# Patient Record
Sex: MALE | Race: WHITE | Employment: OTHER | URBAN - METROPOLITAN AREA
[De-identification: names, ages, dates, MRNs, and addresses within clinical notes are randomized per-mention and may not be internally consistent; named-entity substitution may affect disease eponyms.]

---

## 2017-07-19 PROBLEM — Z86.79 H/O ENDOCARDITIS: Status: ACTIVE | Noted: 2017-07-19

## 2018-05-10 ENCOUNTER — OFFICE VISIT (OUTPATIENT)
Dept: ENT CLINIC | Age: 73
End: 2018-05-10
Payer: MEDICARE

## 2018-05-10 VITALS
SYSTOLIC BLOOD PRESSURE: 120 MMHG | DIASTOLIC BLOOD PRESSURE: 70 MMHG | OXYGEN SATURATION: 97 % | HEIGHT: 72 IN | HEART RATE: 70 BPM | BODY MASS INDEX: 25.73 KG/M2 | WEIGHT: 190 LBS

## 2018-05-10 DIAGNOSIS — H61.23 BILATERAL IMPACTED CERUMEN: Primary | ICD-10-CM

## 2018-05-10 DIAGNOSIS — J30.2 SEASONAL ALLERGIC RHINITIS, UNSPECIFIED CHRONICITY, UNSPECIFIED TRIGGER: ICD-10-CM

## 2018-05-10 PROCEDURE — 69210 REMOVE IMPACTED EAR WAX UNI: CPT | Performed by: OTOLARYNGOLOGY

## 2018-05-10 PROCEDURE — 99213 OFFICE O/P EST LOW 20 MIN: CPT | Performed by: OTOLARYNGOLOGY

## 2018-05-10 RX ORDER — AZELASTINE 1 MG/ML
1 SPRAY, METERED NASAL 2 TIMES DAILY
Qty: 30 ML | Refills: 3 | Status: SHIPPED | OUTPATIENT
Start: 2018-05-10 | End: 2018-11-16 | Stop reason: SDUPTHER

## 2018-05-10 ASSESSMENT — ENCOUNTER SYMPTOMS
COUGH: 0
DIARRHEA: 0
SHORTNESS OF BREATH: 0
RHINORRHEA: 0
CONSTIPATION: 0
NAUSEA: 0
VOMITING: 0
SORE THROAT: 0

## 2018-05-23 ENCOUNTER — OFFICE VISIT (OUTPATIENT)
Dept: NEUROLOGY | Age: 73
End: 2018-05-23
Payer: MEDICARE

## 2018-05-23 VITALS
DIASTOLIC BLOOD PRESSURE: 77 MMHG | TEMPERATURE: 98.3 F | SYSTOLIC BLOOD PRESSURE: 125 MMHG | WEIGHT: 190 LBS | BODY MASS INDEX: 25.73 KG/M2 | RESPIRATION RATE: 18 BRPM | HEIGHT: 72 IN | OXYGEN SATURATION: 96 % | HEART RATE: 69 BPM

## 2018-05-23 DIAGNOSIS — G35 MULTIPLE SCLEROSIS (HCC): Primary | ICD-10-CM

## 2018-05-23 PROCEDURE — 99213 OFFICE O/P EST LOW 20 MIN: CPT | Performed by: NURSE PRACTITIONER

## 2018-05-23 RX ORDER — PROPYLENE GLYCOL/PEG 400 0.3 %-0.4%
DROPS OPHTHALMIC (EYE)
COMMUNITY
End: 2020-06-18

## 2018-07-18 RX ORDER — FLUTICASONE PROPIONATE 50 MCG
SPRAY, SUSPENSION (ML) NASAL
Qty: 48 G | Refills: 3 | Status: SHIPPED | OUTPATIENT
Start: 2018-07-18 | End: 2018-11-12 | Stop reason: SDUPTHER

## 2018-08-01 ENCOUNTER — OFFICE VISIT (OUTPATIENT)
Dept: CARDIOLOGY CLINIC | Age: 73
End: 2018-08-01
Payer: MEDICARE

## 2018-08-01 VITALS
HEIGHT: 72 IN | RESPIRATION RATE: 12 BRPM | HEART RATE: 71 BPM | DIASTOLIC BLOOD PRESSURE: 68 MMHG | WEIGHT: 186.8 LBS | BODY MASS INDEX: 25.3 KG/M2 | SYSTOLIC BLOOD PRESSURE: 114 MMHG

## 2018-08-01 DIAGNOSIS — I34.0 SEVERE MITRAL REGURGITATION: ICD-10-CM

## 2018-08-01 DIAGNOSIS — K21.00 GASTROESOPHAGEAL REFLUX DISEASE WITH ESOPHAGITIS: ICD-10-CM

## 2018-08-01 DIAGNOSIS — Z87.442 HISTORY OF KIDNEY STONES: ICD-10-CM

## 2018-08-01 DIAGNOSIS — K44.9 HIATAL HERNIA: ICD-10-CM

## 2018-08-01 DIAGNOSIS — E11.9 TYPE 2 DIABETES MELLITUS WITHOUT COMPLICATION, WITHOUT LONG-TERM CURRENT USE OF INSULIN (HCC): ICD-10-CM

## 2018-08-01 DIAGNOSIS — I34.1 MITRAL VALVE PROLAPSE: Primary | ICD-10-CM

## 2018-08-01 DIAGNOSIS — G35 MULTIPLE SCLEROSIS (HCC): ICD-10-CM

## 2018-08-01 DIAGNOSIS — I35.1 NONRHEUMATIC AORTIC VALVE INSUFFICIENCY: ICD-10-CM

## 2018-08-01 DIAGNOSIS — I10 ESSENTIAL HYPERTENSION: ICD-10-CM

## 2018-08-01 DIAGNOSIS — N40.1 BENIGN PROSTATIC HYPERPLASIA WITH LOWER URINARY TRACT SYMPTOMS, SYMPTOM DETAILS UNSPECIFIED: ICD-10-CM

## 2018-08-01 DIAGNOSIS — Z87.19 S/P DILATATION OF ESOPHAGEAL STRICTURE: ICD-10-CM

## 2018-08-01 DIAGNOSIS — Z90.79 S/P TURP: ICD-10-CM

## 2018-08-01 DIAGNOSIS — I27.20 PULMONARY HTN (HCC): ICD-10-CM

## 2018-08-01 DIAGNOSIS — Z86.79 HISTORY OF PSVT (PAROXYSMAL SUPRAVENTRICULAR TACHYCARDIA): ICD-10-CM

## 2018-08-01 DIAGNOSIS — I37.1 NONRHEUMATIC PULMONARY VALVE INSUFFICIENCY: ICD-10-CM

## 2018-08-01 DIAGNOSIS — I36.1 NON-RHEUMATIC TRICUSPID VALVE INSUFFICIENCY: ICD-10-CM

## 2018-08-01 DIAGNOSIS — C68.9 UROTHELIAL CARCINOMA (HCC): ICD-10-CM

## 2018-08-01 DIAGNOSIS — J61 ASBESTOSIS (HCC): ICD-10-CM

## 2018-08-01 DIAGNOSIS — Z86.79 H/O ENDOCARDITIS: ICD-10-CM

## 2018-08-01 DIAGNOSIS — F41.0 PANIC ATTACKS: ICD-10-CM

## 2018-08-01 DIAGNOSIS — F41.9 ANXIETY: ICD-10-CM

## 2018-08-01 DIAGNOSIS — K22.70 BARRETT'S ESOPHAGUS WITHOUT DYSPLASIA: ICD-10-CM

## 2018-08-01 DIAGNOSIS — Z87.19 HISTORY OF ESOPHAGEAL STRICTURE: ICD-10-CM

## 2018-08-01 DIAGNOSIS — F40.240 CLAUSTROPHOBIA: ICD-10-CM

## 2018-08-01 DIAGNOSIS — N52.9 ERECTILE DYSFUNCTION, UNSPECIFIED ERECTILE DYSFUNCTION TYPE: ICD-10-CM

## 2018-08-01 DIAGNOSIS — Z98.890 S/P DILATATION OF ESOPHAGEAL STRICTURE: ICD-10-CM

## 2018-08-01 PROCEDURE — 93000 ELECTROCARDIOGRAM COMPLETE: CPT | Performed by: INTERNAL MEDICINE

## 2018-08-01 PROCEDURE — 99213 OFFICE O/P EST LOW 20 MIN: CPT | Performed by: INTERNAL MEDICINE

## 2018-08-01 RX ORDER — TAMSULOSIN HYDROCHLORIDE 0.4 MG/1
0.4 CAPSULE ORAL EVERY MORNING
COMMUNITY
Start: 2018-06-11 | End: 2022-04-07 | Stop reason: ALTCHOICE
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
## 2018-08-13 ENCOUNTER — HOSPITAL ENCOUNTER (EMERGENCY)
Age: 73
Discharge: HOME | End: 2018-08-13
Payer: MEDICARE

## 2018-08-13 ENCOUNTER — APPOINTMENT (OUTPATIENT)
Dept: GENERAL RADIOLOGY | Age: 73
End: 2018-08-13
Payer: MEDICARE

## 2018-08-13 VITALS
RESPIRATION RATE: 14 BRPM | OXYGEN SATURATION: 96 % | WEIGHT: 190 LBS | TEMPERATURE: 97.8 F | BODY MASS INDEX: 25.73 KG/M2 | SYSTOLIC BLOOD PRESSURE: 122 MMHG | HEART RATE: 72 BPM | HEIGHT: 72 IN | DIASTOLIC BLOOD PRESSURE: 68 MMHG
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
## 2018-08-13 DIAGNOSIS — S93.402A SPRAIN OF LEFT ANKLE, UNSPECIFIED LIGAMENT, INITIAL ENCOUNTER: Primary | ICD-10-CM

## 2018-08-13 PROCEDURE — 73610 X-RAY EXAM OF ANKLE: CPT

## 2018-08-13 PROCEDURE — 99283 EMERGENCY DEPT VISIT LOW MDM: CPT

## 2018-08-13 PROCEDURE — 73564 X-RAY EXAM KNEE 4 OR MORE: CPT

## 2018-08-13 PROCEDURE — 73502 X-RAY EXAM HIP UNI 2-3 VIEWS: CPT

## 2018-08-13 PROCEDURE — 73630 X-RAY EXAM OF FOOT: CPT

## 2018-08-13 RX ORDER — HYDROCODONE BITARTRATE AND ACETAMINOPHEN 5; 325 MG/1; MG/1
1 TABLET ORAL EVERY 6 HOURS PRN
Qty: 12 TABLET | Refills: 0 | Status: SHIPPED | OUTPATIENT
Start: 2018-08-13 | End: 2018-08-16

## 2018-08-13 NOTE — ED PROVIDER NOTES
Independent MLP      HPI:  8/13/18,   Time: 12:54 PM         August Alba Kaur is a 74 y.o. male presenting to the ED for fall, beginning just prior to arrival .  The complaint has been persistent, moderate in severity, and worsened by walking. Pt reports he was working in yard and fell. States his left foot was planted and he twisted his left knee. Comes in complaining of pain in left ankle and knee. He states he fall to ground and was unable to get up due to pain. Reports he landed on his left hip. Has pain in his left ankle and foot as well. Very painful to ambulate. No swelling or bruising reported. Denies hitting his head. No LOC. States he is not on any blood thinners. ROS:     Constitutional: Negative for fever and chills  HENT: Negative for ear pain, sore throat and sinus pressure  Eyes: Negative for pain, discharge and redness  Respiratory:  Negative for shortness of breath, cough and wheezing  Cardiovascular: Negative for CP, edema or palpitations  Gastrointestinal: Negative for nausea, vomiting, diarrhea and abdominal distention  Genitourinary: Negative for dysuria and frequency  Musculoskeletal:  See HPI  Skin: Negative for rash and wound  Neurological: Negative for weakness and headaches  Hematological: Negative for adenopathy    All other systems reviewed and are negative      --------------------------------------------- PAST HISTORY ---------------------------------------------  Past Medical History:  has a past medical history of Abnormal PSA; Anxiety; Bronchitis; Claustrophobia; Diabetes mellitus ([address] Endocarditis; Enlarged LA (left atrium); Erectile dysfunction; GERD (gastroesophageal reflux disease); H/O complete arterial study of extremity; Hiatal hernia; History of EMG; Hypertension; Lung nodule; Mitral valve prolapse; Moderate tricuspid regurgitation; Multiple sclerosis ([address] Prostatitis; Renal calculi; Sinus problem; Supraventricular arrhythmia;  Tachycardia; and Tilt table

## 2018-08-17 DIAGNOSIS — M54.17 L-S RADICULOPATHY: ICD-10-CM

## 2018-08-17 RX ORDER — GABAPENTIN 100 MG/1
200 CAPSULE ORAL 3 TIMES DAILY
Qty: 180 CAPSULE | Refills: 2 | Status: SHIPPED | OUTPATIENT
Start: 2018-08-17 | End: 2018-08-21 | Stop reason: SDUPTHER

## 2018-08-21 DIAGNOSIS — M54.17 L-S RADICULOPATHY: ICD-10-CM

## 2018-08-21 RX ORDER — GABAPENTIN 100 MG/1
200 CAPSULE ORAL 3 TIMES DAILY
Qty: 180 CAPSULE | Refills: 2 | Status: SHIPPED | OUTPATIENT
Start: 2018-08-21 | End: 2019-01-28 | Stop reason: SDUPTHER

## 2018-11-05 ENCOUNTER — OFFICE VISIT (OUTPATIENT)
Dept: NEUROLOGY | Age: 73
End: 2018-11-05
Payer: MEDICARE

## 2018-11-05 VITALS
HEART RATE: 69 BPM | TEMPERATURE: 96.6 F | WEIGHT: 190 LBS | BODY MASS INDEX: 25.73 KG/M2 | OXYGEN SATURATION: 95 % | HEIGHT: 72 IN | SYSTOLIC BLOOD PRESSURE: 117 MMHG | DIASTOLIC BLOOD PRESSURE: 79 MMHG | RESPIRATION RATE: 18 BRPM

## 2018-11-05 DIAGNOSIS — G35 MULTIPLE SCLEROSIS (HCC): ICD-10-CM

## 2018-11-05 DIAGNOSIS — M54.17 L-S RADICULOPATHY: Primary | ICD-10-CM

## 2018-11-05 PROCEDURE — 99215 OFFICE O/P EST HI 40 MIN: CPT | Performed by: PSYCHIATRY & NEUROLOGY

## 2018-11-05 RX ORDER — HYDROCODONE BITARTRATE AND ACETAMINOPHEN 5; 325 MG/1; MG/1
1 TABLET ORAL EVERY 8 HOURS PRN
COMMUNITY
End: 2020-12-14

## 2018-11-05 RX ORDER — DUTASTERIDE 0.5 MG/1
0.5 CAPSULE, LIQUID FILLED ORAL EVERY MORNING
COMMUNITY
End: 2022-04-07 | Stop reason: ALTCHOICE

## 2018-11-08 ENCOUNTER — OFFICE VISIT (OUTPATIENT)
Dept: ENT CLINIC | Age: 73
End: 2018-11-08
Payer: MEDICARE

## 2018-11-08 VITALS
SYSTOLIC BLOOD PRESSURE: 111 MMHG | HEIGHT: 72 IN | OXYGEN SATURATION: 96 % | WEIGHT: 190 LBS | BODY MASS INDEX: 25.73 KG/M2 | HEART RATE: 73 BPM | DIASTOLIC BLOOD PRESSURE: 63 MMHG

## 2018-11-08 DIAGNOSIS — R42 VERTIGO: ICD-10-CM

## 2018-11-08 DIAGNOSIS — H61.23 BILATERAL IMPACTED CERUMEN: Primary | ICD-10-CM

## 2018-11-08 PROCEDURE — 69210 REMOVE IMPACTED EAR WAX UNI: CPT | Performed by: OTOLARYNGOLOGY

## 2018-11-08 PROCEDURE — 99213 OFFICE O/P EST LOW 20 MIN: CPT | Performed by: OTOLARYNGOLOGY

## 2018-11-08 ASSESSMENT — ENCOUNTER SYMPTOMS
DIARRHEA: 0
NAUSEA: 0
COUGH: 0
VOMITING: 0
SORE THROAT: 0
CONSTIPATION: 0
RHINORRHEA: 0
SHORTNESS OF BREATH: 0

## 2018-11-08 NOTE — PROGRESS NOTES
Subjective:      Patient ID:  Sonu Joe is a 74 y.o. male. HPI:    Pt presents with a history of cerumen impaction removal.   The patients ear was last cleaned 6 month(s) ago. The patient was not using ear drops to loosen wax immediately prior to thisvisit. Pt has had some vertigo issues 1 week ago. Pt turned head to the left and experienced some dizziness. Pt had seen neurology for his MS but felt it was inner ear. Hearing aids: no      Patient's medications, allergies, past medical,surgical, social and family histories were reviewed and updated as appropriate. Review of Systems   Constitutional: Negative for chills and fever. HENT: Negative for congestion, ear discharge, ear pain, postnasal drip, rhinorrhea and sore throat. Respiratory: Negative for cough and shortness of breath. Cardiovascular: Negative for chest pain. Gastrointestinal: Negative for constipation, diarrhea, nausea and vomiting. Allergic/Immunologic: Positive for environmental allergies. Neurological: Positive for dizziness. Negative for light-headedness. All other systems reviewed and are negative. Objective:   Physical Exam   Constitutional: He appears well-developed and well-nourished. HENT:   Head: Normocephalic and atraumatic. Right Ear: Hearing normal.   Left Ear: Hearing normal.   Nose: Nose normal. No mucosal edema or rhinorrhea. Mouth/Throat: Normal dentition. No dental abscesses or dental caries. No oropharyngeal exudate, posterior oropharyngeal edema, posterior oropharyngeal erythema or tonsillar abscesses. Bilateral cerumen impaction. Eyes: Pupils are equal, round, and reactive to light. Conjunctivae are normal.   Neck: Normal range of motion. Cardiovascular: Normal rate. Abdominal: Soft. Neurological: He is alert. Skin: Skin is warm. Cerumen removal     Auditory canal(s) both ears completely obstructed with cerumen.   A microscope was not used due

## 2018-11-14 RX ORDER — FLUTICASONE PROPIONATE 50 MCG
1 SPRAY, SUSPENSION (ML) NASAL DAILY
Qty: 3 BOTTLE | Refills: 1 | Status: SHIPPED
Start: 2018-11-14 | End: 2020-02-07

## 2018-11-16 RX ORDER — AZELASTINE 1 MG/ML
1 SPRAY, METERED NASAL 2 TIMES DAILY
Qty: 3 BOTTLE | Refills: 1 | Status: SHIPPED | OUTPATIENT
Start: 2018-11-16 | End: 2019-07-22

## 2019-01-28 DIAGNOSIS — M54.17 L-S RADICULOPATHY: ICD-10-CM

## 2019-01-28 RX ORDER — GABAPENTIN 100 MG/1
200 CAPSULE ORAL 3 TIMES DAILY
Qty: 270 CAPSULE | Refills: 2 | Status: SHIPPED | OUTPATIENT
Start: 2019-01-28 | End: 2019-07-05 | Stop reason: SDUPTHER

## 2019-02-25 ENCOUNTER — OFFICE VISIT (OUTPATIENT)
Dept: NEUROLOGY | Age: 74
End: 2019-02-25
Payer: MEDICARE

## 2019-02-25 VITALS
SYSTOLIC BLOOD PRESSURE: 133 MMHG | TEMPERATURE: 97.4 F | OXYGEN SATURATION: 97 % | WEIGHT: 196 LBS | BODY MASS INDEX: 26.58 KG/M2 | DIASTOLIC BLOOD PRESSURE: 84 MMHG | RESPIRATION RATE: 18 BRPM

## 2019-02-25 DIAGNOSIS — G35 MULTIPLE SCLEROSIS (HCC): ICD-10-CM

## 2019-02-25 PROCEDURE — 99215 OFFICE O/P EST HI 40 MIN: CPT | Performed by: PSYCHIATRY & NEUROLOGY

## 2019-03-07 ENCOUNTER — TELEPHONE (OUTPATIENT)
Dept: ENT CLINIC | Age: 74
End: 2019-03-07

## 2019-03-20 ENCOUNTER — OFFICE VISIT (OUTPATIENT)
Dept: CARDIOLOGY CLINIC | Age: 74
End: 2019-03-20
Payer: MEDICARE

## 2019-03-20 VITALS
HEART RATE: 71 BPM | BODY MASS INDEX: 26.63 KG/M2 | HEIGHT: 72 IN | SYSTOLIC BLOOD PRESSURE: 112 MMHG | DIASTOLIC BLOOD PRESSURE: 70 MMHG | RESPIRATION RATE: 18 BRPM | WEIGHT: 196.6 LBS

## 2019-03-20 DIAGNOSIS — I34.0 SEVERE MITRAL REGURGITATION: ICD-10-CM

## 2019-03-20 DIAGNOSIS — K22.70 BARRETT'S ESOPHAGUS WITHOUT DYSPLASIA: ICD-10-CM

## 2019-03-20 DIAGNOSIS — I47.1 PAROXYSMAL SUPRAVENTRICULAR TACHYCARDIA (HCC): ICD-10-CM

## 2019-03-20 DIAGNOSIS — M21.372 FOOT DROP, LEFT: ICD-10-CM

## 2019-03-20 DIAGNOSIS — Z86.79 H/O ENDOCARDITIS: ICD-10-CM

## 2019-03-20 DIAGNOSIS — Z90.79 S/P TURP: ICD-10-CM

## 2019-03-20 DIAGNOSIS — J61 ASBESTOSIS (HCC): ICD-10-CM

## 2019-03-20 DIAGNOSIS — I38 VHD (VALVULAR HEART DISEASE): Primary | ICD-10-CM

## 2019-03-20 DIAGNOSIS — F41.9 ANXIETY: ICD-10-CM

## 2019-03-20 DIAGNOSIS — N52.8 OTHER MALE ERECTILE DYSFUNCTION: ICD-10-CM

## 2019-03-20 DIAGNOSIS — F40.240 CLAUSTROPHOBIA: ICD-10-CM

## 2019-03-20 DIAGNOSIS — Z85.51 HISTORY OF BLADDER CANCER: ICD-10-CM

## 2019-03-20 DIAGNOSIS — I36.1 NON-RHEUMATIC TRICUSPID VALVE INSUFFICIENCY: ICD-10-CM

## 2019-03-20 DIAGNOSIS — I27.20 PULMONARY HTN (HCC): ICD-10-CM

## 2019-03-20 DIAGNOSIS — K44.9 HIATAL HERNIA: ICD-10-CM

## 2019-03-20 DIAGNOSIS — G35 MULTIPLE SCLEROSIS (HCC): ICD-10-CM

## 2019-03-20 DIAGNOSIS — I10 ESSENTIAL HYPERTENSION: ICD-10-CM

## 2019-03-20 DIAGNOSIS — I34.1 MITRAL VALVE PROLAPSE: ICD-10-CM

## 2019-03-20 DIAGNOSIS — Z98.890 HISTORY OF ESOPHAGEAL DILATATION: ICD-10-CM

## 2019-03-20 DIAGNOSIS — K21.9 GASTROESOPHAGEAL REFLUX DISEASE, ESOPHAGITIS PRESENCE NOT SPECIFIED: ICD-10-CM

## 2019-03-20 DIAGNOSIS — I35.1 NONRHEUMATIC AORTIC VALVE INSUFFICIENCY: ICD-10-CM

## 2019-03-20 DIAGNOSIS — E11.9 DIET-CONTROLLED DIABETES MELLITUS (HCC): ICD-10-CM

## 2019-03-20 DIAGNOSIS — Z86.59 HISTORY OF PANIC ATTACKS: ICD-10-CM

## 2019-03-20 PROCEDURE — 93000 ELECTROCARDIOGRAM COMPLETE: CPT | Performed by: INTERNAL MEDICINE

## 2019-03-20 PROCEDURE — 99214 OFFICE O/P EST MOD 30 MIN: CPT | Performed by: INTERNAL MEDICINE

## 2019-04-29 ENCOUNTER — HOSPITAL ENCOUNTER (OUTPATIENT)
Dept: CARDIOLOGY | Age: 74
Discharge: HOME | End: 2019-04-29
Payer: MEDICARE

## 2019-04-29 DIAGNOSIS — I34.1 MITRAL VALVE PROLAPSE: ICD-10-CM

## 2019-04-29 DIAGNOSIS — I36.1 NON-RHEUMATIC TRICUSPID VALVE INSUFFICIENCY: ICD-10-CM

## 2019-04-29 DIAGNOSIS — I38 VHD (VALVULAR HEART DISEASE): ICD-10-CM

## 2019-04-29 DIAGNOSIS — I27.20 PULMONARY HTN (HCC): ICD-10-CM

## 2019-04-29 DIAGNOSIS — I34.0 SEVERE MITRAL REGURGITATION: ICD-10-CM

## 2019-04-29 DIAGNOSIS — I35.1 NONRHEUMATIC AORTIC VALVE INSUFFICIENCY: ICD-10-CM

## 2019-04-29 LAB
LV EF: 63 %
LVEF MODALITY: NORMAL

## 2019-04-29 PROCEDURE — 93306 TTE W/DOPPLER COMPLETE: CPT

## 2019-05-09 ENCOUNTER — TELEPHONE (OUTPATIENT)
Dept: ENT CLINIC | Age: 74
End: 2019-05-09

## 2019-05-09 NOTE — TELEPHONE ENCOUNTER
Patient was scheduled this afternoon for an appt at 1:15pm. Unfortunately, Dr. Luis Miguel Stinson was delayed in surgery. We announced the delay to the waiting room for all the patients including each patient that came in afterwards. When the patient came to the window tired of waiting he said he was never told of the delay, however he would wait. Within 5 minutes the patient was called by the clinical staff to be roomed. Patient was no where to be found. We gave him 5 minutes in case he was in the hallway restroom and then I went to look for him to make sure he did not have an fall or accident. I then left a message on the patient's cell phone asking if he was okay since we could not find him to see the doctor. Patient's wife called back 30 minutes later yelling that it was ridiculous that we let him wait for 90 minutes before telling him the doctor was delayed. I apologized for the delay and miscommunication several times throughout the conversation, as the patient himself was yelling in the background as well. He insists no one was told of the delay. I again apologized and asked if the patient would like to be rescheduled. He accepted an appt for next Tuesday, May 14, and said he was definitely coming because he was going to let Dr. Luis Miguel Stinson know how upset he was.

## 2019-05-13 ENCOUNTER — TELEPHONE (OUTPATIENT)
Dept: CARDIOLOGY CLINIC | Age: 74
End: 2019-05-13

## 2019-05-14 ENCOUNTER — OFFICE VISIT (OUTPATIENT)
Dept: ENT CLINIC | Age: 74
End: 2019-05-14
Payer: MEDICARE

## 2019-05-14 VITALS
SYSTOLIC BLOOD PRESSURE: 137 MMHG | WEIGHT: 190 LBS | HEIGHT: 72 IN | BODY MASS INDEX: 25.73 KG/M2 | HEART RATE: 75 BPM | DIASTOLIC BLOOD PRESSURE: 80 MMHG

## 2019-05-14 DIAGNOSIS — R42 VERTIGO: ICD-10-CM

## 2019-05-14 DIAGNOSIS — H61.23 BILATERAL IMPACTED CERUMEN: Primary | ICD-10-CM

## 2019-05-14 PROCEDURE — 69210 REMOVE IMPACTED EAR WAX UNI: CPT | Performed by: OTOLARYNGOLOGY

## 2019-05-14 PROCEDURE — 99213 OFFICE O/P EST LOW 20 MIN: CPT | Performed by: OTOLARYNGOLOGY

## 2019-05-14 ASSESSMENT — ENCOUNTER SYMPTOMS
RHINORRHEA: 0
DIARRHEA: 0
NAUSEA: 0
SHORTNESS OF BREATH: 0
COUGH: 0
CONSTIPATION: 0
VOMITING: 0
SORE THROAT: 0

## 2019-06-10 ENCOUNTER — HOSPITAL ENCOUNTER (OUTPATIENT)
Dept: GENERAL RADIOLOGY | Age: 74
Discharge: HOME | End: 2019-06-12
Payer: MEDICARE

## 2019-06-10 ENCOUNTER — HOSPITAL ENCOUNTER (OUTPATIENT)
Age: 74
Discharge: HOME | End: 2019-06-12
Payer: MEDICARE

## 2019-06-10 DIAGNOSIS — N20.0 KIDNEY STONES: ICD-10-CM

## 2019-06-10 PROCEDURE — 74018 RADEX ABDOMEN 1 VIEW: CPT

## 2019-07-22 ENCOUNTER — OFFICE VISIT (OUTPATIENT)
Dept: NEUROLOGY | Age: 74
End: 2019-07-22
Payer: MEDICARE

## 2019-07-22 VITALS
BODY MASS INDEX: 25.73 KG/M2 | OXYGEN SATURATION: 97 % | RESPIRATION RATE: 18 BRPM | SYSTOLIC BLOOD PRESSURE: 133 MMHG | WEIGHT: 190 LBS | DIASTOLIC BLOOD PRESSURE: 85 MMHG | HEART RATE: 68 BPM | HEIGHT: 72 IN

## 2019-07-22 DIAGNOSIS — H81.10 BENIGN PAROXYSMAL POSITIONAL VERTIGO, UNSPECIFIED LATERALITY: ICD-10-CM

## 2019-07-22 DIAGNOSIS — G35 MULTIPLE SCLEROSIS (HCC): Primary | ICD-10-CM

## 2019-07-22 DIAGNOSIS — M54.17 L-S RADICULOPATHY: ICD-10-CM

## 2019-07-22 PROCEDURE — 99215 OFFICE O/P EST HI 40 MIN: CPT | Performed by: PSYCHIATRY & NEUROLOGY

## 2019-07-22 RX ORDER — LORATADINE 10 MG/1
TABLET ORAL
Refills: 5 | COMMUNITY
Start: 2019-07-02 | End: 2020-12-14

## 2019-07-22 RX ORDER — MECLIZINE HCL 12.5 MG/1
12.5 TABLET ORAL 3 TIMES DAILY PRN
Qty: 60 TABLET | Refills: 3 | Status: SHIPPED | OUTPATIENT
Start: 2019-07-22 | End: 2020-01-18

## 2019-07-22 RX ORDER — AZELASTINE 1 MG/ML
1 SPRAY, METERED NASAL DAILY
COMMUNITY
Start: 2019-06-28 | End: 2019-11-18 | Stop reason: SDUPTHER

## 2019-10-18 ENCOUNTER — OFFICE VISIT (OUTPATIENT)
Dept: CARDIOLOGY CLINIC | Age: 74
End: 2019-10-18
Payer: MEDICARE

## 2019-10-18 VITALS
DIASTOLIC BLOOD PRESSURE: 72 MMHG | SYSTOLIC BLOOD PRESSURE: 120 MMHG | BODY MASS INDEX: 26.17 KG/M2 | HEIGHT: 72 IN | HEART RATE: 68 BPM | RESPIRATION RATE: 18 BRPM | WEIGHT: 193.2 LBS

## 2019-10-18 DIAGNOSIS — I38 VHD (VALVULAR HEART DISEASE): Primary | ICD-10-CM

## 2019-10-18 DIAGNOSIS — Z98.890 HISTORY OF ESOPHAGEAL DILATATION: ICD-10-CM

## 2019-10-18 DIAGNOSIS — Z98.890 HISTORY OF TRANSURETHRAL RESECTION OF PROSTATE: ICD-10-CM

## 2019-10-18 DIAGNOSIS — R07.89 ATYPICAL CHEST PAIN: ICD-10-CM

## 2019-10-18 DIAGNOSIS — K22.70 BARRETT'S ESOPHAGUS WITHOUT DYSPLASIA: ICD-10-CM

## 2019-10-18 DIAGNOSIS — Z87.19 H/O GASTRITIS: ICD-10-CM

## 2019-10-18 DIAGNOSIS — Z86.79 H/O ENDOCARDITIS: ICD-10-CM

## 2019-10-18 DIAGNOSIS — F41.9 ANXIETY: ICD-10-CM

## 2019-10-18 DIAGNOSIS — I35.1 NONRHEUMATIC AORTIC VALVE INSUFFICIENCY: ICD-10-CM

## 2019-10-18 DIAGNOSIS — K44.9 HIATAL HERNIA WITH GASTROESOPHAGEAL REFLUX: ICD-10-CM

## 2019-10-18 DIAGNOSIS — N52.9 ERECTILE DYSFUNCTION, UNSPECIFIED ERECTILE DYSFUNCTION TYPE: ICD-10-CM

## 2019-10-18 DIAGNOSIS — I36.1 NONRHEUMATIC TRICUSPID VALVE REGURGITATION: ICD-10-CM

## 2019-10-18 DIAGNOSIS — K21.9 HIATAL HERNIA WITH GASTROESOPHAGEAL REFLUX: ICD-10-CM

## 2019-10-18 DIAGNOSIS — I34.1 MITRAL VALVE PROLAPSE: ICD-10-CM

## 2019-10-18 DIAGNOSIS — K22.2 PEPTIC STRICTURE OF ESOPHAGUS: ICD-10-CM

## 2019-10-18 DIAGNOSIS — I34.0 MODERATE TO SEVERE MITRAL REGURGITATION: ICD-10-CM

## 2019-10-18 DIAGNOSIS — E11.9 DIET-CONTROLLED DIABETES MELLITUS (HCC): ICD-10-CM

## 2019-10-18 DIAGNOSIS — F40.240 CLAUSTROPHOBIA: ICD-10-CM

## 2019-10-18 DIAGNOSIS — M21.379 FOOT-DROP, UNSPECIFIED LATERALITY: ICD-10-CM

## 2019-10-18 DIAGNOSIS — I47.1 PAROXYSMAL SUPRAVENTRICULAR TACHYCARDIA (HCC): ICD-10-CM

## 2019-10-18 DIAGNOSIS — J61 ASBESTOSIS (HCC): ICD-10-CM

## 2019-10-18 DIAGNOSIS — Z86.59 HISTORY OF PANIC ATTACKS: ICD-10-CM

## 2019-10-18 DIAGNOSIS — G35 MULTIPLE SCLEROSIS (HCC): ICD-10-CM

## 2019-10-18 DIAGNOSIS — Z85.51 HISTORY OF BLADDER CANCER: ICD-10-CM

## 2019-10-18 DIAGNOSIS — Z90.79 HISTORY OF TRANSURETHRAL RESECTION OF PROSTATE: ICD-10-CM

## 2019-10-18 DIAGNOSIS — I10 ESSENTIAL HYPERTENSION: ICD-10-CM

## 2019-10-18 DIAGNOSIS — I27.20 PULMONARY HTN (HCC): ICD-10-CM

## 2019-10-18 PROCEDURE — 93000 ELECTROCARDIOGRAM COMPLETE: CPT | Performed by: INTERNAL MEDICINE

## 2019-10-18 PROCEDURE — 99214 OFFICE O/P EST MOD 30 MIN: CPT | Performed by: INTERNAL MEDICINE

## 2019-11-18 RX ORDER — AZELASTINE 1 MG/ML
SPRAY, METERED NASAL
Qty: 90 ML | Refills: 2 | Status: SHIPPED
Start: 2019-11-18 | End: 2021-02-11

## 2019-12-16 ENCOUNTER — OFFICE VISIT (OUTPATIENT)
Dept: NEUROLOGY | Age: 74
End: 2019-12-16
Payer: MEDICARE

## 2019-12-16 VITALS
HEART RATE: 65 BPM | HEIGHT: 72 IN | BODY MASS INDEX: 25.73 KG/M2 | WEIGHT: 190 LBS | DIASTOLIC BLOOD PRESSURE: 74 MMHG | SYSTOLIC BLOOD PRESSURE: 122 MMHG

## 2019-12-16 DIAGNOSIS — M54.17 L-S RADICULOPATHY: ICD-10-CM

## 2019-12-16 PROCEDURE — 99215 OFFICE O/P EST HI 40 MIN: CPT | Performed by: PSYCHIATRY & NEUROLOGY

## 2019-12-16 RX ORDER — GABAPENTIN 300 MG/1
300 CAPSULE ORAL 4 TIMES DAILY
Qty: 360 CAPSULE | Refills: 3 | Status: SHIPPED
Start: 2019-12-16 | End: 2021-02-03

## 2020-01-13 ENCOUNTER — TELEPHONE (OUTPATIENT)
Dept: ADMINISTRATIVE | Age: 75
End: 2020-01-13

## 2020-02-12 RX ORDER — FLUTICASONE PROPIONATE 50 MCG
SPRAY, SUSPENSION (ML) NASAL
Qty: 3 BOTTLE | Refills: 1 | Status: SHIPPED
Start: 2020-02-12 | End: 2021-02-09

## 2020-05-11 ENCOUNTER — OFFICE VISIT (OUTPATIENT)
Dept: CARDIOLOGY CLINIC | Age: 75
End: 2020-05-11
Payer: MEDICARE

## 2020-05-11 VITALS
WEIGHT: 195.2 LBS | SYSTOLIC BLOOD PRESSURE: 132 MMHG | BODY MASS INDEX: 26.44 KG/M2 | DIASTOLIC BLOOD PRESSURE: 72 MMHG | HEIGHT: 72 IN | RESPIRATION RATE: 18 BRPM | HEART RATE: 76 BPM

## 2020-05-11 PROCEDURE — 93000 ELECTROCARDIOGRAM COMPLETE: CPT | Performed by: INTERNAL MEDICINE

## 2020-05-11 PROCEDURE — 99213 OFFICE O/P EST LOW 20 MIN: CPT | Performed by: INTERNAL MEDICINE
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
## 2020-06-17 ENCOUNTER — OFFICE VISIT (OUTPATIENT)
Dept: ENT CLINIC | Age: 75
End: 2020-06-17
Payer: MEDICARE

## 2020-06-17 VITALS — WEIGHT: 195 LBS | BODY MASS INDEX: 26.41 KG/M2 | HEIGHT: 72 IN

## 2020-06-17 PROCEDURE — 69210 REMOVE IMPACTED EAR WAX UNI: CPT | Performed by: OTOLARYNGOLOGY

## 2020-06-17 ASSESSMENT — ENCOUNTER SYMPTOMS
SHORTNESS OF BREATH: 0
CONSTIPATION: 0
VOMITING: 0
DIARRHEA: 0
COUGH: 0
NAUSEA: 0
RHINORRHEA: 0
SORE THROAT: 0

## 2020-06-17 NOTE — PROGRESS NOTES
Subjective:      Patient ID:  Sonu Juárez is a 66 y.o. male. HPI:    Pt presents with a history of cerumen impaction removal.   The patients ear was last cleaned 6 month(s) ago. The patient was not using ear drops to loosen wax immediately prior to this visit. Hearing aids: no      Past Medical History:   Diagnosis Date    Abnormal PSA     Anxiety     With panic attacks.  Bronchitis     Claustrophobia     Diabetes mellitus ([address]     Endocarditis 4/2005    Transesophageal echocardiogram showed prolapse of the posterior mitral leaflet but with no definite vegetation and with moderate mitral regurgitation.  Enlarged LA (left atrium) 3/24/15    severely dilated    Erectile dysfunction     GERD (gastroesophageal reflux disease)     H/O complete arterial study of extremity 2/25/2010    Normal.    Hiatal hernia     History of EMG     testing on legs    Hypertension     Lung nodule     Mitral valve prolapse     Mitral regurgitation.  Moderate tricuspid regurgitation 3/24/15    Multiple sclerosis (HCC)     Mild drop foot.  Prostatitis     Renal calculi 11/2004 S/P lithotripsy. 1/2005 S/P cystoscopy (with further removal of calculi).  Sinus problem     Supraventricular arrhythmia     ? history in 7/2007.  Tachycardia     Tilt table evaluation 2/17/2010    Upright titlt-table test was unremarkable. There was NTG-induced hypotension but without clinical reproduction of the patient's symptoms.      Past Surgical History:   Procedure Laterality Date    BLADDER SURGERY      CARDIOVASCULAR STRESS TEST  10/19/2011  [place] nuclear stress test (4 minute walking protocol) showed a minimally reversible minor, nontransmural defect involving the apical anterolateral wall, more consistent with soft tissue attenuation and motion artifact    with the gated views showing no regional wall motion abnormality with normal left ventricular systolic function and a coputer-calculated EF of  Transportation needs     Medical: None     Non-medical: None   Tobacco Use    Smoking status: Former Smoker     Packs/day: 2.00     Years: 25.00     Pack years: 50.00     Types: Cigarettes     Last attempt to quit: 1982     Years since quittin.5    Smokeless tobacco: Never Used    Tobacco comment: Smoked 2-3 ppd. Substance and Sexual Activity    Alcohol use: Yes     Comment: drinks hard lemonade during the week, scotch 3-4x/week . 3 cups of coffee a day     Drug use: No    Sexual activity: None   Lifestyle    Physical activity     Days per week: None     Minutes per session: None    Stress: None   Relationships    Social connections     Talks on phone: None     Gets together: None     Attends Church service: None     Active member of club or organization: None     Attends meetings of clubs or organizations: None     Relationship status: None    Intimate partner violence     Fear of current or ex partner: None     Emotionally abused: None     Physically abused: None     Forced sexual activity: None   Other Topics Concern    None   Social History Narrative    8 cups coffee daily     Allergies   Allergen Reactions    Baclofen Hives and Swelling    Aleve [Naproxen]      Nausea    Dye [Iodides]      Rash    Shellfish Allergy      Unclear if allergic     Paxil [Paroxetine] Anxiety       Review of Systems   Constitutional: Negative for chills and fever. HENT: Negative for congestion, ear discharge, ear pain, postnasal drip, rhinorrhea and sore throat. Respiratory: Negative for cough and shortness of breath. Cardiovascular: Negative for chest pain. Gastrointestinal: Negative for constipation, diarrhea, nausea and vomiting. Allergic/Immunologic: Positive for environmental allergies. Neurological: Positive for dizziness. Negative for light-headedness. All other systems reviewed and are negative. Objective:    There were no vitals filed for this visit.  Physical Exam  Constitutional:       Appearance: He is well-developed. HENT:      Head: Normocephalic and atraumatic. Right Ear: Hearing normal.      Left Ear: Hearing normal.      Nose: Nose normal. No mucosal edema or rhinorrhea. Mouth/Throat:      Dentition: Normal dentition. No dental caries or dental abscesses. Pharynx: No oropharyngeal exudate or posterior oropharyngeal erythema. Tonsils: No tonsillar abscesses. Eyes:      Conjunctiva/sclera: Conjunctivae normal.      Pupils: Pupils are equal, round, and reactive to light. Neck:      Musculoskeletal: Normal range of motion. Cardiovascular:      Rate and Rhythm: Normal rate. Abdominal:      Palpations: Abdomen is soft. Skin:     General: Skin is warm. Neurological:      Mental Status: He is alert. Cerumen removal     Auditory canal(s) both ears completely obstructed with cerumen. A microscope was used due to deep impaction of the cerumen. Cerumen was gently removed using soft plastic curette, suction. Tympanic membranes are intact following the procedure. Auditory canals appear normal.            Assessment:       Diagnosis Orders   1. Bilateral impacted cerumen     2. Vertigo                Plan:      Cerumen impaction   Discussed H2O2 and irrigation bi-weekly for maintenance.     Follow up in 6 month(s)  [facility]

## 2020-06-18 ENCOUNTER — OFFICE VISIT (OUTPATIENT)
Dept: NEUROLOGY | Age: 75
End: 2020-06-18
Payer: MEDICARE

## 2020-06-18 VITALS
HEIGHT: 72 IN | BODY MASS INDEX: 26.41 KG/M2 | WEIGHT: 195 LBS | HEART RATE: 58 BPM | RESPIRATION RATE: 12 BRPM | TEMPERATURE: 96.3 F | OXYGEN SATURATION: 94 % | SYSTOLIC BLOOD PRESSURE: 115 MMHG | DIASTOLIC BLOOD PRESSURE: 70 MMHG

## 2020-06-18 PROCEDURE — 99215 OFFICE O/P EST HI 40 MIN: CPT | Performed by: PSYCHIATRY & NEUROLOGY

## 2020-06-18 RX ORDER — MECLIZINE HCL 12.5 MG/1
12.5 TABLET ORAL 3 TIMES DAILY PRN
COMMUNITY
End: 2020-12-14

## 2020-06-18 SDOH — HEALTH STABILITY: MENTAL HEALTH: HOW OFTEN DO YOU HAVE A DRINK CONTAINING ALCOHOL?: 2-4 TIMES A MONTH

## 2020-06-18 NOTE — PROGRESS NOTES
Sonu Colon is a 78-year-old right-handed man who presented for follow up of longstanding multiple sclerosis. He was an excellent historian. His medications were meclizine as needed, Flonase, gabapentin, metoprolol, Avodart, hydra oxycodone as needed, Flomax, Singulair, sialoliths, Xanax, Allegra and albuterol inhalers.     He was off Tecfidera for almost 3 years-- his multiple sclerosis remained stable. He again denied additional weakness, clumsiness, speech difficulties, double vision or headaches.      He was not tripping or falling at home. However, he was using a cane more often, citing increasing numbness in both feet. Unfortunately, he also suffered from lumbosacral radiculopathy. These discomforts continued well controlled on gabapentin 600 mg---now only twice daily. He still occasionally used oxycodone. His vertigo had not returned. He had responded remarkably well to low-dose meclizine. He was now taking that drug only as needed. He still reported occasional cramping sensations in both legs.     He  continued to drive and was active socially. He was eating well. He was sleeping well on most occasions. He now noted increasing urinary problems. He would undergo prostate biopsy and imaging studies of his pelvis and kidneys. He was previously afflicted with kidney stones, noting 3 episodes this year alone. Medically, he was otherwise stable. He denied additional episodes of tachycardia, chest pain or palpitations, shortness of breath, loss of consciousness, incontinence, other pains or abdominal issues. He continued in good spirits.     Review of systems was otherwise unremarkable.     Objective:      /70 (Site: Left Arm, Position: Sitting)  Pulse 58  Resp 12  Ht 6' (1.829 m)  Wt 190 lb (86.2 kg)  BMI 25.77 kg/m2  Afebrile      General Appearance: alert, cooperative, in no distress              Neck: supple,, trachea midline, no adenopathy; no carotid bruit or JVD; prostate biopsy.     Assessment:      This individual has suffered from multiple sclerosis for many years with only minimal disease. At present, he again displays only a minimal left foot drop----- likely from his lumbosacral radiculopathies. He has continued well off Tecfidera.     His lumbosacral radiculopathies have stabilized. He will continue with his current dose of gabapentin. If necessary, nerve blocks will be considered. However, I recommended shoe inserts---- which will, hopefully, relieve many of his foot pains and back pains. His benign positional vertigo has not return. .    Medically, he is stable despite his valvular heart disease, paroxysmal supraventricular tachycardia and anxiety disorder. Unfortunately, he may have return of his prostatic cancer.     Plan:      He will be maintained on his same medications. He will stay off disease modifying therapies. He will report back after his above urological studies were completed. He will obtain shoe inserts from [employer]. He will call if any problems arise in the interim. He will return in 6 months. I spent 40 minutes with the patient with over 50 % spent in counseling and disease management.   All patient issues were addressed and all questions were answered.                                                                                   Visit the only 1 that these are the foot still got help was running

## 2020-06-19 ENCOUNTER — TELEPHONE (OUTPATIENT)
Dept: CARDIOLOGY CLINIC | Age: 75
End: 2020-06-19

## 2020-06-25 ENCOUNTER — HOSPITAL ENCOUNTER (OUTPATIENT)
Age: 75
Discharge: HOME | End: 2020-06-27

## 2020-06-25 PROCEDURE — 88305 TISSUE EXAM BY PATHOLOGIST: CPT

## 2020-09-16 RX ORDER — METOPROLOL SUCCINATE 100 MG/1
TABLET, EXTENDED RELEASE ORAL
Qty: 180 TABLET | Refills: 3 | Status: SHIPPED
Start: 2020-09-16 | End: 2021-11-17

## 2020-12-11 ENCOUNTER — OFFICE VISIT (OUTPATIENT)
Dept: CARDIOLOGY CLINIC | Age: 75
End: 2020-12-11
Payer: MEDICARE

## 2020-12-11 VITALS
SYSTOLIC BLOOD PRESSURE: 116 MMHG | RESPIRATION RATE: 18 BRPM | DIASTOLIC BLOOD PRESSURE: 68 MMHG | HEIGHT: 72 IN | BODY MASS INDEX: 26.36 KG/M2 | WEIGHT: 194.6 LBS | HEART RATE: 69 BPM

## 2020-12-11 PROCEDURE — 93000 ELECTROCARDIOGRAM COMPLETE: CPT | Performed by: INTERNAL MEDICINE

## 2020-12-11 PROCEDURE — 99214 OFFICE O/P EST MOD 30 MIN: CPT | Performed by: INTERNAL MEDICINE

## 2020-12-11 RX ORDER — BACLOFEN 10 MG/1
10 TABLET ORAL 3 TIMES DAILY
COMMUNITY
End: 2020-12-14

## 2020-12-14 ENCOUNTER — OFFICE VISIT (OUTPATIENT)
Dept: NEUROLOGY | Age: 75
End: 2020-12-14
Payer: MEDICARE

## 2020-12-14 VITALS
RESPIRATION RATE: 18 BRPM | TEMPERATURE: 97 F | OXYGEN SATURATION: 95 % | HEART RATE: 76 BPM | HEIGHT: 66 IN | WEIGHT: 195 LBS | DIASTOLIC BLOOD PRESSURE: 76 MMHG | SYSTOLIC BLOOD PRESSURE: 130 MMHG | BODY MASS INDEX: 31.34 KG/M2

## 2020-12-14 PROCEDURE — 99215 OFFICE O/P EST HI 40 MIN: CPT | Performed by: PSYCHIATRY & NEUROLOGY

## 2020-12-14 SDOH — HEALTH STABILITY: MENTAL HEALTH: HOW MANY STANDARD DRINKS CONTAINING ALCOHOL DO YOU HAVE ON A TYPICAL DAY?: NOT ASKED
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
## 2020-12-14 NOTE — PROGRESS NOTES
Sonu Vasquez  was a 64-year-old right-handed man who presented for follow up of longstanding multiple sclerosis. He was an excellent historian. His medications were now Flonase, gabapentin, metoprolol, Avodart, hydra oxycodone as needed, Flomax, Singulair, sialoliths, Xanax, Allegra and albuterol inhalers. He was no longer on Norco or meclizine.     He was off Tecfidera for over 3 years his multiple sclerosis remained stable. He reported no additional weakness, clumsiness, speech difficulties, double vision or headaches.      He was not tripping or falling. However, he was using a cane more often, citing increasing numbness in both feet. Unfortunately, he also suffered from lumbosacral radiculopathy. These discomforts were moderately controlled on gabapentin 300 mg 4 times daily. Fortunately, he was no longer using oxycodone. His vertigo had not returned. He had responded well to low-dose meclizine. He was now taking that drug. He still reported occasional cramping sensations in both legs, requesting baclofen. That drug was reported as producing an allergy; he denied such.     He continued to drive and was active socially. He was eating well. He was sleeping well on most occasions. He was practicing proper precautions during the pandemic. There were no additional urinary problems. Medically, he was otherwise stable. He denied tachycardia, chest pain or palpitations, shortness of breath, loss of consciousness, incontinence, other pains or abdominal issues. He continued in good spirits.     Review of systems was otherwise unremarkable.     Objective:      /76 (Site: Left Arm, Position: Sitting)  Pulse 76  Resp 12  Ht 6' (1.829 m)  Wt 195 lb (86.2 kg)  BMI 31.4  Afebrile      General Appearance: alert, cooperative, in no distress              Neck: supple,, trachea midline, no adenopathy; no carotid bruit or JVD; moderately limited ROM with persistent spasms in the cervical paraspinals and trapezius muscles              Lungs: clear to auscultation, respirations unlabored               Heart: regular rate and rhythm, S1 and S2 normal, no rub or gallop; +2/6 SAMI              Extremities:  marked bilateral pes planus deformities, no cyanosis or edema              Pulses: 1+ throughout     Mental Status:  alert, oriented and very pleasant  Speech: no aphasic errors; no dysarthria  Language: intact     Cranial Nerves:  I: smell NA   II: visual acuity  NA   II: visual fields Full to confrontation   II: pupils ALENA   III,VII: ptosis None   III,IV,VI: extraocular muscles  Full ROM   V: mastication Normal   V: facial light touch sensation  Normal   V,VII: corneal reflex  Present   VII: facial muscle function - upper  Normal   VII: facial muscle function - lower Normal   VIII: hearing Normal   IX: soft palate elevation  Normal   IX,X: gag reflex Present   XI: trapezius strength  5/5   XI: sternocleidomastoid strength 5/5   XI: neck extension strength  5/5   XII: tongue strength  Normal      No red desaturation  No Leydi pupil     Motor:  5/5 strength throughout  Normal tone and bulk  No spasticity; no abnormal movements     Sensory:  Light touch \"more sensitive\" in his left leg -- otherwise unremarkable  Pinprick decreased to both mid shins  Vibration minimally decreased at both ankles     Coordination:   Intact throughout       Gait:  Slightly cautious gait with more left foot drop  He continued to walk well on his heels and toes    DTR:   Right Brachioradialis reflex 1+  Left Brachioradialis reflex 3+  Right Biceps reflex 1+  Left Biceps reflex 3+  Right Triceps reflex 1+  Left Triceps reflex 3+  Right Quadriceps reflex 2+  Left Quadriceps reflex 3+  Right Achilles reflex 1+  Left Achilles reflex 1+   No pathological reflexes    His neurological examination displayed increasing left foot drop     Laboratory/Radiology:      Number pending     Assessment:      This individual suffered from multiple sclerosis for years with only minimal disease. At present, he again presents with left foot drop--worsening from his lumbosacral radiculopathies. He remains off Tecfidera, without issues.     I now suspect increasing radiculopathy, aggravating his foot drop. He will continue with his current dose of gabapentin. I first recommended hightop boots and tennis shoes. His benign positional vertigo has not returned. Medically, he is stable despite his valvular heart disease, paroxysmal supraventricular tachycardia and anxiety disorder. Unfortunately, he may have return of his prostatic cancer.     Plan:      He will be maintained on his same medications. I will start a low-dose baclofen at night. He will stay off disease modifying therapies. He will call if any problems arise in the interim. He will return in 6 months. I spent 40 minutes with the patient with over 50 % spent in counseling and disease management.   All patient issues were addressed and all questions were answered.                                                                                   Visit the only 1 that these are the foot still got help was running

## 2021-01-07 ENCOUNTER — OFFICE VISIT (OUTPATIENT)
Dept: ENT CLINIC | Age: 76
End: 2021-01-07
Payer: MEDICARE

## 2021-01-07 VITALS — BODY MASS INDEX: 31.34 KG/M2 | HEIGHT: 66 IN | TEMPERATURE: 97.4 F | WEIGHT: 195 LBS

## 2021-01-07 DIAGNOSIS — H61.23 BILATERAL IMPACTED CERUMEN: Primary | ICD-10-CM

## 2021-01-07 DIAGNOSIS — R42 VERTIGO: ICD-10-CM

## 2021-01-07 PROCEDURE — 69210 REMOVE IMPACTED EAR WAX UNI: CPT | Performed by: OTOLARYNGOLOGY

## 2021-01-07 ASSESSMENT — ENCOUNTER SYMPTOMS
VOMITING: 0
COUGH: 0
SORE THROAT: 0
DIARRHEA: 0
SHORTNESS OF BREATH: 0
NAUSEA: 0
RHINORRHEA: 0
CONSTIPATION: 0
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
## 2021-01-19 ENCOUNTER — TELEPHONE (OUTPATIENT)
Dept: CARDIOLOGY | Age: 76
End: 2021-01-19

## 2021-01-20 ENCOUNTER — HOSPITAL ENCOUNTER (OUTPATIENT)
Dept: CARDIOLOGY | Age: 76
Discharge: HOME | End: 2021-01-20
Payer: MEDICARE

## 2021-01-20 DIAGNOSIS — I34.0 NONRHEUMATIC MITRAL VALVE REGURGITATION: Primary | ICD-10-CM

## 2021-01-20 LAB
LV EF: 63 %
LVEF MODALITY: NORMAL

## 2021-01-20 PROCEDURE — 93306 TTE W/DOPPLER COMPLETE: CPT | Performed by: PSYCHIATRY & NEUROLOGY

## 2021-02-02 DIAGNOSIS — M54.17 L-S RADICULOPATHY: ICD-10-CM

## 2021-02-03 RX ORDER — GABAPENTIN 300 MG/1
300 CAPSULE ORAL 4 TIMES DAILY
Qty: 360 CAPSULE | Refills: 1 | Status: SHIPPED
Start: 2021-02-03 | End: 2022-01-18

## 2021-02-06 DIAGNOSIS — J30.2 SEASONAL ALLERGIC RHINITIS, UNSPECIFIED TRIGGER: Primary | ICD-10-CM

## 2021-02-09 RX ORDER — FLUTICASONE PROPIONATE 50 MCG
SPRAY, SUSPENSION (ML) NASAL
Qty: 48 G | Refills: 1 | Status: SHIPPED
Start: 2021-02-09 | End: 2022-01-18

## 2021-02-09 NOTE — TELEPHONE ENCOUNTER
Patient was last seen in office 1/7/2021 and would like a prescription refill for Flonase nasal spray. Patient next appointment is 7/14/2021.

## 2021-02-10 DIAGNOSIS — J30.2 SEASONAL ALLERGIC RHINITIS, UNSPECIFIED TRIGGER: Primary | ICD-10-CM

## 2021-02-11 RX ORDER — AZELASTINE 1 MG/ML
SPRAY, METERED NASAL
Qty: 90 ML | Refills: 1 | Status: SHIPPED
Start: 2021-02-11 | End: 2021-12-07

## 2021-02-11 NOTE — TELEPHONE ENCOUNTER
Patient was last seen in office 1/7/2021 and is scheduled to come back in to the office 7/14/2021. Patient would like a prescription refill for Astelin Nasal [place].

## 2021-08-24 ENCOUNTER — OFFICE VISIT (OUTPATIENT)
Dept: NEUROLOGY | Age: 76
End: 2021-08-24
Payer: MEDICARE

## 2021-08-24 VITALS
WEIGHT: 195 LBS | HEIGHT: 66 IN | TEMPERATURE: 98.1 F | OXYGEN SATURATION: 93 % | DIASTOLIC BLOOD PRESSURE: 75 MMHG | SYSTOLIC BLOOD PRESSURE: 118 MMHG | HEART RATE: 68 BPM | BODY MASS INDEX: 31.34 KG/M2

## 2021-08-24 DIAGNOSIS — M54.17 L-S RADICULOPATHY: ICD-10-CM

## 2021-08-24 DIAGNOSIS — G35 MULTIPLE SCLEROSIS (HCC): Primary | ICD-10-CM

## 2021-08-24 PROCEDURE — 99215 OFFICE O/P EST HI 40 MIN: CPT | Performed by: PSYCHIATRY & NEUROLOGY

## 2021-08-24 RX ORDER — BACLOFEN 10 MG/1
10 TABLET ORAL 3 TIMES DAILY
Status: ON HOLD | COMMUNITY
End: 2022-04-22 | Stop reason: HOSPADM

## 2021-08-24 RX ORDER — CETIRIZINE HYDROCHLORIDE 10 MG/1
10 TABLET ORAL EVERY MORNING
COMMUNITY
End: 2022-04-07 | Stop reason: ALTCHOICE

## 2021-08-24 NOTE — PROGRESS NOTES
respirations unlabored   Heart: regular rate and rhythm, S1 and S2 normal, no rub or gallop; +2/6 SAMI  Extremities:  marked bilateral pes planus deformities, no cyanosis or edema  Pulses: 1+ throughout     Mental Status:  alert, oriented and pleasant; his speech was unremarkable     Cranial Nerves:  I: smell NA   II: visual acuity  NA   II: visual fields Full to confrontation   II: pupils ALENA   III,VII: ptosis None   III,IV,VI: extraocular muscles  Full ROM   V: mastication Normal   V: facial light touch sensation  Normal   V,VII: corneal reflex  Present   VII: facial muscle function - upper  Normal   VII: facial muscle function - lower Normal   VIII: hearing Normal   IX: soft palate elevation  Normal   IX,X: gag reflex Present   XI: trapezius strength  5/5   XI: sternocleidomastoid strength 5/5   XI: neck extension strength  5/5   XII: tongue strength  Normal      No red desaturation  No Leydi pupil     Motor:  5/5 strength throughout remained  I found normal tone and bulk  There was spasticity or abnormal movements     Sensory:  Light touch continued \"more sensitive\" in his left leg, otherwise unremarkable  Pinprick was decreased to both mid shins  Vibration was minimally decreased at both ankles     Coordination:   Finger-nose and heel shin testings were unremarkable.     Gait:  He remained cautious while walking with minimal left foot drop    DTR:   Right Brachioradialis reflex 1+  Left Brachioradialis reflex 3+  Right Biceps reflex 1+  Left Biceps reflex 3+  Right Triceps reflex 1+  Left Triceps reflex 3+  Right Quadriceps reflex 2+  Left Quadriceps reflex 3+  Right Achilles reflex 1+  Left Achilles reflex 1+   No pathological reflexes    His neurological examination displayed persistent left foot drop     Laboratory/Radiology:      None were pending     Assessment:      This individual suffered from longstanding multiple sclerosis with only minimal disease.   At present, he again displays left foot drop, from his lumbosacral radiculopathies. He remains off dimethyl fumarate, without issues.     I again suspect increasing radiculopathy, aggravating his foot drop. He will continue with his current dose of gabapentin. I again recommended hightop boots and tennis shoes. His benign positional vertigo has not returned. Medically, he is stable despite his valvular heart disease, paroxysmal supraventricular tachycardia and anxiety disorder. Unfortunately, his anxiety is increasing with his girlfriend's cognitive decline.     Plan:      He will be maintained on his same medications. I recommended decreasing his coffee consumption and drinking more fluids. He will stay off disease modifying therapies. He will call if any problems arise in the interim. He will return in 4 months. I spent 40 minutes with the patient with over 50 % spent in counseling and disease management.   All patient issues were addressed and all questions were answered.

## 2021-11-17 RX ORDER — METOPROLOL SUCCINATE 100 MG/1
TABLET, EXTENDED RELEASE ORAL
Qty: 180 TABLET | Refills: 3 | Status: SHIPPED
Start: 2021-11-17 | End: 2022-01-18

## 2021-12-07 DIAGNOSIS — J30.2 SEASONAL ALLERGIC RHINITIS, UNSPECIFIED TRIGGER: Primary | ICD-10-CM

## 2021-12-07 RX ORDER — AZELASTINE 1 MG/ML
SPRAY, METERED NASAL
Qty: 90 ML | Refills: 1 | Status: SHIPPED
Start: 2021-12-07 | End: 2022-01-18

## 2021-12-07 NOTE — TELEPHONE ENCOUNTER
Patient was last seen in office 1/7/2021 patient would like a prescription refill for Astelin nasal spray.

## 2021-12-30 ENCOUNTER — APPOINTMENT (OUTPATIENT)
Dept: CT IMAGING | Age: 76
DRG: 853 | End: 2021-12-30
Payer: MEDICARE

## 2021-12-30 ENCOUNTER — APPOINTMENT (OUTPATIENT)
Dept: ULTRASOUND IMAGING | Age: 76
DRG: 853 | End: 2021-12-30
Payer: MEDICARE

## 2021-12-30 ENCOUNTER — HOSPITAL ENCOUNTER (INPATIENT)
Age: 76
LOS: 8 days | Discharge: HOME HEALTH | DRG: 853 | End: 2022-01-07
Attending: EMERGENCY MEDICINE | Admitting: INTERNAL MEDICINE
Payer: MEDICARE

## 2021-12-30 ENCOUNTER — APPOINTMENT (OUTPATIENT)
Dept: GENERAL RADIOLOGY | Age: 76
DRG: 853 | End: 2021-12-30
Payer: MEDICARE

## 2021-12-30 DIAGNOSIS — J86.9 EMPYEMA (HCC): ICD-10-CM

## 2021-12-30 DIAGNOSIS — D72.829 LEUKOCYTOSIS, UNSPECIFIED TYPE: ICD-10-CM

## 2021-12-30 DIAGNOSIS — J90 PLEURAL EFFUSION: Primary | ICD-10-CM

## 2021-12-30 DIAGNOSIS — R10.11 ABDOMINAL PAIN, RIGHT UPPER QUADRANT: ICD-10-CM

## 2021-12-30 LAB
ALBUMIN SERPL-MCNC: 2.6 G/DL (ref 3.5–5.2)
ALP BLD-CCNC: 158 U/L (ref 40–129)
ALT SERPL-CCNC: 15 U/L (ref 0–40)
ANION GAP SERPL CALCULATED.3IONS-SCNC: 13 MMOL/L (ref 7–16)
AST SERPL-CCNC: 10 U/L (ref 0–39)
BACTERIA: ABNORMAL /HPF
BASOPHILS ABSOLUTE: 0 E9/L (ref 0–0.2)
BASOPHILS RELATIVE PERCENT: 0 % (ref 0–2)
BILIRUB SERPL-MCNC: 0.7 MG/DL (ref 0–1.2)
BILIRUBIN URINE: NEGATIVE
BLOOD, URINE: ABNORMAL
BUN BLDV-MCNC: 25 MG/DL (ref 6–23)
BURR CELLS: ABNORMAL
CALCIUM SERPL-MCNC: 8.4 MG/DL (ref 8.6–10.2)
CHLORIDE BLD-SCNC: 101 MMOL/L (ref 98–107)
CLARITY: CLEAR
CO2: 20 MMOL/L (ref 22–29)
COLOR: YELLOW
CREAT SERPL-MCNC: 0.8 MG/DL (ref 0.7–1.2)
EOSINOPHILS ABSOLUTE: 0 E9/L (ref 0.05–0.5)
EOSINOPHILS RELATIVE PERCENT: 0 % (ref 0–6)
GFR AFRICAN AMERICAN: >60
GFR NON-AFRICAN AMERICAN: >60 ML/MIN/1.73
GLUCOSE BLD-MCNC: 137 MG/DL (ref 74–99)
GLUCOSE URINE: NEGATIVE MG/DL
HCT VFR BLD CALC: 36.6 % (ref 37–54)
HEMOGLOBIN: 12 G/DL (ref 12.5–16.5)
HYALINE CASTS: ABNORMAL /LPF (ref 0–2)
KETONES, URINE: NEGATIVE MG/DL
LACTIC ACID: 1.6 MMOL/L (ref 0.5–2.2)
LEUKOCYTE ESTERASE, URINE: NEGATIVE
LIPASE: 13 U/L (ref 13–60)
LYMPHOCYTES ABSOLUTE: 1.07 E9/L (ref 1.5–4)
LYMPHOCYTES RELATIVE PERCENT: 2.6 % (ref 20–42)
MCH RBC QN AUTO: 30.8 PG (ref 26–35)
MCHC RBC AUTO-ENTMCNC: 32.8 % (ref 32–34.5)
MCV RBC AUTO: 93.8 FL (ref 80–99.9)
MONOCYTES ABSOLUTE: 1.42 E9/L (ref 0.1–0.95)
MONOCYTES RELATIVE PERCENT: 4.4 % (ref 2–12)
NEUTROPHILS ABSOLUTE: 33.02 E9/L (ref 1.8–7.3)
NEUTROPHILS RELATIVE PERCENT: 93 % (ref 43–80)
NITRITE, URINE: NEGATIVE
NUCLEATED RED BLOOD CELLS: 0 /100 WBC
OVALOCYTES: ABNORMAL
PDW BLD-RTO: 15.3 FL (ref 11.5–15)
PH UA: 5.5 (ref 5–9)
PLATELET # BLD: 435 E9/L (ref 130–450)
PMV BLD AUTO: 9.1 FL (ref 7–12)
POIKILOCYTES: ABNORMAL
POLYCHROMASIA: ABNORMAL
POTASSIUM SERPL-SCNC: 3.9 MMOL/L (ref 3.5–5)
PROCALCITONIN: 1.93 NG/ML (ref 0–0.08)
PROTEIN UA: NEGATIVE MG/DL
RBC # BLD: 3.9 E12/L (ref 3.8–5.8)
RBC UA: ABNORMAL /HPF (ref 0–2)
SARS-COV-2, NAAT: NOT DETECTED
SEDIMENTATION RATE, ERYTHROCYTE: 68 MM/HR (ref 0–15)
SODIUM BLD-SCNC: 134 MMOL/L (ref 132–146)
SPECIFIC GRAVITY UA: 1.02 (ref 1–1.03)
TOTAL PROTEIN: 6.8 G/DL (ref 6.4–8.3)
UROBILINOGEN, URINE: 0.2 E.U./DL
WBC # BLD: 35.5 E9/L (ref 4.5–11.5)
WBC UA: ABNORMAL /HPF (ref 0–5)

## 2021-12-30 PROCEDURE — 83690 ASSAY OF LIPASE: CPT

## 2021-12-30 PROCEDURE — 71275 CT ANGIOGRAPHY CHEST: CPT

## 2021-12-30 PROCEDURE — 2580000003 HC RX 258: Performed by: EMERGENCY MEDICINE

## 2021-12-30 PROCEDURE — 85025 COMPLETE CBC W/AUTO DIFF WBC: CPT

## 2021-12-30 PROCEDURE — 71046 X-RAY EXAM CHEST 2 VIEWS: CPT

## 2021-12-30 PROCEDURE — 96376 TX/PRO/DX INJ SAME DRUG ADON: CPT

## 2021-12-30 PROCEDURE — 83605 ASSAY OF LACTIC ACID: CPT

## 2021-12-30 PROCEDURE — 87635 SARS-COV-2 COVID-19 AMP PRB: CPT

## 2021-12-30 PROCEDURE — 81001 URINALYSIS AUTO W/SCOPE: CPT

## 2021-12-30 PROCEDURE — 6370000000 HC RX 637 (ALT 250 FOR IP): Performed by: EMERGENCY MEDICINE

## 2021-12-30 PROCEDURE — 76705 ECHO EXAM OF ABDOMEN: CPT

## 2021-12-30 PROCEDURE — 36415 COLL VENOUS BLD VENIPUNCTURE: CPT

## 2021-12-30 PROCEDURE — 99284 EMERGENCY DEPT VISIT MOD MDM: CPT

## 2021-12-30 PROCEDURE — 87088 URINE BACTERIA CULTURE: CPT

## 2021-12-30 PROCEDURE — 74176 CT ABD & PELVIS W/O CONTRAST: CPT

## 2021-12-30 PROCEDURE — 6360000002 HC RX W HCPCS: Performed by: EMERGENCY MEDICINE

## 2021-12-30 PROCEDURE — 1200000000 HC SEMI PRIVATE

## 2021-12-30 PROCEDURE — 85651 RBC SED RATE NONAUTOMATED: CPT

## 2021-12-30 PROCEDURE — 96375 TX/PRO/DX INJ NEW DRUG ADDON: CPT

## 2021-12-30 PROCEDURE — 96366 THER/PROPH/DIAG IV INF ADDON: CPT

## 2021-12-30 PROCEDURE — 96374 THER/PROPH/DIAG INJ IV PUSH: CPT

## 2021-12-30 PROCEDURE — 96365 THER/PROPH/DIAG IV INF INIT: CPT

## 2021-12-30 PROCEDURE — 87040 BLOOD CULTURE FOR BACTERIA: CPT

## 2021-12-30 PROCEDURE — 87449 NOS EACH ORGANISM AG IA: CPT

## 2021-12-30 PROCEDURE — 2700000000 HC OXYGEN THERAPY PER DAY

## 2021-12-30 PROCEDURE — 80053 COMPREHEN METABOLIC PANEL: CPT

## 2021-12-30 PROCEDURE — 96361 HYDRATE IV INFUSION ADD-ON: CPT

## 2021-12-30 PROCEDURE — 84145 PROCALCITONIN (PCT): CPT

## 2021-12-30 PROCEDURE — 6360000004 HC RX CONTRAST MEDICATION: Performed by: RADIOLOGY

## 2021-12-30 PROCEDURE — 74177 CT ABD & PELVIS W/CONTRAST: CPT

## 2021-12-30 PROCEDURE — 2500000003 HC RX 250 WO HCPCS: Performed by: EMERGENCY MEDICINE

## 2021-12-30 RX ORDER — 0.9 % SODIUM CHLORIDE 0.9 %
1000 INTRAVENOUS SOLUTION INTRAVENOUS ONCE
Status: COMPLETED | OUTPATIENT
Start: 2021-12-30 | End: 2021-12-30

## 2021-12-30 RX ORDER — CEFTRIAXONE 1 G/1
INJECTION, POWDER, FOR SOLUTION INTRAMUSCULAR; INTRAVENOUS
Status: DISCONTINUED
Start: 2021-12-30 | End: 2021-12-31

## 2021-12-30 RX ORDER — METHYLPREDNISOLONE SODIUM SUCCINATE 125 MG/2ML
125 INJECTION, POWDER, LYOPHILIZED, FOR SOLUTION INTRAMUSCULAR; INTRAVENOUS ONCE
Status: COMPLETED | OUTPATIENT
Start: 2021-12-30 | End: 2021-12-30

## 2021-12-30 RX ORDER — DOXYCYCLINE HYCLATE 100 MG/1
100 CAPSULE ORAL ONCE
Status: COMPLETED | OUTPATIENT
Start: 2021-12-30 | End: 2021-12-30

## 2021-12-30 RX ORDER — DIPHENHYDRAMINE HYDROCHLORIDE 50 MG/ML
25 INJECTION INTRAMUSCULAR; INTRAVENOUS ONCE
Status: COMPLETED | OUTPATIENT
Start: 2021-12-30 | End: 2021-12-30

## 2021-12-30 RX ORDER — ONDANSETRON 2 MG/ML
4 INJECTION INTRAMUSCULAR; INTRAVENOUS ONCE
Status: COMPLETED | OUTPATIENT
Start: 2021-12-30 | End: 2021-12-30

## 2021-12-30 RX ORDER — FENTANYL CITRATE 50 UG/ML
50 INJECTION, SOLUTION INTRAMUSCULAR; INTRAVENOUS ONCE
Status: COMPLETED | OUTPATIENT
Start: 2021-12-30 | End: 2021-12-30

## 2021-12-30 RX ADMIN — WATER 2000 MG: 1 INJECTION INTRAMUSCULAR; INTRAVENOUS; SUBCUTANEOUS at 21:33

## 2021-12-30 RX ADMIN — METRONIDAZOLE 500 MG: 500 INJECTION, SOLUTION INTRAVENOUS at 22:27

## 2021-12-30 RX ADMIN — METHYLPREDNISOLONE SODIUM SUCCINATE 125 MG: 125 INJECTION, POWDER, FOR SOLUTION INTRAMUSCULAR; INTRAVENOUS at 15:51

## 2021-12-30 RX ADMIN — SODIUM CHLORIDE 1000 ML: 9 INJECTION, SOLUTION INTRAVENOUS at 14:25

## 2021-12-30 RX ADMIN — IOPAMIDOL 85 ML: 755 INJECTION, SOLUTION INTRAVENOUS at 16:33

## 2021-12-30 RX ADMIN — DOXYCYCLINE HYCLATE 100 MG: 100 CAPSULE ORAL at 20:58

## 2021-12-30 RX ADMIN — FENTANYL CITRATE 50 MCG: 50 INJECTION, SOLUTION INTRAMUSCULAR; INTRAVENOUS at 14:26

## 2021-12-30 RX ADMIN — DIPHENHYDRAMINE HYDROCHLORIDE 25 MG: 50 INJECTION, SOLUTION INTRAMUSCULAR; INTRAVENOUS at 15:50

## 2021-12-30 RX ADMIN — ONDANSETRON 4 MG: 2 INJECTION INTRAMUSCULAR; INTRAVENOUS at 13:30

## 2021-12-30 ASSESSMENT — PAIN SCALES - GENERAL
PAINLEVEL_OUTOF10: 8
PAINLEVEL_OUTOF10: 8

## 2021-12-30 ASSESSMENT — ENCOUNTER SYMPTOMS
ABDOMINAL PAIN: 1
SORE THROAT: 0
EYE PAIN: 0
WHEEZING: 0
BACK PAIN: 0
DIARRHEA: 0
COUGH: 0
NAUSEA: 1
SHORTNESS OF BREATH: 0
VOMITING: 0
SINUS PAIN: 0
EYE REDNESS: 0

## 2021-12-30 ASSESSMENT — PAIN DESCRIPTION - PAIN TYPE: TYPE: ACUTE PAIN

## 2021-12-30 ASSESSMENT — PAIN DESCRIPTION - LOCATION: LOCATION: ABDOMEN

## 2021-12-30 NOTE — Clinical Note
Patient Class: Inpatient [101]   REQUIRED: Diagnosis: Empyema ([facility] [address] [[id number]]   Estimated Length of Stay: Estimated stay of less than 2 midnights   Admitting Provider: Neema HORTA [de-identified]   Telemetry/Cardiac Monitoring Required?: Yes

## 2021-12-30 NOTE — ED PROVIDER NOTES
Chief Complaint   Patient presents with    Abdominal Pain     intermittent x 2 days    Diarrhea       75-year-old male with past medical history of diabetes, acid reflux, hypertension, multiple sclerosis presenting today with sharp stabbing abdominal pain. He had a colonoscopy 10 to 15 days ago and that he has been feeling worse since then. He has been getting intermittent bouts of abdominal pain that is very short lasting but feels as if someone is stabbing him, it is worsening over the past week, nothing makes it better, associated with nausea. He is not complaining of fevers, chills, chest pain, shortness of breath. He is not experiencing any weakness numbness or tingling. He does believe his belly is more distended than normal, he is not experiencing any abnormalities with urination. No other acute complaints. Review of Systems   Constitutional: Negative for chills and fever. HENT: Negative for ear pain, sinus pain and sore throat. Eyes: Negative for pain and redness. Respiratory: Negative for cough, shortness of breath and wheezing. Cardiovascular: Negative for chest pain. Gastrointestinal: Positive for abdominal pain and nausea. Negative for diarrhea and vomiting. Genitourinary: Negative for dysuria and flank pain. Musculoskeletal: Negative for back pain and neck pain. Skin: Negative for rash. Neurological: Negative for seizures and headaches. Hematological: Negative for adenopathy. All other systems reviewed and are negative. Physical Exam  Vitals and nursing note reviewed. Constitutional:       Appearance: Normal appearance. He is not ill-appearing. HENT:      Head: Normocephalic and atraumatic. Right Ear: External ear normal.      Left Ear: External ear normal.      Nose: Nose normal.      Mouth/Throat:      Mouth: Mucous membranes are moist.      Pharynx: Oropharynx is clear.    Eyes:      Conjunctiva/sclera: Conjunctivae normal.      Pupils: Pupils are equal, round, and reactive to light. Cardiovascular:      Rate and Rhythm: Regular rhythm. Tachycardia present. Pulmonary:      Breath sounds: Normal breath sounds. Abdominal:      General: Abdomen is flat. Palpations: Abdomen is soft. Tenderness: There is generalized abdominal tenderness. Musculoskeletal:         General: No deformity or signs of injury. Cervical back: Neck supple. Skin:     General: Skin is warm and dry. Neurological:      General: No focal deficit present. Mental Status: He is alert. Mental status is at baseline. Procedures       MDM  Number of Diagnoses or Management Options  Abdominal pain, right upper quadrant  Leukocytosis, unspecified type  Pleural effusion  Diagnosis management comments: 66-year male past medical history of diabetes, acid reflux, hypertension, multiple sclerosis presenting with sharp stabbing abdominal pain. He was tachycardic on arrival to emergency department otherwise hemodynamically stable. He did get a fluid bolus, fentanyl and Zofran for symptomatic management. CMP did not demonstrate acute abnormalities, lactic acid, lipase were within normal limits. CBC demonstrated leukocytosis of 35.5. Chest x-ray demonstrated right lung pneumonia and large right-sided pleural effusion, pulmonary CT showed no evidence for pulmonary emboli but there was a moderate to large right pleural effusion with adjacent atelectasis versus infiltrate that cannot exclude loculation. Ultrasound of abdomen showed a slight distention of common bile duct, CT abdomen pelvis showed irregular masslike areas around superior aspect of right kidney and cholelithiasis with no signs of acute cholecystitis. I spoke with Dr. Wayne Mei, she will plan to have the patient set up for a tap tomorrow. Discussed results of labs and imaging with patient and he verbalized understanding of plan for admission.   With leukocytosis, likely empyema on CT there were no acute infectious pathologies on abdomen CT. getting ceftriaxone, Flagyl, doxycycline to cover all the bases. I spoke with [facility] and she will admit the patient with LON. Patient verbalized understanding. ED Course as of 12/30/21 2003   Thu Dec 30, 2021   1908 Spoke with Dr. Stacy Cagle, she will plan to see the patient and set him up for a tap tomorrow. [MM]   [facility] with [facility] admitting for LON and they will accept the patient. [MM]      ED Course User Index  [MM] Lynnette Baires DO      ED Course as of 12/30/21 2003   Thu Dec 30, 2021   1908 Spoke with Dr. Stacy Cagle, she will plan to see the patient and set him up for a tap tomorrow. [MM]   36 [facility] with [facility] admitting for LON and they will accept the patient. [MM]      ED Course User Index  [MM] Lynnette Baires DO       --------------------------------------------- PAST HISTORY ---------------------------------------------  Past Medical History:  has a past medical history of Abnormal PSA, Anxiety, Bronchitis, Claustrophobia, Diabetes mellitus ([address] Endocarditis, Enlarged LA (left atrium), Erectile dysfunction, GERD (gastroesophageal reflux disease), H/O complete arterial study of extremity, Hiatal hernia, History of EMG, Hypertension, Lung nodule, Mitral valve prolapse, Moderate tricuspid regurgitation, Multiple sclerosis ([address] Prostatitis, Renal calculi, Sinus problem, Supraventricular arrhythmia, Tachycardia, and Tilt table evaluation.     Past Surgical History:  has a past surgical history that includes transthoracic echocardiogram (9/28/2011  Echo showed normal left ventricular size with borderline concentric left ventricular hypertrophy with normal left ventricular systolic function with stage I  LV diastolic dysfunction, normal, normal right ventricular size and function, mildly dilated left atrium, borderline dilated right atrium mildly thickened anterior mitral leaflet with bowing of the mitral leaflets without felipe prolapse with mild mitral annular calcification, mild-to-moderate eccentrically-directed jet of mitral regurgitation.); Diagnostic Cardiac Cath Lab Procedure; cardiovascular stress test (10/19/2011  Adenosine nuclear stress test (4 minute walking protocol) showed a minimally reversible minor, nontransmural defect involving the apical anterolateral wall, more consistent with soft tissue attenuation and motion artifact); Skin graft; Inguinal hernia repair; Nose surgery; Colonoscopy; Prostate Biopsy (july 2013); Prostate surgery (Aug,13); eye surgery (10/13); eye surgery (11/13); Bladder surgery; Dental surgery; and Lithotripsy (12/6/17 @ [facility] [address] [address]). Social History:  reports that he quit smoking about 39 years ago. His smoking use included cigarettes. He has a 50.00 pack-year smoking history. He has never used smokeless tobacco. He reports current alcohol use. He reports that he does not use drugs. Family History: family history includes Diabetes in his mother; Other (age of onset: 48) in his father. The patients home medications have been reviewed.     Allergies: Baclofen, Aleve [naproxen], Dye [iodides], Shellfish allergy, and Paxil [paroxetine]    -------------------------------------------------- RESULTS -------------------------------------------------    LABS:  Results for orders placed or performed during the hospital encounter of 12/30/21   COVID-19, Rapid    Specimen: Nasopharyngeal Swab; Nasal   Result Value Ref Range    SARS-CoV-2, NAAT Not Detected Not Detected   CBC Auto Differential   Result Value Ref Range    WBC 35.5 (H) 4.5 - 11.5 E9/L    RBC 3.90 3.80 - 5.80 E12/L    Hemoglobin 12.0 (L) 12.5 - 16.5 g/dL    Hematocrit 36.6 (L) 37.0 - 54.0 %    MCV 93.8 80.0 - 99.9 fL    MCH 30.8 26.0 - 35.0 pg    MCHC 32.8 32.0 - 34.5 %    RDW 15.3 (H) 11.5 - 15.0 fL    Platelets 244 962 - 138 E9/L    MPV 9.1 7.0 - 12.0 fL    Neutrophils % 93.0 (H) 43.0 - 80.0 %    Lymphocytes % 2.6 (L) 20.0 - 42.0 %    Monocytes % 4.4 2.0 - 12.0 % Eosinophils % 0.0 0.0 - 6.0 %    Basophils % 0.0 0.0 - 2.0 %    Neutrophils Absolute 33.02 (H) 1.80 - 7.30 E9/L    Lymphocytes Absolute 1.07 (L) 1.50 - 4.00 E9/L    Monocytes Absolute 1.42 (H) 0.10 - 0.95 E9/L    Eosinophils Absolute 0.00 (L) 0.05 - 0.50 E9/L    Basophils Absolute 0.00 0.00 - 0.20 E9/L    nRBC 0.0 /100 WBC    Polychromasia 1+     Poikilocytes 1+     [place] Cells 1+     Ovalocytes 1+    Comprehensive Metabolic Panel   Result Value Ref Range    Sodium 134 132 - 146 mmol/L    Potassium 3.9 3.5 - 5.0 mmol/L    Chloride 101 98 - 107 mmol/L    CO2 20 (L) 22 - 29 mmol/L    Anion Gap 13 7 - 16 mmol/L    Glucose 137 (H) 74 - 99 mg/dL    BUN 25 (H) 6 - 23 mg/dL    CREATININE 0.8 0.7 - 1.2 mg/dL    GFR Non-African American >60 >=60 mL/min/1.73    GFR African American >60     Calcium 8.4 (L) 8.6 - 10.2 mg/dL    Total Protein 6.8 6.4 - 8.3 g/dL    Albumin 2.6 (L) 3.5 - 5.2 g/dL    Total Bilirubin 0.7 0.0 - 1.2 mg/dL    Alkaline Phosphatase 158 (H) 40 - 129 U/L    ALT 15 0 - 40 U/L    AST 10 0 - 39 U/L   Lipase   Result Value Ref Range    Lipase 13 13 - 60 U/L   Lactic Acid, Plasma   Result Value Ref Range    Lactic Acid 1.6 0.5 - 2.2 mmol/L       RADIOLOGY:  CTA PULMONARY W CONTRAST   Final Result   No evidence for pulmonary emboli. Moderate to large right pleural effusion with adjacent atelectasis versus   infiltrate. Loculation cannot be excluded. Cardiomegaly. Hepatic steatosis. Irregular soft tissue density superior and posterior to the right kidney of   unknown clinical significance and may related to infectious or neoplastic   process. Subcentimeter right renal calculi. Emphysematous changes. RECOMMENDATIONS:   Unavailable         CT ABDOMEN PELVIS W IV CONTRAST Additional Contrast? None   Final Result   Right pleural effusion with right middle lobe and bilateral lower lobe   infiltrates consistent with atelectasis and/or infection.       Mixed density infiltration right posterosuperior perinephric fat extending   into and possibly communicating with the right pleural and pleural fluid. This may represent an area of contiguous infection although neoplastic   disease cannot be excluded. Overall size and volume of this is not   significantly changed when compared to the previous study. If there has been   recent procedure in this area, this appearance could be secondary to   hemorrhage although that is probably less likely. Cardiomegaly and small pericardial effusion. Nonobstructing bilateral nephrolithiasis. Hepatic steatosis. Diverticulosis but no evidence of diverticulitis. Prostate enlargement. RECOMMENDATIONS:   Unavailable         US ABDOMEN LIMITED   Final Result   Non-specific slight distention of common bile duct with 7.9 mm diameter      Echogenic foci in the right kidney most compatible with nonobstructing calculi         XR CHEST (2 VW)   Final Result   Right lung pneumonia and large right pleural effusion. CT ABDOMEN PELVIS WO CONTRAST Additional Contrast? None   Final Result   1. Irregular masslike area of attenuation along superior aspect of right   kidney may be related to scarring at site of prior surgery or trauma versus   developing retroperitoneal hemorrhage. 2. Large right pleural effusion with adjacent atelectasis and pneumonia. 3. Foci of irregular attenuation fill the gallbladder related to multiple   gallstones or hyperattenuating sludge. 4. Nonobstructing bilateral renal calculi. 5. Multiple diverticula involving right colon. 6. Hepatic steatosis. 7. Prostatomegaly.    8. Results were called by Dr. Bianka Bergeron to Dr. Marvina Oppenheim on 12/30/2021 at   15:14.               ------------------------- NURSING NOTES AND VITALS REVIEWED ---------------------------  Date / Time Roomed:  12/30/2021 12:22 PM  ED Bed Assignment:  [id number]    The nursing notes within the ED encounter and vital signs as below have been reviewed. Patient Vitals for the past 24 hrs:   BP Temp Temp src Pulse Resp SpO2 Height Weight   12/30/21 1929 110/67 99 °F (37.2 °C) Oral 116 14 91 % -- --   12/30/21 1234 -- -- -- -- -- 92 % -- --   12/30/21 1231 114/62 -- -- 121 20 90 % 5' 10\" (1.778 m) 190 lb (86.2 kg)       Oxygen Saturation Interpretation: Normal    ------------------------------------------ PROGRESS NOTES ------------------------------------------  Re-evaluation(s):  Time: 1900  Patients symptoms are improving  Repeat physical examination is not changed    Counseling:  I have spoken with the patient and discussed todays results, in addition to providing specific details for the plan of care and counseling regarding the diagnosis and prognosis. Their questions are answered at this time and they are agreeable with the plan of admission.    --------------------------------- ADDITIONAL PROVIDER NOTES ---------------------------------  Consultations:  Time: 1955. Spoke with [facility] admitting for LON. Discussed case. They will admit the patient. This patient's ED course included: a personal history and physicial examination, re-evaluation prior to disposition, multiple bedside re-evaluations, IV medications and complex medical decision making and emergency management    This patient has remained hemodynamically stable during their ED course. Diagnosis:  1. Pleural effusion    2. Abdominal pain, right upper quadrant    3. Leukocytosis, unspecified type        Disposition:  Patient's disposition: Admit to telemetry  Patient's condition is stable.            Mirta Sullivan DO  Resident  12/30/21 2003

## 2021-12-30 NOTE — ED NOTES
Patient's daughter, Geraldo Contreras, can be reached at [phone].      Vinayak Early RN  12/30/21 3745

## 2021-12-31 ENCOUNTER — APPOINTMENT (OUTPATIENT)
Dept: GENERAL RADIOLOGY | Age: 76
DRG: 853 | End: 2021-12-31
Payer: MEDICARE

## 2021-12-31 ENCOUNTER — APPOINTMENT (OUTPATIENT)
Dept: ULTRASOUND IMAGING | Age: 76
DRG: 853 | End: 2021-12-31
Payer: MEDICARE

## 2021-12-31 LAB
ALBUMIN FLUID: 1.5 G/DL
AMYLASE FLUID: 64 U/L
ANION GAP SERPL CALCULATED.3IONS-SCNC: 9 MMOL/L (ref 7–16)
ANTISTREPTOLYSIN-O: 116 IU/ML (ref 0–200)
BASOPHILS ABSOLUTE: 0.06 E9/L (ref 0–0.2)
BASOPHILS RELATIVE PERCENT: 0.2 % (ref 0–2)
BILIRUBIN FLUID: 0.41 MG/DL
BUN BLDV-MCNC: 27 MG/DL (ref 6–23)
BURR CELLS: ABNORMAL
C-REACTIVE PROTEIN: 13.7 MG/DL (ref 0–0.4)
CALCIUM SERPL-MCNC: 8.3 MG/DL (ref 8.6–10.2)
CHLORIDE BLD-SCNC: 107 MMOL/L (ref 98–107)
CHOLESTEROL FLUID: 47 MG/DL
CO2: 22 MMOL/L (ref 22–29)
CREAT SERPL-MCNC: 0.8 MG/DL (ref 0.7–1.2)
CREATININE FLUID: 0.7 MG/DL
EOSINOPHILS ABSOLUTE: 0 E9/L (ref 0.05–0.5)
EOSINOPHILS RELATIVE PERCENT: 0 % (ref 0–6)
FLUID TYPE: NORMAL
GFR AFRICAN AMERICAN: >60
GFR NON-AFRICAN AMERICAN: >60 ML/MIN/1.73
GLUCOSE BLD-MCNC: 122 MG/DL (ref 74–99)
GLUCOSE, FLUID: 13 MG/DL
HCT VFR BLD CALC: 32.7 % (ref 37–54)
HEMATOCRIT FLUID: <2 %
HEMOGLOBIN: 10.6 G/DL (ref 12.5–16.5)
IMMATURE GRANULOCYTES #: 0.55 E9/L
IMMATURE GRANULOCYTES %: 1.8 % (ref 0–5)
INR BLD: 1.8
L. PNEUMOPHILA SEROGP 1 UR AG: NORMAL
LD, FLUID: 2149 U/L
LIPASE BODY FLUID: 7 U/L
LYMPHOCYTES ABSOLUTE: 0.73 E9/L (ref 1.5–4)
LYMPHOCYTES RELATIVE PERCENT: 2.4 % (ref 20–42)
MCH RBC QN AUTO: 30.8 PG (ref 26–35)
MCHC RBC AUTO-ENTMCNC: 32.4 % (ref 32–34.5)
MCV RBC AUTO: 95.1 FL (ref 80–99.9)
MONOCYTES ABSOLUTE: 1.57 E9/L (ref 0.1–0.95)
MONOCYTES RELATIVE PERCENT: 5.1 % (ref 2–12)
NEUTROPHILS ABSOLUTE: 27.89 E9/L (ref 1.8–7.3)
NEUTROPHILS RELATIVE PERCENT: 90.5 % (ref 43–80)
OVALOCYTES: ABNORMAL
PDW BLD-RTO: 15.6 FL (ref 11.5–15)
PLATELET # BLD: 409 E9/L (ref 130–450)
PMV BLD AUTO: 9.3 FL (ref 7–12)
POIKILOCYTES: ABNORMAL
POLYCHROMASIA: ABNORMAL
POTASSIUM SERPL-SCNC: 4.2 MMOL/L (ref 3.5–5)
PROCALCITONIN: 1.95 NG/ML (ref 0–0.08)
PROTEIN FLUID: 3.1 G/DL
PROTHROMBIN TIME: 20 SEC (ref 9.3–12.4)
RBC # BLD: 3.44 E12/L (ref 3.8–5.8)
SODIUM BLD-SCNC: 138 MMOL/L (ref 132–146)
STREP PNEUMONIAE ANTIGEN, URINE: NORMAL
TRIGLYCERIDES FLUID: 36 MG/DL
WBC # BLD: 30.8 E9/L (ref 4.5–11.5)

## 2021-12-31 PROCEDURE — 87116 MYCOBACTERIA CULTURE: CPT

## 2021-12-31 PROCEDURE — 87015 SPECIMEN INFECT AGNT CONCNTJ: CPT

## 2021-12-31 PROCEDURE — 86060 ANTISTREPTOLYSIN O TITER: CPT

## 2021-12-31 PROCEDURE — 93005 ELECTROCARDIOGRAM TRACING: CPT | Performed by: NURSE PRACTITIONER

## 2021-12-31 PROCEDURE — 84999 UNLISTED CHEMISTRY PROCEDURE: CPT

## 2021-12-31 PROCEDURE — 87206 SMEAR FLUORESCENT/ACID STAI: CPT

## 2021-12-31 PROCEDURE — 86140 C-REACTIVE PROTEIN: CPT

## 2021-12-31 PROCEDURE — 6360000002 HC RX W HCPCS: Performed by: SPECIALIST

## 2021-12-31 PROCEDURE — 84145 PROCALCITONIN (PCT): CPT

## 2021-12-31 PROCEDURE — 87102 FUNGUS ISOLATION CULTURE: CPT

## 2021-12-31 PROCEDURE — 82150 ASSAY OF AMYLASE: CPT

## 2021-12-31 PROCEDURE — 6360000002 HC RX W HCPCS: Performed by: INTERNAL MEDICINE

## 2021-12-31 PROCEDURE — 0W993ZZ DRAINAGE OF RIGHT PLEURAL CAVITY, PERCUTANEOUS APPROACH: ICD-10-PCS | Performed by: INTERNAL MEDICINE

## 2021-12-31 PROCEDURE — 84157 ASSAY OF PROTEIN OTHER: CPT

## 2021-12-31 PROCEDURE — 87205 SMEAR GRAM STAIN: CPT

## 2021-12-31 PROCEDURE — 85013 SPUN MICROHEMATOCRIT: CPT

## 2021-12-31 PROCEDURE — 6370000000 HC RX 637 (ALT 250 FOR IP): Performed by: NURSE PRACTITIONER

## 2021-12-31 PROCEDURE — 71045 X-RAY EXAM CHEST 1 VIEW: CPT

## 2021-12-31 PROCEDURE — 2700000000 HC OXYGEN THERAPY PER DAY

## 2021-12-31 PROCEDURE — 85025 COMPLETE CBC W/AUTO DIFF WBC: CPT

## 2021-12-31 PROCEDURE — 84478 ASSAY OF TRIGLYCERIDES: CPT

## 2021-12-31 PROCEDURE — 82947 ASSAY GLUCOSE BLOOD QUANT: CPT

## 2021-12-31 PROCEDURE — 83615 LACTATE (LD) (LDH) ENZYME: CPT

## 2021-12-31 PROCEDURE — 88112 CYTOPATH CELL ENHANCE TECH: CPT

## 2021-12-31 PROCEDURE — 87186 SC STD MICRODIL/AGAR DIL: CPT

## 2021-12-31 PROCEDURE — 32555 ASPIRATE PLEURA W/ IMAGING: CPT

## 2021-12-31 PROCEDURE — 83986 ASSAY PH BODY FLUID NOS: CPT

## 2021-12-31 PROCEDURE — 82247 BILIRUBIN TOTAL: CPT

## 2021-12-31 PROCEDURE — 87077 CULTURE AEROBIC IDENTIFY: CPT

## 2021-12-31 PROCEDURE — 36415 COLL VENOUS BLD VENIPUNCTURE: CPT

## 2021-12-31 PROCEDURE — 83690 ASSAY OF LIPASE: CPT

## 2021-12-31 PROCEDURE — 92610 EVALUATE SWALLOWING FUNCTION: CPT

## 2021-12-31 PROCEDURE — 87070 CULTURE OTHR SPECIMN AEROBIC: CPT

## 2021-12-31 PROCEDURE — 2060000000 HC ICU INTERMEDIATE R&B

## 2021-12-31 PROCEDURE — 80048 BASIC METABOLIC PNL TOTAL CA: CPT

## 2021-12-31 PROCEDURE — 89051 BODY FLUID CELL COUNT: CPT

## 2021-12-31 PROCEDURE — 82570 ASSAY OF URINE CREATININE: CPT

## 2021-12-31 PROCEDURE — 2580000003 HC RX 258: Performed by: INTERNAL MEDICINE

## 2021-12-31 PROCEDURE — 6370000000 HC RX 637 (ALT 250 FOR IP): Performed by: INTERNAL MEDICINE

## 2021-12-31 PROCEDURE — 85610 PROTHROMBIN TIME: CPT

## 2021-12-31 PROCEDURE — 82042 OTHER SOURCE ALBUMIN QUAN EA: CPT

## 2021-12-31 PROCEDURE — 2580000003 HC RX 258: Performed by: SPECIALIST

## 2021-12-31 PROCEDURE — 88305 TISSUE EXAM BY PATHOLOGIST: CPT

## 2021-12-31 RX ORDER — ALBUTEROL SULFATE 2.5 MG/3ML
2.5 SOLUTION RESPIRATORY (INHALATION) EVERY 6 HOURS PRN
Status: DISCONTINUED | OUTPATIENT
Start: 2021-12-31 | End: 2022-01-07

## 2021-12-31 RX ORDER — METOPROLOL SUCCINATE 25 MG/1
50 TABLET, EXTENDED RELEASE ORAL NIGHTLY
Status: DISCONTINUED | OUTPATIENT
Start: 2021-12-31 | End: 2022-01-07 | Stop reason: HOSPADM

## 2021-12-31 RX ORDER — ALBUTEROL SULFATE 90 UG/1
2 AEROSOL, METERED RESPIRATORY (INHALATION) EVERY 6 HOURS PRN
Status: DISCONTINUED | OUTPATIENT
Start: 2021-12-31 | End: 2021-12-31 | Stop reason: CLARIF

## 2021-12-31 RX ORDER — ACETAMINOPHEN 325 MG/1
650 TABLET ORAL EVERY 6 HOURS PRN
Status: DISPENSED | OUTPATIENT
Start: 2021-12-31 | End: 2022-01-03

## 2021-12-31 RX ORDER — TAMSULOSIN HYDROCHLORIDE 0.4 MG/1
0.4 CAPSULE ORAL DAILY
Status: DISCONTINUED | OUTPATIENT
Start: 2021-12-31 | End: 2022-01-07 | Stop reason: HOSPADM

## 2021-12-31 RX ORDER — CETIRIZINE HYDROCHLORIDE 10 MG/1
5 TABLET ORAL DAILY
Status: DISCONTINUED | OUTPATIENT
Start: 2021-12-31 | End: 2022-01-07 | Stop reason: HOSPADM

## 2021-12-31 RX ORDER — DOXYCYCLINE HYCLATE 100 MG/1
100 CAPSULE ORAL EVERY 12 HOURS SCHEDULED
Status: DISCONTINUED | OUTPATIENT
Start: 2021-12-31 | End: 2021-12-31

## 2021-12-31 RX ORDER — GABAPENTIN 300 MG/1
300 CAPSULE ORAL 2 TIMES DAILY
Status: DISCONTINUED | OUTPATIENT
Start: 2021-12-31 | End: 2022-01-07 | Stop reason: HOSPADM

## 2021-12-31 RX ORDER — FINASTERIDE 5 MG/1
5 TABLET, FILM COATED ORAL DAILY
Status: DISCONTINUED | OUTPATIENT
Start: 2021-12-31 | End: 2022-01-07 | Stop reason: HOSPADM

## 2021-12-31 RX ORDER — DUTASTERIDE 0.5 MG/1
0.5 CAPSULE, LIQUID FILLED ORAL DAILY
Status: DISCONTINUED | OUTPATIENT
Start: 2021-12-31 | End: 2021-12-31 | Stop reason: CLARIF

## 2021-12-31 RX ORDER — METOPROLOL SUCCINATE 25 MG/1
100 TABLET, EXTENDED RELEASE ORAL DAILY
Status: DISCONTINUED | OUTPATIENT
Start: 2021-12-31 | End: 2022-01-07 | Stop reason: HOSPADM

## 2021-12-31 RX ORDER — ALPRAZOLAM 0.25 MG/1
0.25 TABLET ORAL NIGHTLY PRN
Status: DISCONTINUED | OUTPATIENT
Start: 2021-12-31 | End: 2022-01-04

## 2021-12-31 RX ADMIN — FINASTERIDE 5 MG: 5 TABLET, FILM COATED ORAL at 10:04

## 2021-12-31 RX ADMIN — TAMSULOSIN HYDROCHLORIDE 0.4 MG: 0.4 CAPSULE ORAL at 10:07

## 2021-12-31 RX ADMIN — GABAPENTIN 300 MG: 300 CAPSULE ORAL at 18:36

## 2021-12-31 RX ADMIN — CETIRIZINE HYDROCHLORIDE 5 MG: 10 TABLET, FILM COATED ORAL at 10:04

## 2021-12-31 RX ADMIN — AZITHROMYCIN DIHYDRATE 500 MG: 500 INJECTION, POWDER, LYOPHILIZED, FOR SOLUTION INTRAVENOUS at 10:04

## 2021-12-31 RX ADMIN — ALPRAZOLAM 0.25 MG: 0.25 TABLET ORAL at 02:21

## 2021-12-31 RX ADMIN — METOPROLOL SUCCINATE 50 MG: 25 TABLET, FILM COATED, EXTENDED RELEASE ORAL at 21:04

## 2021-12-31 RX ADMIN — ACETAMINOPHEN 650 MG: 325 TABLET ORAL at 02:21

## 2021-12-31 RX ADMIN — AMPICILLIN SODIUM AND SULBACTAM SODIUM 3000 MG: 2; 1 INJECTION, POWDER, FOR SOLUTION INTRAMUSCULAR; INTRAVENOUS at 21:03

## 2021-12-31 RX ADMIN — GABAPENTIN 300 MG: 300 CAPSULE ORAL at 10:04

## 2021-12-31 RX ADMIN — METOPROLOL SUCCINATE 100 MG: 25 TABLET, EXTENDED RELEASE ORAL at 10:04

## 2021-12-31 ASSESSMENT — PAIN SCALES - GENERAL
PAINLEVEL_OUTOF10: 0

## 2021-12-31 NOTE — PROGRESS NOTES
I will be down later to place chest tube in right pleural space of patient to potentially drain the loculated pleural effusion that is predominantly right-sided.     Aroldo Ramachandran MD  12/31/2021  10:39 AM
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
## 2021-12-31 NOTE — ED NOTES
Patient readjusted into bed and placed in hospital gown. IV tubing changed along with lead stickers. Patient was getting swallow eval upon nurse exiting room. No distress noted at this time. Patient also was able to take all PO meds without difficulty or coughing. Isidra Burton.  Desmond Clark RN  12/31/21 1016

## 2021-12-31 NOTE — PROCEDURES
[address]    PULMONARY/CRITICAL CARE PROCEDURE NOTE    RIGHT CHEST TUBE PROCEDURE NOTE    Patient: Sonu Vasquez  MRN: [id number]  : 1945  Date: 2021  Time: 4:57 PM    Laboratory Work:  Lab Results   Component Value Date    INR 1.8 2021    INR 1.0 2016    INR 1.0 2015    PROTIME 20.0 (H) 2021    PROTIME 11.0 2016    PROTIME 10.9 2015     Lab Results   Component Value Date    WBC 30.8 (H) 2021    HGB 10.6 (L) 2021    HCT 32.7 (L) 2021    MCV 95.1 2021     2021    LYMPHOPCT 2.4 (L) 2021    RBC 3.44 (L) 2021    MCH 30.8 2021    MCHC 32.4 2021    RDW 15.6 (H) 2021    NEUTOPHILPCT 90.5 (H) 2021    MONOPCT 5.1 2021    BASOPCT 0.2 2021    NEUTROABS 27.89 (H) 2021    LYMPHSABS 0.73 (L) 2021    MONOSABS 1.57 (H) 2021    EOSABS 0.00 (L) 2021    BASOSABS 0.06 2021     Lab Results   Component Value Date     2021    K 4.2 2021     2021    CO2 22 2021    BUN 27 2021    CREATININE 0.8 2021    GLUCOSE 122 2021    CALCIUM 8.3 2021      Attending Physician: Dr. Jonathan Lozano    Assistant(s): Ultrasound, Nursing Staff    Pre-Operative Diagnosis: Pleural Effusion, Right    Operation/Procedure: Right 14 Fr 35 cm Pigtail Catheter Placement     Post-Operative Diagnosis: Pleural Effusion, Right    Anesthesia: Local with 1% Lidocaine     Indications: Pleural Effusion, Right    Purpose: Therapeutic    Consent: Consent was obtained prior to procedure. Indications, risks, benefits were explained at length. We explained the major steps of the procedure, the necessity for repeat chest radiographs and possible complications but not limited to bleeding, infection, re-expansion pulmonary edema, injury to internal organs during the procedure, anxiety, shortness of breath, pain, cough.   Further, the patient was made aware that if the chest tube becomes non-functional for whatever reason, it may be removed and another tube may need to be placed. Procedure Summary:  A time out was performed to confirm patient and procedure. A hat, mask as well as sterile gown and gloves were worn during the procedure. The patient was placed in the seating at bedside. After donning cap, mask, sterile gown and gloves, the patients Right back was cleansed with chlorhexidine scrub. Sterile drapes were applied covering the patients upper back. Landmarks were identified between the 6th and 7th intercostals space. US used to confirm placement  5 cc of 1% lidocaine was widely infiltrated subcutaneously for local analgesia. A finder needle was used to locate pleural fluid which was straw colored and cloudy and free flowing. An 14 Fr 35 cm length catheter was then threaded along the guidewire into the right pleural space. Guidewire was removed. The tube was then placed to suction. The skin was approximated first, via 2-0 sutures in a horizontal mattress above and below the chest tube, then the suture ends were tied around the indwelling tube. The site was then taped with pressure tape and secured and then pleuravac was checked. Estimated Blood Loss: NONE    Complications: NONE    Tolerance: Excellent     Department of Internal Medicine  Division of Pulmonary, Critical Care and Sleep Medicine  [address] [facility]  Procedural Note Addendum Statement    I personally supervised/performed the performance of the documented procedure. I was present for the critical elements of this procedure and was immediately available for the entire procedure.     Surinder Wing MD on 12/31/2021 at 4:57 PM

## 2021-12-31 NOTE — PROGRESS NOTES
SPEECH/LANGUAGE PATHOLOGY  CLINICAL ASSESSMENT OF SWALLOWING FUNCTION   and PLAN OF CARE    PATIENT NAME:  Sonu Rincon  (male)     MRN:  [id number]    :  1945  (64 y.o.)  STATUS:  Inpatient: Room     TODAY'S DATE:  2021  REFERRING PROVIDER:   MARIA DEL ROSARIO Sltaer  SPECIFIC PROVIDER ORDER: Speech Language Pathology clinical swallowing screening Date of order:  21  REASON FOR REFERRAL: assess swallow fx   EVALUATING THERAPIST: ROBINSON Gómez                 RESULTS:    DYSPHAGIA DIAGNOSIS:   Clinical indicators of functional swallow for age/premorbid functioning      DIET RECOMMENDATIONS:  Regular consistency solids (IDDSI level 7) with  thin liquids (IDDSI level 0) and Per patient choice/nursing judgement     FEEDING RECOMMENDATIONS:     Assistance level:  No assistance needed      Compensatory strategies recommended: Not applicable      Discussed recommendations with nursing and/or faxed report to referring provider: Yes    SPEECH THERAPY  PLAN OF CARE   The dysphagia POC is established based on physician order, dysphagia diagnosis and results of clinical assessment     Dysphagia therapy is not recommended     Conditions Requiring Skilled Therapeutic Intervention for dysphagia:    Not applicable    Specific dysphagia interventions to include:     Not applicable    Specific instructions for next treatment:  not applicable   Patient Treatment Goals:    Short Term Goals:  Not applicable no therapy warranted     Long Term Goals:   Not applicable no therapy warranted      Patient/family Goal:    not applicable    Plan of care discussed with Patient   The Patient understand(s) the diagnosis, prognosis and plan of care     Rehabilitation Potential/Prognosis: good                    ADMITTING DIAGNOSIS: Empyema ([facility] [address] [J86.9]  Pleural effusion [J90]    VISIT DIAGNOSIS:   Visit Diagnoses       Codes    Abdominal pain, right upper quadrant     R10.11    Leukocytosis, unspecified type D72.829           PATIENT REPORT/COMPLAINT: none reported    RN cleared patient for participation in assessment     yes     PRIOR LEVEL OF SWALLOW FUNCTION:    PAST HISTORY OF DYSPHAGIA?: none reported    Home diet: Regular consistency solids (IDDSI level 7) with  thin liquids (IDDSI level 0)    PROCEDURE:  Consistencies Administered During the Evaluation   Liquids: thin liquid   Solids:  pureed foods and solid foods      Method of Intake:   cup, straw, spoon  Self fed      Position:   Seated, upright    CLINICAL ASSESSMENT:  Oral Stage: The oral stage of swallowing was within functional limits      Pharyngeal Stage:    No signs of aspiration were noted during this evaluation however, silent aspiration cannot be ruled out at bedside. If silent aspiration is suspected, a Videofluoroscopic Study of Swallowing (MBS) is recommended and requires a physician order. Cognition:   Within functional limits for this exam    Oral Peripheral Examination   Adequate lingual/labial strength     Current Respiratory Status    Nasal cannula     Parameters of Speech Production  Respiration:  Adequate for speech production  Quality:   Within functional limits  Intensity: Within functional limits    Volitional Swallow: present     Volitional Cough:   present     Pain: No pain reported. EDUCATION:   The Speech Language Pathologist (SLP) completed education regarding results of evaluation and that intervention is not warranted at this time. Learner: Patient  Education: Reviewed results and recommendations of this evaluation  Evaluation of Education:  Roya Cameron understanding    This plan may be re-evaluated and revised as warranted.       Evaluation Time includes thorough review of current medical information, gathering information on past medical history/social history and prior level of function, completion of standardized testing/informal observation of tasks, assessment of data and education on plan of care and goals. [x]The admitting diagnosis and active problem list, have been reviewed prior to initiation of this evaluation. ACTIVE PROBLEM LIST:   Patient Active Problem List   Diagnosis    Abnormal PSA    Mitral valve prolapse    MR (mitral regurgitation)    Tricuspid valve regurgitation    Pulmonary HTN (HCC)    HTN (hypertension)    DM (diabetes mellitus) ([facility] [address]    Multiple sclerosis (HCC)    Hiatal hernia    GERD (gastroesophageal reflux disease)    History of kidney stones    Benign prostatic hyperplasia    S/P TURP    Urothelial carcinoma (HCC)    Asbestosis ([facility] [address]    Erectile dysfunction    Panic attacks    History of esophageal stricture    History of esophageal dilatation    Washburn's esophagus determined by biopsy    Anxiety    Claustrophobia    History of PSVT (paroxysmal supraventricular tachycardia)    H/O endocarditis    Nonrheumatic aortic valve insufficiency    Nonrheumatic pulmonary valve insufficiency    Empyema (HCC)    Pleural effusion         CPT code:  92067  bedside swallow eval Arlean Frater D'Amico M. A. Wynell Mako [id number]  Speech Language Pathologist
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
## 2021-12-31 NOTE — ED NOTES
Patients oxygen tank was empty. 81% on room air. Placed on 4 L nasal cannula. Saturation increased to 91%. Will continue to monitor. Pt resting and easily awakens.      Bernadine Ziegler RN  12/30/21 2006

## 2021-12-31 NOTE — CONSULTS
[address] [address]                                  CONSULTATION    PATIENT NAME: Kota Ocampo                    :        1945  MED REC NO:   [id number]                            ROOM:       01  ACCOUNT NO:   [de-identified]                           ADMIT DATE: 2021  PROVIDER:     Mark Barrow MD    CONSULT DATE:  2021    PULMONARY AND CRITICAL CARE CONSULTATION    REQUESTING PROVIDER:  Dr. Danica Fitzgerald. REASON FOR CONSULTATION:  Parapneumonic effusion. IMPRESSION:  1. Sepsis based on leukocytosis and tachycardia. 2.  Loculated right pleural effusion with appearance strongly suggestive  of an empyema. PLAN:  At this point is to place a pigtail catheter and to switch the  doxycycline orally to azithromycin 500 IV daily and increase the  ceftriaxone to 2 gm daily pending the pleural fluid analysis. We will  likely need a thoracic surgery consult the beginning of next week when  Dr. Aditya Fuller returns to [facility]. HISTORY OF PRESENT ILLNESS:  A 58-year-old white male with multiple  sclerosis and mitral regurgitation with mitral valve prolapse and prior  endocarditis, presented to the emergency department last night reportedly with  abdominal pain after 10 to 15 days post a colonoscopy apparently for  constipation. He reports to me that he was having some shortness of  breath and was somewhat delirious. Shortness of breath was mild and  associated with a dry cough. He felt sweaty and had a stabbing  sensation of chest pain in his chest, on the right  side. He had a CT angiogram, which showed a loculated right pleural  effusion and was subsequently referred to Pulmonary. PAST MEDICAL HISTORY:  Includes diabetes, which he denied; multiple  sclerosis, which he admitted; mitral valve prolapse. He did not report  the mitral valve endocarditis, nor the regurgitation.   This was not in  any old notes that I have reviewed. PAST SURGICAL HISTORY:  Includes bilateral hernias and a penile implant. FAMILY HISTORY:  Mother had diabetes. SOCIAL HISTORY:  Remote former smoker. Drinks a glass of wine a night. Worked at [employer]. ALLERGIES:  He has multiple listed drug allergies including dye , which  he no longer has a problem with; PAXIL. REVIEW OF SYSTEMS:  He is still sweaty, but had no fevers or chills. He  has some blurred vision. No hearing difficulty. Positive chest pain. Positive cough and shortness of breath. Positive abdominal pain since  the colonoscopy. No urinary complaints. No history of blood clots. No  skin problems. Review of systems is otherwise negative. PHYSICAL EXAMINATION:  GENERAL:  He is in no acute distress. A little bit confused. VITAL SIGNS:  Temperature 97.5, pulse 100, respiratory rate was 17,  blood pressure 108/67, six-liter pulse ox was 99%. SKIN:  Had no rashes. HEENT:  Eyes had clear sclerae. Teeth and palate were in fair repair. NECK:  Without masses or adenopathy. CHEST:  Symmetric. There was no accessory muscle use. HEART:  Tachycardic with a 1/6 systolic murmur. LUNGS:  Had decreased breath sounds and a couple of crackles at the  right base. ABDOMEN:  Soft and nontender without masses or organomegaly. EXTREMITIES:  Showed some leg swelling, nothing major. NEUROLOGICAL:  He was confused, but answered all questions relatively  appropriately. IMAGING DATA:  CT images of the chest were reviewed and showed extensive  right pleural effusion with loculation superomedially and  superolaterally. There was more free flowing fluid at the right base. LABORATORY DATA:  White count was 30.8, hemoglobin is 10.6, platelets  are 416. INR was 1.8. Glucose was 122, bicarb was 22, anion gap was 9.         Franck Jaramillo MD    D: 12/31/2021 9:36:50       T: 12/31/2021 9:40:22     [email or web address]  Job#: [id number]     Doc#: [id number]    CC:

## 2022-01-01 LAB
ANION GAP SERPL CALCULATED.3IONS-SCNC: 10 MMOL/L (ref 7–16)
BASOPHILS ABSOLUTE: 0.03 E9/L (ref 0–0.2)
BASOPHILS RELATIVE PERCENT: 0.1 % (ref 0–2)
BUN BLDV-MCNC: 31 MG/DL (ref 6–23)
CALCIUM SERPL-MCNC: 8.4 MG/DL (ref 8.6–10.2)
CEA: 0.9 NG/ML (ref 0–5.2)
CHLORIDE BLD-SCNC: 105 MMOL/L (ref 98–107)
CO2: 25 MMOL/L (ref 22–29)
CREAT SERPL-MCNC: 0.8 MG/DL (ref 0.7–1.2)
EOSINOPHILS ABSOLUTE: 0.02 E9/L (ref 0.05–0.5)
EOSINOPHILS RELATIVE PERCENT: 0.1 % (ref 0–6)
GFR AFRICAN AMERICAN: >60
GFR NON-AFRICAN AMERICAN: >60 ML/MIN/1.73
GLUCOSE BLD-MCNC: 101 MG/DL (ref 74–99)
GRAM STAIN ORDERABLE: NORMAL
HCT VFR BLD CALC: 34.5 % (ref 37–54)
HEMOGLOBIN: 11.1 G/DL (ref 12.5–16.5)
IMMATURE GRANULOCYTES #: 0.33 E9/L
IMMATURE GRANULOCYTES %: 1.3 % (ref 0–5)
LYMPHOCYTES ABSOLUTE: 0.8 E9/L (ref 1.5–4)
LYMPHOCYTES RELATIVE PERCENT: 3.2 % (ref 20–42)
MCH RBC QN AUTO: 30.3 PG (ref 26–35)
MCHC RBC AUTO-ENTMCNC: 32.2 % (ref 32–34.5)
MCV RBC AUTO: 94.3 FL (ref 80–99.9)
MONOCYTES ABSOLUTE: 1.63 E9/L (ref 0.1–0.95)
MONOCYTES RELATIVE PERCENT: 6.4 % (ref 2–12)
NEUTROPHILS ABSOLUTE: 22.53 E9/L (ref 1.8–7.3)
NEUTROPHILS RELATIVE PERCENT: 88.9 % (ref 43–80)
OVALOCYTES: ABNORMAL
PDW BLD-RTO: 15.5 FL (ref 11.5–15)
PLATELET # BLD: 477 E9/L (ref 130–450)
PMV BLD AUTO: 9.5 FL (ref 7–12)
POIKILOCYTES: ABNORMAL
POLYCHROMASIA: ABNORMAL
POTASSIUM SERPL-SCNC: 4.6 MMOL/L (ref 3.5–5)
RBC # BLD: 3.66 E12/L (ref 3.8–5.8)
SODIUM BLD-SCNC: 140 MMOL/L (ref 132–146)
WBC # BLD: 25.3 E9/L (ref 4.5–11.5)

## 2022-01-01 PROCEDURE — 6360000002 HC RX W HCPCS: Performed by: SPECIALIST

## 2022-01-01 PROCEDURE — 82378 CARCINOEMBRYONIC ANTIGEN: CPT

## 2022-01-01 PROCEDURE — 86301 IMMUNOASSAY TUMOR CA 19-9: CPT

## 2022-01-01 PROCEDURE — 2700000000 HC OXYGEN THERAPY PER DAY

## 2022-01-01 PROCEDURE — 6370000000 HC RX 637 (ALT 250 FOR IP): Performed by: NURSE PRACTITIONER

## 2022-01-01 PROCEDURE — 2060000000 HC ICU INTERMEDIATE R&B

## 2022-01-01 PROCEDURE — 2580000003 HC RX 258: Performed by: SPECIALIST

## 2022-01-01 PROCEDURE — 36415 COLL VENOUS BLD VENIPUNCTURE: CPT

## 2022-01-01 PROCEDURE — 6360000002 HC RX W HCPCS: Performed by: NURSE PRACTITIONER

## 2022-01-01 PROCEDURE — 85025 COMPLETE CBC W/AUTO DIFF WBC: CPT

## 2022-01-01 PROCEDURE — 6360000002 HC RX W HCPCS: Performed by: INTERNAL MEDICINE

## 2022-01-01 PROCEDURE — 80048 BASIC METABOLIC PNL TOTAL CA: CPT

## 2022-01-01 RX ORDER — MORPHINE SULFATE 2 MG/ML
2 INJECTION, SOLUTION INTRAMUSCULAR; INTRAVENOUS
Status: DISCONTINUED | OUTPATIENT
Start: 2022-01-01 | End: 2022-01-04 | Stop reason: SDUPTHER

## 2022-01-01 RX ADMIN — AMPICILLIN SODIUM AND SULBACTAM SODIUM 3000 MG: 2; 1 INJECTION, POWDER, FOR SOLUTION INTRAMUSCULAR; INTRAVENOUS at 21:17

## 2022-01-01 RX ADMIN — FINASTERIDE 5 MG: 5 TABLET, FILM COATED ORAL at 08:57

## 2022-01-01 RX ADMIN — AMPICILLIN SODIUM AND SULBACTAM SODIUM 3000 MG: 2; 1 INJECTION, POWDER, FOR SOLUTION INTRAMUSCULAR; INTRAVENOUS at 09:05

## 2022-01-01 RX ADMIN — METOPROLOL SUCCINATE 100 MG: 25 TABLET, EXTENDED RELEASE ORAL at 08:56

## 2022-01-01 RX ADMIN — ENOXAPARIN SODIUM 40 MG: 100 INJECTION SUBCUTANEOUS at 17:39

## 2022-01-01 RX ADMIN — TAMSULOSIN HYDROCHLORIDE 0.4 MG: 0.4 CAPSULE ORAL at 08:57

## 2022-01-01 RX ADMIN — METOPROLOL SUCCINATE 50 MG: 25 TABLET, FILM COATED, EXTENDED RELEASE ORAL at 21:17

## 2022-01-01 RX ADMIN — MORPHINE SULFATE 2 MG: 2 INJECTION, SOLUTION INTRAMUSCULAR; INTRAVENOUS at 15:09

## 2022-01-01 RX ADMIN — AMPICILLIN SODIUM AND SULBACTAM SODIUM 3000 MG: 2; 1 INJECTION, POWDER, FOR SOLUTION INTRAMUSCULAR; INTRAVENOUS at 15:05

## 2022-01-01 RX ADMIN — GABAPENTIN 300 MG: 300 CAPSULE ORAL at 17:39

## 2022-01-01 RX ADMIN — GABAPENTIN 300 MG: 300 CAPSULE ORAL at 08:57

## 2022-01-01 RX ADMIN — MORPHINE SULFATE 2 MG: 2 INJECTION, SOLUTION INTRAMUSCULAR; INTRAVENOUS at 09:17

## 2022-01-01 RX ADMIN — AMPICILLIN SODIUM AND SULBACTAM SODIUM 3000 MG: 2; 1 INJECTION, POWDER, FOR SOLUTION INTRAMUSCULAR; INTRAVENOUS at 03:12

## 2022-01-01 RX ADMIN — CETIRIZINE HYDROCHLORIDE 5 MG: 10 TABLET, FILM COATED ORAL at 08:58

## 2022-01-01 ASSESSMENT — PAIN SCALES - GENERAL
PAINLEVEL_OUTOF10: 8
PAINLEVEL_OUTOF10: 0
PAINLEVEL_OUTOF10: 4

## 2022-01-01 NOTE — PROGRESS NOTES
Pulmonary  Progress Note    Admit Date: 2021                            PCP: Mike Villa MD  Patient Active Problem List   Diagnosis    Abnormal PSA    Mitral valve prolapse    MR (mitral regurgitation)    Tricuspid valve regurgitation    [facility]    HTN (hypertension)    DM [facility]diabetes mellitus) [facility]    [facility]    Hiatal hernia    GERD (gastroesophageal reflux disease)    History of kidney stones    Benign prostatic hyperplasia    S/P TURP    Urothelial carcinoma (HCC)    [facility]osis [facility]    Erectile dysfunction    Panic attacks    History of esophageal stricture    History of esophageal dilatation    Washburn's esophagus determined by biopsy    Anxiety    Claustrophobia    History of PSVT (paroxysmal supraventricular tachycardia)    H/O endocarditis    Nonrheumatic aortic valve insufficiency    Nonrheumatic pulmonary valve insufficiency    Empyema (HCC)    Pleural effusion       Subjective:  Resting in bed eating lunch  On 5L nc - on room air at home  R CT intact draining serous fluid  Breathing is good  + Occ cough    Medications:       cetirizine  5 mg Oral Daily    gabapentin  300 mg Oral BID    metoprolol succinate  100 mg Oral Daily    metoprolol succinate  50 mg Oral Nightly    tamsulosin  0.4 mg Oral Daily    finasteride  5 mg Oral Daily    ampicillin-sulbactam  3,000 mg IntraVENous Q6H    [Held by provider] enoxaparin  40 mg SubCUTAneous Daily       Vitals:  Tmax:  VITALS:  BP (!) 113/94   Pulse 104   Temp 98.1 °F (36.7 °C) (Oral)   Resp 18   Ht 5' 10\" (1.778 m)   Wt 190 lb (86.2 kg)   SpO2 95%   BMI 27.26 kg/m²   24HR INTAKE/OUTPUT:  No intake or output data in the 24 hours ending 22 1231  CURRENT PULSE OXIMETRY:  SpO2: 95 %  24HR PULSE OXIMETRY RANGE:  SpO2  Av.7 %  Min: 93 %  Max: 98 %  CVP:    VENT SETTINGS:   Vent Information  SpO2: 95 %  Additional Respiratory  Assessments  Pulse: 104  Resp: 18  SpO2: 95 %      EXAM:  General: No distress. Alert. Eyes: PERRL. No sclera icterus. No conjunctival injection. ENT: No discharge. Pharynx clear. Neck: Trachea midline. Normal thyroid. Resp: No accessory muscle use. Diminished >R, CT to suction draining serous fluid with no air leak or subq emphy. No crackles. No wheezing. No rhonchi. CV: Regular rate. Regular rhythm. No mumur or rub. 2-3+ BLE edema. ABD: Non-tender. Non-distended. No masses. No organmegaly. Normal bowel sounds. Skin: Warm and dry. No nodule on exposed extremities. No rash on exposed extremities. Lymph: No cervical LAD. No supraclavicular LAD. M/S: No cyanosis. No joint deformity. No clubbing. Neuro: Awake. Follows commands. I/O: I/O last 3 completed shifts:  In: -   Out: 1000 [Urine:1000]  No intake/output data recorded. Results:  CBC:   Recent Labs     12/30/21  1309 12/31/21  0547 01/01/22  0441   WBC 35.5* 30.8* 25.3*   HGB 12.0* 10.6* 11.1*   HCT 36.6* 32.7* 34.5*   MCV 93.8 95.1 94.3    409 477*     BMP:   Recent Labs     12/30/21  1309 12/31/21  0547 01/01/22  0441    138 140   K 3.9 4.2 4.6    107 105   CO2 20* 22 25   BUN 25* 27* 31*   CREATININE 0.8 0.8 0.8     PT/INR:   Recent Labs     12/31/21  0547   PROTIME 20.0*   INR 1.8     Cultures:  12/31 Gram stain performed on unspun fluid   Abundant Polymorphonuclear leukocytes   Epithelial cells not seen   No organisms seen     ABG: noted if resulted    Films:  CXR 12/31/21: Interval pleural tube placement right lung base with evaluation of some of the previous seen pleural fluid. There is probable loculated pneumothorax at the right costophrenic sulcus. Right lower lobe consolidation consistent with atelectasis and/or pneumonia. This is improved overall. Minimal left lower lobe atelectasis and/or pneumonitis. Stable enlargement of the cardiac silhouette. 12/30/21 CTA: No evidence for pulmonary emboli.    Moderate to large right pleural effusion with

## 2022-01-01 NOTE — PROGRESS NOTES
Subjective: The patient is awake and alert. No acute events overnight. Denies chest pain, angina, SOB   Complaining pain r/t chest tube, IV  Morphine ordered. Feels much better today    Objective:    BP (!) 113/94   Pulse 104   Temp 98.1 °F (36.7 °C) (Oral)   Resp 18   Ht 5' 10\" (1.778 m)   Wt 190 lb (86.2 kg)   SpO2 95%   BMI 27.26 kg/m²     No intake/output data recorded. No intake/output data recorded. General appearance: NAD, conversant  HEENT: AT/NC, MMM  Neck: FROM, supple  Lungs: right side diminshed, R chest tube to suction   CV: RRR, no MRGs  Vasc: Radial pulses 2+  Abdomen: Soft, non-tender; no masses or HSM  Extremities: No peripheral edema or digital cyanosis  Skin: no rash, lesions or ulcers  Psych: Alert and oriented to person, place and time  Neuro: Alert and interactive     Recent Labs     12/30/21  1309 12/31/21  0547 01/01/22  0441   WBC 35.5* 30.8* 25.3*   HGB 12.0* 10.6* 11.1*   HCT 36.6* 32.7* 34.5*    409 477*       Recent Labs     12/30/21  1309 12/31/21  0547 01/01/22  0441    138 140   K 3.9 4.2 4.6    107 105   CO2 20* 22 25   BUN 25* 27* 31*   CREATININE 0.8 0.8 0.8   CALCIUM 8.4* 8.3* 8.4*       Assessment:    Active Problems:    Empyema (HCC)    Pleural effusion  Resolved Problems:    * No resolved hospital problems. *      Plan:    Patient is a 76-year-old male admitted to [facility] for  Loculated pleural effusion- concerning for possible malignancy given his history   -Monitor labs  -Pro-Isac 1.93 > 1.95, check tumor markers-known history of \"spot on lung\", has not pursued surveillance  -Sed rate 68  -WBC 35.5> 30.8 > 25.3 today   -Pulmonology consulted  -CTA pulmonary moderate to large right pleural effusion with adjacent atelectasis versus infiltrate, with possible loculation  -R pigtail chest tube placed per pulm, 1200cc out at time of assessment.    -ID consulted -input appreciated  -IV Unasyn pending pan culture results   -BC negative so far  -CTS to be consulted per Pulm on Monday 1/3     Hypertension  -continue home medications      DVT Prophylaxis - lovenox  PT/OT  Discharge planning       SHANIQUE Watkins CNP  1:20 PM     Above note edited to reflect my thoughts     I personally saw, examined and provided care for the patient. Radiographs, labs and medication list were reviewed by me independently. The case was discussed in detail and plans for care were established. Review of RICKEY Watkins   , documentation was conducted and revisions were made as appropriate directly by me. I agree with the above documented exam, problem list, and plan of care.      Lexis Nevarez MD  3:18 PM  1/1/2022 1/1/2022

## 2022-01-01 NOTE — PROGRESS NOTES
Patient came to floor on 12/31/21 with a chest tube. Not added to LDA's, added today to document drainage.

## 2022-01-01 NOTE — PROGRESS NOTES
[address] [place] [address] [facility]  Progress Note    SUBJECTIVE:  Chief Complaint   Patient presents with    Abdominal Pain     intermittent x 2 days    Diarrhea     The patient had a chest tube placed. Denies any pain. Minimal cough. Tolerating antibiotics. Review of systems:  As stated above in the chief complaint, otherwise negative. Medications:  Scheduled Meds:   cetirizine  5 mg Oral Daily    gabapentin  300 mg Oral BID    metoprolol succinate  100 mg Oral Daily    metoprolol succinate  50 mg Oral Nightly    tamsulosin  0.4 mg Oral Daily    finasteride  5 mg Oral Daily    ampicillin-sulbactam  3,000 mg IntraVENous Q6H    [Held by provider] enoxaparin  40 mg SubCUTAneous Daily     Continuous Infusions:  PRN Meds:morphine, ALPRAZolam, albuterol, acetaminophen    OBJECTIVE:  BP (!) 113/94   Pulse 104   Temp 98.1 °F (36.7 °C) (Oral)   Resp 18   Ht 5' 10\" (1.778 m)   Wt 190 lb (86.2 kg)   SpO2 95%   BMI 27.26 kg/m²   Temp  Av.4 °F (36.9 °C)  Min: 98.1 °F (36.7 °C)  Max: 98.7 °F (37.1 °C)  Constitutional: The patient is sitting up in bed and eating lunch. No distress. Skin: Warm and dry. No rashes were noted. HEENT: Round and reactive pupils. Moist mucous membranes. No ulcerations or thrush. Neck: Supple to movements. Chest: No use of accessory muscles to breathe. Symmetrical expansion. No wheezing, crackles or rhonchi. Right chest tube with serous fluid. Cardiovascular: S1 and S2 are rhythmic and regular. No murmurs appreciated. Abdomen: Positive bowel sounds to auscultation. Benign to palpation. No masses felt. No hepatosplenomegaly. Extremities: No edema.   Lines: peripheral    Laboratory and Tests Review:  Lab Results   Component Value Date    WBC 25.3 (H) 2022    WBC 30.8 (H) 2021    WBC 35.5 (H) 2021    HGB 11.1 (L) 2022    HCT 34.5 (L) 2022    MCV 94.3 2022     (H) 2022     Lab Results   Component Value Date    NEUTROABS 22.53 (H) 01/01/2022    NEUTROABS 27.89 (H) 12/31/2021    NEUTROABS 33.02 (H) 12/30/2021     No results found for: CRPHS  Lab Results   Component Value Date    ALT 15 12/30/2021    AST 10 12/30/2021    ALKPHOS 158 (H) 12/30/2021    BILITOT 0.7 12/30/2021     Lab Results   Component Value Date     01/01/2022    K 4.6 01/01/2022     01/01/2022    CO2 25 01/01/2022    BUN 31 01/01/2022    CREATININE 0.8 01/01/2022    CREATININE 0.8 12/31/2021    CREATININE 0.8 12/30/2021    GFRAA >60 01/01/2022    LABGLOM >60 01/01/2022    GLUCOSE 101 01/01/2022    PROT 6.8 12/30/2021    LABALBU 2.6 12/30/2021    CALCIUM 8.4 01/01/2022    BILITOT 0.7 12/30/2021    ALKPHOS 158 12/30/2021    AST 10 12/30/2021    ALT 15 12/30/2021     Lab Results   Component Value Date    CRP 13.7 (H) 12/31/2021     Lab Results   Component Value Date    SEDRATE 68 (H) 12/30/2021     Radiology:      Microbiology:   Blood cultures 12/30/2021: Negative so far  Pleural fluid 12/31/2021: Negative so far  Urine culture 12/30/2021: Negative so far  Streptococcus pneumoniae/Legionella urine Ag: negative  Rapid SARS-CoV-2: Negative  ASO titer: 116    Recent Labs     12/30/21  1919 12/31/21  0547   PROCAL 1.93* 1.95*       ASSESSMENT:  · Right side empyema. Status post thoracotomy tube 12/31/2021. Exudative fluid compatible with empyema  · Leukocytosis associated to empyema, improving    PLAN:  · Continue Unasyn  · Check final cultures.   I asked microbiology to hold fluid for 7 days if it is negative at day 2  · Monitor labs    Spoke with nurse    Joy Guardado MD  11:30 AM  1/1/2022

## 2022-01-01 NOTE — PROGRESS NOTES
Patient admitted to unit with new chest tube, intermittent low suction -20. Stage one pressure ulcer noted on bilateral buttocks, mepilex dressing placed.

## 2022-01-02 LAB
ANION GAP SERPL CALCULATED.3IONS-SCNC: 9 MMOL/L (ref 7–16)
BASOPHILS ABSOLUTE: 0 E9/L (ref 0–0.2)
BASOPHILS RELATIVE PERCENT: 0 % (ref 0–2)
BUN BLDV-MCNC: 26 MG/DL (ref 6–23)
CALCIUM SERPL-MCNC: 8.1 MG/DL (ref 8.6–10.2)
CHLORIDE BLD-SCNC: 105 MMOL/L (ref 98–107)
CO2: 24 MMOL/L (ref 22–29)
CREAT SERPL-MCNC: 0.8 MG/DL (ref 0.7–1.2)
EOSINOPHILS ABSOLUTE: 0.17 E9/L (ref 0.05–0.5)
EOSINOPHILS RELATIVE PERCENT: 0.9 % (ref 0–6)
GFR AFRICAN AMERICAN: >60
GFR NON-AFRICAN AMERICAN: >60 ML/MIN/1.73
GLUCOSE BLD-MCNC: 87 MG/DL (ref 74–99)
HCT VFR BLD CALC: 35 % (ref 37–54)
HEMOGLOBIN: 11.1 G/DL (ref 12.5–16.5)
LYMPHOCYTES ABSOLUTE: 0.37 E9/L (ref 1.5–4)
LYMPHOCYTES RELATIVE PERCENT: 1.7 % (ref 20–42)
MCH RBC QN AUTO: 29.8 PG (ref 26–35)
MCHC RBC AUTO-ENTMCNC: 31.7 % (ref 32–34.5)
MCV RBC AUTO: 93.8 FL (ref 80–99.9)
MONOCYTES ABSOLUTE: 1.12 E9/L (ref 0.1–0.95)
MONOCYTES RELATIVE PERCENT: 6.1 % (ref 2–12)
MYELOCYTE PERCENT: 0.9 % (ref 0–0)
NEUTROPHILS ABSOLUTE: 17.02 E9/L (ref 1.8–7.3)
NEUTROPHILS RELATIVE PERCENT: 90.4 % (ref 43–80)
NUCLEATED RED BLOOD CELLS: 0 /100 WBC
OVALOCYTES: ABNORMAL
PDW BLD-RTO: 15.4 FL (ref 11.5–15)
PLATELET # BLD: 440 E9/L (ref 130–450)
PMV BLD AUTO: 9.5 FL (ref 7–12)
POIKILOCYTES: ABNORMAL
POLYCHROMASIA: ABNORMAL
POTASSIUM SERPL-SCNC: 4.6 MMOL/L (ref 3.5–5)
RBC # BLD: 3.73 E12/L (ref 3.8–5.8)
SODIUM BLD-SCNC: 138 MMOL/L (ref 132–146)
URINE CULTURE, ROUTINE: NORMAL
WBC # BLD: 18.7 E9/L (ref 4.5–11.5)

## 2022-01-02 PROCEDURE — 6360000002 HC RX W HCPCS: Performed by: SPECIALIST

## 2022-01-02 PROCEDURE — 6360000002 HC RX W HCPCS: Performed by: INTERNAL MEDICINE

## 2022-01-02 PROCEDURE — 2700000000 HC OXYGEN THERAPY PER DAY

## 2022-01-02 PROCEDURE — 36415 COLL VENOUS BLD VENIPUNCTURE: CPT

## 2022-01-02 PROCEDURE — 80048 BASIC METABOLIC PNL TOTAL CA: CPT

## 2022-01-02 PROCEDURE — 2060000000 HC ICU INTERMEDIATE R&B

## 2022-01-02 PROCEDURE — 2580000003 HC RX 258: Performed by: SPECIALIST

## 2022-01-02 PROCEDURE — 85025 COMPLETE CBC W/AUTO DIFF WBC: CPT

## 2022-01-02 PROCEDURE — 6370000000 HC RX 637 (ALT 250 FOR IP): Performed by: NURSE PRACTITIONER

## 2022-01-02 PROCEDURE — 6360000002 HC RX W HCPCS: Performed by: NURSE PRACTITIONER

## 2022-01-02 RX ADMIN — CETIRIZINE HYDROCHLORIDE 5 MG: 10 TABLET, FILM COATED ORAL at 07:56

## 2022-01-02 RX ADMIN — ENOXAPARIN SODIUM 40 MG: 100 INJECTION SUBCUTANEOUS at 15:08

## 2022-01-02 RX ADMIN — GABAPENTIN 300 MG: 300 CAPSULE ORAL at 16:30

## 2022-01-02 RX ADMIN — FINASTERIDE 5 MG: 5 TABLET, FILM COATED ORAL at 07:56

## 2022-01-02 RX ADMIN — METOPROLOL SUCCINATE 100 MG: 25 TABLET, EXTENDED RELEASE ORAL at 07:55

## 2022-01-02 RX ADMIN — GABAPENTIN 300 MG: 300 CAPSULE ORAL at 07:56

## 2022-01-02 RX ADMIN — MORPHINE SULFATE 2 MG: 2 INJECTION, SOLUTION INTRAMUSCULAR; INTRAVENOUS at 02:41

## 2022-01-02 RX ADMIN — AMPICILLIN SODIUM AND SULBACTAM SODIUM 3000 MG: 2; 1 INJECTION, POWDER, FOR SOLUTION INTRAMUSCULAR; INTRAVENOUS at 02:37

## 2022-01-02 RX ADMIN — MORPHINE SULFATE 2 MG: 2 INJECTION, SOLUTION INTRAMUSCULAR; INTRAVENOUS at 16:02

## 2022-01-02 RX ADMIN — AMPICILLIN SODIUM AND SULBACTAM SODIUM 3000 MG: 2; 1 INJECTION, POWDER, FOR SOLUTION INTRAMUSCULAR; INTRAVENOUS at 19:41

## 2022-01-02 RX ADMIN — METOPROLOL SUCCINATE 50 MG: 25 TABLET, FILM COATED, EXTENDED RELEASE ORAL at 19:40

## 2022-01-02 RX ADMIN — AMPICILLIN SODIUM AND SULBACTAM SODIUM 3000 MG: 2; 1 INJECTION, POWDER, FOR SOLUTION INTRAMUSCULAR; INTRAVENOUS at 15:08

## 2022-01-02 RX ADMIN — AMPICILLIN SODIUM AND SULBACTAM SODIUM 3000 MG: 2; 1 INJECTION, POWDER, FOR SOLUTION INTRAMUSCULAR; INTRAVENOUS at 07:54

## 2022-01-02 RX ADMIN — TAMSULOSIN HYDROCHLORIDE 0.4 MG: 0.4 CAPSULE ORAL at 07:56

## 2022-01-02 ASSESSMENT — PAIN SCALES - GENERAL
PAINLEVEL_OUTOF10: 1
PAINLEVEL_OUTOF10: 5
PAINLEVEL_OUTOF10: 3
PAINLEVEL_OUTOF10: 9

## 2022-01-02 ASSESSMENT — PAIN DESCRIPTION - LOCATION: LOCATION: CHEST

## 2022-01-02 ASSESSMENT — PAIN DESCRIPTION - PAIN TYPE: TYPE: SURGICAL PAIN

## 2022-01-02 NOTE — PROGRESS NOTES
Pulmonary  Progress Note    Admit Date: 2021                            PCP: Obdulio Hickey MD  Patient Active Problem List   Diagnosis    Abnormal PSA    Mitral valve prolapse    MR (mitral regurgitation)    Tricuspid valve regurgitation    Pulmonary HTN (HCC)    HTN (hypertension)    DM [facility]    Multiple sclerosis (HCC)    Hiatal hernia    GERD (gastroesophageal reflux disease)    History of kidney stones    Benign prostatic hyperplasia    S/P TURP    Urothelial carcinoma (HCC)    Asbestosis ([facility]    Erectile dysfunction    Panic attacks    History of esophageal stricture    History of esophageal dilatation    Washburn's esophagus determined by biopsy    Anxiety    Claustrophobia    History of PSVT (paroxysmal supraventricular tachycardia)    H/O endocarditis    Nonrheumatic aortic valve insufficiency    Nonrheumatic pulmonary valve insufficiency    Empyema (HCC)    Pleural effusion       Subjective:  Ambulating to bed on 5L   CT to suction with 0 output last shift  Feels ok, breathing is the same, moist cough    Medications:       enoxaparin  40 mg SubCUTAneous Daily    cetirizine  5 mg Oral Daily    gabapentin  300 mg Oral BID    metoprolol succinate  100 mg Oral Daily    metoprolol succinate  50 mg Oral Nightly    tamsulosin  0.4 mg Oral Daily    finasteride  5 mg Oral Daily    ampicillin-sulbactam  3,000 mg IntraVENous Q6H       Vitals:  Tmax:  VITALS:  /79   Pulse 100   Temp 98.6 °F (37 °C)   Resp 18   Ht 5' 10\" (1.778 m)   Wt 188 lb 12.8 oz (85.6 kg)   SpO2 96%   BMI 27.09 kg/m²   24HR INTAKE/OUTPUT:      Intake/Output Summary (Last 24 hours) at 2022 0930  Last data filed at 2022 0841  Gross per 24 hour   Intake --   Output 4700 ml   Net -4700 ml     CURRENT PULSE OXIMETRY:  SpO2: 96 %  24HR PULSE OXIMETRY RANGE:  SpO2  Av.7 %  Min: 96 %  Max: 98 %  CVP:    VENT SETTINGS:   Vent Information  SpO2: 96 %  Additional Respiratory  Assessments  Pulse: 100  Resp: 18  SpO2: 96 %  Oral Care: Teeth brushed,Mouthwash      EXAM:  General: No distress. Alert. Eyes: PERRL. No sclera icterus. No conjunctival injection. ENT: No discharge. Pharynx clear. Neck: Trachea midline. Normal thyroid. Resp: No accessory muscle use. Diminished >R, CT to suction draining serous fluid with no air leak or subq emphy. No crackles. No wheezing. No rhonchi. CV: Regular rate. Regular rhythm. No mumur or rub. 2-3+ BLE edema. ABD: Non-tender. Non-distended. No masses. No organmegaly. Normal bowel sounds. Skin: Warm and dry. No nodule on exposed extremities. No rash on exposed extremities. Lymph: No cervical LAD. No supraclavicular LAD. M/S: No cyanosis. No joint deformity. No clubbing. Neuro: Awake. Follows commands. I/O: I/O last 3 completed shifts:  In: -   Out: 3800 [Urine:2600; Chest Tube:1200]  I/O this shift:  In: -   Out: 900 [Urine:900]     Results:  CBC:   Recent Labs     12/31/21  0547 01/01/22  0441 01/02/22  0410   WBC 30.8* 25.3* 18.7*   HGB 10.6* 11.1* 11.1*   HCT 32.7* 34.5* 35.0*   MCV 95.1 94.3 93.8    477* 440     BMP:   Recent Labs     12/31/21  0547 01/01/22  0441 01/02/22  0410    140 138   K 4.2 4.6 4.6    105 105   CO2 22 25 24   BUN 27* 31* 26*   CREATININE 0.8 0.8 0.8     PT/INR:   Recent Labs     12/31/21  0547   PROTIME 20.0*   INR 1.8     Cultures:  12/31 Gram stain performed on unspun fluid   Abundant Polymorphonuclear leukocytes   Epithelial cells not seen   No organisms seen     ABG: noted if resulted    Films:  CXR 12/31/21: Interval pleural tube placement right lung base with evaluation of some of the previous seen pleural fluid. There is probable loculated pneumothorax at the right costophrenic sulcus. Right lower lobe consolidation consistent with atelectasis and/or pneumonia. This is improved overall. Minimal left lower lobe atelectasis and/or pneumonitis.    Stable enlargement of the cardiac silhouette. 12/30/21 CTA: No evidence for pulmonary emboli. Moderate to large right pleural effusion with adjacent atelectasis versus infiltrate. Loculation cannot be excluded. Cardiomegaly. Hepatic steatosis. Irregular soft tissue density superior and posterior to the right kidney of unknown clinical significance and may related to infectious or neoplastic process. Subcentimeter right renal calculi. Emphysematous changes         Assessment:  · Loculated R pleural effusion  · Emphyema  · MVP  · DM  · MS      Plan:  · On 5L, wean O2 to keep pox >90%. On room air at home  · R Pigtail placed 12/31 - intact to suction draining serous fluid - exudative. Await cytology and cultures. GS with abundant polymorphonuclear leukocytes, no organisms. CT had 1200 out last 24 hrs  · CTS consult Monday for empyema  · Continue Unisyn per ID  · Procal 1.95 - afebrile and improving leukocytosis  · Strep pneumoniae and legionella negative  · IS        SHANIQUE Davis - CNP        Seen and examined, agree with above except pleural fluid with GNR so will consult Dr. Aditya Fuller for possible decortication in am. Continue Unasyn for empyema per ID.

## 2022-01-02 NOTE — PROGRESS NOTES
Subjective: The patient is awake and alert. No acute events overnight. Denies chest pain, angina, SOB   Resting peacefully in bed, no acute issues overnight    Objective:    /79   Pulse 100   Temp 98.6 °F (37 °C)   Resp 18   Ht 5' 10\" (1.778 m)   Wt 188 lb 12.8 oz (85.6 kg)   SpO2 96%   BMI 27.09 kg/m²     In: -   Out: 4700   In: -   Out: 4700 [Urine:3500]    General appearance: NAD, conversant  HEENT: AT/NC, MMM  Neck: FROM, supple  Lungs: right side diminshed, R chest tube to suction   CV: RRR, no MRGs  Vasc: Radial pulses 2+  Abdomen: Soft, non-tender; no masses or HSM  Extremities: No peripheral edema or digital cyanosis  Skin: no rash, lesions or ulcers  Psych: Alert and oriented to person, place and time  Neuro: Alert and interactive     Recent Labs     12/31/21  0547 01/01/22  0441 01/02/22  0410   WBC 30.8* 25.3* 18.7*   HGB 10.6* 11.1* 11.1*   HCT 32.7* 34.5* 35.0*    477* 440       Recent Labs     12/31/21  0547 01/01/22  0441 01/02/22  0410    140 138   K 4.2 4.6 4.6    105 105   CO2 22 25 24   BUN 27* 31* 26*   CREATININE 0.8 0.8 0.8   CALCIUM 8.3* 8.4* 8.1*       Assessment:    Active Problems:    Empyema (HCC)    Pleural effusion  Resolved Problems:    * No resolved hospital problems.  *      Plan:    Patient is a 73-year-old male admitted to [facility] for  Loculated pleural effusion- concerning for possible malignancy given his history   -Monitor labs  -Pro-Isac 1.93 > 1.95, check tumor markers-known history of \"spot on lung\", has not pursued surveillance  -Sed rate 68  -WBC 35.5> 30.8 > 25.3  > 18,000  -Pulmonology consulted  -CTA pulmonary moderate to large right pleural effusion with adjacent atelectasis versus infiltrate, with possible loculation  -R pigtail chest tube placed per pulm, 1200cc out  -ID consulted -input appreciated  -IV Unasyn pending pan culture results-negative so far  -BC negative so far  -CTS to be consulted per Pulm on Monday 1/3     Hypertension  -continue home medications      DVT Prophylaxis - lovenox  PT/OT  Discharge planning       Chace Munoz MD  1:45 PM

## 2022-01-02 NOTE — PROGRESS NOTES
[address] [place] [address] [facility]  Progress Note    SUBJECTIVE:  Chief Complaint   Patient presents with    Abdominal Pain     intermittent x 2 days    Diarrhea     No new complaints. No pain. Tolerating antibiotics. No fever. Review of systems:  As stated above in the chief complaint, otherwise negative. Medications:  Scheduled Meds:   enoxaparin  40 mg SubCUTAneous Daily    cetirizine  5 mg Oral Daily    gabapentin  300 mg Oral BID    metoprolol succinate  100 mg Oral Daily    metoprolol succinate  50 mg Oral Nightly    tamsulosin  0.4 mg Oral Daily    finasteride  5 mg Oral Daily    ampicillin-sulbactam  3,000 mg IntraVENous Q6H     Continuous Infusions:  PRN Meds:morphine, ALPRAZolam, albuterol, acetaminophen    OBJECTIVE:  /79   Pulse 100   Temp 98.6 °F (37 °C)   Resp 18   Ht 5' 10\" (1.778 m)   Wt 188 lb 12.8 oz (85.6 kg)   SpO2 96%   BMI 27.09 kg/m²   Temp  Av °F (36.7 °C)  Min: 97.5 °F (36.4 °C)  Max: 98.6 °F (37 °C)  Constitutional: The patient is sitting up in bed and eating lunch. No distress. Skin: Warm and dry. No rashes were noted. HEENT: Round and reactive pupils. Moist mucous membranes. No ulcerations or thrush. Neck: Supple to movements. Chest: No respiratory distress. Diminished breath sounds right lower lobe. Right chest tube with serous fluid. Cardiovascular: Heart sounds rhythmic and regular. Abdomen: Positive bowel sounds to auscultation. Benign to palpation. No masses felt. Extremities: No edema.   Lines: peripheral    Laboratory and Tests Review:  Lab Results   Component Value Date    WBC 18.7 (H) 2022    WBC 25.3 (H) 2022    WBC 30.8 (H) 2021    HGB 11.1 (L) 2022    HCT 35.0 (L) 2022    MCV 93.8 2022     2022     Lab Results   Component Value Date    NEUTROABS 17.02 (H) 2022    NEUTROABS 22.53 (H) 2022    NEUTROABS 27.89 (H) 2021     No results found for: CRP  Lab Results   Component Value Date    ALT 15 12/30/2021    AST 10 12/30/2021    ALKPHOS 158 (H) 12/30/2021    BILITOT 0.7 12/30/2021     Lab Results   Component Value Date     01/02/2022    K 4.6 01/02/2022     01/02/2022    CO2 24 01/02/2022    BUN 26 01/02/2022    CREATININE 0.8 01/02/2022    CREATININE 0.8 01/01/2022    CREATININE 0.8 12/31/2021    GFRAA >60 01/02/2022    LABGLOM >60 01/02/2022    GLUCOSE 87 01/02/2022    PROT 6.8 12/30/2021    LABALBU 2.6 12/30/2021    CALCIUM 8.1 01/02/2022    BILITOT 0.7 12/30/2021    ALKPHOS 158 12/30/2021    AST 10 12/30/2021    ALT 15 12/30/2021     Lab Results   Component Value Date    CRP 13.7 (H) 12/31/2021     Lab Results   Component Value Date    SEDRATE 68 (H) 12/30/2021     Radiology:      Microbiology:   Blood cultures 12/30/2021: Negative so far  Pleural fluid 12/31/2021: GNR  Urine culture 12/30/2021: Negative so far  Streptococcus pneumoniae/Legionella urine Ag: negative  Rapid SARS-CoV-2: Negative  ASO titer: 116    Recent Labs     12/30/21  1919 12/31/21  0547   PROCAL 1.93* 1.95*       ASSESSMENT:  · Right side empyema. Status post thoracotomy tube 12/31/2021. Exudative fluid compatible with empyema  · Leukocytosis associated to empyema, improving    PLAN:  · Continue Unasyn  · Check final cultures.   I asked microbiology to hold fluid for 7 days if it is negative at day 2  · Monitor labs    Lucien Allen MD  11:43 AM  1/2/2022

## 2022-01-02 NOTE — PROGRESS NOTES
Dr. Henry Min called back. He is currently out of town and can see patient Tuesday 1/4/2022. If patient needs to be seen sooner, will need transferred downtown.  Audrey Willis RN

## 2022-01-03 ENCOUNTER — ANESTHESIA EVENT (OUTPATIENT)
Dept: OPERATING ROOM | Age: 77
DRG: 853 | End: 2022-01-03
Payer: MEDICARE

## 2022-01-03 LAB
ANION GAP SERPL CALCULATED.3IONS-SCNC: 10 MMOL/L (ref 7–16)
ANISOCYTOSIS: ABNORMAL
APPEARANCE FLUID: NORMAL
BASOPHILS ABSOLUTE: 0.03 E9/L (ref 0–0.2)
BASOPHILS RELATIVE PERCENT: 0.2 % (ref 0–2)
BODY FLUID CULTURE, STERILE: ABNORMAL
BUN BLDV-MCNC: 23 MG/DL (ref 6–23)
CALCIUM SERPL-MCNC: 8 MG/DL (ref 8.6–10.2)
CELL COUNT FLUID TYPE: NORMAL
CHLORIDE BLD-SCNC: 102 MMOL/L (ref 98–107)
CO2: 24 MMOL/L (ref 22–29)
COLOR FLUID: YELLOW
CREAT SERPL-MCNC: 0.7 MG/DL (ref 0.7–1.2)
EKG ATRIAL RATE: 118 BPM
EKG P AXIS: 64 DEGREES
EKG P-R INTERVAL: 130 MS
EKG Q-T INTERVAL: 334 MS
EKG QRS DURATION: 84 MS
EKG QTC CALCULATION (BAZETT): 468 MS
EKG R AXIS: 32 DEGREES
EKG T AXIS: 51 DEGREES
EKG VENTRICULAR RATE: 118 BPM
EOSINOPHILS ABSOLUTE: 0.08 E9/L (ref 0.05–0.5)
EOSINOPHILS RELATIVE PERCENT: 0.4 % (ref 0–6)
GFR AFRICAN AMERICAN: >60
GFR NON-AFRICAN AMERICAN: >60 ML/MIN/1.73
GLUCOSE BLD-MCNC: 98 MG/DL (ref 74–99)
GRAM STAIN RESULT: ABNORMAL
HCT VFR BLD CALC: 33.7 % (ref 37–54)
HEMOGLOBIN: 10.9 G/DL (ref 12.5–16.5)
IMMATURE GRANULOCYTES #: 0.35 E9/L
IMMATURE GRANULOCYTES %: 1.9 % (ref 0–5)
LYMPHOCYTES ABSOLUTE: 0.81 E9/L (ref 1.5–4)
LYMPHOCYTES RELATIVE PERCENT: 4.4 % (ref 20–42)
MCH RBC QN AUTO: 30.2 PG (ref 26–35)
MCHC RBC AUTO-ENTMCNC: 32.3 % (ref 32–34.5)
MCV RBC AUTO: 93.4 FL (ref 80–99.9)
MONOCYTE, FLUID: 24 %
MONOCYTES ABSOLUTE: 1.73 E9/L (ref 0.1–0.95)
MONOCYTES RELATIVE PERCENT: 9.4 % (ref 2–12)
NEUTROPHIL, FLUID: 76 %
NEUTROPHILS ABSOLUTE: 15.45 E9/L (ref 1.8–7.3)
NEUTROPHILS RELATIVE PERCENT: 83.7 % (ref 43–80)
NUCLEATED CELLS FLUID: NORMAL /UL
ORGANISM: ABNORMAL
PDW BLD-RTO: 15.1 FL (ref 11.5–15)
PLATELET # BLD: 392 E9/L (ref 130–450)
PMV BLD AUTO: 9.5 FL (ref 7–12)
POLYCHROMASIA: ABNORMAL
POTASSIUM SERPL-SCNC: 4.4 MMOL/L (ref 3.5–5)
RBC # BLD: 3.61 E12/L (ref 3.8–5.8)
RBC FLUID: NORMAL /UL
SODIUM BLD-SCNC: 136 MMOL/L (ref 132–146)
WBC # BLD: 18.5 E9/L (ref 4.5–11.5)

## 2022-01-03 PROCEDURE — 85025 COMPLETE CBC W/AUTO DIFF WBC: CPT

## 2022-01-03 PROCEDURE — 2060000000 HC ICU INTERMEDIATE R&B

## 2022-01-03 PROCEDURE — 2700000000 HC OXYGEN THERAPY PER DAY

## 2022-01-03 PROCEDURE — 36415 COLL VENOUS BLD VENIPUNCTURE: CPT

## 2022-01-03 PROCEDURE — 6370000000 HC RX 637 (ALT 250 FOR IP): Performed by: NURSE PRACTITIONER

## 2022-01-03 PROCEDURE — 2580000003 HC RX 258: Performed by: SPECIALIST

## 2022-01-03 PROCEDURE — 6360000002 HC RX W HCPCS: Performed by: SPECIALIST

## 2022-01-03 PROCEDURE — 80048 BASIC METABOLIC PNL TOTAL CA: CPT

## 2022-01-03 PROCEDURE — 93010 ELECTROCARDIOGRAM REPORT: CPT | Performed by: INTERNAL MEDICINE

## 2022-01-03 RX ORDER — SODIUM CHLORIDE 9 MG/ML
25 INJECTION, SOLUTION INTRAVENOUS PRN
Status: DISCONTINUED | OUTPATIENT
Start: 2022-01-03 | End: 2022-01-04 | Stop reason: SDUPTHER

## 2022-01-03 RX ORDER — SODIUM CHLORIDE 0.9 % (FLUSH) 0.9 %
5-40 SYRINGE (ML) INJECTION EVERY 12 HOURS SCHEDULED
Status: DISCONTINUED | OUTPATIENT
Start: 2022-01-03 | End: 2022-01-04 | Stop reason: SDUPTHER

## 2022-01-03 RX ORDER — SODIUM CHLORIDE 0.9 % (FLUSH) 0.9 %
5-40 SYRINGE (ML) INJECTION PRN
Status: DISCONTINUED | OUTPATIENT
Start: 2022-01-03 | End: 2022-01-04 | Stop reason: SDUPTHER

## 2022-01-03 RX ORDER — HEPARIN SODIUM (PORCINE) LOCK FLUSH IV SOLN 100 UNIT/ML 100 UNIT/ML
3 SOLUTION INTRAVENOUS PRN
Status: DISCONTINUED | OUTPATIENT
Start: 2022-01-03 | End: 2022-01-07 | Stop reason: HOSPADM

## 2022-01-03 RX ORDER — SODIUM CHLORIDE 0.9 % (FLUSH) 0.9 %
10 SYRINGE (ML) INJECTION PRN
Status: DISCONTINUED | OUTPATIENT
Start: 2022-01-03 | End: 2022-01-04 | Stop reason: HOSPADM

## 2022-01-03 RX ORDER — HEPARIN SODIUM (PORCINE) LOCK FLUSH IV SOLN 100 UNIT/ML 100 UNIT/ML
3 SOLUTION INTRAVENOUS EVERY 12 HOURS SCHEDULED
Status: DISCONTINUED | OUTPATIENT
Start: 2022-01-03 | End: 2022-01-07 | Stop reason: HOSPADM

## 2022-01-03 RX ORDER — SODIUM CHLORIDE 9 MG/ML
25 INJECTION, SOLUTION INTRAVENOUS PRN
Status: DISCONTINUED | OUTPATIENT
Start: 2022-01-03 | End: 2022-01-04 | Stop reason: HOSPADM

## 2022-01-03 RX ORDER — LIDOCAINE HYDROCHLORIDE 10 MG/ML
5 INJECTION, SOLUTION EPIDURAL; INFILTRATION; INTRACAUDAL; PERINEURAL ONCE
Status: COMPLETED | OUTPATIENT
Start: 2022-01-03 | End: 2022-01-06

## 2022-01-03 RX ORDER — SODIUM CHLORIDE 0.9 % (FLUSH) 0.9 %
10 SYRINGE (ML) INJECTION EVERY 12 HOURS SCHEDULED
Status: DISCONTINUED | OUTPATIENT
Start: 2022-01-03 | End: 2022-01-04 | Stop reason: HOSPADM

## 2022-01-03 RX ADMIN — GABAPENTIN 300 MG: 300 CAPSULE ORAL at 09:53

## 2022-01-03 RX ADMIN — GABAPENTIN 300 MG: 300 CAPSULE ORAL at 17:12

## 2022-01-03 RX ADMIN — SODIUM CHLORIDE, PRESERVATIVE FREE 10 ML: 5 INJECTION INTRAVENOUS at 17:17

## 2022-01-03 RX ADMIN — Medication 10 ML: at 19:37

## 2022-01-03 RX ADMIN — AMPICILLIN SODIUM AND SULBACTAM SODIUM 3000 MG: 2; 1 INJECTION, POWDER, FOR SOLUTION INTRAMUSCULAR; INTRAVENOUS at 09:54

## 2022-01-03 RX ADMIN — METOPROLOL SUCCINATE 100 MG: 25 TABLET, EXTENDED RELEASE ORAL at 09:53

## 2022-01-03 RX ADMIN — AMPICILLIN SODIUM AND SULBACTAM SODIUM 3000 MG: 2; 1 INJECTION, POWDER, FOR SOLUTION INTRAMUSCULAR; INTRAVENOUS at 19:35

## 2022-01-03 RX ADMIN — TAMSULOSIN HYDROCHLORIDE 0.4 MG: 0.4 CAPSULE ORAL at 09:53

## 2022-01-03 RX ADMIN — AMPICILLIN SODIUM AND SULBACTAM SODIUM 3000 MG: 2; 1 INJECTION, POWDER, FOR SOLUTION INTRAMUSCULAR; INTRAVENOUS at 17:17

## 2022-01-03 RX ADMIN — AMPICILLIN SODIUM AND SULBACTAM SODIUM 3000 MG: 2; 1 INJECTION, POWDER, FOR SOLUTION INTRAMUSCULAR; INTRAVENOUS at 02:25

## 2022-01-03 RX ADMIN — METOPROLOL SUCCINATE 50 MG: 25 TABLET, FILM COATED, EXTENDED RELEASE ORAL at 19:34

## 2022-01-03 RX ADMIN — FINASTERIDE 5 MG: 5 TABLET, FILM COATED ORAL at 09:53

## 2022-01-03 RX ADMIN — CETIRIZINE HYDROCHLORIDE 5 MG: 10 TABLET, FILM COATED ORAL at 09:53

## 2022-01-03 RX ADMIN — Medication 10 ML: at 20:25

## 2022-01-03 ASSESSMENT — PAIN SCALES - GENERAL
PAINLEVEL_OUTOF10: 1
PAINLEVEL_OUTOF10: 0

## 2022-01-03 ASSESSMENT — PAIN DESCRIPTION - ORIENTATION: ORIENTATION: RIGHT

## 2022-01-03 ASSESSMENT — PAIN DESCRIPTION - ONSET: ONSET: ON-GOING

## 2022-01-03 ASSESSMENT — PAIN DESCRIPTION - DESCRIPTORS: DESCRIPTORS: ACHING

## 2022-01-03 ASSESSMENT — PAIN DESCRIPTION - PROGRESSION: CLINICAL_PROGRESSION: NOT CHANGED

## 2022-01-03 ASSESSMENT — PAIN DESCRIPTION - FREQUENCY: FREQUENCY: INTERMITTENT

## 2022-01-03 ASSESSMENT — PAIN - FUNCTIONAL ASSESSMENT: PAIN_FUNCTIONAL_ASSESSMENT: ACTIVITIES ARE NOT PREVENTED

## 2022-01-03 ASSESSMENT — PAIN DESCRIPTION - LOCATION: LOCATION: CHEST

## 2022-01-03 ASSESSMENT — ENCOUNTER SYMPTOMS: SHORTNESS OF BREATH: 1

## 2022-01-03 ASSESSMENT — PAIN DESCRIPTION - PAIN TYPE: TYPE: ACUTE PAIN

## 2022-01-03 NOTE — CARE COORDINATION
COVID - 12/30. Met w/ patient. Explained role of  and plan of care. Lives with his domestic partner Annelise Kirkland in a 1 story condo- 1 step to entrance. Has walker and cane. Independent PTA. No Hx HHC or EPIFANIO. Currently requiring O2 5lNC. R pigtail catheter inserted 12/31. Cardiothoracic to eval later today- plan for OR R thoracotomy with decortication tomorrow. On iv abx. Discharge plan unknown at this time pending progress.  Will follow Jolene Kinney RN case manager

## 2022-01-03 NOTE — CONSULTS
Full consult to follow:  Complicated R parapneumonic effusion  Advise R thoracotomy with decortication.   Will see pt this evening and schedule for surgery Tues 1-4  Medical clearance preop requested

## 2022-01-03 NOTE — PROGRESS NOTES
Messaged Ede Ba NP regarding medical clearance for thoracotomy tomorrow; patient is cleared for tomorrow.

## 2022-01-03 NOTE — PROGRESS NOTES
Subjective: The patient is awake and alert. Anxious for procedure tomorrow. No acute events overnight. Denies chest pain, angina, SOB       Objective:    /74   Pulse 93   Temp 97.3 °F (36.3 °C)   Resp 21   Ht 5' 10\" (1.778 m)   Wt 188 lb 12.8 oz (85.6 kg)   SpO2 95%   BMI 27.09 kg/m²     In: -   Out: 1663   In: -   Out: 4096 [Urine:1650]    General appearance: NAD, conversant  HEENT: AT/NC, MMM  Neck: FROM, supple  Lungs: right side diminshed, R chest tube to suction   CV: RRR, no MRGs  Vasc: Radial pulses 2+  Abdomen: Soft, non-tender; no masses or HSM  Extremities: No peripheral edema or digital cyanosis  Skin: no rash, lesions or ulcers  Psych: Alert and oriented to person, place and time  Neuro: Alert and interactive     Recent Labs     01/01/22 0441 01/02/22  0410 01/03/22  0230   WBC 25.3* 18.7* 18.5*   HGB 11.1* 11.1* 10.9*   HCT 34.5* 35.0* 33.7*   * 440 392       Recent Labs     01/01/22 0441 01/02/22  0410 01/03/22  0230    138 136   K 4.6 4.6 4.4    105 102   CO2 25 24 24   BUN 31* 26* 23   CREATININE 0.8 0.8 0.7   CALCIUM 8.4* 8.1* 8.0*       Assessment:    Active Problems:    Empyema (HCC)    Pleural effusion  Resolved Problems:    * No resolved hospital problems.  *      Plan:    Patient is a 75-year-old male admitted to [facility] for  Loculated pleural effusion- concerning for possible malignancy given his history   -Monitor labs  -Pro-Isac 1.93 > 1.95, check tumor markers-known history of \"spot on lung\", has not pursued surveillance  -Sed rate 68  -WBC 35.5> 30.8 > 25.3  > 18,000  -Pulmonology following , ID following   -CTA pulmonary moderate to large right pleural effusion with adjacent atelectasis versus infiltrate, with possible loculation  -R pigtail chest tube placed per pulm, 1200cc out  -ID consulted -input appreciated  -IV Unasyn pending pan culture results-negative so far  -BC negative so far  -CTS on board -  right thoracotomy with decortication on 1/4/2022.  - await cardiac clearance        Hypertension  -continue home medications    DVT Prophylaxis - pcd's  PT/OT  Discharge planning       SHANIQUE Carpio CNP  1:36 PM   1/3/2022    Above note edited to reflect my thoughts     Cleared from medicine standpoint to proceed with surgical intervention   Will obtain cardiac roberta ance given age and need for thoracic intervention    I personally saw, examined and provided care for the patient. Radiographs, labs and medication list were reviewed by me independently. The case was discussed in detail and plans for care were established. Review of Iris GUTIERREZ CNP, documentation was conducted and revisions were made as appropriate directly by me. I agree with the above documented exam, problem list, and plan of care.      Chace Munoz MD  5:28 PM  1/3/2022

## 2022-01-03 NOTE — PROGRESS NOTES
[address] [place] [address] [facility]  Progress Note    SUBJECTIVE:  Chief Complaint   Patient presents with    Abdominal Pain     intermittent x 2 days    Diarrhea     The patient is tolerating antibiotics. No fever. No nausea, vomiting or diarrhea. Review of systems:  As stated above in the chief complaint, otherwise negative. Medications:  Scheduled Meds:   enoxaparin  40 mg SubCUTAneous Daily    cetirizine  5 mg Oral Daily    gabapentin  300 mg Oral BID    metoprolol succinate  100 mg Oral Daily    metoprolol succinate  50 mg Oral Nightly    tamsulosin  0.4 mg Oral Daily    finasteride  5 mg Oral Daily    ampicillin-sulbactam  3,000 mg IntraVENous Q6H     Continuous Infusions:  PRN Meds:morphine, ALPRAZolam, albuterol    OBJECTIVE:  /74   Pulse 93   Temp 97.3 °F (36.3 °C)   Resp 21   Ht 5' 10\" (1.778 m)   Wt 188 lb 12.8 oz (85.6 kg)   SpO2 95%   BMI 27.09 kg/m²   Temp  Av.1 °F (36.7 °C)  Min: 97.3 °F (36.3 °C)  Max: 98.9 °F (37.2 °C)  Constitutional: The patient is lying in bed. Asleep but arousable. No distress. Skin: Warm and dry. No rashes were noted. HEENT: Round and reactive pupils. Moist mucous membranes. No ulcerations or thrush. Neck: Supple to movements. Chest: No respiratory distress. Diminished breath sounds right lower lobe. Right chest tube with serous fluid. Cardiovascular: Heart sounds rhythmic and regular. Abdomen: Positive bowel sounds to auscultation. Benign to palpation. No masses felt. Extremities: No edema.   Lines: peripheral    Laboratory and Tests Review:  Lab Results   Component Value Date    WBC 18.5 (H) 2022    WBC 18.7 (H) 2022    WBC 25.3 (H) 2022    HGB 10.9 (L) 2022    HCT 33.7 (L) 2022    MCV 93.4 2022     2022     Lab Results   Component Value Date    NEUTROABS 15.45 (H) 2022    NEUTROABS 17.02 (H) 2022    NEUTROABS 22.53 (H) 2022     No results Xray order faxed as requested.    found for: [facility]  Lab Results   Component Value Date    ALT 15 12/30/2021    AST 10 12/30/2021    ALKPHOS 158 (H) 12/30/2021    BILITOT 0.7 12/30/2021     Lab Results   Component Value Date     01/03/2022    K 4.4 01/03/2022     01/03/2022    CO2 24 01/03/2022    BUN 23 01/03/2022    CREATININE 0.7 01/03/2022    CREATININE 0.8 01/02/2022    CREATININE 0.8 01/01/2022    GFRAA >60 01/03/2022    LABGLOM >60 01/03/2022    GLUCOSE 98 01/03/2022    PROT 6.8 12/30/2021    LABALBU 2.6 12/30/2021    CALCIUM 8.0 01/03/2022    BILITOT 0.7 12/30/2021    ALKPHOS 158 12/30/2021    AST 10 12/30/2021    ALT 15 12/30/2021     Lab Results   Component Value Date    CRP 13.7 (H) 12/31/2021     Lab Results   Component Value Date    SEDRATE 76 (H) 12/30/2021     Radiology:      Microbiology:   Blood cultures 12/30/2021: Negative so far  Pleural fluid 12/31/2021: Pansensitive E. coli  Urine culture 12/30/2021: Negative so far  Streptococcus pneumoniae/Legionella urine Ag: negative  Rapid SARS-CoV-2: Negative  ASO titer: 116    No results for input(s): PROCAL in the last 72 hours. ASSESSMENT:  · Right side empyema. Status post thoracotomy tube 12/31/2021. Exudative fluid compatible with empyema  · Leukocytosis associated to empyema, improving    PLAN:  · Continue Unasyn  · PICC for IV antibiotics  · Patient going for thoracotomy    Spoke with nursing    Informed Consent for PICC:  I explained the risks, benefits, alternative options, and potential complications associated with insertion of Peripherally Inserted Central Catheter (PICC) with the patient prior to the procedure.     Electronically signed by Tu Fuller MD on 1/3/2022 at 12:29 PM     Tu Fuller MD  11:24 AM  1/3/2022
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
## 2022-01-03 NOTE — PROGRESS NOTES
Feels okay, sl. Chest pain from chest tube and some dry cough. On 5 liters.   Vent settings:Vent Information  SpO2: 100 %  Additional Respiratory  Assessments  Pulse: 88  Resp: 20  SpO2: 100 %  Oral Care: Teeth brushed,Mouthwash      Current Facility-Administered Medications:     lidocaine PF 1 % injection 5 mL, 5 mL, IntraDERmal, Once, Ayaka Scott MD    sodium chloride flush 0.9 % injection 5-40 mL, 5-40 mL, IntraVENous, 2 times per day, Ayaka Scott MD    sodium chloride flush 0.9 % injection 5-40 mL, 5-40 mL, IntraVENous, PRN, Ayaka Scott MD    0.9 % sodium chloride infusion, 25 mL, IntraVENous, PRN, Ayaka Scott MD    heparin flush 100 UNIT/ML injection 300 Units, 3 mL, IntraVENous, 2 times per day, Ayaka Scott MD    heparin flush 100 UNIT/ML injection 300 Units, 3 mL, Intercatheter, PRN, Ayaka Scott MD    morphine (PF) injection 2 mg, 2 mg, IntraVENous, Q3H PRN, Duran Raisin, APRN - CNP, 2 mg at 01/02/22 1602    [Held by provider] enoxaparin (LOVENOX) injection 40 mg, 40 mg, SubCUTAneous, Daily, Brandie Pires MD, 40 mg at 01/02/22 1508    ALPRAZolam (XANAX) tablet 0.25 mg, 0.25 mg, Oral, Nightly PRN, [place] Raisin, APRN - CNP, 0.25 mg at 12/31/21 0221    cetirizine (ZYRTEC) tablet 5 mg, 5 mg, Oral, Daily, Duran Raisin, APRN - CNP, 5 mg at 01/03/22 0830    gabapentin (NEURONTIN) capsule 300 mg, 300 mg, Oral, BID, [place] Raisin, APRN - CNP, 300 mg at 01/03/22 0953    metoprolol succinate (TOPROL XL) extended release tablet 100 mg, 100 mg, Oral, Daily, Duran Raisin, APRN - CNP, 100 mg at 01/03/22 0953    metoprolol succinate (TOPROL XL) extended release tablet 50 mg, 50 mg, Oral, Nightly, [place] Raisin, APRN - CNP, 50 mg at 01/02/22 1940    tamsulosin (FLOMAX) capsule 0.4 mg, 0.4 mg, Oral, Daily, Duran Raluisin, APRN - CNP, 0.4 mg at 01/03/22 0953    albuterol (PROVENTIL) nebulizer solution 2.5 mg, 2.5 mg, Nebulization, Q6H PRN, Duran Aguero, APRN - CNP    finasteride (PROSCAR) tablet 5 mg, 5 mg, Oral, Daily, Drema Tial, APRN - CNP, 5 mg at 01/03/22 0953    ampicillin-sulbactam (UNASYN) 3000 mg ivpb minibag, 3,000 mg, IntraVENous, Q6H, Tu Fuller MD, Stopped at 01/03/22 1149  /72   Pulse 88   Temp 97.5 °F (36.4 °C)   Resp 20   Ht 5' 10\" (1.778 m)   Wt 188 lb 12.8 oz (85.6 kg)   SpO2 100%   BMI 27.09 kg/m²     General: No distress  Chest: Symmetric, no accessory use  Heart: RRR  Lungs: Diminished right base  Abdomen: Soft, nt  Extremities: No edema    Intake/Output Summary (Last 24 hours) at 1/3/2022 1437  Last data filed at 1/3/2022 1433  Gross per 24 hour   Intake --   Output 2188 ml   Net -2188 ml     CBC with Differential:    Lab Results   Component Value Date    WBC 18.5 01/03/2022    RBC 3.61 01/03/2022    HGB 10.9 01/03/2022    HCT 33.7 01/03/2022     01/03/2022    MCV 93.4 01/03/2022    MCH 30.2 01/03/2022    MCHC 32.3 01/03/2022    RDW 15.1 01/03/2022    NRBC 0.0 01/02/2022    SEGSPCT 70 06/08/2011    LYMPHOPCT 4.4 01/03/2022    MONOPCT 9.4 01/03/2022    MYELOPCT 0.9 01/02/2022    BASOPCT 0.2 01/03/2022    MONOSABS 1.73 01/03/2022    LYMPHSABS 0.81 01/03/2022    EOSABS 0.08 01/03/2022    BASOSABS 0.03 01/03/2022     BMP:    Lab Results   Component Value Date     01/03/2022    K 4.4 01/03/2022     01/03/2022    CO2 24 01/03/2022    BUN 23 01/03/2022    LABALBU 2.6 12/30/2021    CREATININE 0.7 01/03/2022    CALCIUM 8.0 01/03/2022    GFRAA >60 01/03/2022    LABGLOM >60 01/03/2022    GLUCOSE 98 01/03/2022   Pleural fluid E.  Coli     IMPRESSION:   Patient Active Problem List   Diagnosis    Abnormal PSA    Mitral valve prolapse    MR (mitral regurgitation)    Tricuspid valve regurgitation    Pulmonary HTN ([address]    HTN (hypertension)    DM (diabetes mellitus) ([address]    Multiple sclerosis (HCC)    Hiatal hernia    GERD (gastroesophageal reflux disease)    History of kidney stones    Benign prostatic hyperplasia    S/P TURP    Urothelial carcinoma (HCC)    Asbestosis ([address]    Erectile dysfunction    Panic attacks    History of esophageal stricture    History of esophageal dilatation    Washburn's esophagus determined by biopsy    Anxiety    Claustrophobia    History of PSVT (paroxysmal supraventricular tachycardia)    H/O endocarditis    Nonrheumatic aortic valve insufficiency    Nonrheumatic pulmonary valve insufficiency    Empyema (HCC)    Pleural effusion   Right chest E. Coli empyema for decortication tomorrow.   On amp/ sulbactam.    Feng Mcintyre MD  1/3/2022  2:37 PM

## 2022-01-04 ENCOUNTER — ANESTHESIA (OUTPATIENT)
Dept: OPERATING ROOM | Age: 77
DRG: 853 | End: 2022-01-04
Payer: MEDICARE

## 2022-01-04 ENCOUNTER — APPOINTMENT (OUTPATIENT)
Dept: GENERAL RADIOLOGY | Age: 77
DRG: 853 | End: 2022-01-04
Payer: MEDICARE

## 2022-01-04 VITALS — SYSTOLIC BLOOD PRESSURE: 123 MMHG | TEMPERATURE: 68.2 F | DIASTOLIC BLOOD PRESSURE: 70 MMHG | OXYGEN SATURATION: 95 %

## 2022-01-04 LAB
ANION GAP SERPL CALCULATED.3IONS-SCNC: 8 MMOL/L (ref 7–16)
ANION GAP SERPL CALCULATED.3IONS-SCNC: 9 MMOL/L (ref 7–16)
ANISOCYTOSIS: ABNORMAL
BASOPHILS ABSOLUTE: 0.07 E9/L (ref 0–0.2)
BASOPHILS RELATIVE PERCENT: 0.4 % (ref 0–2)
BLOOD CULTURE, ROUTINE: NORMAL
BUN BLDV-MCNC: 16 MG/DL (ref 6–23)
BUN BLDV-MCNC: 19 MG/DL (ref 6–23)
CA 19-9: 18 U/ML (ref 0–37)
CALCIUM SERPL-MCNC: 7.6 MG/DL (ref 8.6–10.2)
CALCIUM SERPL-MCNC: 7.9 MG/DL (ref 8.6–10.2)
CHLORIDE BLD-SCNC: 100 MMOL/L (ref 98–107)
CHLORIDE BLD-SCNC: 105 MMOL/L (ref 98–107)
CO2: 22 MMOL/L (ref 22–29)
CO2: 24 MMOL/L (ref 22–29)
CREAT SERPL-MCNC: 0.6 MG/DL (ref 0.7–1.2)
CREAT SERPL-MCNC: 0.7 MG/DL (ref 0.7–1.2)
CULTURE, BLOOD 2: NORMAL
EOSINOPHILS ABSOLUTE: 0.23 E9/L (ref 0.05–0.5)
EOSINOPHILS RELATIVE PERCENT: 1.3 % (ref 0–6)
GFR AFRICAN AMERICAN: >60
GFR AFRICAN AMERICAN: >60
GFR NON-AFRICAN AMERICAN: >60 ML/MIN/1.73
GFR NON-AFRICAN AMERICAN: >60 ML/MIN/1.73
GLUCOSE BLD-MCNC: 90 MG/DL (ref 74–99)
GLUCOSE BLD-MCNC: 99 MG/DL (ref 74–99)
HCT VFR BLD CALC: 31.5 % (ref 37–54)
HEMOGLOBIN: 10.1 G/DL (ref 12.5–16.5)
IMMATURE GRANULOCYTES #: 0.51 E9/L
IMMATURE GRANULOCYTES %: 2.8 % (ref 0–5)
LYMPHOCYTES ABSOLUTE: 0.94 E9/L (ref 1.5–4)
LYMPHOCYTES RELATIVE PERCENT: 5.1 % (ref 20–42)
MCH RBC QN AUTO: 30.1 PG (ref 26–35)
MCHC RBC AUTO-ENTMCNC: 32.1 % (ref 32–34.5)
MCV RBC AUTO: 93.8 FL (ref 80–99.9)
MONOCYTES ABSOLUTE: 1.71 E9/L (ref 0.1–0.95)
MONOCYTES RELATIVE PERCENT: 9.3 % (ref 2–12)
NEUTROPHILS ABSOLUTE: 14.91 E9/L (ref 1.8–7.3)
NEUTROPHILS RELATIVE PERCENT: 81.1 % (ref 43–80)
PDW BLD-RTO: 15.1 FL (ref 11.5–15)
PLATELET # BLD: 385 E9/L (ref 130–450)
PMV BLD AUTO: 9.8 FL (ref 7–12)
POLYCHROMASIA: ABNORMAL
POTASSIUM SERPL-SCNC: 4.3 MMOL/L (ref 3.5–5)
POTASSIUM SERPL-SCNC: 4.7 MMOL/L (ref 3.5–5)
RBC # BLD: 3.36 E12/L (ref 3.8–5.8)
SODIUM BLD-SCNC: 133 MMOL/L (ref 132–146)
SODIUM BLD-SCNC: 135 MMOL/L (ref 132–146)
WBC # BLD: 18.4 E9/L (ref 4.5–11.5)

## 2022-01-04 PROCEDURE — 3600000014 HC SURGERY LEVEL 4 ADDTL 15MIN: Performed by: SPECIALIST

## 2022-01-04 PROCEDURE — 6360000002 HC RX W HCPCS: Performed by: SPECIALIST

## 2022-01-04 PROCEDURE — 2580000003 HC RX 258: Performed by: SPECIALIST

## 2022-01-04 PROCEDURE — APPSS60 APP SPLIT SHARED TIME 46-60 MINUTES: Performed by: NURSE PRACTITIONER

## 2022-01-04 PROCEDURE — 3700000000 HC ANESTHESIA ATTENDED CARE: Performed by: SPECIALIST

## 2022-01-04 PROCEDURE — C1729 CATH, DRAINAGE: HCPCS | Performed by: SPECIALIST

## 2022-01-04 PROCEDURE — 36620 INSERTION CATHETER ARTERY: CPT | Performed by: ANESTHESIOLOGY

## 2022-01-04 PROCEDURE — 80048 BASIC METABOLIC PNL TOTAL CA: CPT

## 2022-01-04 PROCEDURE — 71045 X-RAY EXAM CHEST 1 VIEW: CPT

## 2022-01-04 PROCEDURE — 3700000001 HC ADD 15 MINUTES (ANESTHESIA): Performed by: SPECIALIST

## 2022-01-04 PROCEDURE — C2626 INFUSION PUMP, NON-PROG,TEMP: HCPCS | Performed by: SPECIALIST

## 2022-01-04 PROCEDURE — 2709999900 HC NON-CHARGEABLE SUPPLY: Performed by: SPECIALIST

## 2022-01-04 PROCEDURE — 7100000001 HC PACU RECOVERY - ADDTL 15 MIN: Performed by: SPECIALIST

## 2022-01-04 PROCEDURE — 2060000000 HC ICU INTERMEDIATE R&B

## 2022-01-04 PROCEDURE — 88305 TISSUE EXAM BY PATHOLOGIST: CPT

## 2022-01-04 PROCEDURE — 2500000003 HC RX 250 WO HCPCS: Performed by: SPECIALIST

## 2022-01-04 PROCEDURE — 2580000003 HC RX 258: Performed by: NURSE ANESTHETIST, CERTIFIED REGISTERED

## 2022-01-04 PROCEDURE — 7100000000 HC PACU RECOVERY - FIRST 15 MIN: Performed by: SPECIALIST

## 2022-01-04 PROCEDURE — 3600000004 HC SURGERY LEVEL 4 BASE: Performed by: SPECIALIST

## 2022-01-04 PROCEDURE — 87075 CULTR BACTERIA EXCEPT BLOOD: CPT

## 2022-01-04 PROCEDURE — 87206 SMEAR FLUORESCENT/ACID STAI: CPT

## 2022-01-04 PROCEDURE — 87205 SMEAR GRAM STAIN: CPT

## 2022-01-04 PROCEDURE — 2700000000 HC OXYGEN THERAPY PER DAY

## 2022-01-04 PROCEDURE — 6360000002 HC RX W HCPCS: Performed by: NURSE ANESTHETIST, CERTIFIED REGISTERED

## 2022-01-04 PROCEDURE — 87116 MYCOBACTERIA CULTURE: CPT

## 2022-01-04 PROCEDURE — 85025 COMPLETE CBC W/AUTO DIFF WBC: CPT

## 2022-01-04 PROCEDURE — 0BNK0ZZ RELEASE RIGHT LUNG, OPEN APPROACH: ICD-10-PCS | Performed by: SPECIALIST

## 2022-01-04 PROCEDURE — 36415 COLL VENOUS BLD VENIPUNCTURE: CPT

## 2022-01-04 PROCEDURE — 87015 SPECIMEN INFECT AGNT CONCNTJ: CPT

## 2022-01-04 PROCEDURE — 6370000000 HC RX 637 (ALT 250 FOR IP): Performed by: NURSE PRACTITIONER

## 2022-01-04 PROCEDURE — 87070 CULTURE OTHR SPECIMN AEROBIC: CPT

## 2022-01-04 PROCEDURE — 99222 1ST HOSP IP/OBS MODERATE 55: CPT | Performed by: INTERNAL MEDICINE

## 2022-01-04 PROCEDURE — 87176 TISSUE HOMOGENIZATION CULTR: CPT

## 2022-01-04 PROCEDURE — 87102 FUNGUS ISOLATION CULTURE: CPT

## 2022-01-04 PROCEDURE — 2500000003 HC RX 250 WO HCPCS: Performed by: NURSE ANESTHETIST, CERTIFIED REGISTERED

## 2022-01-04 RX ORDER — ONDANSETRON 2 MG/ML
4 INJECTION INTRAMUSCULAR; INTRAVENOUS
Status: DISCONTINUED | OUTPATIENT
Start: 2022-01-04 | End: 2022-01-04 | Stop reason: HOSPADM

## 2022-01-04 RX ORDER — MEPERIDINE HYDROCHLORIDE 25 MG/ML
12.5 INJECTION INTRAMUSCULAR; INTRAVENOUS; SUBCUTANEOUS EVERY 5 MIN PRN
Status: DISCONTINUED | OUTPATIENT
Start: 2022-01-04 | End: 2022-01-04 | Stop reason: HOSPADM

## 2022-01-04 RX ORDER — DEXAMETHASONE SODIUM PHOSPHATE 4 MG/ML
INJECTION, SOLUTION INTRA-ARTICULAR; INTRALESIONAL; INTRAMUSCULAR; INTRAVENOUS; SOFT TISSUE PRN
Status: DISCONTINUED | OUTPATIENT
Start: 2022-01-04 | End: 2022-01-04 | Stop reason: SDUPTHER

## 2022-01-04 RX ORDER — BUPIVACAINE HYDROCHLORIDE 2.5 MG/ML
INJECTION, SOLUTION EPIDURAL; INFILTRATION; INTRACAUDAL PRN
Status: DISCONTINUED | OUTPATIENT
Start: 2022-01-04 | End: 2022-01-04 | Stop reason: ALTCHOICE

## 2022-01-04 RX ORDER — SODIUM CHLORIDE 9 MG/ML
INJECTION, SOLUTION INTRAVENOUS CONTINUOUS PRN
Status: DISCONTINUED | OUTPATIENT
Start: 2022-01-04 | End: 2022-01-04 | Stop reason: SDUPTHER

## 2022-01-04 RX ORDER — FENTANYL CITRATE 50 UG/ML
INJECTION, SOLUTION INTRAMUSCULAR; INTRAVENOUS PRN
Status: DISCONTINUED | OUTPATIENT
Start: 2022-01-04 | End: 2022-01-04 | Stop reason: SDUPTHER

## 2022-01-04 RX ORDER — PHENYLEPHRINE HCL IN 0.9% NACL 1 MG/10 ML
SYRINGE (ML) INTRAVENOUS PRN
Status: DISCONTINUED | OUTPATIENT
Start: 2022-01-04 | End: 2022-01-04 | Stop reason: SDUPTHER

## 2022-01-04 RX ORDER — SODIUM CHLORIDE, SODIUM LACTATE, POTASSIUM CHLORIDE, CALCIUM CHLORIDE 600; 310; 30; 20 MG/100ML; MG/100ML; MG/100ML; MG/100ML
INJECTION, SOLUTION INTRAVENOUS CONTINUOUS
Status: DISCONTINUED | OUTPATIENT
Start: 2022-01-04 | End: 2022-01-05

## 2022-01-04 RX ORDER — FENTANYL CITRATE 50 UG/ML
50 INJECTION, SOLUTION INTRAMUSCULAR; INTRAVENOUS EVERY 5 MIN PRN
Status: DISCONTINUED | OUTPATIENT
Start: 2022-01-04 | End: 2022-01-04 | Stop reason: HOSPADM

## 2022-01-04 RX ORDER — ONDANSETRON 2 MG/ML
INJECTION INTRAMUSCULAR; INTRAVENOUS PRN
Status: DISCONTINUED | OUTPATIENT
Start: 2022-01-04 | End: 2022-01-04 | Stop reason: SDUPTHER

## 2022-01-04 RX ORDER — FENTANYL CITRATE 50 UG/ML
25 INJECTION, SOLUTION INTRAMUSCULAR; INTRAVENOUS EVERY 5 MIN PRN
Status: DISCONTINUED | OUTPATIENT
Start: 2022-01-04 | End: 2022-01-04 | Stop reason: HOSPADM

## 2022-01-04 RX ORDER — SODIUM CHLORIDE 0.9 % (FLUSH) 0.9 %
5-40 SYRINGE (ML) INJECTION PRN
Status: DISCONTINUED | OUTPATIENT
Start: 2022-01-04 | End: 2022-01-07 | Stop reason: HOSPADM

## 2022-01-04 RX ORDER — SODIUM CHLORIDE 0.9 % (FLUSH) 0.9 %
5-40 SYRINGE (ML) INJECTION EVERY 12 HOURS SCHEDULED
Status: DISCONTINUED | OUTPATIENT
Start: 2022-01-04 | End: 2022-01-07 | Stop reason: HOSPADM

## 2022-01-04 RX ORDER — GLYCOPYRROLATE 1 MG/5 ML
SYRINGE (ML) INTRAVENOUS PRN
Status: DISCONTINUED | OUTPATIENT
Start: 2022-01-04 | End: 2022-01-04 | Stop reason: SDUPTHER

## 2022-01-04 RX ORDER — MORPHINE SULFATE 2 MG/ML
2 INJECTION, SOLUTION INTRAMUSCULAR; INTRAVENOUS
Status: DISCONTINUED | OUTPATIENT
Start: 2022-01-04 | End: 2022-01-07 | Stop reason: HOSPADM

## 2022-01-04 RX ORDER — SODIUM CHLORIDE 9 MG/ML
25 INJECTION, SOLUTION INTRAVENOUS PRN
Status: DISCONTINUED | OUTPATIENT
Start: 2022-01-04 | End: 2022-01-07 | Stop reason: HOSPADM

## 2022-01-04 RX ORDER — MORPHINE SULFATE 4 MG/ML
4 INJECTION, SOLUTION INTRAMUSCULAR; INTRAVENOUS
Status: DISCONTINUED | OUTPATIENT
Start: 2022-01-04 | End: 2022-01-07 | Stop reason: HOSPADM

## 2022-01-04 RX ORDER — ACETAMINOPHEN 325 MG/1
650 TABLET ORAL EVERY 4 HOURS PRN
Status: DISCONTINUED | OUTPATIENT
Start: 2022-01-04 | End: 2022-01-07 | Stop reason: HOSPADM

## 2022-01-04 RX ORDER — PROPOFOL 10 MG/ML
INJECTION, EMULSION INTRAVENOUS PRN
Status: DISCONTINUED | OUTPATIENT
Start: 2022-01-04 | End: 2022-01-04 | Stop reason: SDUPTHER

## 2022-01-04 RX ORDER — ROCURONIUM BROMIDE 10 MG/ML
INJECTION, SOLUTION INTRAVENOUS PRN
Status: DISCONTINUED | OUTPATIENT
Start: 2022-01-04 | End: 2022-01-04 | Stop reason: SDUPTHER

## 2022-01-04 RX ORDER — LIDOCAINE HYDROCHLORIDE 20 MG/ML
INJECTION, SOLUTION EPIDURAL; INFILTRATION; INTRACAUDAL; PERINEURAL PRN
Status: DISCONTINUED | OUTPATIENT
Start: 2022-01-04 | End: 2022-01-04 | Stop reason: SDUPTHER

## 2022-01-04 RX ORDER — NEOSTIGMINE METHYLSULFATE 1 MG/ML
INJECTION, SOLUTION INTRAVENOUS PRN
Status: DISCONTINUED | OUTPATIENT
Start: 2022-01-04 | End: 2022-01-04 | Stop reason: SDUPTHER

## 2022-01-04 RX ORDER — GENTAMICIN SULFATE 40 MG/ML
INJECTION, SOLUTION INTRAMUSCULAR; INTRAVENOUS PRN
Status: DISCONTINUED | OUTPATIENT
Start: 2022-01-04 | End: 2022-01-04

## 2022-01-04 RX ADMIN — SODIUM CHLORIDE: 9 INJECTION, SOLUTION INTRAVENOUS at 16:04

## 2022-01-04 RX ADMIN — Medication 0.6 MG: at 18:37

## 2022-01-04 RX ADMIN — BUPIVACAINE HYDROCHLORIDE: 5 INJECTION, SOLUTION EPIDURAL; INTRACAUDAL at 19:31

## 2022-01-04 RX ADMIN — SODIUM CHLORIDE, POTASSIUM CHLORIDE, SODIUM LACTATE AND CALCIUM CHLORIDE: 600; 310; 30; 20 INJECTION, SOLUTION INTRAVENOUS at 21:02

## 2022-01-04 RX ADMIN — METOPROLOL SUCCINATE 100 MG: 25 TABLET, EXTENDED RELEASE ORAL at 09:33

## 2022-01-04 RX ADMIN — Medication 3 MG: at 18:37

## 2022-01-04 RX ADMIN — Medication 100 MCG: at 17:27

## 2022-01-04 RX ADMIN — Medication 100 MCG: at 17:30

## 2022-01-04 RX ADMIN — AMPICILLIN SODIUM AND SULBACTAM SODIUM 3000 MG: 2; 1 INJECTION, POWDER, FOR SOLUTION INTRAMUSCULAR; INTRAVENOUS at 03:08

## 2022-01-04 RX ADMIN — ONDANSETRON 4 MG: 2 INJECTION INTRAMUSCULAR; INTRAVENOUS at 18:17

## 2022-01-04 RX ADMIN — Medication 10 ML: at 09:34

## 2022-01-04 RX ADMIN — FENTANYL CITRATE 50 MCG: 50 INJECTION, SOLUTION INTRAMUSCULAR; INTRAVENOUS at 16:41

## 2022-01-04 RX ADMIN — FENTANYL CITRATE 50 MCG: 50 INJECTION, SOLUTION INTRAMUSCULAR; INTRAVENOUS at 16:52

## 2022-01-04 RX ADMIN — AMPICILLIN SODIUM AND SULBACTAM SODIUM 3000 MG: 2; 1 INJECTION, POWDER, FOR SOLUTION INTRAMUSCULAR; INTRAVENOUS at 09:30

## 2022-01-04 RX ADMIN — LIDOCAINE HYDROCHLORIDE 25 MG: 20 INJECTION, SOLUTION EPIDURAL; INFILTRATION; INTRACAUDAL; PERINEURAL at 16:53

## 2022-01-04 RX ADMIN — SODIUM CHLORIDE: 9 INJECTION, SOLUTION INTRAVENOUS at 16:48

## 2022-01-04 RX ADMIN — Medication 200 MCG: at 17:43

## 2022-01-04 RX ADMIN — PROPOFOL 60 MG: 10 INJECTION, EMULSION INTRAVENOUS at 16:55

## 2022-01-04 RX ADMIN — Medication 100 MCG: at 17:47

## 2022-01-04 RX ADMIN — FINASTERIDE 5 MG: 5 TABLET, FILM COATED ORAL at 09:34

## 2022-01-04 RX ADMIN — AMPICILLIN SODIUM AND SULBACTAM SODIUM 3000 MG: 2; 1 INJECTION, POWDER, FOR SOLUTION INTRAMUSCULAR; INTRAVENOUS at 15:11

## 2022-01-04 RX ADMIN — ROCURONIUM BROMIDE 10 MG: 50 INJECTION, SOLUTION INTRAVENOUS at 17:22

## 2022-01-04 RX ADMIN — Medication 100 MCG: at 17:45

## 2022-01-04 RX ADMIN — Medication 150 MCG: at 19:05

## 2022-01-04 RX ADMIN — SODIUM CHLORIDE: 9 INJECTION, SOLUTION INTRAVENOUS at 18:27

## 2022-01-04 RX ADMIN — ROCURONIUM BROMIDE 10 MG: 50 INJECTION, SOLUTION INTRAVENOUS at 16:57

## 2022-01-04 RX ADMIN — AMPICILLIN SODIUM AND SULBACTAM SODIUM 3000 MG: 2; 1 INJECTION, POWDER, FOR SOLUTION INTRAMUSCULAR; INTRAVENOUS at 21:02

## 2022-01-04 RX ADMIN — ENOXAPARIN SODIUM 40 MG: 100 INJECTION SUBCUTANEOUS at 21:01

## 2022-01-04 RX ADMIN — TAMSULOSIN HYDROCHLORIDE 0.4 MG: 0.4 CAPSULE ORAL at 09:33

## 2022-01-04 RX ADMIN — GABAPENTIN 300 MG: 300 CAPSULE ORAL at 09:33

## 2022-01-04 RX ADMIN — CETIRIZINE HYDROCHLORIDE 5 MG: 10 TABLET, FILM COATED ORAL at 09:34

## 2022-01-04 RX ADMIN — Medication 100 MCG: at 16:58

## 2022-01-04 RX ADMIN — ROCURONIUM BROMIDE 40 MG: 50 INJECTION, SOLUTION INTRAVENOUS at 16:55

## 2022-01-04 RX ADMIN — PROPOFOL 60 MG: 10 INJECTION, EMULSION INTRAVENOUS at 16:53

## 2022-01-04 RX ADMIN — Medication 200 MCG: at 16:56

## 2022-01-04 RX ADMIN — Medication 10 ML: at 21:08

## 2022-01-04 RX ADMIN — Medication 200 MCG: at 17:25

## 2022-01-04 RX ADMIN — PHENYLEPHRINE HYDROCHLORIDE 40 MCG/MIN: 10 INJECTION INTRAVENOUS at 17:49

## 2022-01-04 RX ADMIN — Medication 100 MCG: at 16:54

## 2022-01-04 RX ADMIN — Medication 0.2 MG: at 17:11

## 2022-01-04 RX ADMIN — DEXAMETHASONE SODIUM PHOSPHATE 10 MG: 4 INJECTION, SOLUTION INTRA-ARTICULAR; INTRALESIONAL; INTRAMUSCULAR; INTRAVENOUS; SOFT TISSUE at 17:23

## 2022-01-04 RX ADMIN — Medication 200 MCG: at 18:56

## 2022-01-04 ASSESSMENT — PAIN DESCRIPTION - DESCRIPTORS: DESCRIPTORS: ACHING;DISCOMFORT

## 2022-01-04 ASSESSMENT — PULMONARY FUNCTION TESTS
PIF_VALUE: 18
PIF_VALUE: 5
PIF_VALUE: 15
PIF_VALUE: 24
PIF_VALUE: 28
PIF_VALUE: 0
PIF_VALUE: 19
PIF_VALUE: 15
PIF_VALUE: 25
PIF_VALUE: 24
PIF_VALUE: 29
PIF_VALUE: 18
PIF_VALUE: 32
PIF_VALUE: 15
PIF_VALUE: 15
PIF_VALUE: 27
PIF_VALUE: 18
PIF_VALUE: 21
PIF_VALUE: 30
PIF_VALUE: 20
PIF_VALUE: 0
PIF_VALUE: 27
PIF_VALUE: 26
PIF_VALUE: 13
PIF_VALUE: 19
PIF_VALUE: 1
PIF_VALUE: 25
PIF_VALUE: 18
PIF_VALUE: 28
PIF_VALUE: 0
PIF_VALUE: 19
PIF_VALUE: 2
PIF_VALUE: 26
PIF_VALUE: 6
PIF_VALUE: 18
PIF_VALUE: 24
PIF_VALUE: 17
PIF_VALUE: 0
PIF_VALUE: 22
PIF_VALUE: 29
PIF_VALUE: 26
PIF_VALUE: 0
PIF_VALUE: 30
PIF_VALUE: 5
PIF_VALUE: 28
PIF_VALUE: 18
PIF_VALUE: 24
PIF_VALUE: 19
PIF_VALUE: 29
PIF_VALUE: 2
PIF_VALUE: 27
PIF_VALUE: 18
PIF_VALUE: 24
PIF_VALUE: 2
PIF_VALUE: 0
PIF_VALUE: 7
PIF_VALUE: 15
PIF_VALUE: 25
PIF_VALUE: 24
PIF_VALUE: 18
PIF_VALUE: 23
PIF_VALUE: 18
PIF_VALUE: 2
PIF_VALUE: 19
PIF_VALUE: 26
PIF_VALUE: 0
PIF_VALUE: 28
PIF_VALUE: 28
PIF_VALUE: 29
PIF_VALUE: 2
PIF_VALUE: 27
PIF_VALUE: 27
PIF_VALUE: 26
PIF_VALUE: 15
PIF_VALUE: 29
PIF_VALUE: 19
PIF_VALUE: 29
PIF_VALUE: 14
PIF_VALUE: 28
PIF_VALUE: 30
PIF_VALUE: 27
PIF_VALUE: 35
PIF_VALUE: 28
PIF_VALUE: 0
PIF_VALUE: 18
PIF_VALUE: 34
PIF_VALUE: 27
PIF_VALUE: 22
PIF_VALUE: 20
PIF_VALUE: 32
PIF_VALUE: 3
PIF_VALUE: 34
PIF_VALUE: 30
PIF_VALUE: 2
PIF_VALUE: 29
PIF_VALUE: 3
PIF_VALUE: 35
PIF_VALUE: 26
PIF_VALUE: 19
PIF_VALUE: 30
PIF_VALUE: 27
PIF_VALUE: 18
PIF_VALUE: 1
PIF_VALUE: 23
PIF_VALUE: 20
PIF_VALUE: 25
PIF_VALUE: 5
PIF_VALUE: 26
PIF_VALUE: 19
PIF_VALUE: 13
PIF_VALUE: 30
PIF_VALUE: 24
PIF_VALUE: 28
PIF_VALUE: 36
PIF_VALUE: 28
PIF_VALUE: 0
PIF_VALUE: 27
PIF_VALUE: 19
PIF_VALUE: 18
PIF_VALUE: 11
PIF_VALUE: 22
PIF_VALUE: 21
PIF_VALUE: 29
PIF_VALUE: 27
PIF_VALUE: 27
PIF_VALUE: 2
PIF_VALUE: 24
PIF_VALUE: 20
PIF_VALUE: 32
PIF_VALUE: 18
PIF_VALUE: 27
PIF_VALUE: 28

## 2022-01-04 ASSESSMENT — PAIN DESCRIPTION - PAIN TYPE
TYPE: SURGICAL PAIN
TYPE: SURGICAL PAIN

## 2022-01-04 ASSESSMENT — PAIN DESCRIPTION - ORIENTATION
ORIENTATION: LEFT
ORIENTATION: RIGHT

## 2022-01-04 ASSESSMENT — PAIN DESCRIPTION - LOCATION
LOCATION: CHEST
LOCATION: BACK;CHEST
LOCATION: BACK;CHEST

## 2022-01-04 ASSESSMENT — PAIN SCALES - GENERAL
PAINLEVEL_OUTOF10: 0
PAINLEVEL_OUTOF10: 6
PAINLEVEL_OUTOF10: 7
PAINLEVEL_OUTOF10: 6

## 2022-01-04 ASSESSMENT — PAIN - FUNCTIONAL ASSESSMENT: PAIN_FUNCTIONAL_ASSESSMENT: 0-10

## 2022-01-04 NOTE — PROGRESS NOTES
Subjective: The patient is awake and alert. Anxious for procedure this evening. No acute events overnight. Denies chest pain, angina, SOB       Objective:    /65   Pulse 92   Temp 98.1 °F (36.7 °C) (Oral)   Resp 18   Ht 5' 10\" (1.778 m)   Wt 190 lb 11.2 oz (86.5 kg)   SpO2 99%   BMI 27.36 kg/m²     In: 10 [I.V.:10]  Out: 600   In: 10   Out: 600 [Urine:600]    General appearance: NAD, conversant,   HEENT: AT/NC, MMM  Neck: FROM, supple  Lungs: right side diminshed, R chest tube to suction   CV: RRR, no MRGs  Vasc: Radial pulses 2+  Abdomen: Soft, non-tender; no masses or HSM  Extremities: No peripheral edema or digital cyanosis  Skin: no rash, lesions or ulcers  Psych: Alert and oriented to person, place and time  Neuro: Alert and interactive     Recent Labs     01/02/22  0410 01/03/22  0230 01/04/22  0345   WBC 18.7* 18.5* 18.4*   HGB 11.1* 10.9* 10.1*   HCT 35.0* 33.7* 31.5*    392 385       Recent Labs     01/02/22  0410 01/03/22  0230 01/04/22  0345    136 133   K 4.6 4.4 4.3    102 100   CO2 24 24 24   BUN 26* 23 19   CREATININE 0.8 0.7 0.7   CALCIUM 8.1* 8.0* 7.9*       Assessment:    Active Problems:    Empyema (HCC)    Pleural effusion  Resolved Problems:    * No resolved hospital problems.  *      Plan:    Patient is a 23-year-old male admitted to [facility] for  Loculated pleural effusion- concerning for possible malignancy given his history   -Monitor labs  -Pro-Isac 1.93 > 1.95, check tumor markers-known history of \"spot on lung\", has not pursued surveillance  -Sed rate 68  -WBC 35.5> 30.8 > 25.3  > 18,000>18.4  -Pulmonology following , ID following   -CTA pulmonary moderate to large right pleural effusion with adjacent atelectasis versus infiltrate, with possible loculation  -R pigtail chest tube placed per pulm, 1200cc out  -ID consulted -input appreciated  -IV Unasyn pending pan culture results-negative so far  -BC negative so far  -CTS on board -  right thoracotomy with decortication on 1/4/2022.  - await cardiac clearance -Dr. Luiz Mcdaniel cardiac clearance much appreciated-patient okay to proceed with surgery    Hypertension  -continue home medications    DVT Prophylaxis - pcd's  PT/OT  Discharge planning       SHANIQUE Martinez CNP  10:34 AM   1/3/2022      Above note edited to reflect my thoughts     I personally saw, examined and provided care for the patient. Radiographs, labs and medication list were reviewed by me independently. The case was discussed in detail and plans for care were established. Review of Iris GUTIERREZ CNP, documentation was conducted and revisions were made as appropriate directly by me. I agree with the above documented exam, problem list, and plan of care. Senia Coyle MD  Above note edited to reflect my thoughts     I personally saw, examined and provided care for the patient. Radiographs, labs and medication list were reviewed by me independently. The case was discussed in detail and plans for care were established. Review of Iris GUTIERREZ CNP, documentation was conducted and revisions were made as appropriate directly by me. I agree with the above documented exam, problem list, and plan of care.      Senia Coyle MD  4:00 PM  1/5/2022
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
## 2022-01-04 NOTE — PROGRESS NOTES
[address] [place] [address] [facility]  Progress Note    SUBJECTIVE:  Chief Complaint   Patient presents with    Abdominal Pain     intermittent x 2 days    Diarrhea     The patient is feeling good. No pain. No cough. Tolerating antibiotics. Review of systems:  As stated above in the chief complaint, otherwise negative. Medications:  Scheduled Meds:   sodium chloride flush  10 mL IntraVENous 2 times per day    lidocaine PF  5 mL IntraDERmal Once    sodium chloride flush  5-40 mL IntraVENous 2 times per day    heparin flush  3 mL IntraVENous 2 times per day    [Held by provider] enoxaparin  40 mg SubCUTAneous Daily    cetirizine  5 mg Oral Daily    gabapentin  300 mg Oral BID    metoprolol succinate  100 mg Oral Daily    metoprolol succinate  50 mg Oral Nightly    tamsulosin  0.4 mg Oral Daily    finasteride  5 mg Oral Daily    ampicillin-sulbactam  3,000 mg IntraVENous Q6H     Continuous Infusions:   sodium chloride      sodium chloride       PRN Meds:sodium chloride flush, sodium chloride, sodium chloride flush, sodium chloride, heparin flush, morphine, ALPRAZolam, albuterol    OBJECTIVE:  /65   Pulse 92   Temp 98.1 °F (36.7 °C) (Oral)   Resp 18   Ht 5' 10\" (1.778 m)   Wt 190 lb 11.2 oz (86.5 kg)   SpO2 99%   BMI 27.36 kg/m²   Temp  Av.9 °F (36.6 °C)  Min: 97.5 °F (36.4 °C)  Max: 98.1 °F (36.7 °C)  Constitutional: The patient is sitting up in bed. Awake and alert. No distress. Visitor in room. Skin: Warm and dry. No rashes were noted. HEENT: Round and reactive pupils. Moist mucous membranes. No ulcerations or thrush. Neck: Supple to movements. Chest: No respiratory distress. Diminished breath sounds right lower lobe. Right chest tube with serous fluid. Cardiovascular: Heart sounds rhythmic and regular. Abdomen: Positive bowel sounds to auscultation. Benign to palpation. No masses felt. Extremities: No edema.   Lines: peripheral    Laboratory and Tests Review:  Lab Results   Component Value Date    WBC 18.4 (H) 01/04/2022    WBC 18.5 (H) 01/03/2022    WBC 18.7 (H) 01/02/2022    HGB 10.1 (L) 01/04/2022    HCT 31.5 (L) 01/04/2022    MCV 93.8 01/04/2022     01/04/2022     Lab Results   Component Value Date    NEUTROABS 14.91 (H) 01/04/2022    NEUTROABS 15.45 (H) 01/03/2022    NEUTROABS 17.02 (H) 01/02/2022     No results found for: CRPHS  Lab Results   Component Value Date    ALT 15 12/30/2021    AST 10 12/30/2021    ALKPHOS 158 (H) 12/30/2021    BILITOT 0.7 12/30/2021     Lab Results   Component Value Date     01/04/2022    K 4.3 01/04/2022     01/04/2022    CO2 24 01/04/2022    BUN 19 01/04/2022    CREATININE 0.7 01/04/2022    CREATININE 0.7 01/03/2022    CREATININE 0.8 01/02/2022    GFRAA >60 01/04/2022    LABGLOM >60 01/04/2022    GLUCOSE 90 01/04/2022    PROT 6.8 12/30/2021    LABALBU 2.6 12/30/2021    CALCIUM 7.9 01/04/2022    BILITOT 0.7 12/30/2021    ALKPHOS 158 12/30/2021    AST 10 12/30/2021    ALT 15 12/30/2021     Lab Results   Component Value Date    CRP 13.7 (H) 12/31/2021     Lab Results   Component Value Date    SEDRATE 68 (H) 12/30/2021     Radiology:      Microbiology:   Blood cultures 12/30/2021: Negative so far  Pleural fluid 12/31/2021: Pansensitive E. coli  Urine culture 12/30/2021: Negative so far  Streptococcus pneumoniae/Legionella urine Ag: negative  Rapid SARS-CoV-2: Negative  ASO titer: 116    No results for input(s): PROCAL in the last 72 hours. ASSESSMENT:  · Right side empyema. Status post thoracotomy tube 12/31/2021.   Exudative fluid compatible with empyema  · Leukocytosis associated to empyema, improving    PLAN:  · Continue Unasyn  · Awaiting for PICC  · Patient going for thoracotomy    Spoke with nursing      Eri Ward MD  10:53 AM  1/4/2022

## 2022-01-04 NOTE — BRIEF OP NOTE
Brief Postoperative Note      Patient: Sonu Vasquez  YOB: 1945  MRN: [id number]    Date of Procedure: 1/4/2022    Pre-Op Diagnosis:  Complicated parapneumonic right pleural effusion    Post-Op Diagnosis: Same       PROCEDURE: right thoracotomy with whole lung decortication    Surgeon(s):  Deepa Nuñez MD    Assistant:  Surgical Assistant: Edin Mujica    Anesthesia: General    Estimated Blood Loss (mL): less than 50     Complications: None    Specimens:   ID Type Source Tests Collected by Time Destination   1 : PLEURAL FLUID RIGHT LUNG CULTURE  Body Fluid Fluid CULTURE, FUNGUS, GRAM STAIN, CULTURE, BODY FLUID, CULTURE, SURGICAL, CULTURE WITH SMEAR, ACID FAST Cherry Lemus MD 1/4/2022 1737    2 : RIGHT LUNG TISSUE CULTURE Tissue Tissue CULTURE, ANAEROBIC, CULTURE, FUNGUS, GRAM STAIN, CULTURE, SURGICAL, CULTURE WITH SMEAR, ACID FAST Cherry Lemus MD 1/4/2022 1741    A : RIGHT LUNG TISSUE CULTURE Tissue Tissue SURGICAL PATHOLOGY Deepa Nuñez MD 1/4/2022 1757        Implants:  * No implants in log *      Drains:   Chest Tube Posterior;Right (Active)   Suction To water seal 01/04/22 1540   Chest Tube Airleak No 01/04/22 0920   Drainage Description Yellow 01/04/22 1540   Dressing Status Clean;Dry; Intact 01/04/22 1540   Dressing Type Special type 01/04/22 1540   Site Assessment Clean;Dry; Intact 01/04/22 1540   Surrounding Skin Dry; Intact 01/04/22 1540   Output (ml) 13 ml 01/03/22 0643       Ureteral Catheter (Active)   Urine Color Yellow 01/04/22 1539   Urine Appearance Clear 01/04/22 1539   Output (ml) 775 ml 01/04/22 1515       Findings: fibrinopurulent exudate, lung reexpanded    Electronically signed by Deepa Nuñez MD on 1/4/2022 at 6:29 PM

## 2022-01-04 NOTE — OP NOTE
Operative Note      Patient: Sonu Vasquez  YOB: 1945  MRN: [id number]    Date of Procedure: 1/4/2022    Pre-Op Diagnosis:  Complicated parpneumonic effusion    Post-Op Diagnosis: Same       Procedure(s):  RIGHT THORACOTOMY WITH DECORTICATION (IODINE ALLERGY)   ++STAFF FROM 7++    Surgeon(s):  Janis Chambers MD    Assistant:   Surgical Assistant: Kin Mayo    Anesthesia: General    Estimated Blood Loss (mL): less than 50     Complications: None    Specimens:   ID Type Source Tests Collected by Time Destination   1 : PLEURAL FLUID RIGHT LUNG CULTURE  Body Fluid Fluid CULTURE, FUNGUS, GRAM STAIN, CULTURE, BODY FLUID, CULTURE, SURGICAL, CULTURE WITH SMEAR, ACID FAST Yeyo Carter MD 1/4/2022 1737    2 : RIGHT LUNG TISSUE CULTURE Tissue Tissue CULTURE, ANAEROBIC, CULTURE, FUNGUS, GRAM STAIN, CULTURE, SURGICAL, CULTURE WITH SMEAR, ACID FAST Yeyo Carter MD 1/4/2022 1741    A : RIGHT LUNG TISSUE CULTURE Tissue Tissue SURGICAL PATHOLOGY Janis Chambers MD 1/4/2022 1757        Implants:  * No implants in log *      Drains:   Chest Tube Posterior;Right (Active)   Suction To water seal 01/04/22 1540   Chest Tube Airleak No 01/04/22 0920   Drainage Description Yellow 01/04/22 1540   Dressing Status Clean;Dry; Intact 01/04/22 1540   Dressing Type Special type 01/04/22 1540   Site Assessment Clean;Dry; Intact 01/04/22 1540   Surrounding Skin Dry; Intact 01/04/22 1540   Output (ml) 13 ml 01/03/22 0643       Ureteral Catheter (Active)   Urine Color Yellow 01/04/22 1539   Urine Appearance Clear 01/04/22 1539   Output (ml) 775 ml 01/04/22 1515       Findings: loculated effusion    Detailed Description of Procedure:   dictated    Electronically signed by Janis Chambers MD on 1/4/2022 at 6:31 PM

## 2022-01-04 NOTE — CARE COORDINATION
COVID - 12/30. For OR today R thoracotomy w/ decort. pending cardiac clearance. R pigtail catheter continues. On iv Unasyn. Needs PICC inserted per ID. Requiring O2 4l NC. Discharge plan unknown pending progress post op- from home w/ domestic partner PTA.  Will follow Earnestine Harkins RN case manager

## 2022-01-04 NOTE — ANESTHESIA PROCEDURE NOTES
Arterial Line:    An arterial line was placed using ultrasound guidance and surface landmarks, in the procedure area for the following indication(s): continuous blood pressure monitoring and blood sampling needed. A 20 gauge (size), 1 and 3/4 inch (length), Arrow (type) catheter was placed, Seldinger technique used, into the left radial artery, secured by Tegaderm and tape. Anesthesia type: Local    Events:  patient tolerated procedure well with no complications and EBL < 5mL. Additional notes:  Sang's test performed prior to procedure. Good collateral circulation confirmed. First attempt using surface landmarks. Second attempt using U/S to minimize pt discomfort.    1/4/2022 4:04 PM1/4/2022 4:33 PM  Anesthesiologist: Ollie Mondragon MD  Resident/CRNA: SHANIQUE Keenan CRNA  Performed: Resident/CRNA   Preanesthetic Checklist  Completed: patient identified, IV checked, site marked, risks and benefits discussed, surgical consent, monitors and equipment checked, pre-op evaluation, timeout performed, anesthesia consent given, oxygen available and patient being monitored

## 2022-01-04 NOTE — CONSULTS
[address] [address]                                  CONSULTATION    PATIENT NAME: Doris Raphael                    :        1945  MED REC NO:   [id number]                            ROOM:       0731  ACCOUNT NO:   [de-identified]                           ADMIT DATE: 2021  PROVIDER:     Yanni Winters MD    CONSULT DATE:  2021    REASON FOR CONSULTATION:  Complicated parapneumonic effusion, right  chest.    HISTORY:  The patient is a 66-year-old gentleman who presents with  abdominal pain 2 weeks post colonoscopy with constipation. The patient  also complained of shortness of breath and was found to be delirious. He was found to have a large loculated right pleural effusion. CT-guided pigtail catheter drained a large amount of fluid, culture  positive for E-coli. PAST MEDICAL HISTORY:  Remarkable for multiple sclerosis and mitral  regurgitation with mitral valve prolapse and prior history of  endocarditis. There is history of diabetes, multiple sclerosis,  bilateral inguinal herniorrhaphy, penile implant. SOCIAL HISTORY:  Remarkable for remote tobacco use. Positive for glass  of wine per night. OCCUPATION:  At [employer]. ALLERGIES:  Include IV DYE AND PAXIL. REVIEW OF SYSTEMS:  Negative for symptoms compatible with amaurosis  fugax, transient ischemic attack or stroke. No nausea, vomiting,  diarrhea. Positive for recent constipation. No abdominal, back, or  flank pain presently, however, the patient did experience abdominal pain  on admission. He denies current cough, fever, chills, hemoptysis. No  claudication or rest pain of the extremities. Positive for recent  swelling of bilateral lower extremities. PHYSICAL EXAM:  GENERAL:  Reveals a well-developed gentleman, in no acute distress.   NECK:  Without jugular venous distention, thyromegaly, carotid bruits,  cervical or supraclavicular lymphadenopathy. LUNGS:  Breath sounds are diminished on the right, clear on the left. HEART:  Tones regular. ABDOMEN:  Soft, nontender, no masses. EXTREMITIES:  Without cyanosis, clubbing. 1+ edema noted. Peripheral  pulses were intact. A CT scan of the chest reveals extensive right pleural effusion with  loculations superomedially and superolaterally. Fluid has been drained  via pigtail catheter, however, positioned within the lower chest.  Post  drainage film demonstrates marked improvement in the upper lung fields,  however, there is persistent loculated hydropneumothorax at the right  lung base. Plan is to proceed with right thoracotomy and decortication  tomorrow afternoon.         Jacey Perez MD    D: 01/03/2022 20:08:40       T: 01/03/2022 20:11:03     [email or web address]  Job#: [id number]     Doc#: [id number]

## 2022-01-04 NOTE — PROGRESS NOTES
Pulmonary Progress Note    Admit Date: 2021                            PCP: Ratna Sahu MD  Active Problems:    [facility])    Pleural effusion  Resolved Problems:    * No resolved hospital problems. *      Subjective:  Resting in bed on 4LNC  son at bedside. denies dyspnea. Awaiting surgery 430p    Medications:   sodium chloride      sodium chloride          sodium chloride flush  10 mL IntraVENous 2 times per day    lidocaine PF  5 mL IntraDERmal Once    sodium chloride flush  5-40 mL IntraVENous 2 times per day    heparin flush  3 mL IntraVENous 2 times per day    [Held by provider] enoxaparin  40 mg SubCUTAneous Daily    cetirizine  5 mg Oral Daily    gabapentin  300 mg Oral BID    metoprolol succinate  100 mg Oral Daily    metoprolol succinate  50 mg Oral Nightly    tamsulosin  0.4 mg Oral Daily    finasteride  5 mg Oral Daily    ampicillin-sulbactam  3,000 mg IntraVENous Q6H       Vitals:  VITALS:  /65   Pulse 92   Temp 98.1 °F (36.7 °C) (Oral)   Resp 18   Ht 5' 10\" (1.778 m)   Wt 190 lb 11.2 oz (86.5 kg)   SpO2 99%   BMI 27.36 kg/m²   24HR INTAKE/OUTPUT:      Intake/Output Summary (Last 24 hours) at 2022 1408  Last data filed at 2022 0622  Gross per 24 hour   Intake 10 ml   Output 1125 ml   Net -1115 ml     CURRENT PULSE OXIMETRY:  SpO2: 99 %  24HR PULSE OXIMETRY RANGE:  SpO2  Av.8 %  Min: 98 %  Max: 100 %  CVP:    VENT SETTINGS:   Vent Information  SpO2: 99 %  Additional Respiratory  Assessments  Pulse: 92  Resp: 18  SpO2: 99 %  Oral Care: Teeth brushed,Mouthwash      EXAM:  General: Alert, in NAD  ENT: No discharge. Pharynx clear. membranes moist   Neck: Supple, Trachea midline. Resp: No accessory muscle use. Non-labored. Lungwith No crackles. No wheezing. No rhonchi. diminished on right   CV: Regular rate. Regular rhythm. No murmur . No edema. ABD: Non-tender. Non-distended. Soft, round . Normal bowel sounds. Skin: Warm and dry.  Right lateral pigtail to drainage   M/S: No cyanosis. No joint deformity. No clubbing. Neuro: Awake. Follows commands. O x 3, OTERO  Psych:  calm and interactive     I/O: I/O last 3 completed shifts: In: 10 [I.V.:10]  Out: 1125 [Urine:600; Drains:525]  No intake/output data recorded. Results:  CBC:   Recent Labs     01/02/22 0410 01/03/22 0230 01/04/22  0345   WBC 18.7* 18.5* 18.4*   HGB 11.1* 10.9* 10.1*   HCT 35.0* 33.7* 31.5*   MCV 93.8 93.4 93.8    392 385     BMP:   Recent Labs     01/02/22 0410 01/03/22 0230 01/04/22  0345    136 133   K 4.6 4.4 4.3    102 100   CO2 24 24 24   BUN 26* 23 19   CREATININE 0.8 0.7 0.7     LFT: No results for input(s): ALKPHOS, ALT, AST, PROT, BILITOT, BILIDIR, LABALBU in the last 72 hours. PT/INR: No results for input(s): PROTIME, INR in the last 72 hours. Procalcitonin: No results for input(s): PROCAL in the last 72 hours. Cultures:  No results for input(s): CULTRESP in the last 72 hours. ABG:   No results for input(s): PH, PO2, PCO2, HCO3, BE, O2SAT in the last 72 hours. Films:  CT ABDOMEN PELVIS WO CONTRAST Additional Contrast? None    Result Date: 12/30/2021  EXAMINATION: CT OF THE ABDOMEN AND PELVIS WITHOUT CONTRAST 12/30/2021 12:45 pm TECHNIQUE: CT of the abdomen and pelvis was performed without the administration of intravenous contrast. Multiplanar reformatted images are provided for review. Dose modulation, iterative reconstruction, and/or weight based adjustment of the mA/kV was utilized to reduce the radiation dose to as low as reasonably achievable. COMPARISON: None. HISTORY: ORDERING SYSTEM PROVIDED HISTORY: RUQ pain TECHNOLOGIST PROVIDED HISTORY: Reason for exam:->RUQ pain Additional Contrast?->None Decision Support Exception - unselect if not a suspected or confirmed emergency medical condition->Emergency Medical Condition (MA) FINDINGS: Irregular area of attenuation along superior margin of right kidney measures 7.9 x 5.3 cm.   There is no hydronephrosis. Nonobstructing bilateral renal calculi are present measuring up to 5 mm on the right and 8 mm on the left. Urinary bladder is normal in contour. Prostate gland is enlarged measuring 5.4 x 6.2 cm. There is no bowel obstruction, free air, or free fluid. The appendix is not definitively visualized, however no focal inflammatory changes in its expected location. Multiple diverticula associated with the right colon. No evidence of acute diverticulitis. Generalized decreased attenuation throughout the liver. No intrahepatic or extrahepatic bile duct dilatation. Foci of slight increased attenuation seen throughout the gallbladder could suggest numerous gallstones or sludge. No evidence of acute pancreatitis. Spleen is nonenlarged. Adrenal glands are unremarkable. Penile prosthesis demonstrated. Large right pleural effusion with adjacent atelectasis or pneumonia. 1. Irregular masslike area of attenuation along superior aspect of right kidney may be related to scarring at site of prior surgery or trauma versus developing retroperitoneal hemorrhage. 2. Large right pleural effusion with adjacent atelectasis and pneumonia. 3. Foci of irregular attenuation fill the gallbladder related to multiple gallstones or hyperattenuating sludge. 4. Nonobstructing bilateral renal calculi. 5. Multiple diverticula involving right colon. 6. Hepatic steatosis. 7. Prostatomegaly. 8. Results were called by Dr. Charles Arredondo to Dr. Humaira Viera on 12/30/2021 at 15:14. XR CHEST (2 VW)    Result Date: 12/30/2021  EXAMINATION: TWO XRAY VIEWS OF THE CHEST 12/30/2021 1:25 pm COMPARISON: December 25, 2016 HISTORY: ORDERING SYSTEM PROVIDED HISTORY: abdominal pain TECHNOLOGIST PROVIDED HISTORY: Reason for exam:->abdominal pain FINDINGS: There is extensive opacification of the right hemithorax. Large right pleural effusion noted. Mild opacity at the left lung base. The heart is mildly enlarged. There is no pneumothorax. Right lung pneumonia and large right pleural effusion. CT ABDOMEN PELVIS W IV CONTRAST Additional Contrast? None    Result Date: 12/30/2021  EXAMINATION: CT OF THE ABDOMEN AND PELVIS WITH CONTRAST 12/30/2021 4:32 pm TECHNIQUE: CT of the abdomen and pelvis was performed with the administration of intravenous contrast. Multiplanar reformatted images are provided for review. Dose modulation, iterative reconstruction, and/or weight based adjustment of the mA/kV was utilized to reduce the radiation dose to as low as reasonably achievable. COMPARISON: 12/23/2021 12:54 p.m. abdomen/pelvis CT. HISTORY: ORDERING SYSTEM PROVIDED HISTORY: rt kidney hematoma TECHNOLOGIST PROVIDED HISTORY: Reason for exam:->rt kidney hematoma Additional Contrast?->None Decision Support Exception - unselect if not a suspected or confirmed emergency medical condition->Emergency Medical Condition (MA) FINDINGS: There is a large right pleural effusion and small pericardial effusion. Cardiac size is enlarged. There is consolidation right middle lobe, right lower lobe and left lower lobe with linear density in the lingula. No lytic or blastic osseous lesions. The liver is diffusely hypodense. The spleen, pancreas and adrenal glands are normal.  Subtle density within the dependent gallbladder may be sludge or gallstones. There are bilateral renal calculi, not significantly changed. There is an area of mixed hypo and hyper dense material along the posterior margin of the superior right kidney and extending into the right posterior costophrenic sulcus where there is a the 2.9 cm focal hypodense collection. Subtle small hypodense areas within this right perinephric density probably represent fat. There are multiple small cystic lesions within the kidneys bilaterally. The stomach and small bowel are unremarkable. There is diverticulosis involving the colon. Bladder is normal.  There is a penile implant. Prostate gland is enlarged.   There is atherosclerotic calcification of the abdominal aorta and iliac arteries but no abdominal aortic aneurysm. Right pleural effusion with right middle lobe and bilateral lower lobe infiltrates consistent with atelectasis and/or infection. Mixed density infiltration right posterosuperior perinephric fat extending into and possibly communicating with the right pleural and pleural fluid. This may represent an area of contiguous infection although neoplastic disease cannot be excluded. Overall size and volume of this is not significantly changed when compared to the previous study. If there has been recent procedure in this area, this appearance could be secondary to hemorrhage although that is probably less likely. Cardiomegaly and small pericardial effusion. Nonobstructing bilateral nephrolithiasis. Hepatic steatosis. Diverticulosis but no evidence of diverticulitis. Prostate enlargement. RECOMMENDATIONS: Unavailable     XR CHEST PORTABLE    Result Date: 12/31/2021  EXAMINATION: ONE XRAY VIEW OF THE CHEST 12/31/2021 5:42 pm COMPARISON: 12/30/2021 chest radiograph. HISTORY: ORDERING SYSTEM PROVIDED HISTORY: Thoracentesis TECHNOLOGIST PROVIDED HISTORY: Reason for exam:->Thoracentesis FINDINGS: There is airspace density at the right lung base and blunting of the right costophrenic sulcus. There are interstitial densities at the left lung base. Cardiac silhouette is enlarged. There is atherosclerotic calcification of the thoracic aorta. A pigtail catheter overlies the right lateral lung base. Lucencies in this area suggest loculated pneumothorax. Interval pleural tube placement right lung base with evaluation of some of the previous seen pleural fluid. There is probable loculated pneumothorax at the right costophrenic sulcus. Right lower lobe consolidation consistent with atelectasis and/or pneumonia. This is improved overall. Minimal left lower lobe atelectasis and/or pneumonitis.  Stable enlargement of the cardiac silhouette. CTA PULMONARY W CONTRAST    Result Date: 12/30/2021  EXAMINATION: CTA OF THE CHEST 12/30/2021 4:32 pm TECHNIQUE: CTA of the chest was performed after the administration of intravenous contrast.  Multiplanar reformatted images are provided for review. MIP images are provided for review. Dose modulation, iterative reconstruction, and/or weight based adjustment of the mA/kV was utilized to reduce the radiation dose to as low as reasonably achievable. COMPARISON: None. HISTORY: ORDERING SYSTEM PROVIDED HISTORY: pleural effusion TECHNOLOGIST PROVIDED HISTORY: Reason for exam:->pleural effusion Decision Support Exception - unselect if not a suspected or confirmed emergency medical condition->Emergency Medical Condition (MA) FINDINGS: Pulmonary Arteries: Pulmonary arteries are adequately opacified for evaluation. No evidence of intraluminal filling defect to suggest pulmonary embolism. Main pulmonary artery is normal in caliber. Loculation cannot be excluded. Mediastinum: No evidence of mediastinal lymphadenopathy. Cardiomegaly. There is no acute abnormality of the thoracic aorta. Lungs/pleura: No pneumothorax. Moderate to large right pleural effusion possible loculation and with adjacent atelectasis and/or pneumonia. Emphysematous changes. Upper Abdomen: Hepatic steatosis. .  Irregular soft tissue density posterior in superior to the right kidney. Subcentimeter right renal calculi. Soft Tissues/Bones: No acute bone or soft tissue abnormality. Degenerative changes thoracic spine. No evidence for pulmonary emboli. Moderate to large right pleural effusion with adjacent atelectasis versus infiltrate. Loculation cannot be excluded. Cardiomegaly. Hepatic steatosis. Irregular soft tissue density superior and posterior to the right kidney of unknown clinical significance and may related to infectious or neoplastic process. Subcentimeter right renal calculi. Emphysematous changes. RECOMMENDATIONS: Unavailable     US ABDOMEN LIMITED    Result Date: 12/30/2021  EXAMINATION: RIGHT UPPER QUADRANT ULTRASOUND 12/30/2021 1:44 pm COMPARISON: Abdomen radiograph 06/10/2019 HISTORY: ORDERING SYSTEM PROVIDED HISTORY: Pain TECHNOLOGIST PROVIDED HISTORY: Right upper quadrant pain. Epigastric pain. Reason for exam:->Pain What reading provider will be dictating this exam?->CRC FINDINGS: Technologist reports examination is limited by patient pain. Unable to get decubitus views. No focal abnormality in the limited visualized portion of pancreas. No right upper quadrant ascites. No definite abnormality in the limited visualized portion of the liver. Normal-appearing gallbladder without stones, wall thickening, or pericholecystic fluid. Negative Ladd sign reported. Common bile duct slightly distended at 7.9 mm diameter. Right kidney without hydronephrosis. There are nonspecific 10 and 14 mm foci of minimally shadowing echogenicity in the central kidney suggestive of nonobstructing calculi. Non-specific slight distention of common bile duct with 7.9 mm diameter Echogenic foci in the right kidney most compatible with nonobstructing calculi     US THORACENTESIS Which side should the procedure be performed? Right    Result Date: 1/1/2022  PROCEDURE: ULTRASOUNDGUIDED RIGHT THORACENTESIS 12/31/2021 HISTORY: ORDERING SYSTEM PROVIDED HISTORY: Marking for Right Chest Tube Placement TECHNOLOGIST PROVIDED HISTORY: Reason for exam:->Marking for Right Chest Tube Placement Which side should the procedure be performed?->Right What reading provider will be dictating this exam?->CRC TECHNIQUE: Ultrasound utilized for thoracentesis procedure performed by Dr. Glen Moore. FINDINGS: Single ultrasound image shows a right pleural effusion. Ultrasound utilized for thoracentesis procedure. For additional information, please refer to operative report.        Assessment:  · Right lung empyema--Ecoli  · Acute hypoxic respiratory failure         Plan:  · Continue o2 on 4L, baseline is room air, keep POX >90%, wean as sung  · Right pigtail intact to intermittent  Suction -20  525cc out no air leak + ecoli for decortication awith CTS today   · On unasyn.  , pct 1.95 ID following  · IS for pulmonary hygiene   · lovenox on hold         Electronically signed by SHANIQUE Parish on 1/4/2022 at 2:08 PM

## 2022-01-05 ENCOUNTER — APPOINTMENT (OUTPATIENT)
Dept: GENERAL RADIOLOGY | Age: 77
DRG: 853 | End: 2022-01-05
Payer: MEDICARE

## 2022-01-05 LAB
ANISOCYTOSIS: ABNORMAL
BASOPHILS ABSOLUTE: 0 E9/L (ref 0–0.2)
BASOPHILS ABSOLUTE: 0.08 E9/L (ref 0–0.2)
BASOPHILS RELATIVE PERCENT: 0 % (ref 0–2)
BASOPHILS RELATIVE PERCENT: 0.3 % (ref 0–2)
BURR CELLS: ABNORMAL
EOSINOPHILS ABSOLUTE: 0 E9/L (ref 0.05–0.5)
EOSINOPHILS ABSOLUTE: 0 E9/L (ref 0.05–0.5)
EOSINOPHILS RELATIVE PERCENT: 0 % (ref 0–6)
EOSINOPHILS RELATIVE PERCENT: 0 % (ref 0–6)
GRAM STAIN ORDERABLE: NORMAL
GRAM STAIN ORDERABLE: NORMAL
HCT VFR BLD CALC: 35 % (ref 37–54)
HCT VFR BLD CALC: 36.8 % (ref 37–54)
HEMOGLOBIN: 11.2 G/DL (ref 12.5–16.5)
HEMOGLOBIN: 11.6 G/DL (ref 12.5–16.5)
IMMATURE GRANULOCYTES #: 0.75 E9/L
IMMATURE GRANULOCYTES %: 2.9 % (ref 0–5)
LYMPHOCYTES ABSOLUTE: 0.7 E9/L (ref 1.5–4)
LYMPHOCYTES ABSOLUTE: 0.82 E9/L (ref 1.5–4)
LYMPHOCYTES RELATIVE PERCENT: 2.6 % (ref 20–42)
LYMPHOCYTES RELATIVE PERCENT: 3.1 % (ref 20–42)
MCH RBC QN AUTO: 30.1 PG (ref 26–35)
MCH RBC QN AUTO: 30.2 PG (ref 26–35)
MCHC RBC AUTO-ENTMCNC: 31.5 % (ref 32–34.5)
MCHC RBC AUTO-ENTMCNC: 32 % (ref 32–34.5)
MCV RBC AUTO: 94.3 FL (ref 80–99.9)
MCV RBC AUTO: 95.3 FL (ref 80–99.9)
MONOCYTES ABSOLUTE: 0.68 E9/L (ref 0.1–0.95)
MONOCYTES ABSOLUTE: 0.7 E9/L (ref 0.1–0.95)
MONOCYTES RELATIVE PERCENT: 2.6 % (ref 2–12)
MONOCYTES RELATIVE PERCENT: 2.6 % (ref 2–12)
MYELOCYTE PERCENT: 2.6 % (ref 0–0)
NEUTROPHILS ABSOLUTE: 22.23 E9/L (ref 1.8–7.3)
NEUTROPHILS ABSOLUTE: 23.89 E9/L (ref 1.8–7.3)
NEUTROPHILS RELATIVE PERCENT: 91.1 % (ref 43–80)
NEUTROPHILS RELATIVE PERCENT: 92.2 % (ref 43–80)
NUCLEATED RED BLOOD CELLS: 0 /100 WBC
OVALOCYTES: ABNORMAL
OVALOCYTES: ABNORMAL
PDW BLD-RTO: 15.2 FL (ref 11.5–15)
PDW BLD-RTO: 15.4 FL (ref 11.5–15)
PLATELET # BLD: 427 E9/L (ref 130–450)
PLATELET # BLD: 469 E9/L (ref 130–450)
PMV BLD AUTO: 10.2 FL (ref 7–12)
PMV BLD AUTO: 9.5 FL (ref 7–12)
POIKILOCYTES: ABNORMAL
POIKILOCYTES: ABNORMAL
POLYCHROMASIA: ABNORMAL
POLYCHROMASIA: ABNORMAL
RBC # BLD: 3.71 E12/L (ref 3.8–5.8)
RBC # BLD: 3.86 E12/L (ref 3.8–5.8)
WBC # BLD: 23.4 E9/L (ref 4.5–11.5)
WBC # BLD: 26.2 E9/L (ref 4.5–11.5)

## 2022-01-05 PROCEDURE — 85025 COMPLETE CBC W/AUTO DIFF WBC: CPT

## 2022-01-05 PROCEDURE — 99233 SBSQ HOSP IP/OBS HIGH 50: CPT | Performed by: INTERNAL MEDICINE

## 2022-01-05 PROCEDURE — 6360000002 HC RX W HCPCS: Performed by: SPECIALIST

## 2022-01-05 PROCEDURE — 2060000000 HC ICU INTERMEDIATE R&B

## 2022-01-05 PROCEDURE — 71045 X-RAY EXAM CHEST 1 VIEW: CPT

## 2022-01-05 PROCEDURE — 2580000003 HC RX 258: Performed by: SPECIALIST

## 2022-01-05 PROCEDURE — 36415 COLL VENOUS BLD VENIPUNCTURE: CPT

## 2022-01-05 PROCEDURE — 6370000000 HC RX 637 (ALT 250 FOR IP): Performed by: SPECIALIST

## 2022-01-05 PROCEDURE — 2700000000 HC OXYGEN THERAPY PER DAY

## 2022-01-05 RX ORDER — OXYCODONE HYDROCHLORIDE AND ACETAMINOPHEN 5; 325 MG/1; MG/1
1 TABLET ORAL EVERY 4 HOURS PRN
Status: DISCONTINUED | OUTPATIENT
Start: 2022-01-05 | End: 2022-01-07 | Stop reason: HOSPADM

## 2022-01-05 RX ADMIN — Medication 5 ML: at 14:36

## 2022-01-05 RX ADMIN — Medication 10 ML: at 20:03

## 2022-01-05 RX ADMIN — GABAPENTIN 300 MG: 300 CAPSULE ORAL at 10:17

## 2022-01-05 RX ADMIN — TAMSULOSIN HYDROCHLORIDE 0.4 MG: 0.4 CAPSULE ORAL at 10:18

## 2022-01-05 RX ADMIN — FINASTERIDE 5 MG: 5 TABLET, FILM COATED ORAL at 10:19

## 2022-01-05 RX ADMIN — ENOXAPARIN SODIUM 40 MG: 100 INJECTION SUBCUTANEOUS at 20:00

## 2022-01-05 RX ADMIN — AMPICILLIN SODIUM AND SULBACTAM SODIUM 3000 MG: 2; 1 INJECTION, POWDER, FOR SOLUTION INTRAMUSCULAR; INTRAVENOUS at 16:56

## 2022-01-05 RX ADMIN — MORPHINE SULFATE 2 MG: 2 INJECTION, SOLUTION INTRAMUSCULAR; INTRAVENOUS at 10:18

## 2022-01-05 RX ADMIN — MORPHINE SULFATE 2 MG: 2 INJECTION, SOLUTION INTRAMUSCULAR; INTRAVENOUS at 00:29

## 2022-01-05 RX ADMIN — AMPICILLIN SODIUM AND SULBACTAM SODIUM 3000 MG: 2; 1 INJECTION, POWDER, FOR SOLUTION INTRAMUSCULAR; INTRAVENOUS at 20:00

## 2022-01-05 RX ADMIN — CETIRIZINE HYDROCHLORIDE 5 MG: 10 TABLET, FILM COATED ORAL at 10:18

## 2022-01-05 RX ADMIN — AMPICILLIN SODIUM AND SULBACTAM SODIUM 3000 MG: 2; 1 INJECTION, POWDER, FOR SOLUTION INTRAMUSCULAR; INTRAVENOUS at 09:36

## 2022-01-05 RX ADMIN — GABAPENTIN 300 MG: 300 CAPSULE ORAL at 17:03

## 2022-01-05 RX ADMIN — METOPROLOL SUCCINATE 50 MG: 25 TABLET, FILM COATED, EXTENDED RELEASE ORAL at 20:00

## 2022-01-05 RX ADMIN — SODIUM CHLORIDE, POTASSIUM CHLORIDE, SODIUM LACTATE AND CALCIUM CHLORIDE: 600; 310; 30; 20 INJECTION, SOLUTION INTRAVENOUS at 16:56

## 2022-01-05 RX ADMIN — METOPROLOL SUCCINATE 100 MG: 25 TABLET, EXTENDED RELEASE ORAL at 10:17

## 2022-01-05 RX ADMIN — SODIUM CHLORIDE, POTASSIUM CHLORIDE, SODIUM LACTATE AND CALCIUM CHLORIDE: 600; 310; 30; 20 INJECTION, SOLUTION INTRAVENOUS at 05:02

## 2022-01-05 RX ADMIN — AMPICILLIN SODIUM AND SULBACTAM SODIUM 3000 MG: 2; 1 INJECTION, POWDER, FOR SOLUTION INTRAMUSCULAR; INTRAVENOUS at 02:30

## 2022-01-05 ASSESSMENT — PAIN DESCRIPTION - LOCATION
LOCATION: BACK
LOCATION: BACK

## 2022-01-05 ASSESSMENT — PAIN DESCRIPTION - PROGRESSION
CLINICAL_PROGRESSION: NOT CHANGED
CLINICAL_PROGRESSION: NOT CHANGED

## 2022-01-05 ASSESSMENT — PAIN SCALES - GENERAL
PAINLEVEL_OUTOF10: 5
PAINLEVEL_OUTOF10: 2
PAINLEVEL_OUTOF10: 2
PAINLEVEL_OUTOF10: 6
PAINLEVEL_OUTOF10: 8

## 2022-01-05 ASSESSMENT — PAIN DESCRIPTION - FREQUENCY
FREQUENCY: INTERMITTENT
FREQUENCY: INTERMITTENT

## 2022-01-05 ASSESSMENT — PAIN DESCRIPTION - ORIENTATION
ORIENTATION: RIGHT
ORIENTATION: RIGHT

## 2022-01-05 ASSESSMENT — PAIN DESCRIPTION - PAIN TYPE
TYPE: ACUTE PAIN
TYPE: ACUTE PAIN

## 2022-01-05 ASSESSMENT — PAIN DESCRIPTION - ONSET: ONSET: GRADUAL

## 2022-01-05 ASSESSMENT — PAIN DESCRIPTION - DESCRIPTORS
DESCRIPTORS: DISCOMFORT;JABBING;SORE
DESCRIPTORS: JABBING

## 2022-01-05 NOTE — PLAN OF CARE
Problem: Urinary Elimination:  Goal: Signs and symptoms of infection will decrease  Description: Signs and symptoms of infection will decrease  Outcome: Ongoing  Goal: Complications related to the disease process, condition or treatment will be avoided or minimized  Description: Complications related to the disease process, condition or treatment will be avoided or minimized  Outcome: Ongoing     Problem: Skin Integrity:  Goal: Will show no infection signs and symptoms  Description: Will show no infection signs and symptoms  Outcome: Ongoing  Goal: Absence of new skin breakdown  Description: Absence of new skin breakdown  Outcome: Ongoing

## 2022-01-05 NOTE — PROGRESS NOTES
[address] [place] [address] [facility]  Progress Note    SUBJECTIVE:  Chief Complaint   Patient presents with    Abdominal Pain     intermittent x 2 days    Diarrhea     Patient had a thoracotomy. Denies any pain. He has a chest tube. Tolerating antibiotics. Review of systems:  As stated above in the chief complaint, otherwise negative. Medications:  Scheduled Meds:   sodium chloride flush  5-40 mL IntraVENous 2 times per day    enoxaparin  40 mg SubCUTAneous Daily    lidocaine PF  5 mL IntraDERmal Once    heparin flush  3 mL IntraVENous 2 times per day    cetirizine  5 mg Oral Daily    gabapentin  300 mg Oral BID    metoprolol succinate  100 mg Oral Daily    metoprolol succinate  50 mg Oral Nightly    tamsulosin  0.4 mg Oral Daily    finasteride  5 mg Oral Daily    ampicillin-sulbactam  3,000 mg IntraVENous Q6H     Continuous Infusions:   bupivacaine (MARCAINE) 0.25 % elastomeric infusion 2 mL/hr at 22 1931    lactated ringers 80 mL/hr at 22 0629    sodium chloride       PRN Meds:oxyCODONE-acetaminophen, sodium chloride flush, sodium chloride, acetaminophen, morphine **OR** morphine, heparin flush, albuterol    OBJECTIVE:  BP (!) 99/58   Pulse 99   Temp 97.7 °F (36.5 °C) (Infrared)   Resp 20   Ht 5' 10\" (1.778 m)   Wt 199 lb 9.6 oz (90.5 kg)   SpO2 93%   BMI 28.64 kg/m²   Temp  Av °F (31.7 °C)  Min: 54.1 °F (12.3 °C)  Max: 98.4 °F (36.9 °C)  Constitutional: The patient is sitting up in bed. Awake and alert. No distress. Son in room. Skin: Warm and dry. No rashes were noted. HEENT: Round and reactive pupils. Moist mucous membranes. No ulcerations or thrush. Neck: Supple to movements. Chest: No respiratory distress. Diminished breath sounds right lower lobe. Right thoracotomy. Chest tube with serosanguineous fluid. Cardiovascular: Heart sounds rhythmic and regular. Abdomen: Positive bowel sounds to auscultation. Benign to palpation.  No masses felt. Extremities: No edema. Lines: peripheral    Laboratory and Tests Review:  Lab Results   Component Value Date    WBC 26.2 (H) 01/05/2022    WBC 23.4 (H) 01/04/2022    WBC 18.4 (H) 01/04/2022    HGB 11.6 (L) 01/05/2022    HCT 36.8 (L) 01/05/2022    MCV 95.3 01/05/2022     01/05/2022     Lab Results   Component Value Date    NEUTROABS 23.89 (H) 01/05/2022    NEUTROABS 22.23 (H) 01/04/2022    NEUTROABS 14.91 (H) 01/04/2022     No results found for: CRPHS  Lab Results   Component Value Date    ALT 15 12/30/2021    AST 10 12/30/2021    ALKPHOS 158 (H) 12/30/2021    BILITOT 0.7 12/30/2021     Lab Results   Component Value Date     01/04/2022    K 4.7 01/04/2022     01/04/2022    CO2 22 01/04/2022    BUN 16 01/04/2022    CREATININE 0.6 01/04/2022    CREATININE 0.7 01/04/2022    CREATININE 0.7 01/03/2022    GFRAA >60 01/04/2022    LABGLOM >60 01/04/2022    GLUCOSE 99 01/04/2022    PROT 6.8 12/30/2021    LABALBU 2.6 12/30/2021    CALCIUM 7.6 01/04/2022    BILITOT 0.7 12/30/2021    ALKPHOS 158 12/30/2021    AST 10 12/30/2021    ALT 15 12/30/2021     Lab Results   Component Value Date    CRP 13.7 (H) 12/31/2021     Lab Results   Component Value Date    SEDRATE 68 (H) 12/30/2021     Radiology:      Microbiology:   Blood cultures 12/30/2021: Negative so far  Pleural fluid 12/31/2021: Pansensitive E. coli  Urine culture 12/30/2021: Negative so far  Streptococcus pneumoniae/Legionella urine Ag: negative  Rapid SARS-CoV-2: Negative  ASO titer: 116    No results for input(s): PROCAL in the last 72 hours.     ASSESSMENT:  · Right side empyema  · Status post thoracotomy tube 12/31/2021 Exudative fluid compatible with empyema  · Status post muscle-sparing right thoracotomy with lung decortication 1/4/2022  · Leukocytosis associated to empyema, improving    PLAN:  · Continue Unasyn  · Awaiting for PICC    Spoke with fauzia Zamudio MD  11:42 AM  1/5/2022

## 2022-01-05 NOTE — CARE COORDINATION
COVID - 12/30. POD #1 . Chest tubes x 2. Continues on iv abx- needs PICC inserted-awaiting ID final plan. Requiring O2 4lNC. Awaiting PT/OT evals. From home w/ domestic partner PTAJaime Mares [phone] , domestic partner's daughter, updated- states her mother has dementia-states pt's son is visiting from [place] and will be here to assist his father until approx 1/15-discharge plan unknown at this time pending progress.  Will follow Jet Rosa RN case manager

## 2022-01-05 NOTE — OP NOTE
[address] [address]                                OPERATIVE REPORT    PATIENT NAME: Ya John                    :        1945  MED REC NO:   [id number]                            ROOM:       4580  ACCOUNT NO:   [de-identified]                           ADMIT DATE: 2021  PROVIDER:     Christian Moses MD    DATE OF PROCEDURE:  2022    PREOPERATIVE DIAGNOSIS:  Complicated right parapneumonic effusion. POSTOPERATIVE DIAGNOSIS:  Complicated right parapneumonic effusion. PROCEDURE:  Muscle-sparing right thoracotomy with whole lung  decortication. SURGEON:  Christian Moses MD    ASSISTANT:  None. ANESTHESIA:  General endotracheal.    ESTIMATED BLOOD LOSS:  20 mL. COMPLICATIONS:  None. INDICATIONS:  The patient is a 77-year-old gentleman with complicated  parapneumonic effusion. Positive for gram-negative rods. The patient  is taken to surgery at this time for decortication. DESCRIPTION OF PROCEDURE:  The patient is taken to the operating room  where general endotracheal anesthesia was administered. The patient was  placed in the left decubitus, and the right chest prepped and draped in  the usual sterile fashion. Double-lumen endotracheal tube was placed  per Anesthesia. Muscle-sparing right fifth thoracotomy incision was  placed and latissimus reflected posteriorly and the serratus mobilized  in the direction of its fibers. The fifth interspace was entered. Adhesions were taken down with blunt technique. Green fibrinopurulent  fluid and exudates were noted and sample sent for culture and Lukens  trap fluid for Microbiology and tissue for pathology. A whole lung  decortication was performed of all fibrinopurulent debris including the  diaphragm and major and minor fissure. No abscess was identified.   The  pleural space was irrigated with two liters of warm antibiotic solution. Two #28-Uzbek chest tubes were placed. A PainBuster catheter was  tunneled subpleurally and loaded with 20 mL 0.25% Marcaine. Ribs were  approximated with #5 Ethibond nerve-sparing technique x3. Fascia was  closed in three layers with 2-0 Vicryl, 2-0 Vicryl, 3-0 Vicryl and skin  with 4-0 subcuticular Monocryl and Dermabond. Sterile occlusive  dressing was applied, and the patient extubated in the operating room  and transferred to Recovery with stable vital signs. No complications.         Jacey Perez MD    D: 01/04/2022 18:42:49       T: 01/04/2022 18:45:03     [email or web address]  Job#: [id number]     Doc#: [id number]    CC:

## 2022-01-05 NOTE — PROGRESS NOTES
Subjective: The patient is awake and alert. No acute events overnight. Denies chest pain, angina, SOB   States he feels great, visiting with son at bedside. Objective:    /78   Pulse 96   Temp 97.5 °F (36.4 °C) (Axillary)   Resp 20   Ht 5' 10\" (1.778 m)   Wt 199 lb 9.6 oz (90.5 kg)   SpO2 94%   BMI 28.64 kg/m²     In: 1560 [I.V.:1560]  Out: 2798   In: 1560   Out: 7746 [Urine:1100; Drains:1275]    General appearance: NAD, conversant, pleasant  HEENT: AT/NC, MMM  Neck: FROM, supple  Lungs: right side diminshed, R chest tube to suction, ?air leak   CV: RRR, no MRGs  Vasc: Radial pulses 2+  Abdomen: Soft, non-tender; no masses or HSM  Extremities: No peripheral edema or digital cyanosis  Skin: no rash, lesions or ulcers  Psych: Alert and oriented to person, place and time  Neuro: Alert and interactive     Recent Labs     01/04/22  0345 01/04/22 1912 01/05/22  0320   WBC 18.4* 23.4* 26.2*   HGB 10.1* 11.2* 11.6*   HCT 31.5* 35.0* 36.8*    469* 427       Recent Labs     01/03/22  0230 01/04/22  0345 01/04/22 1912    133 135   K 4.4 4.3 4.7    100 105   CO2 24 24 22   BUN 23 19 16   CREATININE 0.7 0.7 0.6*   CALCIUM 8.0* 7.9* 7.6*       Assessment:    Active Problems:    Empyema (HCC)    Pleural effusion  Resolved Problems:    * No resolved hospital problems.  *      Plan:    Patient is a 53-year-old male [facility]  Loculated pleural effusion- concerning for possible malignancy given his history   1/4/2021 -right thoracotomy with whole lung decortication for complicated right parapneumonic effusion-await cultures/results, chest tube in place  -Monitor labs  -Pro-Isac 1.93 > 1.95, check tumor markers-known history of \"spot on lung\", has not pursued surveillance  -Sed rate 68  -WBC 35.5> 30.8 > 25.3  > 18.0>18.4 > 26.2  -R pigtail chest tube placed per pulm, 1200cc out  -IV Unasyn pending pan culture results-negative so far  -Reactive leukocytosis 1/5/2021 postoperatively no evidence of worsening sepsis  -BC negative so far  -IV PICC line ordered once placed, patient can likely be discharged after chest tube was removed  -PT/OT for possible placement needs. -Multiple consultants on board pulmonology, infectious disease, cardiothoracic    Hypertension  -continue home medications    DVT Prophylaxis - pcd's  PT/OT  Discharge planning   Discussed with son at bedside today    SHANIQUE Daigle CNP  3:33 PM   1/3/2022    Above note edited to reflect my thoughts     I personally saw, examined and provided care for the patient. Radiographs, labs and medication list were reviewed by me independently. The case was discussed in detail and plans for care were established. Review of RICKEY Daigle   , documentation was conducted and revisions were made as appropriate directly by me. I agree with the above documented exam, problem list, and plan of care.      Brandie Pires MD  4:02 PM  1/5/2022

## 2022-01-05 NOTE — ANESTHESIA POSTPROCEDURE EVALUATION
Department of Anesthesiology  Postprocedure Note    Patient: Sonu Vasquez  MRN: [id number]  YOB: 1945  Date of evaluation: 1/5/2022  Time:  2:47 AM     Procedure Summary     Date: 01/04/22 Room / Location: [facility] 01 / [facility]    Anesthesia Start: 8199 Anesthesia Stop: [place]    Procedure: RIGHT THORACOTOMY WITH DECORTICATION (IODINE ALLERGY)   ++STAFF FROM 7++ (Right Chest) Diagnosis: (/)    Surgeons: Salma Mart MD Responsible Provider: Jared Sigala MD    Anesthesia Type: general ASA Status: 3          Anesthesia Type: general    Gil Phase I: Gil Score: 9    Gil Phase II:      Last vitals: Reviewed and per EMR flowsheets.        Anesthesia Post Evaluation    Patient location during evaluation: PACU  Level of consciousness: awake and alert  Pain score: 3  Airway patency: patent  Nausea & Vomiting: no nausea  Complications: no  Cardiovascular status: blood pressure returned to baseline  Hydration status: euvolemic

## 2022-01-05 NOTE — PROGRESS NOTES
Nursing Transfer Note    Data:  Summary of patients progress: Dr Hensley Route thoracotomy   Reason for transfer: next level of care    Action:  Explained reason for transfer to Patient and Family. Report given to: [address] [place] [address], using RN Handoff Navigator.   Mode of transportation: bed    Response:  RN Recommendations: pain mgt

## 2022-01-05 NOTE — PROGRESS NOTES
Pulmonary Progress Note    Admit Date: 2021                            PCP: Silverio Rodriguez MD  Active Problems:    Empyema [facility])    Pleural effusion  Resolved Problems:    * No resolved hospital problems. *      Subjective:  POD#1 following right thoracotomy with whole lung decortication 2/2 complicated right parapneumonic effusion. Remains on 4L n/c. Large air leak noted. He reports an intermittent dry cough and minimal dyspnea. Feels his pain has been well managed. Medications:   bupivacaine (MARCAINE) 0.25 % elastomeric infusion 2 mL/hr at 22 1931    lactated ringers 80 mL/hr at 22 6674    sodium chloride          sodium chloride flush  5-40 mL IntraVENous 2 times per day    enoxaparin  40 mg SubCUTAneous Daily    lidocaine PF  5 mL IntraDERmal Once    heparin flush  3 mL IntraVENous 2 times per day    cetirizine  5 mg Oral Daily    gabapentin  300 mg Oral BID    metoprolol succinate  100 mg Oral Daily    metoprolol succinate  50 mg Oral Nightly    tamsulosin  0.4 mg Oral Daily    finasteride  5 mg Oral Daily    ampicillin-sulbactam  3,000 mg IntraVENous Q6H       Vitals:  VITALS:  BP (!) 99/58   Pulse 99   Temp 97.7 °F (36.5 °C) (Infrared)   Resp 20   Ht 5' 10\" (1.778 m)   Wt 199 lb 9.6 oz (90.5 kg)   SpO2 93%   BMI 28.64 kg/m²   24HR INTAKE/OUTPUT:      Intake/Output Summary (Last 24 hours) at 2022 1219  Last data filed at 2022 1941  Gross per 24 hour   Intake 1560 ml   Output 2798 ml   Net -1238 ml     CURRENT PULSE OXIMETRY:  SpO2: 93 %  24HR PULSE OXIMETRY RANGE:  SpO2  Av.6 %  Min: 70 %  Max: 100 %  CVP:    VENT SETTINGS:   Vent Information  SpO2: 93 %  Additional Respiratory  Assessments  Pulse: 99  Resp: 20  SpO2: 93 %  Oral Care: Teeth brushed,Mouthwash      EXAM:  General: Alert, in NAD  ENT: No discharge. Pharynx clear. membranes moist   Neck: Supple, Trachea midline. Resp: Non-labored, Lungs diminished.  CT secure to suction with large air leak.   CV: Regular rate. Regular rhythm. No murmur . No edema. ABD: Non-tender. Non-distended. Soft, round . Normal bowel sounds. Skin: Warm and dry. M/S: No cyanosis. No joint deformity. No clubbing. Neuro: Awake. Follows commands. O x 3, OTERO  Psych:  calm and interactive     I/O: I/O last 3 completed shifts: In: 8290 [I.V.:1570]  Out: 6816 [Urine:1700; Drains:1275; Blood:238; Chest Tube:185]  No intake/output data recorded. Results:  CBC:   Recent Labs     01/04/22  0345 01/04/22 1912 01/05/22  0320   WBC 18.4* 23.4* 26.2*   HGB 10.1* 11.2* 11.6*   HCT 31.5* 35.0* 36.8*   MCV 93.8 94.3 95.3    469* 427     BMP:   Recent Labs     01/03/22  0230 01/04/22  0345 01/04/22 1912    133 135   K 4.4 4.3 4.7    100 105   CO2 24 24 22   BUN 23 19 16   CREATININE 0.7 0.7 0.6*       ABG:     Films:  CT ABDOMEN PELVIS WO CONTRAST Additional Contrast? None    Result Date: 12/30/2021  1. Irregular masslike area of attenuation along superior aspect of right kidney may be related to scarring at site of prior surgery or trauma versus developing retroperitoneal hemorrhage. 2. Large right pleural effusion with adjacent atelectasis and pneumonia. 3. Foci of irregular attenuation fill the gallbladder related to multiple gallstones or hyperattenuating sludge. 4. Nonobstructing bilateral renal calculi. 5. Multiple diverticula involving right colon. 6. Hepatic steatosis. 7. Prostatomegaly. 8. Results were called by Dr. Flores Failing to Dr. Angeles Case on 12/30/2021 at 15:14. XR CHEST (2 VW)    Result Date: 12/30/2021  Right lung pneumonia and large right pleural effusion. CT ABDOMEN PELVIS W IV CONTRAST Additional Contrast? None    Result Date: 12/30/2021  Right pleural effusion with right middle lobe and bilateral lower lobe infiltrates consistent with atelectasis and/or infection.  Mixed density infiltration right posterosuperior perinephric fat extending into and possibly communicating with the right pleural and pleural fluid. This may represent an area of contiguous infection although neoplastic disease cannot be excluded. Overall size and volume of this is not significantly changed when compared to the previous study. If there has been recent procedure in this area, this appearance could be secondary to hemorrhage although that is probably less likely. Cardiomegaly and small pericardial effusion. Nonobstructing bilateral nephrolithiasis. Hepatic steatosis. Diverticulosis but no evidence of diverticulitis. Prostate enlargement. RECOMMENDATIONS: Unavailable     XR CHEST PORTABLE    Result Date: 12/31/2021  Interval pleural tube placement right lung base with evaluation of some of the previous seen pleural fluid. There is probable loculated pneumothorax at the right costophrenic sulcus. Right lower lobe consolidation consistent with atelectasis and/or pneumonia. This is improved overall. Minimal left lower lobe atelectasis and/or pneumonitis. Stable enlargement of the cardiac silhouette. CTA PULMONARY W CONTRAST    Result Date: 12/30/2021  No evidence for pulmonary emboli. Moderate to large right pleural effusion with adjacent atelectasis versus infiltrate. Loculation cannot be excluded. Cardiomegaly. Hepatic steatosis. Irregular soft tissue density superior and posterior to the right kidney of unknown clinical significance and may related to infectious or neoplastic process. Subcentimeter right renal calculi. Emphysematous changes. RECOMMENDATIONS: Unavailable     US ABDOMEN LIMITED    Result Date: 12/30/2021  Non-specific slight distention of common bile duct with 7.9 mm diameter Echogenic foci in the right kidney most compatible with nonobstructing calculi     US THORACENTESIS Which side should the procedure be performed? Right    Result Date: 1/1/2022  Ultrasound utilized for thoracentesis procedure. For additional information, please refer to operative report.        Assessment:    64-year-old male ex-smoker retired from [employer] vaccinated for coronavirus with history of  GERD, HTN, multiple sclerosis  2 weeks prior colonoscopy done as an outpatient  12/30 presented with sharp stabbing pain with distended abdomen  chest x-ray right pneumonia large right effusion  CT chest showing no PE moderate right effusion atelectasis loculation  Ultrasound abdomen distention of CBD  CT abdomen pelvis masslike area around the right kidney and cholelithiasis  Mixed density infiltration right posterosuperior perinephric fat extending   into and possibly communicating with the right pleural and pleural fluid. This may represent an area of contiguous infection although neoplastic   disease cannot be excluded  12/31 passed swallow study  Right 15 Faroese chest tube was placed  1/3 on 5 L saturating 100%  1/4 underwent thoracotomy with decortication. Chest x-ray with multifocal airspace disease  1/5 on 4 L. Chest x-ray stable position of 2 chest tubes right perihilar right base infiltrate      1. Right lung E. coli empyema s/p chest tube 12/31 exudative negative cytology, cultures E. coli s/p thoracotomy 1/4  2. Acute hypoxic respiratory failure   3. Asbestosis  4. Multiple sclerosis with minimal disease   5. Lumbosacral radiculopathy with left foot drop  6. anxiety  7. GERD/hiatal hernia  8. HTN  9. Proximal SVT  10. Severe MR with history of mitral valve endocarditis Echo 1/20/2021 EF 65% stage II DD  11. History of bladder cancer  12. Kidney stones status post lithotripsy  13. History of penile implant  14. Elevated PSA being monitored with biopsies  15. History of vertigo  ·   Plan:  · keep POX >90%, wean as able  · On unasyn, pct 1.95 ID following  · IS for pulmonary hygiene   · lovenox on hold   · Chest tube to right side with air leak, to suction. Defer management to CT surgery. · Wean O2 for pox > 90%. 93% on 4L. · Son updated at bedside.       Electronically signed by SHANIQUE Farah CNP on 1/5/2022 at 12:19 PM    I had a face-to-face encounter with the patient at the bedside. I agree with the nurse practitioner's note and assessment and plan as detailed above. Necessary editing and changes made to the note by myself. Question perforation causing empyema    Question abdominal process seen on CT scan of abdomen may need to be worked up further. If he does have an abscess it should be covered with the antibiotics that he has now.   If patient's white count does not improve may need further imaging of the abdomen  Cultures pending from surgery yesterday

## 2022-01-05 NOTE — PROGRESS NOTES
[facility] Cardiology Inpatient Progress Note    Patient is a 64 y.o. male of Avila Alexis MD seen in hospital follow up. Chief complaint: Pre-op    HPI: Some SOB. No CP.      Patient Active Problem List   Diagnosis    Abnormal PSA    Mitral valve prolapse    MR (mitral regurgitation)    Tricuspid valve regurgitation    Pulmonary HTN (HCC)    HTN (hypertension)    DM (diabetes mellitus) ([facility]    Multiple sclerosis (HCC)    Hiatal hernia    GERD (gastroesophageal reflux disease)    History of kidney stones    Benign prostatic hyperplasia    S/P TURP    Urothelial carcinoma (HCC)    Asbestosis ([facility] [address]    Erectile dysfunction    Panic attacks    History of esophageal stricture    History of esophageal dilatation    Washburn's esophagus determined by biopsy    Anxiety    Claustrophobia    History of PSVT (paroxysmal supraventricular tachycardia)    H/O endocarditis    Nonrheumatic aortic valve insufficiency    Nonrheumatic pulmonary valve insufficiency    Empyema (HCC)    Pleural effusion       Allergies   Allergen Reactions    Baclofen Hives and Swelling    Aleve [Naproxen]      Nausea    Dye [Iodides]      Rash    Shellfish Allergy      Unclear if allergic     Paxil [Paroxetine] Anxiety       Current Facility-Administered Medications   Medication Dose Route Frequency Provider Last Rate Last Admin    oxyCODONE-acetaminophen (PERCOCET) 5-325 MG per tablet 1 tablet  1 tablet Oral Q4H PRN Kayla Solis MD        bupivacaine (MARCAINE) 0.25 % elastomeric infusion   Infiltration Continuous Kayla Solis MD 2 mL/hr at 01/04/22 1931 New Bag at 01/04/22 1931    lactated ringers infusion   IntraVENous Continuous Kayla Solis MD 80 mL/hr at 01/05/22 0629 Rate Change at 01/05/22 0629    sodium chloride flush 0.9 % injection 5-40 mL  5-40 mL IntraVENous 2 times per day Kayla Solis MD   10 mL at 01/04/22 2108    sodium chloride flush 0.9 % injection 5-40 mL  5-40 mL IntraVENous PRN Robin Chaparro MD        0.9 % sodium chloride infusion  25 mL IntraVENous PRN Robin Chaparro MD        enoxaparin (LOVENOX) injection 40 mg  40 mg SubCUTAneous Daily Robin Chaparro MD   40 mg at 01/04/22 2101    acetaminophen (TYLENOL) tablet 650 mg  650 mg Oral Q4H PRN Robin Chaparro MD        morphine (PF) injection 2 mg  2 mg IntraVENous Q2H PRN Robin Chaparro MD   2 mg at 01/05/22 4065    Or    morphine sulfate (PF) injection 4 mg  4 mg IntraVENous Q2H PRN Robin Chaparro MD        lidocaine PF 1 % injection 5 mL  5 mL IntraDERmal Once Humble Oh MD        heparin flush 100 UNIT/ML injection 300 Units  3 mL IntraVENous 2 times per day Robin Chaparro MD        heparin flush 100 UNIT/ML injection 300 Units  3 mL Intercatheter PRN Robin Chaparro MD        cetirizine (ZYRTEC) tablet 5 mg  5 mg Oral Daily Robin Chaparro MD   5 mg at 01/04/22 0934    gabapentin (NEURONTIN) capsule 300 mg  300 mg Oral BID Robin Chaparro MD   300 mg at 01/04/22 9217    metoprolol succinate (TOPROL XL) extended release tablet 100 mg  100 mg Oral Daily Robin Chaparro MD   100 mg at 01/04/22 7758    metoprolol succinate (TOPROL XL) extended release tablet 50 mg  50 mg Oral Nightly Robin Chaparro MD   50 mg at 01/03/22 1934    tamsulosin (FLOMAX) capsule 0.4 mg  0.4 mg Oral Daily Robin Chaparro MD   0.4 mg at 01/04/22 0933    albuterol (PROVENTIL) nebulizer solution 2.5 mg  2.5 mg Nebulization Q6H PRN Robin Chaparro MD        finasteride (PROSCAR) tablet 5 mg  5 mg Oral Daily Robin Chaparro MD   5 mg at 01/04/22 0934    ampicillin-sulbactam (UNASYN) 3000 mg ivpb minibag  3,000 mg IntraVENous Q6H Robin Chaparro MD   Stopped at 01/05/22 4251       Review of systems:   Heart: as above   Lungs: as above   Eyes: denies changes in vision or discharge. Ears: denies changes in hearing or pain. Nose: denies epistaxis or masses   Throat: denies sore throat or trouble swallowing. Neuro: denies numbness, tingling, tremors. Skin: denies rashes or itching. : denies hematuria, dysuria   GI: denies vomiting, diarrhea   Psych: denies mood changed, anxiety, depression. Physical Exam   BP (!) 99/58   Pulse 99   Temp 97.7 °F (36.5 °C) (Infrared)   Resp 20   Ht 5' 10\" (1.778 m)   Wt 199 lb 9.6 oz (90.5 kg)   SpO2 93%   BMI 28.64 kg/m²   Constitutional: Oriented to person, place, and time. No distress. Well developed. Head: Normocephalic and atraumatic. Neck: Neck supple. No hepatojugular reflux. No JVD present. Carotid bruit is not present. No tracheal deviation present. No thyromegaly present. Cardiovascular: Normal rate, regular rhythm, normal heart sounds. intact distal pulses. No gallop and no friction rub. No murmur heard. Pulmonary: Breath sounds normal. No respiratory distress. No wheezes. No rales. Chest: Effort normal. No tenderness. Abdominal: Soft. Bowel sounds are normal. No distension or mass. No tenderness, rebound or guarding. Musculoskeletal: . No tenderness. No clubbing or cyanosis. Extremitites: Intact distal pulses. No edema  Neurological: Alert and oriented to person, place, and time. Skin: Skin is warm and dry. No rash noted. Not diaphoretic. No erythema. Psychiatric: Normal mood and affect. Behavior is normal.   Lymphadenopathy: No cervical adenopathy. No groin adenopathy.     CBC:   Lab Results   Component Value Date    WBC 26.2 01/05/2022    RBC 3.86 01/05/2022    HGB 11.6 01/05/2022    HCT 36.8 01/05/2022    MCV 95.3 01/05/2022    MCH 30.1 01/05/2022    MCHC 31.5 01/05/2022    RDW 15.4 01/05/2022     01/05/2022    MPV 10.2 01/05/2022     BMP:   Lab Results   Component Value Date     01/04/2022    K 4.7 01/04/2022     01/04/2022    CO2 22 01/04/2022    BUN 16 01/04/2022    LABALBU 2.6 12/30/2021    CREATININE 0.6 01/04/2022    CALCIUM 7.6 01/04/2022    GFRAA >60 01/04/2022    LABGLOM >60 01/04/2022     Magnesium:    Lab Results   Component Value Date    MG 1.9 12/25/2016     Cardiac Enzymes:   Lab Results   Component Value Date    CKTOTAL 105 12/25/2016    CKTOTAL 43 12/22/2015    CKTOTAL 45 12/21/2015    CKMB 4.0 12/25/2016    CKMB 1.9 12/22/2015    CKMB 2.2 12/21/2015      PT/INR:    Lab Results   Component Value Date    PROTIME 20.0 12/31/2021    INR 1.8 12/31/2021     TSH:    Lab Results   Component Value Date    TSH 3.460 12/25/2016     Rhythm Strip: normal sinus rhythm. ASSESSMENT & PLAN:    Patient Active Problem List   Diagnosis    Abnormal PSA    Mitral valve prolapse    MR (mitral regurgitation)    Tricuspid valve regurgitation    Pulmonary HTN (HCC)    HTN (hypertension)    DM (diabetes mellitus) ([facility] [address]    Multiple sclerosis (HCC)    Hiatal hernia    GERD (gastroesophageal reflux disease)    History of kidney stones    Benign prostatic hyperplasia    S/P TURP    Urothelial carcinoma (HCC)    Asbestosis ([facility] [address]    Erectile dysfunction    Panic attacks    History of esophageal stricture    History of esophageal dilatation    Washburn's esophagus determined by biopsy    Anxiety    Claustrophobia    History of PSVT (paroxysmal supraventricular tachycardia)    H/O endocarditis    Nonrheumatic aortic valve insufficiency    Nonrheumatic pulmonary valve insufficiency    Empyema (HCC)    Pleural effusion     1. Pre-op/ID issues/Empyema: Tolerated procedure. 2. VHD: Hx of MVP and significant MR. Continue to medically manage and follow volume. 3. Hx of MV endocarditis. 4. SVT: Monitor. BB.     5. HTN: Observe. 6. Multiple sclerosis:    7. DM: Per primary service. 8. Hiatal hernia/GERD. 9.  Asbestosis/COPD. Seun Printers, D.O.   Cardiologist  Cardiology, [facility]

## 2022-01-06 ENCOUNTER — APPOINTMENT (OUTPATIENT)
Dept: GENERAL RADIOLOGY | Age: 77
DRG: 853 | End: 2022-01-06
Payer: MEDICARE

## 2022-01-06 LAB
ANION GAP SERPL CALCULATED.3IONS-SCNC: 9 MMOL/L (ref 7–16)
BASOPHILS ABSOLUTE: 0.04 E9/L (ref 0–0.2)
BASOPHILS RELATIVE PERCENT: 0.2 % (ref 0–2)
BUN BLDV-MCNC: 25 MG/DL (ref 6–23)
CALCIUM SERPL-MCNC: 8.1 MG/DL (ref 8.6–10.2)
CHLORIDE BLD-SCNC: 103 MMOL/L (ref 98–107)
CO2: 24 MMOL/L (ref 22–29)
CREAT SERPL-MCNC: 0.7 MG/DL (ref 0.7–1.2)
EOSINOPHILS ABSOLUTE: 0.01 E9/L (ref 0.05–0.5)
EOSINOPHILS RELATIVE PERCENT: 0 % (ref 0–6)
GFR AFRICAN AMERICAN: >60
GFR NON-AFRICAN AMERICAN: >60 ML/MIN/1.73
GLUCOSE BLD-MCNC: 105 MG/DL (ref 74–99)
HCT VFR BLD CALC: 32.1 % (ref 37–54)
HEMOGLOBIN: 10.3 G/DL (ref 12.5–16.5)
IMMATURE GRANULOCYTES #: 0.86 E9/L
IMMATURE GRANULOCYTES %: 3.8 % (ref 0–5)
LYMPHOCYTES ABSOLUTE: 0.91 E9/L (ref 1.5–4)
LYMPHOCYTES RELATIVE PERCENT: 4 % (ref 20–42)
MCH RBC QN AUTO: 30.3 PG (ref 26–35)
MCHC RBC AUTO-ENTMCNC: 32.1 % (ref 32–34.5)
MCV RBC AUTO: 94.4 FL (ref 80–99.9)
MONOCYTES ABSOLUTE: 1.48 E9/L (ref 0.1–0.95)
MONOCYTES RELATIVE PERCENT: 6.5 % (ref 2–12)
NEUTROPHILS ABSOLUTE: 19.6 E9/L (ref 1.8–7.3)
NEUTROPHILS RELATIVE PERCENT: 85.5 % (ref 43–80)
PDW BLD-RTO: 15.3 FL (ref 11.5–15)
PLATELET # BLD: 432 E9/L (ref 130–450)
PMV BLD AUTO: 9.9 FL (ref 7–12)
POTASSIUM SERPL-SCNC: 5 MMOL/L (ref 3.5–5)
RBC # BLD: 3.4 E12/L (ref 3.8–5.8)
SODIUM BLD-SCNC: 136 MMOL/L (ref 132–146)
WBC # BLD: 22.9 E9/L (ref 4.5–11.5)

## 2022-01-06 PROCEDURE — 2700000000 HC OXYGEN THERAPY PER DAY

## 2022-01-06 PROCEDURE — 6370000000 HC RX 637 (ALT 250 FOR IP): Performed by: SPECIALIST

## 2022-01-06 PROCEDURE — 2500000003 HC RX 250 WO HCPCS: Performed by: SPECIALIST

## 2022-01-06 PROCEDURE — 97161 PT EVAL LOW COMPLEX 20 MIN: CPT

## 2022-01-06 PROCEDURE — 76937 US GUIDE VASCULAR ACCESS: CPT

## 2022-01-06 PROCEDURE — 71045 X-RAY EXAM CHEST 1 VIEW: CPT

## 2022-01-06 PROCEDURE — 36569 INSJ PICC 5 YR+ W/O IMAGING: CPT

## 2022-01-06 PROCEDURE — 2060000000 HC ICU INTERMEDIATE R&B

## 2022-01-06 PROCEDURE — 99233 SBSQ HOSP IP/OBS HIGH 50: CPT | Performed by: INTERNAL MEDICINE

## 2022-01-06 PROCEDURE — 80048 BASIC METABOLIC PNL TOTAL CA: CPT

## 2022-01-06 PROCEDURE — 2580000003 HC RX 258: Performed by: SPECIALIST

## 2022-01-06 PROCEDURE — 6360000002 HC RX W HCPCS: Performed by: SPECIALIST

## 2022-01-06 PROCEDURE — 97165 OT EVAL LOW COMPLEX 30 MIN: CPT

## 2022-01-06 PROCEDURE — 36415 COLL VENOUS BLD VENIPUNCTURE: CPT

## 2022-01-06 PROCEDURE — C1751 CATH, INF, PER/CENT/MIDLINE: HCPCS

## 2022-01-06 PROCEDURE — 85025 COMPLETE CBC W/AUTO DIFF WBC: CPT

## 2022-01-06 PROCEDURE — 02HV33Z INSERTION OF INFUSION DEVICE INTO SUPERIOR VENA CAVA, PERCUTANEOUS APPROACH: ICD-10-PCS | Performed by: INTERNAL MEDICINE

## 2022-01-06 RX ORDER — OXYCODONE HYDROCHLORIDE AND ACETAMINOPHEN 5; 325 MG/1; MG/1
1 TABLET ORAL EVERY 6 HOURS PRN
Qty: 28 TABLET | Refills: 0 | Status: SHIPPED | OUTPATIENT
Start: 2022-01-06 | End: 2022-01-13

## 2022-01-06 RX ORDER — AMPICILLIN AND SULBACTAM 2; 1 G/1; G/1
2000 INJECTION, POWDER, FOR SOLUTION INTRAMUSCULAR; INTRAVENOUS EVERY 6 HOURS
Qty: 80 EACH | Refills: 0 | Status: SHIPPED | OUTPATIENT
Start: 2022-01-06 | End: 2022-01-07 | Stop reason: SDUPTHER

## 2022-01-06 RX ADMIN — FINASTERIDE 5 MG: 5 TABLET, FILM COATED ORAL at 08:28

## 2022-01-06 RX ADMIN — METOPROLOL SUCCINATE 100 MG: 25 TABLET, EXTENDED RELEASE ORAL at 08:27

## 2022-01-06 RX ADMIN — AMPICILLIN SODIUM AND SULBACTAM SODIUM 3000 MG: 2; 1 INJECTION, POWDER, FOR SOLUTION INTRAMUSCULAR; INTRAVENOUS at 03:20

## 2022-01-06 RX ADMIN — AMPICILLIN SODIUM AND SULBACTAM SODIUM 3000 MG: 2; 1 INJECTION, POWDER, FOR SOLUTION INTRAMUSCULAR; INTRAVENOUS at 16:07

## 2022-01-06 RX ADMIN — ENOXAPARIN SODIUM 40 MG: 100 INJECTION SUBCUTANEOUS at 20:00

## 2022-01-06 RX ADMIN — METOPROLOL SUCCINATE 50 MG: 25 TABLET, FILM COATED, EXTENDED RELEASE ORAL at 20:00

## 2022-01-06 RX ADMIN — TAMSULOSIN HYDROCHLORIDE 0.4 MG: 0.4 CAPSULE ORAL at 08:31

## 2022-01-06 RX ADMIN — GABAPENTIN 300 MG: 300 CAPSULE ORAL at 08:28

## 2022-01-06 RX ADMIN — Medication 10 ML: at 22:03

## 2022-01-06 RX ADMIN — HEPARIN 300 UNITS: 100 SYRINGE at 20:01

## 2022-01-06 RX ADMIN — AMPICILLIN SODIUM AND SULBACTAM SODIUM 3000 MG: 2; 1 INJECTION, POWDER, FOR SOLUTION INTRAMUSCULAR; INTRAVENOUS at 20:00

## 2022-01-06 RX ADMIN — LIDOCAINE HYDROCHLORIDE 1 ML: 10 INJECTION, SOLUTION EPIDURAL; INFILTRATION; INTRACAUDAL; PERINEURAL at 16:04

## 2022-01-06 RX ADMIN — CETIRIZINE HYDROCHLORIDE 5 MG: 10 TABLET, FILM COATED ORAL at 08:28

## 2022-01-06 RX ADMIN — AMPICILLIN SODIUM AND SULBACTAM SODIUM 3000 MG: 2; 1 INJECTION, POWDER, FOR SOLUTION INTRAMUSCULAR; INTRAVENOUS at 08:30

## 2022-01-06 RX ADMIN — GABAPENTIN 300 MG: 300 CAPSULE ORAL at 17:04

## 2022-01-06 RX ADMIN — Medication 5 ML: at 08:30

## 2022-01-06 ASSESSMENT — PAIN SCALES - GENERAL
PAINLEVEL_OUTOF10: 0

## 2022-01-06 NOTE — PROGRESS NOTES
Pulmonary Progress Note    Admit Date: 2021                            PCP: Mike Villa MD  Active Problems:    Empyema [facility])    Pleural effusion  Resolved Problems:    * No resolved hospital problems. *      Subjective:  POD#2 following right thoracotomy with whole lung decortication 2/2 complicated right parapneumonic effusion  Resting in bed with O2 down to 2L  CT intact with no air leak noted  Anterior CT removed by CT yesterday  No dyspnea or cough  Pain is controlled       Medications:   bupivacaine (MARCAINE) 0.25 % elastomeric infusion 2 mL/hr at 22 193    sodium chloride          sodium chloride flush  5-40 mL IntraVENous 2 times per day    enoxaparin  40 mg SubCUTAneous Daily    lidocaine PF  5 mL IntraDERmal Once    heparin flush  3 mL IntraVENous 2 times per day    cetirizine  5 mg Oral Daily    gabapentin  300 mg Oral BID    metoprolol succinate  100 mg Oral Daily    metoprolol succinate  50 mg Oral Nightly    tamsulosin  0.4 mg Oral Daily    finasteride  5 mg Oral Daily    ampicillin-sulbactam  3,000 mg IntraVENous Q6H       Vitals:  VITALS:  BP 99/62   Pulse 78   Temp 97.2 °F (36.2 °C) (Infrared)   Resp 18   Ht 5' 10\" (1.778 m)   Wt 199 lb 9.6 oz (90.5 kg)   SpO2 96%   BMI 28.64 kg/m²   24HR INTAKE/OUTPUT:      Intake/Output Summary (Last 24 hours) at 2022 09  Last data filed at 2022 0830  Gross per 24 hour   Intake 240 ml   Output 1700 ml   Net -1460 ml     CURRENT PULSE OXIMETRY:  SpO2: 96 %  24HR PULSE OXIMETRY RANGE:  SpO2  Av.6 %  Min: 94 %  Max: 98 %  CVP:    VENT SETTINGS:   Vent Information  SpO2: 96 %  Additional Respiratory  Assessments  Pulse: 78  Resp: 18  SpO2: 96 %  Oral Care: Teeth brushed,Mouthwash      EXAM:  General: Alert, in NAD  ENT: No discharge. Pharynx clear. membranes moist   Neck: Supple, Trachea midline. Resp: Non-labored, Lungs diminished. CT secure to suction with no air leak. CV: Regular rate. Regular rhythm.  No murmur . No edema. ABD: Non-tender. Non-distended. Soft, round . Normal bowel sounds. Skin: Warm and dry. M/S: No cyanosis. No joint deformity. No clubbing. Neuro: Awake. Follows commands. O x 3, OTERO  Psych:  calm and interactive     I/O: I/O last 3 completed shifts: In: 160 [I.V.:160]  Out: 2723 [Urine:1900; Drains:500; Blood:138; Chest Tube:185]  I/O this shift:  In: 240 [P.O.:240]  Out: 600 [Urine:600]     Results:  CBC:   Recent Labs     01/04/22 1912 01/05/22 0320 01/06/22  0348   WBC 23.4* 26.2* 22.9*   HGB 11.2* 11.6* 10.3*   HCT 35.0* 36.8* 32.1*   MCV 94.3 95.3 94.4   * 427 432     BMP:   Recent Labs     01/04/22 0345 01/04/22 1912 01/06/22  0348    135 136   K 4.3 4.7 5.0    105 103   CO2 24 22 24   BUN 19 16 25*   CREATININE 0.7 0.6* 0.7       ABG:     Films:  CT ABDOMEN PELVIS WO CONTRAST Additional Contrast? None    Result Date: 12/30/2021  1. Irregular masslike area of attenuation along superior aspect of right kidney may be related to scarring at site of prior surgery or trauma versus developing retroperitoneal hemorrhage. 2. Large right pleural effusion with adjacent atelectasis and pneumonia. 3. Foci of irregular attenuation fill the gallbladder related to multiple gallstones or hyperattenuating sludge. 4. Nonobstructing bilateral renal calculi. 5. Multiple diverticula involving right colon. 6. Hepatic steatosis. 7. Prostatomegaly. 8. Results were called by Dr. Shannon Avila to Dr. Claudean Riding on 12/30/2021 at 15:14. XR CHEST (2 VW)    Result Date: 12/30/2021  Right lung pneumonia and large right pleural effusion. CT ABDOMEN PELVIS W IV CONTRAST Additional Contrast? None    Result Date: 12/30/2021  Right pleural effusion with right middle lobe and bilateral lower lobe infiltrates consistent with atelectasis and/or infection.  Mixed density infiltration right posterosuperior perinephric fat extending into and possibly communicating with the right pleural and pleural fluid. This may represent an area of contiguous infection although neoplastic disease cannot be excluded. Overall size and volume of this is not significantly changed when compared to the previous study. If there has been recent procedure in this area, this appearance could be secondary to hemorrhage although that is probably less likely. Cardiomegaly and small pericardial effusion. Nonobstructing bilateral nephrolithiasis. Hepatic steatosis. Diverticulosis but no evidence of diverticulitis. Prostate enlargement. RECOMMENDATIONS: Unavailable     XR CHEST PORTABLE    Result Date: 12/31/2021  Interval pleural tube placement right lung base with evaluation of some of the previous seen pleural fluid. There is probable loculated pneumothorax at the right costophrenic sulcus. Right lower lobe consolidation consistent with atelectasis and/or pneumonia. This is improved overall. Minimal left lower lobe atelectasis and/or pneumonitis. Stable enlargement of the cardiac silhouette. 01/06/22 CXR: Trace right apical pneumothorax. Stable bilateral airspace disease and or atelectasis and likely small right pleural effusion. CTA PULMONARY W CONTRAST    Result Date: 12/30/2021  No evidence for pulmonary emboli. Moderate to large right pleural effusion with adjacent atelectasis versus infiltrate. Loculation cannot be excluded. Cardiomegaly. Hepatic steatosis. Irregular soft tissue density superior and posterior to the right kidney of unknown clinical significance and may related to infectious or neoplastic process. Subcentimeter right renal calculi. Emphysematous changes. RECOMMENDATIONS: Unavailable     US ABDOMEN LIMITED    Result Date: 12/30/2021  Non-specific slight distention of common bile duct with 7.9 mm diameter Echogenic foci in the right kidney most compatible with nonobstructing calculi     US THORACENTESIS Which side should the procedure be performed?  Right    Result Date: 1/1/2022  Ultrasound utilized for wean as able  · On unasyn, pct 1.95 ID following  · IS for pulmonary hygiene   · Lovenox QD  · S/P Thoracotomy 01/4 with CTS. Chest tube to right side with no air leak, to suction. CTS following. · May need additional work up from abd process seen on CT. On abx, if it is an abscess. Monitor WBC - improving. Electronically signed by SHANIQUE Brennan CNP on 1/6/2022 at 9:27 AM    I had a face-to-face encounter with the patient at the bedside. I agree with the nurse practitioner's note and assessment and plan as detailed above. Necessary editing and changes made to the note by myself.    Leukocytosis improving   hemoglobin appears to be stable  D/w wife and son at bedside  May need further imaging of abdomen  Chest tube with air leak

## 2022-01-06 NOTE — PROGRESS NOTES
Nutrition Assessment     Type and Reason for Visit: Initial,RD Nutrition Re-Screen/LOS (rd re-screen negative)    Nutrition Assessment:  Pt assessed per LOS protocol. Chart reviewed. Pt currently eating ~75% of most meals and w/ no other significant nutritional issues noted at this time (post-op incisions noted). Will start ONS to aid in post-op healing and follow-up per policy. Please consult if RD needed.     Electronically signed by Cesar Boothe RD, ELI on 1/6/22 at 9:28 AM EST    Contact: ext [phone]

## 2022-01-06 NOTE — PROGRESS NOTES
Physical Therapy    Facility/Department: [address] INTERNAL MEDICINE 2  Initial Assessment    NAME: Sonu Vasquez  : 1945  MRN: [id number]    Date of Service: 2022    Patient Diagnosis(es): The primary encounter diagnosis was Pleural effusion. Diagnoses of Abdominal pain, right upper quadrant, Leukocytosis, unspecified type, and Empyema ([address] were also pertinent to this visit. has a past medical history of Abnormal PSA, Anxiety, Bronchitis, Claustrophobia, Diabetes mellitus ([address] Endocarditis, Enlarged LA (left atrium), Erectile dysfunction, GERD (gastroesophageal reflux disease), H/O complete arterial study of extremity, Hiatal hernia, History of EMG, Hypertension, Lung nodule, Mitral valve prolapse, Moderate tricuspid regurgitation, Multiple sclerosis ([address] Prostatitis, Renal calculi, Sinus problem, Supraventricular arrhythmia, Tachycardia, and Tilt table evaluation. has a past surgical history that includes transthoracic echocardiogram (2011  Echo showed normal left ventricular size with borderline concentric left ventricular hypertrophy with normal left ventricular systolic function with stage I  LV diastolic dysfunction, normal, normal right ventricular size and function, mildly dilated left atrium, borderline dilated right atrium mildly thickened anterior mitral leaflet with bowing of the mitral leaflets without felipe prolapse with mild mitral annular calcification, mild-to-moderate eccentrically-directed jet of mitral regurgitation.); Diagnostic Cardiac Cath Lab Procedure; cardiovascular stress test (10/19/2011  Adenosine nuclear stress test (4 minute walking protocol) showed a minimally reversible minor, nontransmural defect involving the apical anterolateral wall, more consistent with soft tissue attenuation and motion artifact); Skin graft; Inguinal hernia repair; Nose surgery; Colonoscopy; Prostate Biopsy (2013);  Prostate surgery (Aug,13); eye surgery (10/13); eye surgery (11/13); Bladder surgery; Dental surgery; Lithotripsy (12/6/17 @ [id number]); and thoracotomy (Right, 1/4/2022). Referring provider:  Josee Winn MD    PT Order:  PT eval and treat     Evaluating PT:  Fernanda Baig, PT, DPT PT [id number]    Room #:  [address] [place] [address]  Diagnosis:  Abdominal pain, right upper quadrant [R10.11]  Pleural effusion [J90]  Empyema ([address] [J86.9]  Leukocytosis, unspecified type [D72.829]  Procedure/Surgery:  Muscle-sparing right thoracotomy with whole lung  decortication. Precautions:  Fall risk, chest tube, O2  Equipment Needs:  w/w    SUBJECTIVE:    Pt lives with his partner in a 1 story home with 1 stairs to enter and 0 rail. Bed and bath is on first floor. Pt ambulated with walker as needed PTA. Pt reported her needs to assist his partner at time and does the driving. OBJECTIVE:   Initial Evaluation  Date: 1/6 Treatment Short Term/ Long Term   Goals   Was pt agreeable to Eval/treatment? yes     Does pt have pain? Right sided chest      Bed Mobility  Rolling: NT  Supine to sit: Mod A  Sit to supine: NT  Scooting: Min A to sitting EOB  SBA   Transfers Sit to stand: Min A  Stand to sit: Min A  Stand pivot: Min A with w/w  SBA   Ambulation    3 feet x 2 with w/w Min A   50+ feet with w/w SBA    Stair negotiation: ascended and descended  NT      ROM BLE:  WFL     Strength BLE:  Grossly 4/5  Grossly 4+/5   Balance Sitting EOB:  Supervision/independent  Dynamic Standing:  Min A with w/w  Sitting EOB:  independent  Dynamic Standing:  SBA with w/w   AM-PAC 6 Clicks 05/39       Pt is A & O x 3  Sensation:  Pt reported numbness/tingling in his feet      Patient education  Pt educated on PT objectives during eval and while in the hospital, hand placement during transfers, calling for assistance.     Patient response to education:   Pt verbalized understanding Pt demonstrated skill Pt requires further education in this area   yes With cueing yes     ASSESSMENT:    Conditions Requiring Skilled Therapeutic Intervention:    [x]Decreased strength     []Decreased ROM  [x]Decreased functional mobility  [x]Decreased balance   [x]Decreased endurance   [x]Decreased posture  []Decreased sensation  []Decreased coordination   []Decreased vision  []Decreased safety awareness   []Increased pain       Comments:  Pt found in bed. No report of dizziness during functional mobility. Pt incontinent of bowel when standing from the bed. Pt assisted onto the [facility] and assisted with personal hygiene while standing. Ambulation distance limited due to multiple lines. At end of eval, pt left sitting up in the chair with call light in reach. Pt's/ family goals   1. None stated    Prognosis is good for reaching above PT goals. Patient and or family understand(s) diagnosis, prognosis, and plan of care. yes    PHYSICAL THERAPY PLAN OF CARE:    PT POC is established based on physician order and patient diagnosis     Diagnosis:  Abdominal pain, right upper quadrant [R10.11]  Pleural effusion [J90]  Empyema ([address] [J86.9]  Leukocytosis, unspecified type [D72.829]    Current Treatment Recommendations:     [x] Strengthening to improve independence with functional mobility   [] ROM to improve independence with functional mobility   [x] Balance Training to improve static/dynamic balance and to reduce fall risk  [x] Endurance Training to improve activity tolerance during functional mobility   [x] Transfer Training to improve safety and independence with all functional transfers   [x] Gait Training to improve gait mechanics, endurance and assess need for appropriate assistive device  [] Stair Training in preparation for safe discharge home and/or into the community   [] Positioning to prevent skin breakdown and contractures  [x] Safety and Education Training   [x] Patient/Caregiver Education   [] HEP  [] Other     PT long term treatment goals are located in above grid    Frequency of treatments: 2-5x/week x 1-2 weeks.     Time in  0845  Time out  0908    Evaluation Time includes thorough review of current medical information, gathering information on past medical history/social history and prior level of function, completion of standardized testing/informal observation of tasks, assessment of data and education on plan of care and goals.     CPT codes:  [x] Low Complexity PT evaluation 72356  [] Moderate Complexity PT evaluation 41213  [] High Complexity PT evaluation 62253  [] PT Re-evaluation 65344  [] Therapeutic activities 06599 __minutes  [] Therapeutic exercises 60219 __ minutes      Kourtney Peters, PT, DPT  PT [id number]

## 2022-01-06 NOTE — PROGRESS NOTES
Subjective: The patient is awake and alert. No acute events overnight. Denies chest pain, angina, SOB   PICC line being placed        Objective:    /65   Pulse 84   Temp 97.3 °F (36.3 °C) (Infrared)   Resp 18   Ht 5' 10\" (1.778 m)   Wt 199 lb 9.6 oz (90.5 kg)   SpO2 97%   BMI 28.64 kg/m²     In: 240 [P.O.:240]  Out: 1700   In: 240   Out: 1700 [Urine:1700]    General appearance: NAD, conversant, pleasant  HEENT: AT/NC, MMM  Neck: FROM, supple  Lungs: right side diminshed, chest tube out  CV: RRR, no MRGs  Vasc: Radial pulses 2+  Abdomen: Soft, non-tender; no masses or HSM  Extremities: No peripheral edema or digital cyanosis  Skin: no rash, lesions or ulcers  Psych: Alert and oriented to person, place and time  Neuro: Alert and interactive     Recent Labs     01/04/22 1912 01/05/22 0320 01/06/22  0348   WBC 23.4* 26.2* 22.9*   HGB 11.2* 11.6* 10.3*   HCT 35.0* 36.8* 32.1*   * 427 432       Recent Labs     01/04/22 0345 01/04/22 1912 01/06/22  0348    135 136   K 4.3 4.7 5.0    105 103   CO2 24 22 24   BUN 19 16 25*   CREATININE 0.7 0.6* 0.7   CALCIUM 7.9* 7.6* 8.1*       Assessment:    Active Problems:    Empyema (HCC)    Pleural effusion  Resolved Problems:    * No resolved hospital problems.  *      Plan:    Patient is a 68-year-old male admitted to [facility] for  Loculated pleural effusion- concerning for possible malignancy given his history   1/4/2021 -right thoracotomy with whole lung decortication for complicated right parapneumonic effusion-await cultures/results  -Monitor labs  -Pro-Isac 1.93 > 1.95, check tumor markers-known history of \"spot on lung\", has not pursued surveillance  -Sed rate 68  -WBC 35.5> 30.8 > 25.3  > 18.0>18.4 > 26.2 > 22.9  -R pigtail chest tube placed per pulm, 1200cc out  -IV Unasyn pending pan culture results-negative so far  -Reactive leukocytosis 1/5/2021 postoperatively no evidence of worsening sepsis  -BC negative so far  -PICC line placed 1/6   -PT/OT for possible placement needs. -ID, CT surgery, Pulm following   -Chest tube removed 1/6/2021    Hypertension  -continue home medications    DVT Prophylaxis - pcd's  PT/OT 15/24, [facility] on discharge-hopeful for discharge tomorrow if antibiotics are finalized  Discharge planning       SHANIQUE Gutiérrez CNP  4:57 PM   1/3/2022    Above note edited to reflect my thoughts     I personally saw, examined and provided care for the patient. Radiographs, labs and medication list were reviewed by me independently. The case was discussed in detail and plans for care were established. Review of RICKEY Gutiérrez   , documentation was conducted and revisions were made as appropriate directly by me. I agree with the above documented exam, problem list, and plan of care.      Singh Xiao MD  7:51 PM  1/6/2022

## 2022-01-06 NOTE — PROCEDURES
PICC  Catheter insertion date: 1/6/2022     Product Number:  [address] [facility]   Lot No: [id number]   Gauge: 17   Lumen: 9.4ZG single   R Basilic    Vein Diameter : 0.40cm   Mid upper arm circumference: 26cm   Catheter Length : 45cm   Internal Length: 44cm   External Catheter Length: 1cm   Ultrasound Used:yes  [facility] [place] confirms PICC tip is placed in the lower 1/3 of the SVC or at the Cavoatrial junction. Floor nurse notified PICC is okay to use.    : ARLINE Aponte RN

## 2022-01-06 NOTE — PROGRESS NOTES
Department of Cardiovascular & Thoracic Surgery  Attending Progress Note      SUBJECTIVE:  No complaint of chest pain or shortness of breath. OBJECTIVE    Physical  CURRENT VITALS:  /65   Pulse 84   Temp 97.3 °F (36.3 °C) (Infrared)   Resp 18   Ht 5' 10\" (1.778 m)   Wt 199 lb 9.6 oz (90.5 kg)   SpO2 97%   BMI 28.64 kg/m²     I/O last 3 completed shifts: In: 160 [I.V.:160]  Out: 2723 [Urine:1900; Drains:500; Blood:138; Chest Tube:185]  I/O this shift:  In: 240 [P.O.:240]  Out: 600 [Urine:600]       CHEST TUBE: no air leak     CONSTITUTIONAL:  awake, alert, cooperative, no apparent distress  LUNGS:  No increased work of breathing, good air exchange, clear to auscultation bilaterally, no crackles or wheezing  INCISION: dry and intact, no redness, no crepitus  CARDIOVASCULAR:  Normal apical impulse, regular rate and rhythm,  no murmur noted  ABDOMEN:  Normal bowel sounds, soft, non-distended, non-tender, no masses palpated, no hepatosplenomegally  EXTREMITIES: no edema, pulses intact. Data  LABS;   Lab Results   Component Value Date     01/06/2022    K 5.0 01/06/2022     01/06/2022    CO2 24 01/06/2022    BUN 25 01/06/2022    CREATININE 0.7 01/06/2022    CALCIUM 8.1 01/06/2022      Lab Results   Component Value Date    WBC 22.9 01/06/2022    RBC 3.40 01/06/2022    HGB 10.3 01/06/2022    HCT 32.1 01/06/2022    MCV 94.4 01/06/2022    MCH 30.3 01/06/2022    MCHC 32.1 01/06/2022    RDW 15.3 01/06/2022     01/06/2022    MPV 9.9 01/06/2022       Radiology Review: no significant ptx or effusion, infiltrate RLL    ASSESSMENT  Stable cardiopulmonary status s/p R decortication    PLAN  Increase activity as tolerated  2nd chest tube removed.   Home when medically stable  RTC 3 wks with cxr      Carlton Rios MD

## 2022-01-06 NOTE — PROGRESS NOTES
Occupational Therapy  OCCUPATIONAL THERAPY INITIAL EVALUATION      Date:2022  Patient Name: Sonu Vasquez  MRN: [id number]  : 1945  Room: [address] [place] [address] & [facility], [place]         Date:2022                                                  Patient Name: Jeff Angeles    MRN: [id number]    : 1945    Room: [address] [place] [address]      Evaluating OT: Lars Thomason OTR/L   [id number]      Referring Jimmie Roland MD    Specific Provider Orders/Date:OT eval and treat 2022      Diagnosis:  Abdominal pain, right upper quadrant [R10.11]  Pleural effusion [J90]  Empyema ([address] [J86.9]  Leukocytosis, unspecified type [D72.829]    Procedure/Surgery:  Muscle-sparing right thoracotomy with whole lung  Decortication 2022    Pertinent Medical History: DM, MS,    Precautions:  Fall Risk, O2, chest tube     Assessment of current deficits    [x] Functional mobility  [x]ADLs  [x] Strength               []Cognition    [x] Functional transfers   [x] IADLs         [x] Safety Awareness   [x]Endurance    [] Fine Coordination              [x] Balance      [] Vision/perception   []Sensation     []Gross Motor Coordination  [] ROM  [] Delirium                   [] Motor Control     OT PLAN OF CARE   OT POC based on physician orders, patient diagnosis and results of clinical assessment    Frequency/Duration  2-4 days/wk for 2 weeks PRN   Specific OT Treatment Interventions to include:   ADL retraining/adapted techniques and AE recommendations to increase functional independence within precautions                    Energy conservation techniques to improve tolerance for selfcare routine   Functional transfer/mobility training/DME recommendations for increased independence, safety and fall prevention         Patient/family education to increase safety and functional independence             Environmental modifications for safe mobility and completion of ADLs                             Therapeutic activity to improve functional performance during ADLs. Therapeutic exercise to improve tolerance and functional strength for ADLs    Balance retraining/tolerance tasks for facilitation of postural control with dynamic challenges during ADLs . Positioning to improve functional independence  []    Recommended Adaptive Equipment: TBD     Home Living: Pt lives with significant other (patient helps care for her).   1 story with 2 steps to enter  Bathroom setup: tub/shower , grab bar    Equipment owned: shower chair , walker , cane, home O2    Prior Level of Function: Independent  with ADLs , independent  with IADLs; ambulated with walker occasionally       Pain Level: no pain this session ;   Cognition: A&O: 4/4;    Memory:  Good    Sequencing:  Good    Problem solving:  Good    Judgement/safety:  Good      Functional Assessment:  AM-PAC Daily Activity Raw Score: 15/24   Initial Eval Status  Date: 1/6/22 Treatment Status  Date: STGs = LTGs  Time frame: 10-14 days   Feeding Independent      Grooming SBA/set-up ,seated   Mod I    UB Dressing Min A  Mod I    LB Dressing Max a  Assist with adjusting socks ,   Threading brief over feet and pulling up over hips   Mod I    Bathing Mod A   Mod I    Toileting Assist with hygiene   Mod I    Bed Mobility  Mod A  Supine to sit   Mod I    Functional Transfers Mod A  Sit-stand from bed   Mod I    Functional Mobility Min A,w/walker  Steps from bed to [facility] to chair   Mod I  with good tolerance    Balance Sitting:     Static:  SBA- EOB     Dynamic:Mod A   Standing: MiN A  Independent    Activity Tolerance Fair with light activity   No SOB noted   Patient on 2 L   O2 sats 90s  Good  with ADL activity    Visual/  Perceptual Glasses: yes                 Hand Dominance right    AROM (PROM) Strength Additional Info:    RUE  WFL WFL good  and wfl FMC/dexterity noted during ADL tasks       [facility] WFL good  and wfl FMC/dexterity noted during ADL tasks       Hearing: WFL   Sensation:  No c/o numbness or tingling  Tone: WFL   Edema: none observed     Comments: Upon arrival patient lying in bed . At end of session, patient sitting in chair  with call light and phone within reach, all lines and tubes intact. Overall patient demonstrated  decreased independence and safety during completion of ADL/functional transfer/mobility tasks. Pt would benefit from continued skilled OT to increase safety and independence with completion of ADL/IADL tasks for functional independence and quality of life. Rehab Potential: good for established goals     Patient / Family Goal: none stated       Patient and/or family were instructed on functional diagnosis, prognosis/goals and OT plan of care. Demonstrated good  understanding. Eval Complexity: Low    Time In: 0840  Time Out: 0903      Min Units   OT Eval Low 97165 x  1   OT Eval Medium 21149      OT Eval High 89799      OT Re-Eval [id number]       Therapeutic Ex [id number]       Therapeutic Activities 43694       ADL/Self Care 64030       Orthotic Management 46234       Manual [id number]     Neuro Re-Ed 73214       Non-Billable Time          Evaluation Time additionally includes thorough review of current medical information, gathering information on past medical history/social history and prior level of function, interpretation of standardized testing/informal observation of tasks, assessment of data and development of plan of care and goals.             Sally Browne  OTR/L  OT [id number]

## 2022-01-06 NOTE — CARE COORDINATION
COVID - 12/30. POD #2. One Chest tube remains. Continues on iv abx-still needs PICC inserted-awaiting ID final plan. Requiring O2 4lNC. Awaiting PT/OT evals. From home w/ domestic partner PTA-discharge plan unknown at this time pending progress. Referral made to [address] [place] [address] acceptance. Referral made to [place] [phone]- will need signed abx script faxed to [phone] if required on discharge and if pt does return home. Will follow Alonso Oviedo RN case manager    8925: Mina Dougherty is following- [employer] notified. Awaiting final ID plan Alonso Oviedo RN case manager      The Plan for Transition of Care is related to the following treatment goals: abx administration, post op care    The Patient and/or patient representative Sonu Vasquez was provided with a choice of provider and agrees   with the discharge plan. [x] Yes [] No    Freedom of choice list was provided with basic dialogue that supports the patient's individualized plan of care/goals, treatment preferences and shares the quality data associated with the providers.  [x] Yes [] No

## 2022-01-06 NOTE — PROGRESS NOTES
Department of Cardiovascular & Thoracic Surgery  Attending Progress Note      SUBJECTIVE:  No complaint of chest pain or shortness of breath. OBJECTIVE    Physical  CURRENT VITALS:  /60   Pulse 82   Temp 97.6 °F (36.4 °C) (Oral)   Resp 20   Ht 5' 10\" (1.778 m)   Wt 199 lb 9.6 oz (90.5 kg)   SpO2 98%   BMI 28.64 kg/m²     I/O last 3 completed shifts: In: 1560 [I.V.:1560]  Out: 2276 [Urine:1700; Drains:1275; Blood:238; Chest Tube:185]  No intake/output data recorded. CHEST TUBE: no air leak     CONSTITUTIONAL:  awake, alert, cooperative, no apparent distress  LUNGS:  No increased work of breathing, good air exchange, clear to auscultation bilaterally, no crackles or wheezing  INCISION: dry and intact, no redness, no crepitus  CARDIOVASCULAR:  Normal apical impulse, regular rate and rhythm,  no murmur noted  ABDOMEN:  Normal bowel sounds, soft, non-distended, non-tender, no masses palpated, no hepatosplenomegally  EXTREMITIES: no edema, pulses intact.        Data  LABS;   Lab Results   Component Value Date     01/04/2022    K 4.7 01/04/2022     01/04/2022    CO2 22 01/04/2022    BUN 16 01/04/2022    CREATININE 0.6 01/04/2022    CALCIUM 7.6 01/04/2022      Lab Results   Component Value Date    WBC 26.2 01/05/2022    RBC 3.86 01/05/2022    HGB 11.6 01/05/2022    HCT 36.8 01/05/2022    MCV 95.3 01/05/2022    MCH 30.1 01/05/2022    MCHC 31.5 01/05/2022    RDW 15.4 01/05/2022     01/05/2022    MPV 10.2 01/05/2022       Radiology Review: no significant ptx or effusion    ASSESSMENT  Stable cardiopulmonary status s/p R decortication for complicated parapneumonic effusion    PLAN  Increase activity as tolerated  Dc ant chest tube      Karissa Lopez MD

## 2022-01-06 NOTE — PROGRESS NOTES
[facility] Cardiology Inpatient Progress Note    Patient is a 64 y.o. male of Pietro East MD seen in hospital follow up. Chief complaint: Pre-op    HPI: Some SOB. No CP.      Patient Active Problem List   Diagnosis    Abnormal PSA    Mitral valve prolapse    MR (mitral regurgitation)    Tricuspid valve regurgitation    Pulmonary HTN (HCC)    HTN (hypertension)    DM (diabetes mellitus) ([address]    Multiple sclerosis [facility]    Hiatal hernia    GERD (gastroesophageal reflux disease)    History of kidney stones    Benign prostatic hyperplasia    S/P TURP    Urothelial carcinoma (HCC)    Asbestosis ([facility]    Erectile dysfunction    Panic attacks    History of esophageal stricture    History of esophageal dilatation    Washburn's esophagus determined by biopsy    Anxiety    Claustrophobia    History of PSVT (paroxysmal supraventricular tachycardia)    H/O endocarditis    Nonrheumatic aortic valve insufficiency    Nonrheumatic pulmonary valve insufficiency    Empyema (HCC)    Pleural effusion       Allergies   Allergen Reactions    Baclofen Hives and Swelling    Aleve [Naproxen]      Nausea    Dye [Iodides]      Rash    Shellfish Allergy      Unclear if allergic     Paxil [Paroxetine] Anxiety       Current Facility-Administered Medications   Medication Dose Route Frequency Provider Last Rate Last Admin    oxyCODONE-acetaminophen (PERCOCET) 5-325 MG per tablet 1 tablet  1 tablet Oral Q4H PRN Juni Bonds MD        bupivacaine (MARCAINE) 0.25 % elastomeric infusion   Infiltration Continuous Juni Bonds MD 2 mL/hr at 01/04/22 1931 New Bag at 01/04/22 1931    sodium chloride flush 0.9 % injection 5-40 mL  5-40 mL IntraVENous 2 times per day Juni Bonds MD   10 mL at 01/05/22 2003    sodium chloride flush 0.9 % injection 5-40 mL  5-40 mL IntraVENous PRN Juni Bonds MD        0.9 % sodium chloride infusion  25 mL IntraVENous PRN Jessica Fuentes MD Sho        enoxaparin (LOVENOX) injection 40 mg  40 mg SubCUTAneous Daily Salvador Romero MD   40 mg at 01/05/22 2000    acetaminophen (TYLENOL) tablet 650 mg  650 mg Oral Q4H PRN Salvador Romero MD        morphine (PF) injection 2 mg  2 mg IntraVENous Q2H PRN Salvador Romero MD   2 mg at 01/05/22 1018    Or    morphine sulfate (PF) injection 4 mg  4 mg IntraVENous Q2H PRN Salvador Romero MD        lidocaine PF 1 % injection 5 mL  5 mL IntraDERmal Once Omelia MD Lizz        heparin flush 100 UNIT/ML injection 300 Units  3 mL IntraVENous 2 times per day Salvador Romero MD        heparin flush 100 UNIT/ML injection 300 Units  3 mL Intercatheter PRN Salvador Romero MD        cetirizine (ZYRTEC) tablet 5 mg  5 mg Oral Daily Salvador Romero MD   5 mg at 01/05/22 1018    gabapentin (NEURONTIN) capsule 300 mg  300 mg Oral BID Salvador Romero MD   300 mg at 01/05/22 1703    metoprolol succinate (TOPROL XL) extended release tablet 100 mg  100 mg Oral Daily Salvador Romero MD   100 mg at 01/05/22 1017    metoprolol succinate (TOPROL XL) extended release tablet 50 mg  50 mg Oral Nightly Salvador Romero MD   50 mg at 01/05/22 2000    tamsulosin (FLOMAX) capsule 0.4 mg  0.4 mg Oral Daily Salvador Romero MD   0.4 mg at 01/05/22 1018    albuterol (PROVENTIL) nebulizer solution 2.5 mg  2.5 mg Nebulization Q6H PRN Salvador Romero MD        finasteride (PROSCAR) tablet 5 mg  5 mg Oral Daily Salvador Romero MD   5 mg at 01/05/22 1019    ampicillin-sulbactam (UNASYN) 3000 mg ivpb minibag  3,000 mg IntraVENous Q6H Salvador Romero MD   Stopped at 01/06/22 0430       Review of systems:   Heart: as above   Lungs: as above   Eyes: denies changes in vision or discharge. Ears: denies changes in hearing or pain. Nose: denies epistaxis or masses   Throat: denies sore throat or trouble swallowing. Neuro: denies numbness, tingling, tremors. Skin: denies rashes or itching. : denies hematuria, dysuria   GI: denies vomiting, diarrhea   Psych: denies mood changed, anxiety, depression. Physical Exam   /65   Pulse 77   Temp 97.8 °F (36.6 °C) (Oral)   Resp 18   Ht 5' 10\" (1.778 m)   Wt 199 lb 9.6 oz (90.5 kg)   SpO2 97%   BMI 28.64 kg/m²   Constitutional: Oriented to person, place, and time. No distress. Well developed. Head: Normocephalic and atraumatic. Neck: Neck supple. No hepatojugular reflux. No JVD present. Carotid bruit is not present. No tracheal deviation present. No thyromegaly present. Cardiovascular: Normal rate, regular rhythm, normal heart sounds. intact distal pulses. No gallop and no friction rub. No murmur heard. Pulmonary: Breath sounds normal. No respiratory distress. No wheezes. No rales. Chest: Effort normal. No tenderness. Abdominal: Soft. Bowel sounds are normal. No distension or mass. No tenderness, rebound or guarding. Musculoskeletal: . No tenderness. No clubbing or cyanosis. Extremitites: Intact distal pulses. No edema  Neurological: Alert and oriented to person, place, and time. Skin: Skin is warm and dry. No rash noted. Not diaphoretic. No erythema. Psychiatric: Normal mood and affect. Behavior is normal.   Lymphadenopathy: No cervical adenopathy. No groin adenopathy.     CBC:   Lab Results   Component Value Date    WBC 22.9 01/06/2022    RBC 3.40 01/06/2022    HGB 10.3 01/06/2022    HCT 32.1 01/06/2022    MCV 94.4 01/06/2022    MCH 30.3 01/06/2022    MCHC 32.1 01/06/2022    RDW 15.3 01/06/2022     01/06/2022    MPV 9.9 01/06/2022     BMP:   Lab Results   Component Value Date     01/06/2022    K 5.0 01/06/2022     01/06/2022    CO2 24 01/06/2022    BUN 25 01/06/2022    LABALBU 2.6 12/30/2021    CREATININE 0.7 01/06/2022    CALCIUM 8.1 01/06/2022    GFRAA >60 01/06/2022    LABGLOM >60 01/06/2022     Magnesium:    Lab Results Component Value Date    MG 1.9 12/25/2016     Cardiac Enzymes:   Lab Results   Component Value Date    CKTOTAL 105 12/25/2016    CKTOTAL 43 12/22/2015    CKTOTAL 45 12/21/2015    CKMB 4.0 12/25/2016    CKMB 1.9 12/22/2015    CKMB 2.2 12/21/2015      PT/INR:    Lab Results   Component Value Date    PROTIME 20.0 12/31/2021    INR 1.8 12/31/2021     TSH:    Lab Results   Component Value Date    TSH 3.460 12/25/2016     Rhythm Strip: normal sinus rhythm. ASSESSMENT & PLAN:    Patient Active Problem List   Diagnosis    Abnormal PSA    Mitral valve prolapse    MR (mitral regurgitation)    Tricuspid valve regurgitation    Pulmonary HTN (HCC)    HTN (hypertension)    DM (diabetes mellitus) ([address]    Multiple sclerosis (HCC)    Hiatal hernia    GERD (gastroesophageal reflux disease)    History of kidney stones    Benign prostatic hyperplasia    S/P TURP    Urothelial carcinoma (HCC)    Asbestosis ([address]    Erectile dysfunction    Panic attacks    History of esophageal stricture    History of esophageal dilatation    Washburn's esophagus determined by biopsy    Anxiety    Claustrophobia    History of PSVT (paroxysmal supraventricular tachycardia)    H/O endocarditis    Nonrheumatic aortic valve insufficiency    Nonrheumatic pulmonary valve insufficiency    Empyema (HCC)    Pleural effusion     1. Pre-op/ID issues/Empyema: Tolerated procedure. 2. VHD: Hx of MVP and significant MR. Continue to medically manage and follow volume. 3. Hx of MV endocarditis. 4. SVT: Monitor. BB.     5. HTN: Observe. 6. Multiple sclerosis:    7. DM: Per primary service. 8. Hiatal hernia/GERD. 9.  Asbestosis/COPD. Romana Schwalbe, D.O.   Cardiologist  Cardiology, [address] [place] [address]

## 2022-01-07 VITALS
WEIGHT: 199.6 LBS | DIASTOLIC BLOOD PRESSURE: 77 MMHG | SYSTOLIC BLOOD PRESSURE: 98 MMHG | RESPIRATION RATE: 18 BRPM | HEIGHT: 70 IN | BODY MASS INDEX: 28.58 KG/M2 | OXYGEN SATURATION: 93 % | HEART RATE: 95 BPM | TEMPERATURE: 97.5 F

## 2022-01-07 LAB
ANION GAP SERPL CALCULATED.3IONS-SCNC: 8 MMOL/L (ref 7–16)
BASOPHILS ABSOLUTE: 0.03 E9/L (ref 0–0.2)
BASOPHILS RELATIVE PERCENT: 0.2 % (ref 0–2)
BUN BLDV-MCNC: 24 MG/DL (ref 6–23)
CALCIUM SERPL-MCNC: 7.9 MG/DL (ref 8.6–10.2)
CHLORIDE BLD-SCNC: 103 MMOL/L (ref 98–107)
CO2: 27 MMOL/L (ref 22–29)
CREAT SERPL-MCNC: 0.7 MG/DL (ref 0.7–1.2)
EOSINOPHILS ABSOLUTE: 0.17 E9/L (ref 0.05–0.5)
EOSINOPHILS RELATIVE PERCENT: 1.3 % (ref 0–6)
GFR AFRICAN AMERICAN: >60
GFR NON-AFRICAN AMERICAN: >60 ML/MIN/1.73
GLUCOSE BLD-MCNC: 122 MG/DL (ref 74–99)
HCT VFR BLD CALC: 30.6 % (ref 37–54)
HEMOGLOBIN: 9.6 G/DL (ref 12.5–16.5)
IMMATURE GRANULOCYTES #: 0.59 E9/L
IMMATURE GRANULOCYTES %: 4.7 % (ref 0–5)
LYMPHOCYTES ABSOLUTE: 0.93 E9/L (ref 1.5–4)
LYMPHOCYTES RELATIVE PERCENT: 7.3 % (ref 20–42)
MCH RBC QN AUTO: 30.4 PG (ref 26–35)
MCHC RBC AUTO-ENTMCNC: 31.4 % (ref 32–34.5)
MCV RBC AUTO: 96.8 FL (ref 80–99.9)
MONOCYTES ABSOLUTE: 1.14 E9/L (ref 0.1–0.95)
MONOCYTES RELATIVE PERCENT: 9 % (ref 2–12)
NEUTROPHILS ABSOLUTE: 9.81 E9/L (ref 1.8–7.3)
NEUTROPHILS RELATIVE PERCENT: 77.5 % (ref 43–80)
PDW BLD-RTO: 15.5 FL (ref 11.5–15)
PLATELET # BLD: 392 E9/L (ref 130–450)
PMV BLD AUTO: 10.3 FL (ref 7–12)
POTASSIUM SERPL-SCNC: 4.4 MMOL/L (ref 3.5–5)
RBC # BLD: 3.16 E12/L (ref 3.8–5.8)
SODIUM BLD-SCNC: 138 MMOL/L (ref 132–146)
WBC # BLD: 12.7 E9/L (ref 4.5–11.5)

## 2022-01-07 PROCEDURE — 6370000000 HC RX 637 (ALT 250 FOR IP): Performed by: NURSE PRACTITIONER

## 2022-01-07 PROCEDURE — 97535 SELF CARE MNGMENT TRAINING: CPT

## 2022-01-07 PROCEDURE — 2580000003 HC RX 258: Performed by: SPECIALIST

## 2022-01-07 PROCEDURE — 85025 COMPLETE CBC W/AUTO DIFF WBC: CPT

## 2022-01-07 PROCEDURE — 6360000002 HC RX W HCPCS: Performed by: NURSE PRACTITIONER

## 2022-01-07 PROCEDURE — 6370000000 HC RX 637 (ALT 250 FOR IP): Performed by: SPECIALIST

## 2022-01-07 PROCEDURE — 97530 THERAPEUTIC ACTIVITIES: CPT

## 2022-01-07 PROCEDURE — 94640 AIRWAY INHALATION TREATMENT: CPT

## 2022-01-07 PROCEDURE — 94664 DEMO&/EVAL PT USE INHALER: CPT

## 2022-01-07 PROCEDURE — 36415 COLL VENOUS BLD VENIPUNCTURE: CPT

## 2022-01-07 PROCEDURE — 80048 BASIC METABOLIC PNL TOTAL CA: CPT

## 2022-01-07 PROCEDURE — 6360000002 HC RX W HCPCS: Performed by: SPECIALIST

## 2022-01-07 PROCEDURE — 99233 SBSQ HOSP IP/OBS HIGH 50: CPT | Performed by: INTERNAL MEDICINE

## 2022-01-07 RX ORDER — AMPICILLIN AND SULBACTAM 2; 1 G/1; G/1
2000 INJECTION, POWDER, FOR SOLUTION INTRAMUSCULAR; INTRAVENOUS EVERY 6 HOURS
Qty: 80 EACH | Refills: 0 | Status: ON HOLD | OUTPATIENT
Start: 2022-01-07 | End: 2022-01-21 | Stop reason: HOSPADM

## 2022-01-07 RX ORDER — ALBUTEROL SULFATE 2.5 MG/3ML
2.5 SOLUTION RESPIRATORY (INHALATION) 3 TIMES DAILY
Status: DISCONTINUED | OUTPATIENT
Start: 2022-01-07 | End: 2022-01-07 | Stop reason: HOSPADM

## 2022-01-07 RX ORDER — ALPRAZOLAM 0.25 MG/1
0.25 TABLET ORAL ONCE
Status: COMPLETED | OUTPATIENT
Start: 2022-01-07 | End: 2022-01-07

## 2022-01-07 RX ADMIN — Medication 10 ML: at 20:34

## 2022-01-07 RX ADMIN — SALINE NASAL SPRAY 1 SPRAY: 1.5 SOLUTION NASAL at 21:12

## 2022-01-07 RX ADMIN — TAMSULOSIN HYDROCHLORIDE 0.4 MG: 0.4 CAPSULE ORAL at 08:41

## 2022-01-07 RX ADMIN — ALPRAZOLAM 0.25 MG: 0.25 TABLET ORAL at 11:00

## 2022-01-07 RX ADMIN — GABAPENTIN 300 MG: 300 CAPSULE ORAL at 17:01

## 2022-01-07 RX ADMIN — CETIRIZINE HYDROCHLORIDE 5 MG: 10 TABLET, FILM COATED ORAL at 08:41

## 2022-01-07 RX ADMIN — AMPICILLIN SODIUM AND SULBACTAM SODIUM 3000 MG: 2; 1 INJECTION, POWDER, FOR SOLUTION INTRAMUSCULAR; INTRAVENOUS at 20:32

## 2022-01-07 RX ADMIN — ALBUTEROL SULFATE 2.5 MG: 0.83 SOLUTION RESPIRATORY (INHALATION) at 11:10

## 2022-01-07 RX ADMIN — FINASTERIDE 5 MG: 5 TABLET, FILM COATED ORAL at 08:41

## 2022-01-07 RX ADMIN — GABAPENTIN 300 MG: 300 CAPSULE ORAL at 08:41

## 2022-01-07 RX ADMIN — ALBUTEROL SULFATE 2.5 MG: 0.83 SOLUTION RESPIRATORY (INHALATION) at 19:52

## 2022-01-07 RX ADMIN — HEPARIN 300 UNITS: 100 SYRINGE at 20:33

## 2022-01-07 RX ADMIN — AMPICILLIN SODIUM AND SULBACTAM SODIUM 3000 MG: 2; 1 INJECTION, POWDER, FOR SOLUTION INTRAMUSCULAR; INTRAVENOUS at 15:13

## 2022-01-07 RX ADMIN — Medication 10 ML: at 08:42

## 2022-01-07 RX ADMIN — SALINE NASAL SPRAY 1 SPRAY: 1.5 SOLUTION NASAL at 15:13

## 2022-01-07 RX ADMIN — HEPARIN 300 UNITS: 100 SYRINGE at 08:42

## 2022-01-07 RX ADMIN — AMPICILLIN SODIUM AND SULBACTAM SODIUM 3000 MG: 2; 1 INJECTION, POWDER, FOR SOLUTION INTRAMUSCULAR; INTRAVENOUS at 08:42

## 2022-01-07 RX ADMIN — METOPROLOL SUCCINATE 100 MG: 25 TABLET, EXTENDED RELEASE ORAL at 08:40

## 2022-01-07 RX ADMIN — MORPHINE SULFATE 2 MG: 2 INJECTION, SOLUTION INTRAMUSCULAR; INTRAVENOUS at 05:06

## 2022-01-07 RX ADMIN — AMPICILLIN SODIUM AND SULBACTAM SODIUM 3000 MG: 2; 1 INJECTION, POWDER, FOR SOLUTION INTRAMUSCULAR; INTRAVENOUS at 02:57

## 2022-01-07 RX ADMIN — ALBUTEROL SULFATE 2.5 MG: 0.83 SOLUTION RESPIRATORY (INHALATION) at 13:55

## 2022-01-07 ASSESSMENT — PAIN DESCRIPTION - ORIENTATION
ORIENTATION: RIGHT
ORIENTATION: RIGHT

## 2022-01-07 ASSESSMENT — PAIN SCALES - GENERAL
PAINLEVEL_OUTOF10: 4
PAINLEVEL_OUTOF10: 9
PAINLEVEL_OUTOF10: 3

## 2022-01-07 ASSESSMENT — PAIN DESCRIPTION - PAIN TYPE
TYPE: ACUTE PAIN
TYPE: ACUTE PAIN

## 2022-01-07 ASSESSMENT — PAIN DESCRIPTION - LOCATION: LOCATION: BACK

## 2022-01-07 NOTE — PROGRESS NOTES
[facility] Cardiology Inpatient Progress Note    Patient is a 64 y.o. male of Rochelle Weston MD seen in hospital follow up. Chief complaint: Pre-op    HPI: Some SOB. No CP.      Patient Active Problem List   Diagnosis    Abnormal PSA    Mitral valve prolapse    MR (mitral regurgitation)    Tricuspid valve regurgitation    Pulmonary HTN (HCC)    HTN (hypertension)    DM (diabetes mellitus) ([facility] [facility]    Multiple sclerosis (HCC)    Hiatal hernia    GERD (gastroesophageal reflux disease)    History of kidney stones    Benign prostatic hyperplasia    S/P TURP    Urothelial carcinoma (HCC)    Asbestosis ([facility]    Erectile dysfunction    Panic attacks    History of esophageal stricture    History of esophageal dilatation    Washburn's esophagus determined by biopsy    Anxiety    Claustrophobia    History of PSVT (paroxysmal supraventricular tachycardia)    H/O endocarditis    Nonrheumatic aortic valve insufficiency    Nonrheumatic pulmonary valve insufficiency    Empyema (HCC)    Pleural effusion       Allergies   Allergen Reactions    Baclofen Hives and Swelling    Aleve [Naproxen]      Nausea    Dye [Iodides]      Rash    Shellfish Allergy      Unclear if allergic     Paxil [Paroxetine] Anxiety       Current Facility-Administered Medications   Medication Dose Route Frequency Provider Last Rate Last Admin    oxyCODONE-acetaminophen (PERCOCET) 5-325 MG per tablet 1 tablet  1 tablet Oral Q4H PRN Lynda Sunshine MD        bupivacaine (MARCAINE) 0.25 % elastomeric infusion   Infiltration Continuous Lynda Sunshine MD 2 mL/hr at 01/04/22 1931 New Bag at 01/04/22 1931    sodium chloride flush 0.9 % injection 5-40 mL  5-40 mL IntraVENous 2 times per day Lynda Sunshine MD   10 mL at 01/06/22 2203    sodium chloride flush 0.9 % injection 5-40 mL  5-40 mL IntraVENous PRN Lynda Sunshine MD        0.9 % sodium chloride infusion  25 mL IntraVENous PRN Lynda Sunshine MD        enoxaparin (LOVENOX) injection 40 mg  40 mg SubCUTAneous Daily Juni Bonds MD   40 mg at 01/06/22 2000    acetaminophen (TYLENOL) tablet 650 mg  650 mg Oral Q4H PRN Juni Bonds MD        morphine (PF) injection 2 mg  2 mg IntraVENous Q2H PRN Juni Bonds MD   2 mg at 01/07/22 9649    Or    morphine sulfate (PF) injection 4 mg  4 mg IntraVENous Q2H PRN Juni Bonds MD        heparin flush 100 UNIT/ML injection 300 Units  3 mL IntraVENous 2 times per day Juni Bonds MD   300 Units at 01/06/22 2001    heparin flush 100 UNIT/ML injection 300 Units  3 mL Intercatheter PRN Juni Bonds MD        cetirizine (ZYRTEC) tablet 5 mg  5 mg Oral Daily Juni Bonds MD   5 mg at 01/06/22 7633    gabapentin (NEURONTIN) capsule 300 mg  300 mg Oral BID Juni Bonds MD   300 mg at 01/06/22 1704    metoprolol succinate (TOPROL XL) extended release tablet 100 mg  100 mg Oral Daily Juni Bonds MD   100 mg at 01/06/22 0827    metoprolol succinate (TOPROL XL) extended release tablet 50 mg  50 mg Oral Nightly Juni Bonds MD   50 mg at 01/06/22 2000    tamsulosin (FLOMAX) capsule 0.4 mg  0.4 mg Oral Daily Juni Bonds MD   0.4 mg at 01/06/22 0831    albuterol (PROVENTIL) nebulizer solution 2.5 mg  2.5 mg Nebulization Q6H PRN Juni Bonds MD        finasteride (PROSCAR) tablet 5 mg  5 mg Oral Daily Juni Bonds MD   5 mg at 01/06/22 0008    ampicillin-sulbactam (UNASYN) 3000 mg ivpb minibag  3,000 mg IntraVENous Q6H Juni Bonds  mL/hr at 01/07/22 0257 3,000 mg at 01/07/22 0257       Review of systems:   Heart: as above   Lungs: as above   Eyes: denies changes in vision or discharge. Ears: denies changes in hearing or pain. Nose: denies epistaxis or masses   Throat: denies sore throat or trouble swallowing. Neuro: denies numbness, tingling, tremors. Skin: denies rashes or itching. : denies hematuria, dysuria   GI: denies vomiting, diarrhea   Psych: denies mood changed, anxiety, depression. Physical Exam   /63   Pulse 91   Temp 97.9 °F (36.6 °C) (Axillary)   Resp 18   Ht 5' 10\" (1.778 m)   Wt 199 lb 9.6 oz (90.5 kg)   SpO2 92%   BMI 28.64 kg/m²   Constitutional: Oriented to person, place, and time. No distress. Well developed. Head: Normocephalic and atraumatic. Neck: Neck supple. No hepatojugular reflux. No JVD present. Carotid bruit is not present. No tracheal deviation present. No thyromegaly present. Cardiovascular: Normal rate, regular rhythm, normal heart sounds. intact distal pulses. No gallop and no friction rub. No murmur heard. Pulmonary: Breath sounds normal. No respiratory distress. No wheezes. No rales. Chest: Effort normal. No tenderness. Abdominal: Soft. Bowel sounds are normal. No distension or mass. No tenderness, rebound or guarding. Musculoskeletal: . No tenderness. No clubbing or cyanosis. Extremitites: Intact distal pulses. No edema  Neurological: Alert and oriented to person, place, and time. Skin: Skin is warm and dry. No rash noted. Not diaphoretic. No erythema. Psychiatric: Normal mood and affect. Behavior is normal.   Lymphadenopathy: No cervical adenopathy. No groin adenopathy.     CBC:   Lab Results   Component Value Date    WBC 12.7 01/07/2022    RBC 3.16 01/07/2022    HGB 9.6 01/07/2022    HCT 30.6 01/07/2022    MCV 96.8 01/07/2022    MCH 30.4 01/07/2022    MCHC 31.4 01/07/2022    RDW 15.5 01/07/2022     01/07/2022    MPV 10.3 01/07/2022     BMP:   Lab Results   Component Value Date     01/07/2022    K 4.4 01/07/2022     01/07/2022    CO2 27 01/07/2022    BUN 24 01/07/2022    LABALBU 2.6 12/30/2021    CREATININE 0.7 01/07/2022    CALCIUM 7.9 01/07/2022    GFRAA >60 01/07/2022    LABGLOM >60 01/07/2022     Magnesium:    Lab Results   Component Value Date    MG 1.9 12/25/2016     Cardiac Enzymes:   Lab Results   Component Value Date    CKTOTAL 105 12/25/2016    CKTOTAL 43 12/22/2015    CKTOTAL 45 12/21/2015    CKMB 4.0 12/25/2016    CKMB 1.9 12/22/2015    CKMB 2.2 12/21/2015      PT/INR:    Lab Results   Component Value Date    PROTIME 20.0 12/31/2021    INR 1.8 12/31/2021     TSH:    Lab Results   Component Value Date    TSH 3.460 12/25/2016     Rhythm Strip: normal sinus rhythm. ASSESSMENT & PLAN:    Patient Active Problem List   Diagnosis    Abnormal PSA    Mitral valve prolapse    MR (mitral regurgitation)    Tricuspid valve regurgitation    Pulmonary HTN (HCC)    HTN (hypertension)    DM (diabetes mellitus) ([facility]    Multiple sclerosis (HCC)    Hiatal hernia    GERD (gastroesophageal reflux disease)    History of kidney stones    Benign prostatic hyperplasia    S/P TURP    Urothelial carcinoma (HCC)    Asbestosis ([facility] [facility]    Erectile dysfunction    Panic attacks    History of esophageal stricture    History of esophageal dilatation    Washburn's esophagus determined by biopsy    Anxiety    Claustrophobia    History of PSVT (paroxysmal supraventricular tachycardia)    H/O endocarditis    Nonrheumatic aortic valve insufficiency    Nonrheumatic pulmonary valve insufficiency    Empyema (HCC)    Pleural effusion     1. Pre-op/ID issues/Empyema: Tolerated procedure. 2. VHD: Hx of MVP and significant MR. Continue to medically manage and follow volume. 3. Hx of MV endocarditis. 4. SVT: Monitor. BB.     5. HTN: Observe. 6. Multiple sclerosis:    7. DM: Per primary service. 8. Hiatal hernia/GERD. 9.  Asbestosis/COPD. Patient stable from CV standpoint. Please call if needed. FERNY Gasca D.O.   Cardiologist  Cardiology, [facility]

## 2022-01-07 NOTE — PROGRESS NOTES
Message to [facility] office regarding need for paper script

## 2022-01-07 NOTE — PROGRESS NOTES
[address] [place] [address] [facility]  Progress Note    SUBJECTIVE:  Chief Complaint   Patient presents with    Abdominal Pain     intermittent x 2 days    Diarrhea     No new complaints. The second chest tube was removed. Tolerating antibiotics. Review of systems:  As stated above in the chief complaint, otherwise negative. Medications:  Scheduled Meds:   albuterol  2.5 mg Nebulization TID    sodium chloride  1 spray Each Nostril 4x Daily    sodium chloride flush  5-40 mL IntraVENous 2 times per day    enoxaparin  40 mg SubCUTAneous Daily    heparin flush  3 mL IntraVENous 2 times per day    cetirizine  5 mg Oral Daily    gabapentin  300 mg Oral BID    metoprolol succinate  100 mg Oral Daily    metoprolol succinate  50 mg Oral Nightly    tamsulosin  0.4 mg Oral Daily    finasteride  5 mg Oral Daily    ampicillin-sulbactam  3,000 mg IntraVENous Q6H     Continuous Infusions:   bupivacaine (MARCAINE) 0.25 % elastomeric infusion 2 mL/hr at 22 193    sodium chloride       PRN Meds:oxyCODONE-acetaminophen, sodium chloride flush, sodium chloride, acetaminophen, morphine **OR** morphine, heparin flush    OBJECTIVE:  /72   Pulse 90   Temp 97 °F (36.1 °C) (Axillary)   Resp 18   Ht 5' 10\" (1.778 m)   Wt 199 lb 9.6 oz (90.5 kg)   SpO2 90%   BMI 28.64 kg/m²   Temp  Av.4 °F (36.3 °C)  Min: 97 °F (36.1 °C)  Max: 97.9 °F (36.6 °C)  Constitutional: The patient is lying in bed. Asleep and arousable. No distress. Skin: Warm and dry. No rashes were noted. HEENT: Round and reactive pupils. Moist mucous membranes. No ulcerations or thrush. Neck: Supple to movements. Asleep but  Chest: No respiratory distress. Diminished breath sounds right lower lobe. Right thoracotomy. Cardiovascular: Heart sounds rhythmic and regular. Abdomen: Positive bowel sounds to auscultation. Benign to palpation. No masses felt. Extremities: No edema.   Lines: Right PICC 1/6/2022. Laboratory and Tests Review:  Lab Results   Component Value Date    WBC 12.7 (H) 01/07/2022    WBC 22.9 (H) 01/06/2022    WBC 26.2 (H) 01/05/2022    HGB 9.6 (L) 01/07/2022    HCT 30.6 (L) 01/07/2022    MCV 96.8 01/07/2022     01/07/2022     Lab Results   Component Value Date    NEUTROABS 9.81 (H) 01/07/2022    NEUTROABS 19.60 (H) 01/06/2022    NEUTROABS 23.89 (H) 01/05/2022     No results found for: CRPHS  Lab Results   Component Value Date    ALT 15 12/30/2021    AST 10 12/30/2021    ALKPHOS 158 (H) 12/30/2021    BILITOT 0.7 12/30/2021     Lab Results   Component Value Date     01/07/2022    K 4.4 01/07/2022     01/07/2022    CO2 27 01/07/2022    BUN 24 01/07/2022    CREATININE 0.7 01/07/2022    CREATININE 0.7 01/06/2022    CREATININE 0.6 01/04/2022    GFRAA >60 01/07/2022    LABGLOM >60 01/07/2022    GLUCOSE 122 01/07/2022    PROT 6.8 12/30/2021    LABALBU 2.6 12/30/2021    CALCIUM 7.9 01/07/2022    BILITOT 0.7 12/30/2021    ALKPHOS 158 12/30/2021    AST 10 12/30/2021    ALT 15 12/30/2021     Lab Results   Component Value Date    CRP 13.7 (H) 12/31/2021     Lab Results   Component Value Date    SEDRATE 68 (H) 12/30/2021     Radiology:      Microbiology:   Blood cultures 12/30/2021: Negative so far  Pleural fluid 12/31/2021: Pansensitive E. coli  Urine culture 12/30/2021: Negative so far  Streptococcus pneumoniae/Legionella urine Ag: negative  Rapid SARS-CoV-2: Negative  ASO titer: 116    No results for input(s): PROCAL in the last 72 hours. ASSESSMENT:  · Right side empyema  · Status post thoracotomy tube 12/31/2021. Cultures grew pansensitive E. coli  · Status post muscle-sparing right thoracotomy with lung decortication 1/4/2022  · Leukocytosis     PLAN:  · Continue Unasyn for another 2 weeks.   Reconciled  · The patient can be discharged from ID standpoint  · Follow-up in the office    Spoke with nursing    Dino Harrison MD  11:54 AM  1/7/2022

## 2022-01-07 NOTE — PROGRESS NOTES
strength for ADLs    Balance retraining/tolerance tasks for facilitation of postural control with dynamic challenges during ADLs .       Positioning to improve functional independence  []?      Recommended Adaptive Equipment: TBD      Home Living: Pt lives with significant other (patient helps care for her). 1 story with 2 steps to enter  Bathroom setup: tub/shower , grab bar    Equipment owned: shower chair , walker , cane, home O2     Prior Level of Function: Independent  with ADLs , independent  with IADLs; ambulated with walker occasionally         Pain Level: right side surgical discomfort with movement. Cognition: A&O: 4/4;               Memory:  Good               Sequencing:  Good               Problem solving:  Good               Judgement/safety:  Good                 Functional Assessment:  AM-PAC Daily Activity Raw Score: 15/24    Initial Eval Status  Date: 1/6/22 Treatment Status  Date: 1/7/22 STGs = LTGs  Time frame: 10-14 days   Feeding Independent        Grooming SBA/set-up ,seated    Mod I    UB Dressing Min A  min A to arrange gown while standing Mod I    LB Dressing Max a  Assist with adjusting socks ,   Threading brief over feet and pulling up over hips   mod A to don brief, max A to adjust socks. Mod I    Bathing Mod A    Mod I    Toileting Assist with hygiene  Setup to use urinal  Mod I    Bed Mobility  Mod A  Supine to sit   supine to sit min A Mod I    Functional Transfers Mod A  Sit-stand from bed  Sit to stand min A fading to CGA with progression of session.  Mod I    Functional Mobility Min A,w/walker  Steps from bed to [facility] to chair  CGA/min A in room and bach using WW, improved stability noted with progression.  Mod I  with good tolerance    Balance Sitting:     Static:  SBA- EOB     Dynamic:Mod A   Standing: MiN A  standing balance min A with [employer] Independent    Activity Tolerance Fair with light activity   No SOB noted   Patient on 2 L   O2 sats 90s  fair with light activity  O2 sat 88-93 throughout session on room air. Good  with ADL activity        Comments:  Patient cleared with nursing and agreeable to session. Good participation and improvement demonstrated. Son educated on ADL and functional transfer techniques with patient, both receptive and demonstrate good understanding. Patient in chair with call light in reach at the end of the session. Education/treatment: ADL and functional transfer/activity performed to increase safety and independence during self care tasks. Education provided on safety awareness, adl reeducation, functional transfer training, work simplification    · Pt has made good progress towards set goals.      Time In: 1:20  Time Out: 2:00     Min Units   Therapeutic Ex 80280     Therapeutic Activities 95904 25 2   ADL/Self Care 42122 15 1   Orthotic Management 71511     Neuro Re-Ed [id number]     Non-Billable Time     TOTAL TIMED TREATMENT 40 1815 [address] [place] [address]

## 2022-01-07 NOTE — DISCHARGE SUMMARY
Physician Discharge Summary     Patient ID:  Sonu Winters  [id number]  64 y.o.  1945    Admit date: 12/30/2021    Discharge date and time: 1/7/2022    Admission Diagnoses:   Patient Active Problem List   Diagnosis    Abnormal PSA    Mitral valve prolapse    MR (mitral regurgitation)    Tricuspid valve regurgitation    Pulmonary HTN (HCC)    HTN (hypertension)    DM (diabetes mellitus) ([facility]    Multiple sclerosis (HCC)    Hiatal hernia    GERD (gastroesophageal reflux disease)    History of kidney stones    Benign prostatic hyperplasia    S/P TURP    Urothelial carcinoma (HCC)    Asbestosis ([facility]    Erectile dysfunction    Panic attacks    History of esophageal stricture    History of esophageal dilatation    Washburn's esophagus determined by biopsy    Anxiety    Claustrophobia    History of PSVT (paroxysmal supraventricular tachycardia)    H/O endocarditis    Nonrheumatic aortic valve insufficiency    Nonrheumatic pulmonary valve insufficiency    Empyema (HCC)    Pleural effusion       Discharge Diagnoses: Empyema/complicated parapneumonic effusion    Consults: cardiology, pulmonary/intensive care, ID and Cardiothoracic surgery     Procedures: Right Thoracotomy 1/4/21, total lung decortication for complicated parapneumonic effusion    Hospital Course:   Patient is a 78-year-old male admitted to [facility] for  Loculated pleural effusion- concerning for possible malignancy given his history   1/4/2021 -right thoracotomy with whole lung decortication for complicated right parapneumonic effusion-await cultures/results-final path report still pending at the time of discharge  -Monitor labs  -Pro-Isac 1.93 > 1.95, check tumor markers-known history of \"spot on lung\", has not pursued surveillance  -Sed rate 68  -WBC 35.5> 30.8 > 25.3  > 18.0>18.4 > 26.2 > 22.9 > 12.9  -R pigtail chest tube placed per pulm, 1200cc out  -IV Unasyn pending pan culture results-negative so far  -Reactive leukocytosis 1/5/2021 postoperatively no evidence of worsening sepsis  -BC negative so far  -PICC line placed 1/6 > unasyn on discharge  -PT/OT for possible placement needs. -ID, CT surgery, Pulm following   -Chest tube removed 1/6/2021      Recent Labs     01/05/22  0320 01/06/22  0348 01/07/22  0305   WBC 26.2* 22.9* 12.7*   HGB 11.6* 10.3* 9.6*   HCT 36.8* 32.1* 30.6*    432 392       Recent Labs     01/04/22  1912 01/06/22  0348 01/07/22  0305    136 138   K 4.7 5.0 4.4    103 103   CO2 22 24 27   BUN 16 25* 24*   CREATININE 0.6* 0.7 0.7   CALCIUM 7.6* 8.1* 7.9*       CT ABDOMEN PELVIS WO CONTRAST Additional Contrast? None    Result Date: 12/30/2021  EXAMINATION: CT OF THE ABDOMEN AND PELVIS WITHOUT CONTRAST 12/30/2021 12:45 pm TECHNIQUE: CT of the abdomen and pelvis was performed without the administration of intravenous contrast. Multiplanar reformatted images are provided for review. Dose modulation, iterative reconstruction, and/or weight based adjustment of the mA/kV was utilized to reduce the radiation dose to as low as reasonably achievable. COMPARISON: None. HISTORY: ORDERING SYSTEM PROVIDED HISTORY: RUQ pain TECHNOLOGIST PROVIDED HISTORY: Reason for exam:->RUQ pain Additional Contrast?->None Decision Support Exception - unselect if not a suspected or confirmed emergency medical condition->Emergency Medical Condition (MA) FINDINGS: Irregular area of attenuation along superior margin of right kidney measures 7.9 x 5.3 cm. There is no hydronephrosis. Nonobstructing bilateral renal calculi are present measuring up to 5 mm on the right and 8 mm on the left. Urinary bladder is normal in contour. Prostate gland is enlarged measuring 5.4 x 6.2 cm. There is no bowel obstruction, free air, or free fluid. The appendix is not definitively visualized, however no focal inflammatory changes in its expected location. Multiple diverticula associated with the right colon.   No evidence of acute diverticulitis. Generalized decreased attenuation throughout the liver. No intrahepatic or extrahepatic bile duct dilatation. Foci of slight increased attenuation seen throughout the gallbladder could suggest numerous gallstones or sludge. No evidence of acute pancreatitis. Spleen is nonenlarged. Adrenal glands are unremarkable. Penile prosthesis demonstrated. Large right pleural effusion with adjacent atelectasis or pneumonia. 1. Irregular masslike area of attenuation along superior aspect of right kidney may be related to scarring at site of prior surgery or trauma versus developing retroperitoneal hemorrhage. 2. Large right pleural effusion with adjacent atelectasis and pneumonia. 3. Foci of irregular attenuation fill the gallbladder related to multiple gallstones or hyperattenuating sludge. 4. Nonobstructing bilateral renal calculi. 5. Multiple diverticula involving right colon. 6. Hepatic steatosis. 7. Prostatomegaly. 8. Results were called by Dr. Noman Tejada to Dr. Sinai Hess on 12/30/2021 at 15:14. XR CHEST (2 VW)    Result Date: 12/30/2021  EXAMINATION: TWO XRAY VIEWS OF THE CHEST 12/30/2021 1:25 pm COMPARISON: December 25, 2016 HISTORY: ORDERING SYSTEM PROVIDED HISTORY: abdominal pain TECHNOLOGIST PROVIDED HISTORY: Reason for exam:->abdominal pain FINDINGS: There is extensive opacification of the right hemithorax. Large right pleural effusion noted. Mild opacity at the left lung base. The heart is mildly enlarged. There is no pneumothorax. Right lung pneumonia and large right pleural effusion. CT ABDOMEN PELVIS W IV CONTRAST Additional Contrast? None    Result Date: 12/30/2021  EXAMINATION: CT OF THE ABDOMEN AND PELVIS WITH CONTRAST 12/30/2021 4:32 pm TECHNIQUE: CT of the abdomen and pelvis was performed with the administration of intravenous contrast. Multiplanar reformatted images are provided for review.  Dose modulation, iterative reconstruction, and/or weight based adjustment of the mA/kV was utilized to reduce the radiation dose to as low as reasonably achievable. COMPARISON: 12/23/2021 12:54 p.m. abdomen/pelvis CT. HISTORY: ORDERING SYSTEM PROVIDED HISTORY: rt kidney hematoma TECHNOLOGIST PROVIDED HISTORY: Reason for exam:->rt kidney hematoma Additional Contrast?->None Decision Support Exception - unselect if not a suspected or confirmed emergency medical condition->Emergency Medical Condition (MA) FINDINGS: There is a large right pleural effusion and small pericardial effusion. Cardiac size is enlarged. There is consolidation right middle lobe, right lower lobe and left lower lobe with linear density in the lingula. No lytic or blastic osseous lesions. The liver is diffusely hypodense. The spleen, pancreas and adrenal glands are normal.  Subtle density within the dependent gallbladder may be sludge or gallstones. There are bilateral renal calculi, not significantly changed. There is an area of mixed hypo and hyper dense material along the posterior margin of the superior right kidney and extending into the right posterior costophrenic sulcus where there is a the 2.9 cm focal hypodense collection. Subtle small hypodense areas within this right perinephric density probably represent fat. There are multiple small cystic lesions within the kidneys bilaterally. The stomach and small bowel are unremarkable. There is diverticulosis involving the colon. Bladder is normal.  There is a penile implant. Prostate gland is enlarged. There is atherosclerotic calcification of the abdominal aorta and iliac arteries but no abdominal aortic aneurysm. Right pleural effusion with right middle lobe and bilateral lower lobe infiltrates consistent with atelectasis and/or infection. Mixed density infiltration right posterosuperior perinephric fat extending into and possibly communicating with the right pleural and pleural fluid.  This may represent an area of contiguous infection although neoplastic disease cannot be excluded. Overall size and volume of this is not significantly changed when compared to the previous study. If there has been recent procedure in this area, this appearance could be secondary to hemorrhage although that is probably less likely. Cardiomegaly and small pericardial effusion. Nonobstructing bilateral nephrolithiasis. Hepatic steatosis. Diverticulosis but no evidence of diverticulitis. Prostate enlargement. RECOMMENDATIONS: Unavailable     XR CHEST PORTABLE    Result Date: 12/31/2021  EXAMINATION: ONE XRAY VIEW OF THE CHEST 12/31/2021 5:42 pm COMPARISON: 12/30/2021 chest radiograph. HISTORY: ORDERING SYSTEM PROVIDED HISTORY: Thoracentesis TECHNOLOGIST PROVIDED HISTORY: Reason for exam:->Thoracentesis FINDINGS: There is airspace density at the right lung base and blunting of the right costophrenic sulcus. There are interstitial densities at the left lung base. Cardiac silhouette is enlarged. There is atherosclerotic calcification of the thoracic aorta. A pigtail catheter overlies the right lateral lung base. Lucencies in this area suggest loculated pneumothorax. Interval pleural tube placement right lung base with evaluation of some of the previous seen pleural fluid. There is probable loculated pneumothorax at the right costophrenic sulcus. Right lower lobe consolidation consistent with atelectasis and/or pneumonia. This is improved overall. Minimal left lower lobe atelectasis and/or pneumonitis. Stable enlargement of the cardiac silhouette. CTA PULMONARY W CONTRAST    Result Date: 12/30/2021  EXAMINATION: CTA OF THE CHEST 12/30/2021 4:32 pm TECHNIQUE: CTA of the chest was performed after the administration of intravenous contrast.  Multiplanar reformatted images are provided for review. MIP images are provided for review.  Dose modulation, iterative reconstruction, and/or weight based adjustment of the mA/kV was utilized to reduce the radiation dose to as low as reasonably achievable. COMPARISON: None. HISTORY: ORDERING SYSTEM PROVIDED HISTORY: pleural effusion TECHNOLOGIST PROVIDED HISTORY: Reason for exam:->pleural effusion Decision Support Exception - unselect if not a suspected or confirmed emergency medical condition->Emergency Medical Condition (MA) FINDINGS: Pulmonary Arteries: Pulmonary arteries are adequately opacified for evaluation. No evidence of intraluminal filling defect to suggest pulmonary embolism. Main pulmonary artery is normal in caliber. Loculation cannot be excluded. Mediastinum: No evidence of mediastinal lymphadenopathy. Cardiomegaly. There is no acute abnormality of the thoracic aorta. Lungs/pleura: No pneumothorax. Moderate to large right pleural effusion possible loculation and with adjacent atelectasis and/or pneumonia. Emphysematous changes. Upper Abdomen: Hepatic steatosis. .  Irregular soft tissue density posterior in superior to the right kidney. Subcentimeter right renal calculi. Soft Tissues/Bones: No acute bone or soft tissue abnormality. Degenerative changes thoracic spine. No evidence for pulmonary emboli. Moderate to large right pleural effusion with adjacent atelectasis versus infiltrate. Loculation cannot be excluded. Cardiomegaly. Hepatic steatosis. Irregular soft tissue density superior and posterior to the right kidney of unknown clinical significance and may related to infectious or neoplastic process. Subcentimeter right renal calculi. Emphysematous changes. RECOMMENDATIONS: Unavailable     US ABDOMEN LIMITED    Result Date: 12/30/2021  EXAMINATION: RIGHT UPPER QUADRANT ULTRASOUND 12/30/2021 1:44 pm COMPARISON: Abdomen radiograph 06/10/2019 HISTORY: ORDERING SYSTEM PROVIDED HISTORY: Pain TECHNOLOGIST PROVIDED HISTORY: Right upper quadrant pain. Epigastric pain. Reason for exam:->Pain What reading provider will be dictating this exam?->CRC FINDINGS: Technologist reports examination is limited by patient pain.   Unable to get decubitus views. No focal abnormality in the limited visualized portion of pancreas. No right upper quadrant ascites. No definite abnormality in the limited visualized portion of the liver. Normal-appearing gallbladder without stones, wall thickening, or pericholecystic fluid. Negative Ladd sign reported. Common bile duct slightly distended at 7.9 mm diameter. Right kidney without hydronephrosis. There are nonspecific 10 and 14 mm foci of minimally shadowing echogenicity in the central kidney suggestive of nonobstructing calculi. Non-specific slight distention of common bile duct with 7.9 mm diameter Echogenic foci in the right kidney most compatible with nonobstructing calculi     US THORACENTESIS Which side should the procedure be performed? Right    Result Date: 1/1/2022  PROCEDURE: ULTRASOUNDGUIDED RIGHT THORACENTESIS 12/31/2021 HISTORY: ORDERING SYSTEM PROVIDED HISTORY: Marking for Right Chest Tube Placement TECHNOLOGIST PROVIDED HISTORY: Reason for exam:->Marking for Right Chest Tube Placement Which side should the procedure be performed?->Right What reading provider will be dictating this exam?->CRC TECHNIQUE: Ultrasound utilized for thoracentesis procedure performed by Dr. Nelly Fox. FINDINGS: Single ultrasound image shows a right pleural effusion. Ultrasound utilized for thoracentesis procedure. For additional information, please refer to operative report. Discharge Exam:    HEENT: NCAT,  PERRLA, No JVD  Heart:  RRR, no murmurs, gallops, or rubs.   Lungs:  CTA bilaterally, no wheeze, rales or rhonchi  Abd: bowel sounds present, nontender, nondistended, no masses  Extrem:  No clubbing, cyanosis, or edema    Disposition: home     Patient Condition at Discharge: Stable    Patient Instructions:      Medication List      START taking these medications    ampicillin-sulbactam 3 (2-1) g Solr  Commonly known as: UNASYN 3GM  Infuse 3,000 mg intravenously every 6 hours for 20 days oxyCODONE-acetaminophen 5-325 MG per tablet  Commonly known as: PERCOCET  Take 1 tablet by mouth every 6 hours as needed for Pain for up to 7 days. CHANGE how you take these medications    gabapentin 300 MG capsule  Commonly known as: NEURONTIN  Take 1 capsule by mouth 4 times daily for 180 days. What changed: when to take this        CONTINUE taking these medications    albuterol sulfate  (90 Base) MCG/ACT inhaler     ALPRAZolam 0.25 MG tablet  Commonly known as: XANAX     azelastine 0.1 % nasal spray  Commonly known as: ASTELIN  USE 1 SPRAY IN EACH NOSTRIL TWICE A DAY AS DIRECTED     baclofen 10 MG tablet  Commonly known as: LIORESAL     cetirizine 10 MG tablet  Commonly known as: ZYRTEC     Cialis 5 MG tablet  Generic drug: tadalafil     dutasteride 0.5 MG capsule  Commonly known as: AVODART     fexofenadine 180 MG tablet  Commonly known as: ALLEGRA     fluticasone 50 MCG/ACT nasal spray  Commonly known as: FLONASE  USE 1 SPRAY NASALLY DAILY     MENS PROSTATE HEALTH FORMULA PO     metoprolol succinate 100 MG extended release tablet  Commonly known as: TOPROL XL  TAKE 1 TABLET IN THE MORNING AND ONE-HALF (1/2) TABLET IN THE EVENING     montelukast 10 MG tablet  Commonly known as: SINGULAIR     REFRESH DRY EYE THERAPY OP     SYSTANE OP     tamsulosin 0.4 MG capsule  Commonly known as: FLOMAX        STOP taking these medications    amoxicillin 250 MG capsule  Commonly known as: AMOXIL           Where to Get Your Medications      You can get these medications from any pharmacy    Bring a paper prescription for each of these medications  · ampicillin-sulbactam 3 (2-1) g Solr  · oxyCODONE-acetaminophen 5-325 MG per tablet       Activity: activity as tolerated  Diet: regular diet    Pt has been advised to: Follow-up with Kate Tate MD in 1 week.   Follow-up with consultants as recommended by them    Note that over 30 minutes was spent in preparing discharge papers, discussing discharge with patient, medication review, etc.    Signed:  SHANIQUE Servin CNP  1/7/2022  2:52 PM     Above note edited to reflect my thoughts     I personally saw, examined and provided care for the patient. Radiographs, labs and medication list were reviewed by me independently. The case was discussed in detail and plans for care were established. Review of RICKEY Servin   , documentation was conducted and revisions were made as appropriate directly by me. I agree with the above documented exam, problem list, and plan of care.      Zeinab Kinney MD  8:34 PM  1/7/2022

## 2022-01-07 NOTE — PROGRESS NOTES
Pulmonary Progress Note    Admit Date: 2021                            PCP: Silverio Rodriguez MD  Active Problems:    Empyema [facility])    Pleural effusion  Resolved Problems:    * No resolved hospital problems. *      Subjective:  POD #3 following right thoracotomy with whole lung decortication 2/2 complicated right parapneumonic effusion  Resting in bed on room air. Both chest tubes have been removed. Reports a loose congested cough, no shortness of breath. Medications:   bupivacaine (MARCAINE) 0.25 % elastomeric infusion 2 mL/hr at 22 193    sodium chloride          sodium chloride flush  5-40 mL IntraVENous 2 times per day    enoxaparin  40 mg SubCUTAneous Daily    heparin flush  3 mL IntraVENous 2 times per day    cetirizine  5 mg Oral Daily    gabapentin  300 mg Oral BID    metoprolol succinate  100 mg Oral Daily    metoprolol succinate  50 mg Oral Nightly    tamsulosin  0.4 mg Oral Daily    finasteride  5 mg Oral Daily    ampicillin-sulbactam  3,000 mg IntraVENous Q6H       Vitals:  VITALS:  /72   Pulse 90   Temp 97 °F (36.1 °C) (Axillary)   Resp 18   Ht 5' 10\" (1.778 m)   Wt 199 lb 9.6 oz (90.5 kg)   SpO2 92%   BMI 28.64 kg/m²   24HR INTAKE/OUTPUT:      Intake/Output Summary (Last 24 hours) at 2022 1014  Last data filed at 2022 0953  Gross per 24 hour   Intake --   Output 1425 ml   Net -1425 ml     CURRENT PULSE OXIMETRY:  SpO2: 92 %  24HR PULSE OXIMETRY RANGE:  SpO2  Av %  Min: 92 %  Max: 100 %  CVP:    VENT SETTINGS:   Vent Information  SpO2: 92 %  Additional Respiratory  Assessments  Pulse: 90  Resp: 18  SpO2: 92 %  Oral Care: Teeth brushed,Mouthwash      EXAM:  General: Alert, in NAD  ENT: No discharge. Pharynx clear. membranes moist   Neck: Supple, Trachea midline. Resp: Non-labored, Lungs diminished. No rales/rhonchi/wheezing. CV: Regular rate. Regular rhythm. No murmur . No edema. ABD: Non-tender. Non-distended. Soft, round .  Normal bowel sounds. Skin: Warm and dry. M/S: No cyanosis. No joint deformity. No clubbing. Neuro: Awake. Follows commands. O x 3, OTERO  Psych:  calm and interactive     I/O: I/O last 3 completed shifts: In: 240 [P.O.:240]  Out: 1900 [Urine:1900]  I/O this shift:  In: -   Out: 625 [Urine:625]     Results:  CBC:   Recent Labs     01/05/22  0320 01/06/22  0348 01/07/22  0305   WBC 26.2* 22.9* 12.7*   HGB 11.6* 10.3* 9.6*   HCT 36.8* 32.1* 30.6*   MCV 95.3 94.4 96.8    432 392     BMP:   Recent Labs     01/04/22  1912 01/06/22  0348 01/07/22  0305    136 138   K 4.7 5.0 4.4    103 103   CO2 22 24 27   BUN 16 25* 24*   CREATININE 0.6* 0.7 0.7       ABG:     Films:  CT ABDOMEN PELVIS WO CONTRAST Additional Contrast? None    Result Date: 12/30/2021  1. Irregular masslike area of attenuation along superior aspect of right kidney may be related to scarring at site of prior surgery or trauma versus developing retroperitoneal hemorrhage. 2. Large right pleural effusion with adjacent atelectasis and pneumonia. 3. Foci of irregular attenuation fill the gallbladder related to multiple gallstones or hyperattenuating sludge. 4. Nonobstructing bilateral renal calculi. 5. Multiple diverticula involving right colon. 6. Hepatic steatosis. 7. Prostatomegaly. 8. Results were called by Dr. Noman Tejada to Dr. Sinai Hess on 12/30/2021 at 15:14. XR CHEST (2 VW)    Result Date: 12/30/2021  Right lung pneumonia and large right pleural effusion. CT ABDOMEN PELVIS W IV CONTRAST Additional Contrast? None    Result Date: 12/30/2021  Right pleural effusion with right middle lobe and bilateral lower lobe infiltrates consistent with atelectasis and/or infection. Mixed density infiltration right posterosuperior perinephric fat extending into and possibly communicating with the right pleural and pleural fluid. This may represent an area of contiguous infection although neoplastic disease cannot be excluded.   Overall size and volume of this is not significantly changed when compared to the previous study. If there has been recent procedure in this area, this appearance could be secondary to hemorrhage although that is probably less likely. Cardiomegaly and small pericardial effusion. Nonobstructing bilateral nephrolithiasis. Hepatic steatosis. Diverticulosis but no evidence of diverticulitis. Prostate enlargement. RECOMMENDATIONS: Unavailable     XR CHEST PORTABLE    Result Date: 12/31/2021  Interval pleural tube placement right lung base with evaluation of some of the previous seen pleural fluid. There is probable loculated pneumothorax at the right costophrenic sulcus. Right lower lobe consolidation consistent with atelectasis and/or pneumonia. This is improved overall. Minimal left lower lobe atelectasis and/or pneumonitis. Stable enlargement of the cardiac silhouette. 01/06/22 CXR: Trace right apical pneumothorax. Stable bilateral airspace disease and or atelectasis and likely small right pleural effusion. CTA PULMONARY W CONTRAST    Result Date: 12/30/2021  No evidence for pulmonary emboli. Moderate to large right pleural effusion with adjacent atelectasis versus infiltrate. Loculation cannot be excluded. Cardiomegaly. Hepatic steatosis. Irregular soft tissue density superior and posterior to the right kidney of unknown clinical significance and may related to infectious or neoplastic process. Subcentimeter right renal calculi. Emphysematous changes. RECOMMENDATIONS: Unavailable     US ABDOMEN LIMITED    Result Date: 12/30/2021  Non-specific slight distention of common bile duct with 7.9 mm diameter Echogenic foci in the right kidney most compatible with nonobstructing calculi     US THORACENTESIS Which side should the procedure be performed? Right    Result Date: 1/1/2022  Ultrasound utilized for thoracentesis procedure. For additional information, please refer to operative report.         Summary:    73-year-old male ex-smoker retired from [employer] vaccinated for coronavirus with history of  GERD, HTN, multiple sclerosis  2 weeks prior colonoscopy done as an outpatient by Dr Kiki Palma: polyp was removed    12/30 presented with sharp stabbing pain with distended abdomen  chest x-ray right pneumonia large right effusion  CT chest showing no PE moderate right effusion atelectasis loculation  Ultrasound abdomen distention of CBD  CT abdomen pelvis masslike area around the right kidney and cholelithiasis  Mixed density infiltration right posterosuperior perinephric fat extending   into and possibly communicating with the right pleural and pleural fluid. This may represent an area of contiguous infection although neoplastic   disease cannot be excluded  12/31 passed swallow study  Right 15 [place] chest tube was placed  1/3 on 5 L saturating 100%  1/4 underwent thoracotomy with decortication. Chest x-ray with multifocal airspace disease  1/5 on 4 L. Chest x-ray stable position of 2 chest tubes right perihilar right base infiltrate  1/6 on 2L, CT to suction - no air leak. CXR with small R pneumo; pleural effusion  1/7: room air, chest tubes out      Assessment:  1. Right lung E. coli empyema s/p chest tube 12/31 exudative negative cytology, cultures E. coli s/p thoracotomy 1/4  2. Abnormal CT of abdomen with possible abscess  3. Acute hypoxic respiratory failure now only on 2 L  4. Asbestosis  5. Multiple sclerosis with minimal disease   6. Lumbosacral radiculopathy with left foot drop  7. anxiety  8. GERD/hiatal hernia  9. HTN  10. Proximal SVT  11. Severe MR with history of mitral valve endocarditis Echo 1/20/2021 EF 65% stage II DD  12. History of bladder cancer  13. Kidney stones status post lithotripsy  14. History of penile implant  15. Elevated PSA being monitored with biopsies  16.  History of vertigo        Plan:  · keep POX >90%, room air as of 1/7/22  · On unasyn, pct 1.95 ID following  · IS for pulmonary hygiene   · Lovenox QD  · S/P

## 2022-01-07 NOTE — PROGRESS NOTES
Physical Therapy    Facility/Department: [facility] INTERNAL MEDICINE 2    NAME: Sonu Vasquez  : 1945  MRN: [id number]    Date of Service: 2022    Patient Diagnosis(es): The primary encounter diagnosis was Pleural effusion. Diagnoses of Abdominal pain, right upper quadrant, Leukocytosis, unspecified type, and Empyema ([address] were also pertinent to this visit. has a past medical history of Abnormal PSA, Anxiety, Bronchitis, Claustrophobia, Diabetes mellitus ([address] Endocarditis, Enlarged LA (left atrium), Erectile dysfunction, GERD (gastroesophageal reflux disease), H/O complete arterial study of extremity, Hiatal hernia, History of EMG, Hypertension, Lung nodule, Mitral valve prolapse, Moderate tricuspid regurgitation, Multiple sclerosis ([address] Prostatitis, Renal calculi, Sinus problem, Supraventricular arrhythmia, Tachycardia, and Tilt table evaluation. has a past surgical history that includes transthoracic echocardiogram (2011  Echo showed normal left ventricular size with borderline concentric left ventricular hypertrophy with normal left ventricular systolic function with stage I  LV diastolic dysfunction, normal, normal right ventricular size and function, mildly dilated left atrium, borderline dilated right atrium mildly thickened anterior mitral leaflet with bowing of the mitral leaflets without felipe prolapse with mild mitral annular calcification, mild-to-moderate eccentrically-directed jet of mitral regurgitation.); Diagnostic Cardiac Cath Lab Procedure; cardiovascular stress test (10/19/2011  Adenosine nuclear stress test (4 minute walking protocol) showed a minimally reversible minor, nontransmural defect involving the apical anterolateral wall, more consistent with soft tissue attenuation and motion artifact); Skin graft; Inguinal hernia repair; Nose surgery; Colonoscopy; Prostate Biopsy (2013); Prostate surgery (Aug,13); eye surgery (10/13); eye surgery ();  Bladder surgery; Dental surgery; Lithotripsy (12/6/17 @ [id number]); thoracotomy (Right, 1/4/2022); and picc line insertion nurse (1/6/2022). Referring provider:  Chace Munoz MD    PT Order:  PT eval and treat     Evaluating PT:  Gopal Torrez PT, DPT PT [id number]    Room #:  5705/8264-L  Diagnosis:  Abdominal pain, right upper quadrant [R10.11]  Pleural effusion [J90]  Empyema ([address] [J86.9]  Leukocytosis, unspecified type [D72.829]  Procedure/Surgery:  Muscle-sparing right thoracotomy with whole lung  decortication. Precautions:  Fall risk, chest tube, O2  Equipment Needs:  w/w    SUBJECTIVE:    Pt lives with his partner in a 1 story home with 1 stairs to enter and 0 rail. Bed and bath is on first floor. Pt ambulated with walker as needed PTA. Pt reported her needs to assist his partner at time and does the driving. OBJECTIVE:   Initial Evaluation  Date: 1/6 Treatment  1/7 Short Term/ Long Term   Goals   Was pt agreeable to Eval/treatment? yes Yes     Does pt have pain? Right sided chest  Right side chest region     Bed Mobility  Rolling: NT  Supine to sit: Mod A  Sit to supine: NT  Scooting: Min A to sitting EOB  rolling sba  Supine to sit min  Sit to supine min  Scooting sba  SBA   Transfers Sit to stand: Min A  Stand to sit: Min A  Stand pivot: Min A with w/w Sit to stand sba   Stand to sba   Stand pivot ww sba  SBA   Ambulation    3 feet x 2 with w/w Min A  50 feet ww sba  50+ feet with w/w SBA    Stair negotiation: ascended and descended  NT  Simulated threshold with ww  sba     ROM BLE:  WFL     Strength BLE:  Grossly 4/5  Grossly 4+/5   Balance Sitting EOB:  Supervision/independent  Dynamic Standing:  Min A with w/w  sitting eob   Independent  Dynamic standing   sba ww  Sitting EOB:  independent  Dynamic Standing:  SBA with w/w   AM-PAC 6 Clicks 99/02 80/48       Pt is A & O x 3  Sensation:  Pt reported numbness/tingling in his feet      Patient education  Pt educated  And son educated on function, safety. Patient response to education:   Pt verbalized understanding Pt demonstrated skill Pt requires further education in this area   yes With cueing yes     ASSESSMENT:    Conditions Requiring Skilled Therapeutic Intervention:    [x]Decreased strength     []Decreased ROM  [x]Decreased functional mobility  [x]Decreased balance   [x]Decreased endurance   [x]Decreased posture  []Decreased sensation  []Decreased coordination   []Decreased vision  []Decreased safety awareness   []Increased pain       Comments:  Pt found in bed. No report of dizziness during functional mobility. Son wanting to take pt home. Pt performed function as per above. Son assisted with transfers, ambulating pt and guarding pt. Reviewed car transfer verbally. Pt overall did well and son worked with pt well. Pt steady needed a little help with getting out of bed. Educated pt and son on safety. Educated on seated ex to do at home. Son reports he wants to take pt home and he will be assisting . At end of eval, pt left sitting up in the chair with call light in reach. Pt's/ family goals   1. None stated    Prognosis is good for reaching above PT goals. Patient and or family understand(s) diagnosis, prognosis, and plan of care.   yes    PHYSICAL THERAPY PLAN OF CARE:    PT POC is established based on physician order and patient diagnosis     Diagnosis:  Abdominal pain, right upper quadrant [R10.11]  Pleural effusion [J90]  Empyema ([address] [J86.9]  Leukocytosis, unspecified type [D72.829]    Current Treatment Recommendations:     [x] Strengthening to improve independence with functional mobility   [] ROM to improve independence with functional mobility   [x] Balance Training to improve static/dynamic balance and to reduce fall risk  [x] Endurance Training to improve activity tolerance during functional mobility   [x] Transfer Training to improve safety and independence with all functional transfers   [x] Gait Training to improve gait mechanics, endurance and assess need for appropriate assistive device  [] Stair Training in preparation for safe discharge home and/or into the community   [] Positioning to prevent skin breakdown and contractures  [x] Safety and Education Training   [x] Patient/Caregiver Education   [] HEP  [] Other     PT long term treatment goals are located in above grid    Frequency of treatments: 2-5x/week x 1-2 weeks.     Time in  0120  Time out  200       CPT codes:  [] Low Complexity PT evaluation 13396  [] Moderate Complexity PT evaluation 51077  [] High Complexity PT evaluation 63276  [] PT Re-evaluation 49818  [x] Therapeutic activities 15256 _40 _minutes  [] Therapeutic exercises 25728 __ minutes      Mcfadden Dire, PTA  [id number]

## 2022-01-07 NOTE — PROGRESS NOTES
On room air patient's SpO2 96%. Walking patient's SpO2 on room air was between 92-96%. At rest patient's SpO2 was 96% on room air.

## 2022-01-07 NOTE — PROGRESS NOTES
Message to Travis Oliver that patient is unable to discharge today due to needing the signed script for Unasyn.   [facility] unable to accept unsigned script

## 2022-01-07 NOTE — CARE COORDINATION
1/7/2022  Social Work Discharge Planning: COVID NEG 12/30. Estelle Aquasco is 15/24. Previous discharge plan was home with jovanny marie and [facility] with julieth (script would need signed and faxed) to follow for IV ATB (Pt has a PICC) ;however, Pt is agreeable and wants to go to a [facility] for rehab. Choices given by step daughter Luan Kaur were [facility], [facility] and [facility]. None have an available bed. SW is waiting for more [facility] choices. Pt is now on room air. Electronically signed by SHERRIE Jiménez on 1/7/2022 at 10:50 AM    1/7/2022  Social Work Discharge Planning:SW was informed that Pt changed his mind and now wants to return home with Bethany Monge. SW notified [facility] and step daughter Yaakov Bonds and Pts son dont feel Pt is capable of being trained to do his own IV ATB. SW notified nursing that Pt needs to be seen by someone who can determine if Pt is able to care for his own IV ATB, because Pt told this worker today that he can do it. . ATB script still needs signed and faxed to [address] [place] [address],[address] [place], [place] [phone]. [place] Electronically signed by SHERRIE Jiménez on 1/7/2022 at 11:10 AM    1/7/2022 Social Work Discharge Planning:SW was informed that after discussing with Pt the IV ATB needs again, he now wants to go to a [facility]. [facility] Waiting reply. Electronically signed by SHERRIE Jiménez on 1/7/2022 at 12:07 PM    1/7/2022. Social Work Discharge Planning:Son and family are taking Pt home with [facility]. [facility] is to follow. [facility] needs the signed script by today-soon or they wont be able to run it though. [facility] is able to start tomorrow morning as long as Pt is able to skip his 2am dose. Electronically signed by SHERRIE Jiménez on 1/7/2022 at 2:31 PM

## 2022-01-08 LAB
BODY FLUID CULTURE, STERILE: NORMAL
CULTURE SURGICAL: NORMAL
GRAM STAIN RESULT: NORMAL

## 2022-01-09 LAB — ANAEROBIC CULTURE: NORMAL

## 2022-01-10 NOTE — PROGRESS NOTES
Physician Progress Note      PATIENTNamshalom Whitehead  CSN #:                  [id number]  :                       1945  ADMIT DATE:       2021 12:22 PM  [address] DATE:        2022 9:29 PM  RESPONDING  PROVIDER #:        RULA Sheriff MD          QUERY TEXT:    Pt admitted with empyema. Noted documentation of sepsis on  by ordered   Pulmonology consultant. If possible, please document in progress notes and   discharge summary:    The medical record reflects the following:  Risk Factors: pneumonia, empyema  Clinical Indicators: WBC 35.5, CRP 13.7, Procal 1.93, T 99, , and per   Pulmonology consult \". Gweneth Latch Gweneth Latch Sepsis based on leukocytosis and tachycardia. Gweneth Latch Gweneth Latch \"  Treatment: IV fluids, IV Unasyn    Thank you,  Max SHEPPARD, RN, CDIS  Clinical Documentation Improvement  [email or web address]  [phone]  Options provided:  -- Sepsis confirmed present on admission  -- Sepsis confirmed not present on admission  -- Sepsis ruled out  -- Other - I will add my own diagnosis  -- Disagree - Not applicable / Not valid  -- Disagree - Clinically unable to determine / Unknown  -- Refer to Clinical Documentation Reviewer    PROVIDER RESPONSE TEXT:    The diagnosis of sepsis was confirmed as present on admission.     Query created by: Audrey Reynolds on 1/3/2022 10:40 AM      Electronically signed by:  RULA Sheriff MD 1/10/2022 1:31 PM

## 2022-01-11 ENCOUNTER — TELEPHONE (OUTPATIENT)
Dept: ADMINISTRATIVE | Age: 77
End: 2022-01-11

## 2022-01-13 ENCOUNTER — TELEPHONE (OUTPATIENT)
Dept: CARDIOLOGY CLINIC | Age: 77
End: 2022-01-13

## 2022-01-17 ENCOUNTER — APPOINTMENT (OUTPATIENT)
Dept: ULTRASOUND IMAGING | Age: 77
DRG: 683 | End: 2022-01-17
Payer: MEDICARE

## 2022-01-17 ENCOUNTER — HOSPITAL ENCOUNTER (INPATIENT)
Age: 77
LOS: 4 days | Discharge: HOME HEALTH | DRG: 683 | End: 2022-01-21
Attending: EMERGENCY MEDICINE | Admitting: INTERNAL MEDICINE
Payer: MEDICARE

## 2022-01-17 ENCOUNTER — APPOINTMENT (OUTPATIENT)
Dept: GENERAL RADIOLOGY | Age: 77
DRG: 683 | End: 2022-01-17
Payer: MEDICARE

## 2022-01-17 ENCOUNTER — APPOINTMENT (OUTPATIENT)
Dept: CT IMAGING | Age: 77
DRG: 683 | End: 2022-01-17
Payer: MEDICARE

## 2022-01-17 DIAGNOSIS — N17.9 AKI (ACUTE KIDNEY INJURY) (HCC): Primary | ICD-10-CM

## 2022-01-17 DIAGNOSIS — N32.0 BLADDER OBSTRUCTION: ICD-10-CM

## 2022-01-17 PROBLEM — R77.8 ELEVATED TROPONIN: Status: ACTIVE | Noted: 2022-01-17

## 2022-01-17 PROBLEM — R79.89 ELEVATED TROPONIN: Status: ACTIVE | Noted: 2022-01-17

## 2022-01-17 PROBLEM — T83.61XA INFECTION ASSOCIATED WITH IMPLANTED PENILE PROSTHESIS (HCC): Status: ACTIVE | Noted: 2022-01-17

## 2022-01-17 PROBLEM — E87.5 HYPERKALEMIA: Status: ACTIVE | Noted: 2022-01-17

## 2022-01-17 LAB
ALBUMIN SERPL-MCNC: 2.6 G/DL (ref 3.5–5.2)
ALP BLD-CCNC: 165 U/L (ref 40–129)
ALT SERPL-CCNC: 42 U/L (ref 0–40)
ANION GAP SERPL CALCULATED.3IONS-SCNC: 11 MMOL/L (ref 7–16)
ANISOCYTOSIS: ABNORMAL
AST SERPL-CCNC: 34 U/L (ref 0–39)
BACTERIA: ABNORMAL /HPF
BASOPHILS ABSOLUTE: 0 E9/L (ref 0–0.2)
BASOPHILS RELATIVE PERCENT: 0 % (ref 0–2)
BILIRUB SERPL-MCNC: 0.4 MG/DL (ref 0–1.2)
BILIRUBIN URINE: NEGATIVE
BLOOD, URINE: ABNORMAL
BUN BLDV-MCNC: 42 MG/DL (ref 6–23)
CALCIUM SERPL-MCNC: 8.7 MG/DL (ref 8.6–10.2)
CHLORIDE BLD-SCNC: 103 MMOL/L (ref 98–107)
CLARITY: CLEAR
CO2: 24 MMOL/L (ref 22–29)
COLOR: YELLOW
CREAT SERPL-MCNC: 1.9 MG/DL (ref 0.7–1.2)
EOSINOPHILS ABSOLUTE: 0 E9/L (ref 0.05–0.5)
EOSINOPHILS RELATIVE PERCENT: 0 % (ref 0–6)
GFR AFRICAN AMERICAN: 42
GFR NON-AFRICAN AMERICAN: 34 ML/MIN/1.73
GLUCOSE BLD-MCNC: 89 MG/DL (ref 74–99)
GLUCOSE URINE: NEGATIVE MG/DL
HCT VFR BLD CALC: 33.1 % (ref 37–54)
HEMOGLOBIN: 10.2 G/DL (ref 12.5–16.5)
KETONES, URINE: NEGATIVE MG/DL
LACTIC ACID, SEPSIS: 0.7 MMOL/L (ref 0.5–1.9)
LACTIC ACID, SEPSIS: 1.3 MMOL/L (ref 0.5–1.9)
LEUKOCYTE ESTERASE, URINE: ABNORMAL
LIPASE: 16 U/L (ref 13–60)
LYMPHOCYTES ABSOLUTE: 0.24 E9/L (ref 1.5–4)
LYMPHOCYTES RELATIVE PERCENT: 1.7 % (ref 20–42)
MCH RBC QN AUTO: 29.9 PG (ref 26–35)
MCHC RBC AUTO-ENTMCNC: 30.8 % (ref 32–34.5)
MCV RBC AUTO: 97.1 FL (ref 80–99.9)
MONOCYTES ABSOLUTE: 0.72 E9/L (ref 0.1–0.95)
MONOCYTES RELATIVE PERCENT: 6.1 % (ref 2–12)
NEUTROPHILS ABSOLUTE: 11.04 E9/L (ref 1.8–7.3)
NEUTROPHILS RELATIVE PERCENT: 92.2 % (ref 43–80)
NITRITE, URINE: NEGATIVE
NUCLEATED RED BLOOD CELLS: 0 /100 WBC
PDW BLD-RTO: 17.8 FL (ref 11.5–15)
PH UA: 8 (ref 5–9)
PLATELET # BLD: 311 E9/L (ref 130–450)
PMV BLD AUTO: 9.7 FL (ref 7–12)
POLYCHROMASIA: ABNORMAL
POTASSIUM REFLEX MAGNESIUM: 5.3 MMOL/L (ref 3.5–5)
PRO-BNP: 3848 PG/ML (ref 0–450)
PROTEIN UA: NEGATIVE MG/DL
RBC # BLD: 3.41 E12/L (ref 3.8–5.8)
RBC UA: ABNORMAL /HPF (ref 0–2)
SARS-COV-2, NAAT: NOT DETECTED
SODIUM BLD-SCNC: 138 MMOL/L (ref 132–146)
SPECIFIC GRAVITY UA: 1.01 (ref 1–1.03)
TOTAL PROTEIN: 6.7 G/DL (ref 6.4–8.3)
TROPONIN, HIGH SENSITIVITY: 34 NG/L (ref 0–11)
TROPONIN, HIGH SENSITIVITY: 36 NG/L (ref 0–11)
UROBILINOGEN, URINE: 0.2 E.U./DL
WBC # BLD: 12 E9/L (ref 4.5–11.5)
WBC UA: ABNORMAL /HPF (ref 0–5)

## 2022-01-17 PROCEDURE — 83690 ASSAY OF LIPASE: CPT

## 2022-01-17 PROCEDURE — 87088 URINE BACTERIA CULTURE: CPT

## 2022-01-17 PROCEDURE — 93975 VASCULAR STUDY: CPT

## 2022-01-17 PROCEDURE — 74176 CT ABD & PELVIS W/O CONTRAST: CPT

## 2022-01-17 PROCEDURE — 2060000000 HC ICU INTERMEDIATE R&B

## 2022-01-17 PROCEDURE — 83880 ASSAY OF NATRIURETIC PEPTIDE: CPT

## 2022-01-17 PROCEDURE — 76870 US EXAM SCROTUM: CPT

## 2022-01-17 PROCEDURE — 71045 X-RAY EXAM CHEST 1 VIEW: CPT

## 2022-01-17 PROCEDURE — 93005 ELECTROCARDIOGRAM TRACING: CPT | Performed by: STUDENT IN AN ORGANIZED HEALTH CARE EDUCATION/TRAINING PROGRAM

## 2022-01-17 PROCEDURE — 84145 PROCALCITONIN (PCT): CPT

## 2022-01-17 PROCEDURE — 81001 URINALYSIS AUTO W/SCOPE: CPT

## 2022-01-17 PROCEDURE — 36415 COLL VENOUS BLD VENIPUNCTURE: CPT

## 2022-01-17 PROCEDURE — 85025 COMPLETE CBC W/AUTO DIFF WBC: CPT

## 2022-01-17 PROCEDURE — 87635 SARS-COV-2 COVID-19 AMP PRB: CPT

## 2022-01-17 PROCEDURE — 83605 ASSAY OF LACTIC ACID: CPT

## 2022-01-17 PROCEDURE — 80053 COMPREHEN METABOLIC PANEL: CPT

## 2022-01-17 PROCEDURE — 2580000003 HC RX 258: Performed by: INTERNAL MEDICINE

## 2022-01-17 PROCEDURE — 6360000002 HC RX W HCPCS: Performed by: NURSE PRACTITIONER

## 2022-01-17 PROCEDURE — 87040 BLOOD CULTURE FOR BACTERIA: CPT

## 2022-01-17 PROCEDURE — 96374 THER/PROPH/DIAG INJ IV PUSH: CPT

## 2022-01-17 PROCEDURE — 6360000002 HC RX W HCPCS: Performed by: INTERNAL MEDICINE

## 2022-01-17 PROCEDURE — 99283 EMERGENCY DEPT VISIT LOW MDM: CPT

## 2022-01-17 PROCEDURE — 84484 ASSAY OF TROPONIN QUANT: CPT

## 2022-01-17 PROCEDURE — 6360000002 HC RX W HCPCS: Performed by: EMERGENCY MEDICINE

## 2022-01-17 PROCEDURE — 6370000000 HC RX 637 (ALT 250 FOR IP): Performed by: NURSE PRACTITIONER

## 2022-01-17 RX ORDER — FINASTERIDE 5 MG/1
5 TABLET, FILM COATED ORAL DAILY
Status: DISCONTINUED | OUTPATIENT
Start: 2022-01-17 | End: 2022-01-21 | Stop reason: HOSPADM

## 2022-01-17 RX ORDER — DEXTROMETHORPHAN POLISTIREX 30 MG/5ML
60 SUSPENSION ORAL 2 TIMES DAILY PRN
COMMUNITY
End: 2022-01-18 | Stop reason: ALTCHOICE

## 2022-01-17 RX ORDER — HEPARIN SODIUM 10000 [USP'U]/ML
5000 INJECTION, SOLUTION INTRAVENOUS; SUBCUTANEOUS EVERY 8 HOURS SCHEDULED
Status: DISCONTINUED | OUTPATIENT
Start: 2022-01-17 | End: 2022-01-21 | Stop reason: HOSPADM

## 2022-01-17 RX ORDER — SODIUM CHLORIDE 0.9 % (FLUSH) 0.9 %
5-40 SYRINGE (ML) INJECTION PRN
Status: DISCONTINUED | OUTPATIENT
Start: 2022-01-17 | End: 2022-01-21 | Stop reason: HOSPADM

## 2022-01-17 RX ORDER — FLUTICASONE PROPIONATE 50 MCG
1 SPRAY, SUSPENSION (ML) NASAL DAILY
Status: DISCONTINUED | OUTPATIENT
Start: 2022-01-17 | End: 2022-01-21 | Stop reason: HOSPADM

## 2022-01-17 RX ORDER — DUTASTERIDE 0.5 MG/1
0.5 CAPSULE, LIQUID FILLED ORAL DAILY
Status: DISCONTINUED | OUTPATIENT
Start: 2022-01-17 | End: 2022-01-17 | Stop reason: CLARIF

## 2022-01-17 RX ORDER — MECLIZINE HCL 12.5 MG/1
12.5 TABLET ORAL 3 TIMES DAILY PRN
Status: ON HOLD | COMMUNITY
End: 2022-04-22 | Stop reason: HOSPADM

## 2022-01-17 RX ORDER — SODIUM CHLORIDE 0.9 % (FLUSH) 0.9 %
5-40 SYRINGE (ML) INJECTION EVERY 12 HOURS SCHEDULED
Status: DISCONTINUED | OUTPATIENT
Start: 2022-01-17 | End: 2022-01-21 | Stop reason: HOSPADM

## 2022-01-17 RX ORDER — SODIUM CHLORIDE 9 MG/ML
INJECTION, SOLUTION INTRAVENOUS EVERY 12 HOURS
Status: DISCONTINUED | OUTPATIENT
Start: 2022-01-18 | End: 2022-01-21

## 2022-01-17 RX ORDER — OXYCODONE HYDROCHLORIDE AND ACETAMINOPHEN 5; 325 MG/1; MG/1
1 TABLET ORAL EVERY 6 HOURS PRN
Status: ON HOLD | COMMUNITY
End: 2022-06-01 | Stop reason: HOSPADM

## 2022-01-17 RX ORDER — SULFAMETHOXAZOLE AND TRIMETHOPRIM 800; 160 MG/1; MG/1
1 TABLET ORAL 2 TIMES DAILY
COMMUNITY
End: 2022-04-07 | Stop reason: ALTCHOICE

## 2022-01-17 RX ORDER — SODIUM CHLORIDE 9 MG/ML
25 INJECTION, SOLUTION INTRAVENOUS PRN
Status: DISCONTINUED | OUTPATIENT
Start: 2022-01-17 | End: 2022-01-21 | Stop reason: HOSPADM

## 2022-01-17 RX ORDER — METOPROLOL SUCCINATE 100 MG/1
100 TABLET, EXTENDED RELEASE ORAL EVERY MORNING
Status: DISCONTINUED | OUTPATIENT
Start: 2022-01-18 | End: 2022-01-21 | Stop reason: HOSPADM

## 2022-01-17 RX ORDER — TAMSULOSIN HYDROCHLORIDE 0.4 MG/1
0.4 CAPSULE ORAL DAILY
Status: DISCONTINUED | OUTPATIENT
Start: 2022-01-17 | End: 2022-01-21 | Stop reason: HOSPADM

## 2022-01-17 RX ORDER — MONTELUKAST SODIUM 10 MG/1
10 TABLET ORAL NIGHTLY
Status: DISCONTINUED | OUTPATIENT
Start: 2022-01-17 | End: 2022-01-21 | Stop reason: HOSPADM

## 2022-01-17 RX ORDER — POLYETHYLENE GLYCOL 3350 17 G/17G
17 POWDER, FOR SOLUTION ORAL DAILY PRN
Status: DISCONTINUED | OUTPATIENT
Start: 2022-01-17 | End: 2022-01-21 | Stop reason: HOSPADM

## 2022-01-17 RX ORDER — SODIUM CHLORIDE 9 MG/ML
INJECTION, SOLUTION INTRAVENOUS CONTINUOUS
Status: DISCONTINUED | OUTPATIENT
Start: 2022-01-17 | End: 2022-01-18

## 2022-01-17 RX ORDER — CETIRIZINE HYDROCHLORIDE 10 MG/1
10 TABLET ORAL DAILY
Status: DISCONTINUED | OUTPATIENT
Start: 2022-01-17 | End: 2022-01-21 | Stop reason: HOSPADM

## 2022-01-17 RX ORDER — AZELASTINE 1 MG/ML
1 SPRAY, METERED NASAL 2 TIMES DAILY
Status: DISCONTINUED | OUTPATIENT
Start: 2022-01-17 | End: 2022-01-17 | Stop reason: DRUGHIGH

## 2022-01-17 RX ORDER — PROCHLORPERAZINE EDISYLATE 5 MG/ML
5 INJECTION INTRAMUSCULAR; INTRAVENOUS EVERY 6 HOURS PRN
Status: DISCONTINUED | OUTPATIENT
Start: 2022-01-17 | End: 2022-01-21 | Stop reason: HOSPADM

## 2022-01-17 RX ORDER — GABAPENTIN 300 MG/1
300 CAPSULE ORAL 2 TIMES DAILY
Status: DISCONTINUED | OUTPATIENT
Start: 2022-01-17 | End: 2022-01-21 | Stop reason: HOSPADM

## 2022-01-17 RX ORDER — ALBUTEROL SULFATE 2.5 MG/3ML
2.5 SOLUTION RESPIRATORY (INHALATION) EVERY 6 HOURS PRN
Status: DISCONTINUED | OUTPATIENT
Start: 2022-01-17 | End: 2022-01-21 | Stop reason: HOSPADM

## 2022-01-17 RX ORDER — MORPHINE SULFATE 2 MG/ML
2 INJECTION, SOLUTION INTRAMUSCULAR; INTRAVENOUS ONCE
Status: COMPLETED | OUTPATIENT
Start: 2022-01-17 | End: 2022-01-17

## 2022-01-17 RX ORDER — METOPROLOL SUCCINATE 50 MG/1
50 TABLET, EXTENDED RELEASE ORAL NIGHTLY
Status: DISCONTINUED | OUTPATIENT
Start: 2022-01-17 | End: 2022-01-21 | Stop reason: HOSPADM

## 2022-01-17 RX ORDER — ALBUTEROL SULFATE 90 UG/1
2 AEROSOL, METERED RESPIRATORY (INHALATION) EVERY 6 HOURS PRN
Status: DISCONTINUED | OUTPATIENT
Start: 2022-01-17 | End: 2022-01-17 | Stop reason: CLARIF

## 2022-01-17 RX ADMIN — HEPARIN SODIUM 5000 UNITS: 10000 INJECTION INTRAVENOUS; SUBCUTANEOUS at 22:26

## 2022-01-17 RX ADMIN — MORPHINE SULFATE 2 MG: 2 INJECTION, SOLUTION INTRAMUSCULAR; INTRAVENOUS at 11:44

## 2022-01-17 RX ADMIN — METOPROLOL SUCCINATE 50 MG: 25 TABLET, FILM COATED, EXTENDED RELEASE ORAL at 22:25

## 2022-01-17 RX ADMIN — CEFEPIME HYDROCHLORIDE 2000 MG: 2 INJECTION, POWDER, FOR SOLUTION INTRAVENOUS at 15:01

## 2022-01-17 RX ADMIN — CETIRIZINE HYDROCHLORIDE 10 MG: 10 TABLET, FILM COATED ORAL at 20:44

## 2022-01-17 RX ADMIN — MONTELUKAST SODIUM 10 MG: 10 TABLET ORAL at 22:26

## 2022-01-17 RX ADMIN — GABAPENTIN 300 MG: 300 CAPSULE ORAL at 20:44

## 2022-01-17 RX ADMIN — FLUTICASONE PROPIONATE 1 SPRAY: 50 SPRAY, METERED NASAL at 22:26

## 2022-01-17 RX ADMIN — TAMSULOSIN HYDROCHLORIDE 0.4 MG: 0.4 CAPSULE ORAL at 20:44

## 2022-01-17 RX ADMIN — FINASTERIDE 5 MG: 5 TABLET, FILM COATED ORAL at 22:26

## 2022-01-17 ASSESSMENT — ENCOUNTER SYMPTOMS
NAUSEA: 0
SINUS PRESSURE: 0
ABDOMINAL PAIN: 1
COUGH: 0
VOMITING: 0
EYE REDNESS: 0
SORE THROAT: 0
BACK PAIN: 0
DIARRHEA: 0
WHEEZING: 0
EYE PAIN: 0
EYE DISCHARGE: 0
SHORTNESS OF BREATH: 0

## 2022-01-17 ASSESSMENT — PAIN SCALES - GENERAL: PAINLEVEL_OUTOF10: 8

## 2022-01-17 NOTE — PROGRESS NOTES
Pharmacy Note    Sonu Vasquez was ordered Astelin. Per the [address] [place] [address], this medication is non-formulary and not stocked by the Pharmacy. The medication can be reordered at discharge.

## 2022-01-17 NOTE — ED PROVIDER NOTES
HPI   This is a 64-year-old male patient presenting to emergency department for complaint of renal failure. Patient was notified by his doctor of symptoms and to report to the emergency department. Symptoms are severe, sudden onset, not better or worse with anything, persistent. He was recently admitted in the hospital and discharged 10 days ago for right parapneumonic effusion. He has a PICC line in place and placed on outpatient Unasyn. His last dose was 6 AM this morning. He is not having any new cough, chest pain or shortness of breath. He does mention that he has been having increasing leg swelling over the last week. He has tried compression stockings and elevating his legs without any relief of symptoms. He is also complaining of right-sided groin pain and penile pain. He is having urinary dribbling associated with this however no purulent discharge. He says he cannot feel himself voiding. Patient was seen by the urologist Friday and diagnosed with penile prosthesis infection, started on p.o. Bactrim. He sees Dr. Lu Lopez for urology. He has been having associated lower abdominal pain as well. Denying any diarrhea, nausea, vomiting he has been having some constipation. Review of Systems   Constitutional: Positive for fatigue. Negative for chills and fever. HENT: Negative for congestion, ear pain, sinus pressure and sore throat. Eyes: Negative for pain, discharge and redness. Respiratory: Negative for cough, shortness of breath and wheezing. Cardiovascular: Negative for chest pain. Gastrointestinal: Positive for abdominal pain. Negative for diarrhea, nausea and vomiting. Genitourinary: Positive for penile pain and testicular pain. Negative for dysuria and frequency. Urinary dribbling   Musculoskeletal: Negative for arthralgias and back pain. Skin: Negative for rash and wound. Neurological: Negative for weakness and headaches. Hematological: Negative for adenopathy. All other systems reviewed and are negative. Physical Exam  Vitals and nursing note reviewed. Exam conducted with a chaperone present. Constitutional:       General: He is not in acute distress. HENT:      Head: Normocephalic and atraumatic. Nose: Nose normal.      Mouth/Throat:      Pharynx: Oropharynx is clear. Eyes:      General: Scleral icterus present. Conjunctiva/sclera: Conjunctivae normal.   Cardiovascular:      Rate and Rhythm: Normal rate and regular rhythm. Pulses: Normal pulses. Heart sounds: Normal heart sounds. Pulmonary:      Effort: Pulmonary effort is normal. No respiratory distress. Breath sounds: No wheezing. Comments: Incisions with sutures on the right side of his chest.  No purulent drainage, no erythema, no swelling. Abdominal:      General: There is no distension. Comments: Ecchymosis left lower abdomen. Generalized lower abdominal tenderness   Genitourinary:     Comments: Right testicular tenderness to palpation right cremasteric reflexes intact. No palpable masses. Penile prosthesis is palpated. No signs of erythema, redness, swelling on the penis, scrotum, testes. Musculoskeletal:      Cervical back: Neck supple. Comments: Pitting edema throughout lower extremities. Skin:     General: Skin is warm and dry. Capillary Refill: Capillary refill takes less than 2 seconds. Neurological:      General: No focal deficit present. Mental Status: He is alert. Procedures     MDM   64-year-old male patient presents to emergency department for complaint of renal failure. Labs and imaging obtained. Patient found to be with elevated creatinine up from his baseline of 1.9. Patient was recently started on Bactrim for possible penile prosthesis infection. Patient also on outpatient Unasyn for previous parapneumonic effusion. My examination I do not see any redness, fluctuance, swelling of the penis.   We did perform ultrasounds as well and no acute testicular or scrotal abnormalities noted. Patient was covered with cefepime and given some fluids. He does have bladder outlet obstruction at this time. Urology NP consulted and they will come down to place Davidson catheter here in the emergency department. At this time he will be admitted for urology consultation and medical management. ED Course as of 01/17/22 1814 Mon Jan 17, 2022   1315 Urology [facility] nurse practitioner states she will come place Davidson catheter on patient. [FG]   7711 Spoke with April  for Dr. Uriah Mota will admit [FG]   [id number] EKG: This EKG is signed and interpreted by me. Rate: *84  Rhythm: Sinus  Interpretation: no acute changes, no ST elevations, no QTC prolongation, PVCs noted  Comparison: stable as compared to patient's most recent EKG   [FG]      ED Course User Index  [FG] Reza RamirezDO        ED Course as of 01/17/22 1814 Mon Jan 17, 2022   1315 Urology [facility] nurse practitioner states she will come place Davidson catheter on patient. [FG]   6213 Spoke with April  for Dr. Uriah Mota will admit [FG]   [id number] EKG: This EKG is signed and interpreted by me.     Rate: *84  Rhythm: Sinus  Interpretation: no acute changes, no ST elevations, no QTC prolongation, PVCs noted  Comparison: stable as compared to patient's most recent EKG   [FG]      ED Course User Index  [FG] Reza AshleyDO       --------------------------------------------- PAST HISTORY ---------------------------------------------  Past Medical History:  has a past medical history of Abnormal PSA, Anxiety, Bronchitis, Claustrophobia, Diabetes mellitus ([facility] [address] Endocarditis, Enlarged LA (left atrium), Erectile dysfunction, GERD (gastroesophageal reflux disease), H/O complete arterial study of extremity, Hiatal hernia, History of EMG, Hypertension, Lung nodule, Mitral valve prolapse, Moderate tricuspid regurgitation, Multiple sclerosis ([facility] [address] Prostatitis, Renal calculi, Sinus problem, or performed during the hospital encounter of 01/17/22   COVID-19, Rapid    Specimen: Nasopharyngeal Swab   Result Value Ref Range    SARS-CoV-2, NAAT Not Detected Not Detected   CBC Auto Differential   Result Value Ref Range    WBC 12.0 (H) 4.5 - 11.5 E9/L    RBC 3.41 (L) 3.80 - 5.80 E12/L    Hemoglobin 10.2 (L) 12.5 - 16.5 g/dL    Hematocrit 33.1 (L) 37.0 - 54.0 %    MCV 97.1 80.0 - 99.9 fL    MCH 29.9 26.0 - 35.0 pg    MCHC 30.8 (L) 32.0 - 34.5 %    RDW 17.8 (H) 11.5 - 15.0 fL    Platelets 170 773 - 791 E9/L    MPV 9.7 7.0 - 12.0 fL    Neutrophils % 92.2 (H) 43.0 - 80.0 %    Lymphocytes % 1.7 (L) 20.0 - 42.0 %    Monocytes % 6.1 2.0 - 12.0 %    Eosinophils % 0.0 0.0 - 6.0 %    Basophils % 0.0 0.0 - 2.0 %    Neutrophils Absolute 11.04 (H) 1.80 - 7.30 E9/L    Lymphocytes Absolute 0.24 (L) 1.50 - 4.00 E9/L    Monocytes Absolute 0.72 0.10 - 0.95 E9/L    Eosinophils Absolute 0.00 (L) 0.05 - 0.50 E9/L    Basophils Absolute 0.00 0.00 - 0.20 E9/L    nRBC 0.0 /100 WBC    Anisocytosis 1+     Polychromasia 1+    Comprehensive Metabolic Panel w/ Reflex to MG   Result Value Ref Range    Sodium 138 132 - 146 mmol/L    Potassium reflex Magnesium 5.3 (H) 3.5 - 5.0 mmol/L    Chloride 103 98 - 107 mmol/L    CO2 24 22 - 29 mmol/L    Anion Gap 11 7 - 16 mmol/L    Glucose 89 74 - 99 mg/dL    BUN 42 (H) 6 - 23 mg/dL    CREATININE 1.9 (H) 0.7 - 1.2 mg/dL    GFR Non-African American 34 >=60 mL/min/1.73    GFR African American 42     Calcium 8.7 8.6 - 10.2 mg/dL    Total Protein 6.7 6.4 - 8.3 g/dL    Albumin 2.6 (L) 3.5 - 5.2 g/dL    Total Bilirubin 0.4 0.0 - 1.2 mg/dL    Alkaline Phosphatase 165 (H) 40 - 129 U/L    ALT 42 (H) 0 - 40 U/L    AST 34 0 - 39 U/L   Lipase   Result Value Ref Range    Lipase 16 13 - 60 U/L   Troponin   Result Value Ref Range    Troponin, High Sensitivity 34 (H) 0 - 11 ng/L   Brain Natriuretic Peptide   Result Value Ref Range    Pro-BNP 3,848 (H) 0 - 450 pg/mL   Urinalysis, reflex to microscopic   Result Value Ref Range    Color, UA Yellow Straw/Yellow    Clarity, UA Clear Clear    Glucose, Ur Negative Negative mg/dL    Bilirubin Urine Negative Negative    Ketones, Urine Negative Negative mg/dL    Specific Gravity, UA 1.010 1.005 - 1.030    Blood, Urine SMALL (A) Negative    pH, UA 8.0 5.0 - 9.0    Protein, UA Negative Negative mg/dL    Urobilinogen, Urine 0.2 <2.0 E.U./dL    Nitrite, Urine Negative Negative    Leukocyte Esterase, Urine TRACE (A) Negative   Lactate, Sepsis   Result Value Ref Range    Lactic Acid, Sepsis 0.7 0.5 - 1.9 mmol/L   Microscopic Urinalysis   Result Value Ref Range    WBC, UA 2-5 0 - 5 /HPF    RBC, UA 2-5 0 - 2 /HPF    Bacteria, UA RARE (A) None Seen /HPF   EKG 12 Lead   Result Value Ref Range    Ventricular Rate 84 BPM    Atrial Rate 84 BPM    P-R Interval 138 ms    QRS Duration 72 ms    Q-T Interval 396 ms    QTc Calculation (Bazett) 467 ms    P Axis 46 degrees    R Axis 8 degrees    T Axis 28 degrees       RADIOLOGY:  US DUP ABD PEL RETRO SCROT COMPLETE   Final Result   1. Unremarkable sonographic evaluation of the bilateral testes. 2. Small right and trace left hydrocele. 3. Right epididymal cyst measures approximately 8 mm in size. US SCROTUM AND TESTICLES   Final Result   1. Unremarkable sonographic evaluation of the bilateral testes. 2. Small right and trace left hydrocele. 3. Right epididymal cyst measures approximately 8 mm in size. XR CHEST PORTABLE   Final Result   Unchanged airspace disease. Removal of right-sided chest tube and placement   of right-sided PICC line as noted. CT ABDOMEN PELVIS WO CONTRAST Additional Contrast? None   Final Result   1. Severe dilatation of the urinary bladder suggesting bladder outlet   obstruction. Prostate gland is enlarged. 2.  Bilateral hydronephrosis and hydroureter with bilateral nephrolithiasis. No evidence of ureterolithiasis.       3.  Partial interval resolution of abnormal soft tissue density in the fat surrounding the upper right kidney. This could be related to prior   infection, trauma, surgery or less likely neoplasm. 4.  Improvement in right pleural effusion with some residual alveolar opacity   in the medial right lower lobe which may represent pneumonia. Interval   appearance of a moderate left effusion. 5.  Diffuse subcutaneous edema suggestive of anasarca. 6.  Penile prosthesis reservoir bulb is partially collapsed but unchanged   from the prior study. No new abnormal fluid collections to suggest abscess   related to the penile prosthesis. Mild edema may be related to diffuse   subcutaneous edema/anasarca. ------------------------- NURSING NOTES AND VITALS REVIEWED ---------------------------  Date / Time Roomed:  1/17/2022 10:23 AM  ED Bed Assignment:  19/19    The nursing notes within the ED encounter and vital signs as below have been reviewed. Patient Vitals for the past 24 hrs:   BP Temp Temp src Pulse Resp SpO2 Height Weight   01/17/22 1445 107/67 -- -- 76 16 97 % -- --   01/17/22 1247 -- -- -- -- -- 94 % -- --   01/17/22 1245 (!) 102/56 -- -- 88 12 (!) 89 % -- --   01/17/22 1021 112/70 96.4 °F (35.8 °C) Temporal 94 16 94 % 5' 10\" (1.778 m) 190 lb (86.2 kg)       Oxygen Saturation Interpretation: Normal    ------------------------------------------ PROGRESS NOTES ------------------------------------------  Re-evaluation(s):  Time: 6pm  Patients symptoms show no change  Repeat physical examination is not changed    Counseling:  I have spoken with the patient and discussed todays results, in addition to providing specific details for the plan of care and counseling regarding the diagnosis and prognosis. Their questions are answered at this time and they are agreeable with the plan of admission.    --------------------------------- ADDITIONAL PROVIDER NOTES ---------------------------------  Consultations:  Time: 3pm. Spoke with April for Dr. Mary Roberts.   Discussed case.  They will admit the patient. This patient's ED course included: a personal history and physicial examination, re-evaluation prior to disposition, multiple bedside re-evaluations, IV medications and cardiac monitoring    This patient has remained hemodynamically stable during their ED course. Diagnosis:  1. JAMIE (acute kidney injury) ([facility] [address]    2. Bladder obstruction        Disposition:  Patient's disposition: Admit to telemetry  Patient's condition is stable.          Alexander Nageotte, DO  Resident  01/17/22 8510

## 2022-01-17 NOTE — PROGRESS NOTES
Database initiated pharmacy and medications verified with the patient and his son. A&O  Has fallen recently uses a walker. . Has a Penile implant.  He follows with Dr Kait Edmonds (Card) Dr Boni Epley (urology) and Dr Kojo Arambula and Dr Subhash Iyer (ID)
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
## 2022-01-18 LAB
ALBUMIN SERPL-MCNC: 2.5 G/DL (ref 3.5–5.2)
ALP BLD-CCNC: 164 U/L (ref 40–129)
ALT SERPL-CCNC: 38 U/L (ref 0–40)
ANION GAP SERPL CALCULATED.3IONS-SCNC: 10 MMOL/L (ref 7–16)
ANISOCYTOSIS: ABNORMAL
AST SERPL-CCNC: 31 U/L (ref 0–39)
BASOPHILS ABSOLUTE: 0.03 E9/L (ref 0–0.2)
BASOPHILS RELATIVE PERCENT: 0.3 % (ref 0–2)
BILIRUB SERPL-MCNC: 0.4 MG/DL (ref 0–1.2)
BUN BLDV-MCNC: 37 MG/DL (ref 6–23)
CALCIUM SERPL-MCNC: 8.9 MG/DL (ref 8.6–10.2)
CHLORIDE BLD-SCNC: 107 MMOL/L (ref 98–107)
CO2: 23 MMOL/L (ref 22–29)
CREAT SERPL-MCNC: 2 MG/DL (ref 0.7–1.2)
EKG ATRIAL RATE: 84 BPM
EKG P AXIS: 46 DEGREES
EKG P-R INTERVAL: 138 MS
EKG Q-T INTERVAL: 396 MS
EKG QRS DURATION: 72 MS
EKG QTC CALCULATION (BAZETT): 467 MS
EKG R AXIS: 8 DEGREES
EKG T AXIS: 28 DEGREES
EKG VENTRICULAR RATE: 84 BPM
EOSINOPHILS ABSOLUTE: 0.2 E9/L (ref 0.05–0.5)
EOSINOPHILS RELATIVE PERCENT: 1.7 % (ref 0–6)
GFR AFRICAN AMERICAN: 39
GFR NON-AFRICAN AMERICAN: 32 ML/MIN/1.73
GLUCOSE BLD-MCNC: 102 MG/DL (ref 74–99)
HCT VFR BLD CALC: 29.2 % (ref 37–54)
HEMOGLOBIN: 8.9 G/DL (ref 12.5–16.5)
IMMATURE GRANULOCYTES #: 0.1 E9/L
IMMATURE GRANULOCYTES %: 0.9 % (ref 0–5)
LYMPHOCYTES ABSOLUTE: 0.58 E9/L (ref 1.5–4)
LYMPHOCYTES RELATIVE PERCENT: 4.9 % (ref 20–42)
MCH RBC QN AUTO: 30.2 PG (ref 26–35)
MCHC RBC AUTO-ENTMCNC: 30.5 % (ref 32–34.5)
MCV RBC AUTO: 99 FL (ref 80–99.9)
MONOCYTES ABSOLUTE: 0.97 E9/L (ref 0.1–0.95)
MONOCYTES RELATIVE PERCENT: 8.3 % (ref 2–12)
NEUTROPHILS ABSOLUTE: 9.85 E9/L (ref 1.8–7.3)
NEUTROPHILS RELATIVE PERCENT: 83.9 % (ref 43–80)
PDW BLD-RTO: 17.5 FL (ref 11.5–15)
PLATELET # BLD: 308 E9/L (ref 130–450)
PMV BLD AUTO: 9.8 FL (ref 7–12)
POTASSIUM REFLEX MAGNESIUM: 5 MMOL/L (ref 3.5–5)
PROCALCITONIN: 0.17 NG/ML (ref 0–0.08)
RBC # BLD: 2.95 E12/L (ref 3.8–5.8)
SODIUM BLD-SCNC: 140 MMOL/L (ref 132–146)
TOTAL PROTEIN: 6.6 G/DL (ref 6.4–8.3)
TROPONIN, HIGH SENSITIVITY: 34 NG/L (ref 0–11)
WBC # BLD: 11.7 E9/L (ref 4.5–11.5)

## 2022-01-18 PROCEDURE — 84484 ASSAY OF TROPONIN QUANT: CPT

## 2022-01-18 PROCEDURE — 6370000000 HC RX 637 (ALT 250 FOR IP): Performed by: NURSE PRACTITIONER

## 2022-01-18 PROCEDURE — 93010 ELECTROCARDIOGRAM REPORT: CPT | Performed by: INTERNAL MEDICINE

## 2022-01-18 PROCEDURE — 2060000000 HC ICU INTERMEDIATE R&B

## 2022-01-18 PROCEDURE — 2580000003 HC RX 258: Performed by: NURSE PRACTITIONER

## 2022-01-18 PROCEDURE — 85025 COMPLETE CBC W/AUTO DIFF WBC: CPT

## 2022-01-18 PROCEDURE — 6360000002 HC RX W HCPCS: Performed by: NURSE PRACTITIONER

## 2022-01-18 PROCEDURE — 36415 COLL VENOUS BLD VENIPUNCTURE: CPT

## 2022-01-18 PROCEDURE — 80053 COMPREHEN METABOLIC PANEL: CPT

## 2022-01-18 RX ORDER — METOPROLOL SUCCINATE 100 MG/1
100 TABLET, EXTENDED RELEASE ORAL EVERY MORNING
Status: ON HOLD | COMMUNITY
End: 2022-01-21 | Stop reason: HOSPADM

## 2022-01-18 RX ORDER — GABAPENTIN 300 MG/1
300 CAPSULE ORAL 3 TIMES DAILY
Status: ON HOLD | COMMUNITY
End: 2022-01-21 | Stop reason: HOSPADM

## 2022-01-18 RX ORDER — METOPROLOL SUCCINATE 100 MG/1
50 TABLET, EXTENDED RELEASE ORAL EVERY EVENING
Status: ON HOLD | COMMUNITY
End: 2022-01-21 | Stop reason: HOSPADM

## 2022-01-18 RX ORDER — AZELASTINE 1 MG/ML
1 SPRAY, METERED NASAL 2 TIMES DAILY
Status: ON HOLD | COMMUNITY
End: 2022-04-22 | Stop reason: HOSPADM

## 2022-01-18 RX ORDER — FLUTICASONE PROPIONATE 50 MCG
1 SPRAY, SUSPENSION (ML) NASAL DAILY
COMMUNITY
End: 2022-02-07

## 2022-01-18 RX ORDER — SENNA PLUS 8.6 MG/1
1-2 TABLET ORAL NIGHTLY PRN
COMMUNITY

## 2022-01-18 RX ADMIN — Medication 10 ML: at 04:49

## 2022-01-18 RX ADMIN — HEPARIN SODIUM 5000 UNITS: 10000 INJECTION INTRAVENOUS; SUBCUTANEOUS at 08:48

## 2022-01-18 RX ADMIN — GABAPENTIN 300 MG: 300 CAPSULE ORAL at 08:48

## 2022-01-18 RX ADMIN — SODIUM CHLORIDE: 9 INJECTION, SOLUTION INTRAVENOUS at 20:14

## 2022-01-18 RX ADMIN — METOPROLOL SUCCINATE 100 MG: 100 TABLET, EXTENDED RELEASE ORAL at 08:47

## 2022-01-18 RX ADMIN — HEPARIN SODIUM 5000 UNITS: 10000 INJECTION INTRAVENOUS; SUBCUTANEOUS at 14:18

## 2022-01-18 RX ADMIN — GABAPENTIN 300 MG: 300 CAPSULE ORAL at 16:06

## 2022-01-18 RX ADMIN — SODIUM CHLORIDE: 9 INJECTION, SOLUTION INTRAVENOUS at 05:24

## 2022-01-18 RX ADMIN — CEFEPIME HYDROCHLORIDE 2000 MG: 2 INJECTION, POWDER, FOR SOLUTION INTRAVENOUS at 16:06

## 2022-01-18 RX ADMIN — FLUTICASONE PROPIONATE 1 SPRAY: 50 SPRAY, METERED NASAL at 08:48

## 2022-01-18 RX ADMIN — TAMSULOSIN HYDROCHLORIDE 0.4 MG: 0.4 CAPSULE ORAL at 08:48

## 2022-01-18 RX ADMIN — Medication 5 ML: at 22:07

## 2022-01-18 RX ADMIN — HEPARIN SODIUM 5000 UNITS: 10000 INJECTION INTRAVENOUS; SUBCUTANEOUS at 20:20

## 2022-01-18 RX ADMIN — CEFEPIME HYDROCHLORIDE 2000 MG: 2 INJECTION, POWDER, FOR SOLUTION INTRAVENOUS at 04:48

## 2022-01-18 RX ADMIN — METOPROLOL SUCCINATE 50 MG: 25 TABLET, FILM COATED, EXTENDED RELEASE ORAL at 20:18

## 2022-01-18 RX ADMIN — Medication 10 ML: at 16:07

## 2022-01-18 RX ADMIN — FINASTERIDE 5 MG: 5 TABLET, FILM COATED ORAL at 08:48

## 2022-01-18 RX ADMIN — PROCHLORPERAZINE EDISYLATE 5 MG: 5 INJECTION INTRAMUSCULAR; INTRAVENOUS at 17:05

## 2022-01-18 RX ADMIN — MONTELUKAST SODIUM 10 MG: 10 TABLET ORAL at 20:19

## 2022-01-18 RX ADMIN — CETIRIZINE HYDROCHLORIDE 10 MG: 10 TABLET, FILM COATED ORAL at 08:48

## 2022-01-18 ASSESSMENT — PAIN SCALES - GENERAL
PAINLEVEL_OUTOF10: 0
PAINLEVEL_OUTOF10: 0

## 2022-01-18 NOTE — PROGRESS NOTES
1/18/2022 1:30 PM  Service: Urology  Group: [facility] ([facility])    Sonu Vasquez  [id number]    Subjective: Remains in the emergency department awaiting bed  He is without any new complaints today  Davidson catheter is draining yellow urine  His family is present    Review of Systems  Constitutional: No fever or chills   Respiratory: negative for cough and hemoptysis  Cardiovascular: negative for chest pain and dyspnea  Gastrointestinal: negative for abdominal pain, diarrhea, nausea and vomiting   : See above  Derm: negative for rash and skin lesion(s)  Neurological: negative for seizures and tremors  Musculoskeletal: Negative    Psychiatric: Negative   All other reviews are negative      Scheduled Meds:   sodium chloride flush  5-40 mL IntraVENous 2 times per day    heparin (porcine)  5,000 Units SubCUTAneous 3 times per day    cetirizine  10 mg Oral Daily    fluticasone  1 spray Each Nostril Daily    gabapentin  300 mg Oral BID    montelukast  10 mg Oral Nightly    tamsulosin  0.4 mg Oral Daily    metoprolol succinate  50 mg Oral Nightly    metoprolol succinate  100 mg Oral QAM    cefepime  2,000 mg IntraVENous Q12H    finasteride  5 mg Oral Daily       Objective:  Vitals:    01/18/22 1315   BP: 121/72   Pulse: 95   Resp: 18   Temp:    SpO2: 95%         Allergies: Aleve [naproxen], Baclofen, Dye [iodides], Paxil [paroxetine], and Shellfish allergy    General Appearance: alert and oriented to person, place and time and in no acute distress  Skin: no rash or erythema  Head: normocephalic and atraumatic  Pulmonary/Chest: normal air movement, no respiratory distress  Abdomen: soft, non-tender, non-distended  Genitourinary:  Davidson catheter draining yellow urine, his IPP device was deflated completely, no evidence of any infection or abscess, palpable pump in left scrotum   Extremities: Bilateral lower extremity edema        Labs:     Recent Labs     01/18/22  0454      K 5.0    CO2 23   BUN 37*   CREATININE 2.0*   GLUCOSE 102*   CALCIUM 8.9       Lab Results   Component Value Date    HGB 8.9 01/18/2022    HCT 29.2 01/18/2022       No results found for: PSA      Assessment/Plan:  Urinary retention (2100cc)  Bilateral nonobstructing renal calculi  ED s/p IPP 10 years ago with Dr Brian Carlson   BPH  APPLE  JAMIE    Continue to watch the creatinine  Recommend nephrology consult  Urine culture no growth to date  Blood cultures pending  Antibiotics per primary  CTAP without evidence of obstructive uropathy at this time  Hernandez was inserted for 2100cc of immediate output  Cont the Flomax   Cont the hernandez upon discharge  Will need outpatient voiding trial with [facility], Dr. Debbi Desai there are no further acute interventions planned at this time  Please call with additional questions or concerns        Amelie Cordon, APRN - CNP   [facility]
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
## 2022-01-18 NOTE — PROGRESS NOTES
Son Luz Marinabrooklynn John) present while this nurse performs skin assessment. He states \" Those areas on his back are from a procedure he had when he was in the hospital recently, they called it a thoracentisis and then they attached a drain to his back to drain fluid. \"

## 2022-01-19 ENCOUNTER — APPOINTMENT (OUTPATIENT)
Dept: ULTRASOUND IMAGING | Age: 77
DRG: 683 | End: 2022-01-19
Payer: MEDICARE

## 2022-01-19 LAB
ANION GAP SERPL CALCULATED.3IONS-SCNC: 9 MMOL/L (ref 7–16)
BASOPHILS ABSOLUTE: 0.02 E9/L (ref 0–0.2)
BASOPHILS RELATIVE PERCENT: 0.2 % (ref 0–2)
BUN BLDV-MCNC: 31 MG/DL (ref 6–23)
CALCIUM SERPL-MCNC: 8.6 MG/DL (ref 8.6–10.2)
CHLORIDE BLD-SCNC: 107 MMOL/L (ref 98–107)
CHLORIDE URINE RANDOM: 114 MMOL/L
CO2: 24 MMOL/L (ref 22–29)
CREAT SERPL-MCNC: 1.4 MG/DL (ref 0.7–1.2)
CREATININE URINE: 30 MG/DL (ref 40–278)
CREATININE URINE: 31 MG/DL (ref 40–278)
D DIMER: 1797 NG/ML DDU
EOSINOPHILS ABSOLUTE: 0.08 E9/L (ref 0.05–0.5)
EOSINOPHILS RELATIVE PERCENT: 0.8 % (ref 0–6)
FERRITIN: 837 NG/ML
FOLATE: 8.2 NG/ML (ref 4.8–24.2)
GFR AFRICAN AMERICAN: 59
GFR NON-AFRICAN AMERICAN: 49 ML/MIN/1.73
GLUCOSE BLD-MCNC: 104 MG/DL (ref 74–99)
HCT VFR BLD CALC: 26.5 % (ref 37–54)
HEMOGLOBIN: 8.1 G/DL (ref 12.5–16.5)
IMMATURE GRANULOCYTES #: 0.07 E9/L
IMMATURE GRANULOCYTES %: 0.7 % (ref 0–5)
IRON SATURATION: 60 % (ref 20–55)
IRON: 113 MCG/DL (ref 59–158)
LYMPHOCYTES ABSOLUTE: 0.69 E9/L (ref 1.5–4)
LYMPHOCYTES RELATIVE PERCENT: 6.6 % (ref 20–42)
MCH RBC QN AUTO: 30.5 PG (ref 26–35)
MCHC RBC AUTO-ENTMCNC: 30.6 % (ref 32–34.5)
MCV RBC AUTO: 99.6 FL (ref 80–99.9)
MICROALBUMIN UR-MCNC: 70.9 MG/L
MICROALBUMIN/CREAT UR-RTO: 228.7 (ref 0–30)
MONOCYTES ABSOLUTE: 0.76 E9/L (ref 0.1–0.95)
MONOCYTES RELATIVE PERCENT: 7.3 % (ref 2–12)
NEUTROPHILS ABSOLUTE: 8.78 E9/L (ref 1.8–7.3)
NEUTROPHILS RELATIVE PERCENT: 84.4 % (ref 43–80)
PDW BLD-RTO: 17.8 FL (ref 11.5–15)
PLATELET # BLD: 259 E9/L (ref 130–450)
PMV BLD AUTO: 10.3 FL (ref 7–12)
POTASSIUM SERPL-SCNC: 4.4 MMOL/L (ref 3.5–5)
POTASSIUM, UR: 21.7 MMOL/L
PROTEIN PROTEIN: 26 MG/DL (ref 0–12)
PROTEIN/CREAT RATIO: 0.9
PROTEIN/CREAT RATIO: 0.9 (ref 0–0.2)
RBC # BLD: 2.66 E12/L (ref 3.8–5.8)
SODIUM BLD-SCNC: 140 MMOL/L (ref 132–146)
SODIUM URINE: 139 MMOL/L
TOTAL IRON BINDING CAPACITY: 187 MCG/DL (ref 250–450)
URINE CULTURE, ROUTINE: NORMAL
VITAMIN B-12: 753 PG/ML (ref 211–946)
WBC # BLD: 10.4 E9/L (ref 4.5–11.5)

## 2022-01-19 PROCEDURE — 82607 VITAMIN B-12: CPT

## 2022-01-19 PROCEDURE — 82746 ASSAY OF FOLIC ACID SERUM: CPT

## 2022-01-19 PROCEDURE — 80048 BASIC METABOLIC PNL TOTAL CA: CPT

## 2022-01-19 PROCEDURE — 76770 US EXAM ABDO BACK WALL COMP: CPT

## 2022-01-19 PROCEDURE — 84156 ASSAY OF PROTEIN URINE: CPT

## 2022-01-19 PROCEDURE — 6370000000 HC RX 637 (ALT 250 FOR IP): Performed by: FAMILY MEDICINE

## 2022-01-19 PROCEDURE — 97165 OT EVAL LOW COMPLEX 30 MIN: CPT

## 2022-01-19 PROCEDURE — 6370000000 HC RX 637 (ALT 250 FOR IP): Performed by: NURSE PRACTITIONER

## 2022-01-19 PROCEDURE — 84133 ASSAY OF URINE POTASSIUM: CPT

## 2022-01-19 PROCEDURE — 2580000003 HC RX 258: Performed by: NURSE PRACTITIONER

## 2022-01-19 PROCEDURE — 82728 ASSAY OF FERRITIN: CPT

## 2022-01-19 PROCEDURE — 82044 UR ALBUMIN SEMIQUANTITATIVE: CPT

## 2022-01-19 PROCEDURE — 2580000003 HC RX 258: Performed by: INTERNAL MEDICINE

## 2022-01-19 PROCEDURE — 82436 ASSAY OF URINE CHLORIDE: CPT

## 2022-01-19 PROCEDURE — 6360000002 HC RX W HCPCS: Performed by: INTERNAL MEDICINE

## 2022-01-19 PROCEDURE — 2700000000 HC OXYGEN THERAPY PER DAY

## 2022-01-19 PROCEDURE — 6360000002 HC RX W HCPCS: Performed by: NURSE PRACTITIONER

## 2022-01-19 PROCEDURE — 84300 ASSAY OF URINE SODIUM: CPT

## 2022-01-19 PROCEDURE — 83540 ASSAY OF IRON: CPT

## 2022-01-19 PROCEDURE — 97530 THERAPEUTIC ACTIVITIES: CPT

## 2022-01-19 PROCEDURE — 82570 ASSAY OF URINE CREATININE: CPT

## 2022-01-19 PROCEDURE — 2060000000 HC ICU INTERMEDIATE R&B

## 2022-01-19 PROCEDURE — 83550 IRON BINDING TEST: CPT

## 2022-01-19 PROCEDURE — 36415 COLL VENOUS BLD VENIPUNCTURE: CPT

## 2022-01-19 PROCEDURE — 85025 COMPLETE CBC W/AUTO DIFF WBC: CPT

## 2022-01-19 PROCEDURE — 85378 FIBRIN DEGRADE SEMIQUANT: CPT

## 2022-01-19 RX ORDER — ALPRAZOLAM 0.25 MG/1
0.25 TABLET ORAL NIGHTLY PRN
Status: DISCONTINUED | OUTPATIENT
Start: 2022-01-19 | End: 2022-01-21 | Stop reason: HOSPADM

## 2022-01-19 RX ADMIN — CETIRIZINE HYDROCHLORIDE 10 MG: 10 TABLET, FILM COATED ORAL at 10:04

## 2022-01-19 RX ADMIN — GABAPENTIN 300 MG: 300 CAPSULE ORAL at 10:05

## 2022-01-19 RX ADMIN — SODIUM CHLORIDE: 9 INJECTION, SOLUTION INTRAVENOUS at 20:01

## 2022-01-19 RX ADMIN — MONTELUKAST SODIUM 10 MG: 10 TABLET ORAL at 20:00

## 2022-01-19 RX ADMIN — HEPARIN SODIUM 5000 UNITS: 10000 INJECTION INTRAVENOUS; SUBCUTANEOUS at 14:52

## 2022-01-19 RX ADMIN — AMPICILLIN SODIUM AND SULBACTAM SODIUM 3000 MG: 2; 1 INJECTION, POWDER, FOR SOLUTION INTRAMUSCULAR; INTRAVENOUS at 23:00

## 2022-01-19 RX ADMIN — ALBUTEROL SULFATE 2.5 MG: 0.83 SOLUTION RESPIRATORY (INHALATION) at 20:18

## 2022-01-19 RX ADMIN — GABAPENTIN 300 MG: 300 CAPSULE ORAL at 20:00

## 2022-01-19 RX ADMIN — PROCHLORPERAZINE EDISYLATE 5 MG: 5 INJECTION INTRAMUSCULAR; INTRAVENOUS at 16:08

## 2022-01-19 RX ADMIN — CEFEPIME HYDROCHLORIDE 2000 MG: 2 INJECTION, POWDER, FOR SOLUTION INTRAVENOUS at 03:34

## 2022-01-19 RX ADMIN — SODIUM CHLORIDE: 9 INJECTION, SOLUTION INTRAVENOUS at 08:50

## 2022-01-19 RX ADMIN — HEPARIN SODIUM 5000 UNITS: 10000 INJECTION INTRAVENOUS; SUBCUTANEOUS at 06:20

## 2022-01-19 RX ADMIN — FINASTERIDE 5 MG: 5 TABLET, FILM COATED ORAL at 10:04

## 2022-01-19 RX ADMIN — TAMSULOSIN HYDROCHLORIDE 0.4 MG: 0.4 CAPSULE ORAL at 10:04

## 2022-01-19 RX ADMIN — FLUTICASONE PROPIONATE 1 SPRAY: 50 SPRAY, METERED NASAL at 10:04

## 2022-01-19 RX ADMIN — Medication 10 ML: at 20:01

## 2022-01-19 RX ADMIN — Medication 10 ML: at 10:04

## 2022-01-19 RX ADMIN — CEFEPIME HYDROCHLORIDE 2000 MG: 2 INJECTION, POWDER, FOR SOLUTION INTRAVENOUS at 14:52

## 2022-01-19 RX ADMIN — ALPRAZOLAM 0.25 MG: 0.25 TABLET ORAL at 20:00

## 2022-01-19 RX ADMIN — HEPARIN SODIUM 5000 UNITS: 10000 INJECTION INTRAVENOUS; SUBCUTANEOUS at 20:01

## 2022-01-19 ASSESSMENT — PAIN SCALES - GENERAL
PAINLEVEL_OUTOF10: 0

## 2022-01-19 NOTE — CARE COORDINATION
Met w/ patient. Explained role of  and plan of care. Lives w/ domestic partner of 32 years Madeleine Shen in a 1 story condo-1 step to entrance. Has walker, cane, walk-in shower. Hx R thoracotomy 1/4/22. Has PICC PTA. Active w/ [facility]- will need resume order on discharge. [facility] notified of pt's admission- currently on Cefepime 2 gm [id number] was receiving Unasyn iv at home PTA- will need new signed script faxed to [place] [phone] on discharge [phone] -await final abx plan. PT/OT evals pending. Currently requiring O2 2l NC- need to attempt to wean- no DME preference if unable to wean off. Per pt, plan is to return home on discharge. Will follow Grupo May RN case manager    The Plan for Transition of Care is related to the following treatment goals: abx administration    The Patient and/or patient representative Sonu Vasquez was provided with a choice of provider and agrees   with the discharge plan. [x] Yes [] No    Freedom of choice list was provided with basic dialogue that supports the patient's individualized plan of care/goals, treatment preferences and shares the quality data associated with the providers.  [x] Yes [] No Post-Anesthesia Evaluation and Assessment    Patient: Rosmery Dewitt Sr. MRN: [id number]  SSN: [id number]    YOB: 1955  Age: 70 y.o. Sex: male       Cardiovascular Function/Vital Signs  Visit Vitals    /75    Pulse 94    Temp 36.7 °C (98 °F)    Resp 30    Ht 5' 10\" (1.778 m)    Wt 74.6 kg (164 lb 7.4 oz)    SpO2 97%    BMI 23.6 kg/m2       Patient is status post total IV anesthesia anesthesia for Procedure(s):  ESOPHAGOGASTRODUODENOSCOPY (EGD)  ESOPHAGOGASTRODUODENAL (EGD) BIOPSY. Nausea/Vomiting: None    Postoperative hydration reviewed and adequate. Pain:  Pain Scale 1: Numeric (0 - 10) (08/23/18 1317)  Pain Intensity 1: 0 (08/23/18 1317)   Managed    Neurological Status:   Neuro  Neurologic State: Alert;Confused (periodic confusion, easily reorients himself) (08/23/18 0801)  Orientation Level: Oriented to person;Oriented to place;Oriented to situation;Oriented to time (08/23/18 0801)  Cognition: Follows commands (08/23/18 0801)  Speech: Clear (08/23/18 0801)  LUE Motor Response: Purposeful (08/23/18 0801)  LLE Motor Response: Purposeful;Weak (08/23/18 0801)  RUE Motor Response: Purposeful (08/23/18 0801)  RLE Motor Response: Purposeful;Weak (08/23/18 0801)   At baseline    Mental Status and Level of Consciousness: Arousable    Pulmonary Status:   O2 Device: Room air (08/23/18 1331)   Adequate oxygenation and airway patent    Complications related to anesthesia: None    Post-anesthesia assessment completed.  No concerns    Signed By: Nupur Fuentes DO     August 23, 2018

## 2022-01-19 NOTE — PROGRESS NOTES
Occupational Therapy  OCCUPATIONAL THERAPY INITIAL EVALUATION     [address] [facility] [place], [place]        FNAQ:[id number]                                                  Patient Name: Timothy Israel    MRN: [id number]    : 1945    Room: [address] [place] [address]      Evaluating OT: Nisha Goldstein OTR/L [id number]      Referring Provider: SHANIQUE Baker CNP    Specific Provider Orders/Date: OT eval and treat 22      Diagnosis:  JAMIE (acute kidney injury) ([facility] [address] [N17.9]  Bladder obstruction [N32.0]     Pertinent Medical History: anxiety, DM, GERD, tachycardia, MS, R thoracotomy with decortication 22     Precautions:  Fall Risk, chest tube, no lifting > 5lbs     Assessment of current deficits    [x] Functional mobility  [x]ADLs  [x] Strength               []Cognition    [x] Functional transfers   [x] IADLs         [x] Safety Awareness   [x]Endurance    [] Fine Coordination              [x] Balance      [] Vision/perception   []Sensation     []Gross Motor Coordination  [] ROM  [] Delirium                   [] Motor Control     OT PLAN OF CARE   OT POC based on physician orders, patient diagnosis and results of clinical assessment    Frequency/Duration 2-4 days/wk for 2 weeks PRN   Specific OT Treatment Interventions to include:   * Instruction/training on adapted ADL techniques and AE recommendations to increase functional independence within precautions       * Training on energy conservation strategies, correct breathing pattern and techniques to improve independence/tolerance for self-care routine  * Functional transfer/mobility training/DME recommendations for increased independence, safety, and fall prevention  * Patient/Family education to increase follow through with safety techniques and functional independence  * Recommendation of environmental modifications for increased safety with functional transfers/mobility and ADLs  * Therapeutic exercise to improve motor endurance, ROM, and functional strength for ADLs/functional transfers  * Therapeutic activities to facilitate/challenge dynamic balance, stand tolerance for increased safety and independence with ADLs  * Positioning to improve skin integrity, interaction with environment and functional independence        Recommended Adaptive Equipment: TBD     Home Living: Pt lives with significant other in 1 story house with 1 TAMMY. Pt reports that significant other is 80years old and he is caregiver. Bathroom setup: walk in shower with shower chair and grab bars, BSC over toilet   Equipment owned: wheeled walker, cane    Prior Level of Function: independent with ADLs , assist with IADLs (has cleaning service assist with cleaning); functional mobility: wheeled walker    Pain Level: Pt reports pain in B feet but did not rate; pt agreeable to therapy  Cognition: A&O: 4/4; WFL command follow demonstrated. Pt was pleasant and motivated.     Memory:  fair   Sequencing:  fair   Problem solving:  fair   Judgement/safety:  fair     Functional Assessment:  AM-PAC Daily Activity Raw Score: 16/24   Initial Eval Status  Date: 1/19/22 Treatment Status  Date: STGs = LTGs  Time frame: 10-14 days   Feeding Set up     Grooming CGA to stand at sink for hand hygiene  Mod I/I   UB Dressing Min A  Mod I/I   LB Dressing Max A to adjust socks   Min A/SBA-with use of AD as appropriate/needed   Bathing Mod A  SBA -with use of AD as appropriate/needed   Toileting Min A for clothing management  I    Bed Mobility  Pt in chair      Functional Transfers Min A with wheeled walker from chair  Mod A/Min A with wheeled walker from commode  Cues for hand placement and safe technique  SBA/Sup    Functional Mobility CGA with wheeled walker  To and from bathroom  Cues for safe wheeled walker management and navigation  I -with device as needed to maximize independence with ADLs and functional task completion   Balance Sitting: Static:  I    Dynamic:SBA  Standing: CGA with wheeled walker  I for dynamic sitting balance to maximize independence with ADLs and functional task completion    I for standing balance to maximize independence with ADLs and functional task completion   Activity Tolerance Fair with light activity. Pt was 86% following mobility into bathroom on room air but improve quickly to 94%. Pt was 93% following functional mobility to chair and was 93% at end of session. Good with ADL activity. Pt will demonstrate good understanding of education provided on EC/WS techniques    Visual/  Perceptual Glasses: none at bedside                Additional long-term goal: Pt will increase functional independence to PLOF to allow pt to live in least restrictive environment. Hand Dominance R   AROM (PROM) Strength Additional Info:    RUE  Grossly WFL Grossly WFL Fair  and wfl FMC/dexterity noted during ADL tasks and functional transfers       LUE Grossly WFL Grossly WFL Fair  and wfl FMC/dexterity noted during ADL tasks and functional transfers       Hearing: [facility]   Sensation:  Pt reported some numbness and tingling in BLE feet  Tone: WFL   Edema: BLE    Comments: Upon arrival patient sitting in chair. At end of session, patient returned to chair with call light and phone within reach, all lines and tubes intact. Overall patient demonstrated decreased independence and safety during completion of ADL/functional transfer/mobility tasks. Pt would benefit from continued skilled OT to increase safety and independence with completion of ADL/IADL tasks for functional independence and quality of life. Rehab Potential: Good for established goals     Patient / Family Goal: to return home    Patient and/or family were instructed on functional diagnosis, prognosis/goals and OT plan of care. Demonstrated fair understanding.      Eval Complexity: Low    Time In: 0928  Time Out: 0947    Min Units   OT Eval Low 97165  x  1   OT Eval Medium [address]      OT Re-Eval [id number]       Therapeutic Ex [id number]       Therapeutic Activities 21539       ADL/Self Care 59030       Orthotic Management [id number]       Manual [id number]     Neuro Re-Ed [id number]       Non-Billable Time          Evaluation Time additionally includes thorough review of current medical information, gathering information on past medical history/social history and prior level of function, interpretation of standardized testing/informal observation of tasks, assessment of data and development of plan of care and goals.             Ramon Bernstein, OTR/L, [id number]

## 2022-01-19 NOTE — PROGRESS NOTES
Physical Therapy    Facility/Department: [facility]  Initial Assessment    NAME: Sonu Vasquez  : 1945  MRN: [id number]    Date of Service: 2022    REQUIRES PT FOLLOW UP: Yes       Patient Diagnosis(es): The primary encounter diagnosis was JAMIE (acute kidney injury) ([facility] [address] A diagnosis of Bladder obstruction was also pertinent to this visit. has a past medical history of Abnormal PSA, Anxiety, Bronchitis, Claustrophobia, Diabetes mellitus ([address] Endocarditis, Enlarged LA (left atrium), Erectile dysfunction, GERD (gastroesophageal reflux disease), H/O complete arterial study of extremity, Hiatal hernia, History of EMG, Hypertension, Lung nodule, Mitral valve prolapse, Moderate tricuspid regurgitation, Multiple sclerosis ([address] Prostatitis, Renal calculi, Sinus problem, Supraventricular arrhythmia, Tachycardia, and Tilt table evaluation. has a past surgical history that includes transthoracic echocardiogram (2011  Echo showed normal left ventricular size with borderline concentric left ventricular hypertrophy with normal left ventricular systolic function with stage I  LV diastolic dysfunction, normal, normal right ventricular size and function, mildly dilated left atrium, borderline dilated right atrium mildly thickened anterior mitral leaflet with bowing of the mitral leaflets without felipe prolapse with mild mitral annular calcification, mild-to-moderate eccentrically-directed jet of mitral regurgitation.); Diagnostic Cardiac Cath Lab Procedure; cardiovascular stress test (10/19/2011  Adenosine nuclear stress test (4 minute walking protocol) showed a minimally reversible minor, nontransmural defect involving the apical anterolateral wall, more consistent with soft tissue attenuation and motion artifact); Skin graft; Inguinal hernia repair; Nose surgery; Colonoscopy; Prostate Biopsy (2013); Prostate surgery (Aug,13); eye surgery (10/13); eye surgery ();  Bladder surgery; Dental surgery; Lithotripsy (12/6/17 @ [id number]); thoracotomy (Right, 1/4/2022); and picc line insertion nurse (1/6/2022). Evaluating Therapist: Christian Overton PT     Referring Provider:  SHANIQUE Baker CNP    PT order : PT eval and treat     Room #:  054   DIAGNOSIS:  JAMIE   Additional Pertinent History: recent fall   PRECAUTIONS:  Falls     Social:  Pt lives with s.o  in a  1  floor plan condo 1  steps and no  rails to enter. Prior to admission pt walked with  ww or cane. Initial Evaluation  Date:  1/19/2022  Treatment      Short Term/ Long Term   Goals   Was pt agreeable to Eval/treatment? Yes      Does pt have pain?  none reported      Bed Mobility  Rolling:  NT   Supine to sit:  Min assist   Sit to supine:  NT   Scooting:  SBA in sit    S/I    Transfers Sit to stand:  Min assist   Stand to sit: CGA   Stand pivot:  NT    S/I    Ambulation     50  feet with  ww  with  CGA    150  feet with  ww  with  S/I       Stair negotiation: ascended and descended NT    1-2  steps with  B  rail with  SBA    LE ROM  WFL      LE strength  4- to 4/ 5   4+/ 5    AM- PAC RAW score  17/ 24            Pt is alert and Oriented x  3      Balance:  CGA, fall risk due to weakness and decreased balance, recent h/o falls   Edema :  B LEs     Endurance: decreased   Bed/Chair alarm: Yes      ASSESSMENT  Pt displays functional ability as noted in the objective portion of this evaluation. Conditions Requiring Skilled Therapeutic Intervention:    [x]Decreased strength     []Decreased ROM  [x]Decreased functional mobility  [x]Decreased balance   [x]Decreased endurance   [x]Decreased posture  []Decreased sensation  []Decreased coordination   []Decreased vision  []Decreased safety awareness   []Increased pain       Treatment/Education:     pt in bed upon arrival , agreeable to PT. Mobility as above. Cues needed for hand placement with transfers and ww safety with gait. Pt did not want to walk in hallway.  Required increased time with all mobility. O2@ 1LNC. Pulse ox rest rest 96%. On RA, pulse ox at rest 94%, decreased to 87% with mobility, and recovered to 94%. RN informed and pt left on RA per her instruction. Pt given cues for proper breathing technique. Pt educated on fall risk, safety with mobiltiy         Patient response to education:   Pt verbalized understanding Pt demonstrated skill Pt requires further education in this area   x  with cues   x       Comments:  Pt left  In recliner with LEs elevated after session, with call light in reach. Waffle cushion placed       Rehab potential is Good for reaching above PT goals. Pts/ family goals   1. Home     Patient and or family understand(s) diagnosis, prognosis, and plan of care. -  Yes     PLAN  PT care will be provided in accordance with the objectives noted above. Whenever appropriate, clear delegation orders will be provided for nursing staff. Exercises and functional mobility practice will be used as well as appropriate assistive devices or modalities to obtain goals. Patient and family education will also be administered as needed.         PLAN OF CARE:    Current Treatment Recommendations     [x] Strengthening to improve independence with functional mobility   [] ROM to improve independence with functional mobility   [x] Balance Training to improve static/dynamic balance and to reduce fall risk  [x] Endurance Training to improve activity tolerance during functional mobility   [x] Transfer Training to improve safety and independence with all functional transfers   [x] Gait Training to improve gait mechanics, endurance and assess need for appropriate assistive device  [x] Stair Training in preparation for safe discharge home and/or into the community   [x] Positioning to prevent skin breakdown and contractures  [x] Safety and Education Training   [x] Patient/Caregiver Education   [] HEP  [] Other     Frequency of treatments will be 2-5x/week x  7 days.    Time in: 0840   Time out: 0906       Evaluation Time includes thorough review of current medical information, gathering information on past medical history/social history and prior level of function, completion of standardized testing/informal observation of tasks, assessment of data and education on plan of care and goals.     CPT codes:  [x] Low Complexity PT evaluation 78258  [] Moderate Complexity PT evaluation 06155  [] High Complexity PT evaluation 46867  [] PT Re-evaluation 45585  [] Gait training 89867  minutes  [x] Therapeutic activities 99436 11 minutes  [] Therapeutic exercises 32511  minutes  [] Neuromuscular reeducation 00819  minutes       Callie 18 number:  PT [id number]
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
## 2022-01-19 NOTE — PROGRESS NOTES
Subjective: The patient is awake and alert. Sitting up in bed without complaints  No acute events overnight. Denies chest pain, angina, SOB     Objective:    /63   Pulse 87   Temp 97.7 °F (36.5 °C) (Oral)   Resp 16   Ht 5' 10\" (1.778 m)   Wt 198 lb 8 oz (90 kg)   SpO2 93%   BMI 28.48 kg/m²     In: -   Out: 3400   In: -   Out: 3400 [Urine:3400]    General appearance: NAD, conversant, pleasant  HEENT: AT/NC, MMM  Neck: FROM, supple  Lungs: Clear to auscultation  CV: RRR, no MRGs  Vasc: Radial pulses 2+  Abdomen: Soft, non-tender; no masses or HSM  Extremities: No peripheral edema or digital cyanosis, bilateral lower ext. edema  Skin: no rash, lesions or ulcers  Psych: Alert and oriented to person, place and time  Neuro: Alert and interactive 5/5 strength in all extremities     Recent Labs     01/17/22  1049 01/18/22  0454 01/19/22  0346   WBC 12.0* 11.7* 10.4   HGB 10.2* 8.9* 8.1*   HCT 33.1* 29.2* 26.5*    308 259       Recent Labs     01/17/22  1049 01/18/22  0454 01/19/22  0346    140 140   K 5.3* 5.0 4.4    107 107   CO2 24 23 24   BUN 42* 37* 31*   CREATININE 1.9* 2.0* 1.4*   CALCIUM 8.7 8.9 8.6       Assessment:    Principal Problem:    JAMIE (acute kidney injury) ([facility] [address]  Active Problems:    HTN (hypertension)    DM (diabetes mellitus) (HCC)    Multiple sclerosis (HCC)    Benign prostatic hyperplasia    Erectile dysfunction    Anxiety    Hyperkalemia    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  *      Plan:  75-year-old male with a significant past medical history who was recently discharged on 1/7 readmitted for     JAMIE with volume overload in legs and abdomen   Monitor BUN/creatinine- 37/2.0>31/1.4  Hold all nephrotoxins  Stop fluids for now , pt is dyspneic - check cta chest   Renal eval   Strict I's&O's   Daily weight  Ct abdomen reviewed - b/l hydronephrosis and hydroureter Renal US - pending    Recent decortication of lung for empyema   Pt was on Unasyn at home q6 hrs 2 g until 1/27   Will resume this   Stop cefepime       implanted penile prothesis- no evidence of infection   Stop cefepime- no   fevers or wbc elevation   Urology following - appreciate input - signed off  Monitor Labs  Wbc/temp/cultures    DVT Prophylaxis   PT/OT  Discharge planning       Gabideangelon Mole, APRN - CNP  1:03 PM  1/19/2022     Above note edited to reflect my thoughts   Much better today   More comfortable - improved bun/creat   Still significant edema / anasarca in legs  - differ to renal re diuresis w albumin  Stop cefepime , resume unasyn   Abdomen soft / nontender today     I personally saw, examined and provided care for the patient. Radiographs, labs and medication list were reviewed by me independently. The case was discussed in detail and plans for care were established. Review of Iris BAY- CNP, documentation was conducted and revisions were made as appropriate directly by me. I agree with the above documented exam, problem list, and plan of care.      Camelia Morales MD  9:25 PM  1/19/2022

## 2022-01-19 NOTE — PROGRESS NOTES
[facility] Quality Flow/Interdisciplinary Rounds Progress Note        Quality Flow Rounds held on January 19, 2022    Disciplines Attending:  Bedside Nurse,  and     Sonu Garcia was admitted on 1/17/2022 10:23 AM    Anticipated Discharge Date:  Expected Discharge Date: 02/26/22    Disposition:    Enzo Score:  Enzo Scale Score: 19    Readmission Risk              Risk of Unplanned Readmission:  29           Discussed patient goal for the day, patient clinical progression, and barriers to discharge. The following Goal(s) of the Day/Commitment(s) have been identified:  IV Cefepime, renal US.       Alex Powers RN  January 19, 2022

## 2022-01-20 LAB
ANION GAP SERPL CALCULATED.3IONS-SCNC: 8 MMOL/L (ref 7–16)
BUN BLDV-MCNC: 24 MG/DL (ref 6–23)
CALCIUM SERPL-MCNC: 8.4 MG/DL (ref 8.6–10.2)
CHLORIDE BLD-SCNC: 105 MMOL/L (ref 98–107)
CO2: 22 MMOL/L (ref 22–29)
CREAT SERPL-MCNC: 1.2 MG/DL (ref 0.7–1.2)
GFR AFRICAN AMERICAN: >60
GFR NON-AFRICAN AMERICAN: 58 ML/MIN/1.73
GLUCOSE BLD-MCNC: 106 MG/DL (ref 74–99)
HCT VFR BLD CALC: 25.5 % (ref 37–54)
HEMOGLOBIN: 8 G/DL (ref 12.5–16.5)
MCH RBC QN AUTO: 30.8 PG (ref 26–35)
MCHC RBC AUTO-ENTMCNC: 31.4 % (ref 32–34.5)
MCV RBC AUTO: 98.1 FL (ref 80–99.9)
PDW BLD-RTO: 17.5 FL (ref 11.5–15)
PLATELET # BLD: 222 E9/L (ref 130–450)
PMV BLD AUTO: 10.1 FL (ref 7–12)
POTASSIUM SERPL-SCNC: 4 MMOL/L (ref 3.5–5)
RBC # BLD: 2.6 E12/L (ref 3.8–5.8)
SODIUM BLD-SCNC: 135 MMOL/L (ref 132–146)
WBC # BLD: 9.5 E9/L (ref 4.5–11.5)

## 2022-01-20 PROCEDURE — 6360000002 HC RX W HCPCS: Performed by: NURSE PRACTITIONER

## 2022-01-20 PROCEDURE — 2580000003 HC RX 258: Performed by: INTERNAL MEDICINE

## 2022-01-20 PROCEDURE — 85027 COMPLETE CBC AUTOMATED: CPT

## 2022-01-20 PROCEDURE — 80048 BASIC METABOLIC PNL TOTAL CA: CPT

## 2022-01-20 PROCEDURE — 2580000003 HC RX 258: Performed by: NURSE PRACTITIONER

## 2022-01-20 PROCEDURE — 36415 COLL VENOUS BLD VENIPUNCTURE: CPT

## 2022-01-20 PROCEDURE — 6370000000 HC RX 637 (ALT 250 FOR IP): Performed by: NURSE PRACTITIONER

## 2022-01-20 PROCEDURE — 6360000002 HC RX W HCPCS: Performed by: INTERNAL MEDICINE

## 2022-01-20 PROCEDURE — 2060000000 HC ICU INTERMEDIATE R&B

## 2022-01-20 RX ADMIN — GABAPENTIN 300 MG: 300 CAPSULE ORAL at 17:11

## 2022-01-20 RX ADMIN — Medication 10 ML: at 10:03

## 2022-01-20 RX ADMIN — FINASTERIDE 5 MG: 5 TABLET, FILM COATED ORAL at 10:02

## 2022-01-20 RX ADMIN — HEPARIN SODIUM 5000 UNITS: 10000 INJECTION INTRAVENOUS; SUBCUTANEOUS at 16:01

## 2022-01-20 RX ADMIN — HEPARIN SODIUM 5000 UNITS: 10000 INJECTION INTRAVENOUS; SUBCUTANEOUS at 05:02

## 2022-01-20 RX ADMIN — AMPICILLIN SODIUM AND SULBACTAM SODIUM 3000 MG: 2; 1 INJECTION, POWDER, FOR SOLUTION INTRAMUSCULAR; INTRAVENOUS at 17:07

## 2022-01-20 RX ADMIN — AMPICILLIN SODIUM AND SULBACTAM SODIUM 3000 MG: 2; 1 INJECTION, POWDER, FOR SOLUTION INTRAMUSCULAR; INTRAVENOUS at 10:05

## 2022-01-20 RX ADMIN — SODIUM CHLORIDE: 9 INJECTION, SOLUTION INTRAVENOUS at 10:01

## 2022-01-20 RX ADMIN — CETIRIZINE HYDROCHLORIDE 10 MG: 10 TABLET, FILM COATED ORAL at 10:02

## 2022-01-20 RX ADMIN — GABAPENTIN 300 MG: 300 CAPSULE ORAL at 10:02

## 2022-01-20 RX ADMIN — TAMSULOSIN HYDROCHLORIDE 0.4 MG: 0.4 CAPSULE ORAL at 10:02

## 2022-01-20 RX ADMIN — FLUTICASONE PROPIONATE 1 SPRAY: 50 SPRAY, METERED NASAL at 10:02

## 2022-01-20 RX ADMIN — AMPICILLIN SODIUM AND SULBACTAM SODIUM 3000 MG: 2; 1 INJECTION, POWDER, FOR SOLUTION INTRAMUSCULAR; INTRAVENOUS at 04:15

## 2022-01-20 ASSESSMENT — PAIN SCALES - GENERAL
PAINLEVEL_OUTOF10: 0

## 2022-01-20 NOTE — PLAN OF CARE
Problem: Falls - Risk of:  Goal: Will remain free from falls  Description: Will remain free from falls  1/20/2022 1059 by Juan Carlos Gonzalez RN  Outcome: Ongoing  1/20/2022 0158 by Jatinder Hayden RN  Outcome: Met This Shift  Goal: Absence of physical injury  Description: Absence of physical injury  1/20/2022 1059 by Juan Carlos Gonzalez RN  Outcome: Ongoing  1/20/2022 0158 by Jatinder Hayden RN  Outcome: Met This Shift

## 2022-01-20 NOTE — PROGRESS NOTES
Department of Internal Medicine  Nephrology Attending Progress Note     Events reviewed.     SUBJECTIVE:  We are following Mr Edwin Noble for JAMIE. Reports no complaints.      Physical Exam:    VITALS:  /64   Pulse 99   Temp 98.5 °F (36.9 °C) (Oral)   Resp 18   Ht 5' 10\" (1.778 m)   Wt 198 lb 8 oz (90 kg)   SpO2 95%   BMI 28.48 kg/m²   24HR INTAKE/OUTPUT:       Intake/Output Summary (Last 24 hours) at 1/20/2022 1025  Last data filed at 1/20/2022 0505      Gross per 24 hour   Intake --   Output 4850 ml   Net -4850 ml      Constitutional:  Alert and oriented, NAD  Cardiovascular/Edema:  RRR, S1,S2  Respiratory:  CTA bilaterally  Gastrointestinal:  Soft, rounded, nontender, nondistended  Other:  ++ BLE     Scheduled Meds:  Scheduled Medications    ampicillin-sulbactam  3,000 mg IntraVENous Q6H    sodium chloride flush  5-40 mL IntraVENous 2 times per day    heparin (porcine)  5,000 Units SubCUTAneous 3 times per day    cetirizine  10 mg Oral Daily    fluticasone  1 spray Each Nostril Daily    gabapentin  300 mg Oral BID    montelukast  10 mg Oral Nightly    tamsulosin  0.4 mg Oral Daily    [Held by provider] metoprolol succinate  50 mg Oral Nightly    [Held by provider] metoprolol succinate  100 mg Oral QAM    finasteride  5 mg Oral Daily         Continuous Infusions:  Infusions Meds    sodium chloride      sodium chloride 12.5 mL/hr at 01/20/22 1001         PRN Meds:.   PRN Medications   ALPRAZolam, sodium chloride flush, sodium chloride, polyethylene glycol, prochlorperazine, albuterol       DATA:    CBC:         Lab Results   Component Value Date     WBC 9.5 01/20/2022     RBC 2.60 01/20/2022     HGB 8.0 01/20/2022     HCT 25.5 01/20/2022     MCV 98.1 01/20/2022     MCH 30.8 01/20/2022     MCHC 31.4 01/20/2022     RDW 17.5 01/20/2022      01/20/2022     MPV 10.1 01/20/2022      CMP:          Lab Results   Component Value Date      01/20/2022     K 4.0 01/20/2022     K 5.0 01/18/2022    01/20/2022     CO2 22 01/20/2022     BUN 24 01/20/2022     CREATININE 1.2 01/20/2022     GFRAA >60 01/20/2022     LABGLOM 58 01/20/2022     GLUCOSE 106 01/20/2022     PROT 6.6 01/18/2022     LABALBU 2.5 01/18/2022     CALCIUM 8.4 01/20/2022     BILITOT 0.4 01/18/2022     ALKPHOS 164 01/18/2022     AST 31 01/18/2022     ALT 38 01/18/2022      Magnesium:          Lab Results   Component Value Date     MG 1.9 12/25/2016      Phosphorus:          Lab Results   Component Value Date     PHOS 3.3 11/14/2016      Radiology Review:       CT abdomen and pelvis without contrast January 17, 2022   1.  Severe dilatation of the urinary bladder suggesting bladder outlet   obstruction.  Prostate gland is enlarged.       2.  Bilateral hydronephrosis and hydroureter with bilateral nephrolithiasis.    No evidence of ureterolithiasis.       3.  Partial interval resolution of abnormal soft tissue density in the fat   surrounding the upper right kidney.  This could be related to prior   infection, trauma, surgery or less likely neoplasm.       4.  Improvement in right pleural effusion with some residual alveolar opacity   in the medial right lower lobe which may represent pneumonia.  Interval   appearance of a moderate left effusion.       5.  Diffuse subcutaneous edema suggestive of anasarca.       6.  Penile prosthesis reservoir bulb is partially collapsed but unchanged   from the prior study.  No new abnormal fluid collections to suggest abscess   related to the penile prosthesis.  Mild edema may be related to diffuse   subcutaneous edema/anasarca.          Kidney ultrasound complete January 19, 2022   Interval resolution of the previously described bilateral hydronephrosis.       Bilateral nonobstructing renal calculi are present, measuring up to 6 mm in   the right kidney and 8 mm in the left kidney.       The urinary bladder is predominantly decompressed around a Davidson catheter.          Chest x-ray January 17, 2022 Unchanged airspace disease.  Removal of right-sided chest tube and placement   of right-sided PICC line as noted.             BRIEF SUMMARY OF INITIAL CONSULT:     Briefly Mr. Marcy Conti is a 75-year-old man with history of HTN, type II DM, nephrolithiasis s/p lithotripsy, MVP with significant MR, MV endocarditis, paroxysmal SVT, asbestosis, erectile dysfunction status post penile implant, multiple sclerosis, BPH status post TURP, bladder cancer, Washburn's esophagus, recent admission for pneumonia complicated with right parapneumonic effusion s/p right thoracotomy with decortication, who was seen recently by his urologist and was started on Bactrim for possible penile prosthesis infection, who was admitted on January 17, 2022 after he was instructed to come to the ER by his PCP after he was found to have abnormal laboratory test, creatinine 1.9 mg /dl, reason for this consultation. His baseline creatinine is 0.7 mg/dl. CT scan of the abdomen showed severe dilatation of the bladder with bilateral hydronephrosis.     IMPRESSION/RECOMMENDATIONS:       1. JAMIE stage II, obstructive uropathy 2/2 bladder outlet obstruction/BPH, status post Davidson catheter. Renal function continues to improve with excellent diuresis post-obstruction. No need for diuretics at this time. 2. Bladder outlet obstruction/BPH, status post Davidson catheter placed, on finasteride and tamsulosin  3. Probably CKD, urine albumin/creatinine 229 mg/g, needs to be repeated in 3-6 months  4. HTN, on metoprolol  5. History of nephrolithiasis  6. Recent decortication of lung due to empyema, on ampicillin-sulbactam   7.  Normocytic anemia, ferritin level 827, iron saturation 60%, iron 113.   8. Nutrition, low potassium, fluid restriction 1.5 L       Plan:     · Continue to monitor renal  · Obtain ionized calcium level   · Obtain magnesium level   · No diuretics at this time due to post-obstructive diuresis       Electronically signed by SHANIQUE Zuniga - CNP on 1/20/2022 at 4:17 PM

## 2022-01-20 NOTE — PROGRESS NOTES
Subjective: The patient is awake and alert. Sitting up in bed without complaints. Patient to sit up in chair for all meals  No acute events overnight. Denies chest pain, angina, SOB     Objective:    /64   Pulse 99   Temp 98.5 °F (36.9 °C) (Oral)   Resp 18   Ht 5' 10\" (1.778 m)   Wt 198 lb 8 oz (90 kg)   SpO2 95%   BMI 28.48 kg/m²     In: -   Out: 4850   In: -   Out: 8473 [Urine:4850]    General appearance: NAD, conversant, pleasant  HEENT: AT/NC, MMM  Neck: FROM, supple  Lungs: Clear to auscultation  CV: RRR, no MRGs  Vasc: Radial pulses 2+  Abdomen: Soft, non-tender; no masses or HSM  Extremities: No peripheral edema or digital cyanosis, bilateral lower ext. Edema improving  Skin: no rash, lesions or ulcers  Psych: Alert and oriented to person, place and time  Neuro: Alert and interactive 5/5 strength in all extremities     Recent Labs     01/18/22  0454 01/19/22  0346 01/20/22  0400   WBC 11.7* 10.4 9.5   HGB 8.9* 8.1* 8.0*   HCT 29.2* 26.5* 25.5*    259 222       Recent Labs     01/18/22  0454 01/19/22  0346 01/20/22  0400    140 135   K 5.0 4.4 4.0    107 105   CO2 23 24 22   BUN 37* 31* 24*   CREATININE 2.0* 1.4* 1.2   CALCIUM 8.9 8.6 8.4*       Assessment:    Principal Problem:    JAMIE (acute kidney injury) ([facility], [place] [address]  Active Problems:    HTN (hypertension)    DM (diabetes mellitus) (HCC)    Multiple sclerosis (HCC)    Benign prostatic hyperplasia    Erectile dysfunction    Anxiety    Hyperkalemia    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  *      Plan:  70-year-old male with a significant past medical history who was recently discharged on 1/7 readmitted for     JAMIE with volume overload in legs and abdomen   Monitor BUN/creatinine- 37/2.0>24/1.2  Hold all nephrotoxins  Stop fluids for now , pt is dyspneic - check cta chest   Renal eval   Strict I's&O's   Daily weight  Ct abdomen reviewed - b/l hydronephrosis and hydroureter   Renal US -resolution of bilateral hydronephrosis. Recent decortication of lung for empyema   Pt was on Unasyn at home q6 hrs 2 g until 1/27   Will resume this   Stop cefepime       implanted penile prothesis- no evidence of infection   Stop cefepime- no   fevers or wbc elevation   Urology following - appreciate input - signed off  Monitor Labs  Wbc/temp/cultures    DVT Prophylaxis   PT/OT  Discharge planning - in am       SHANIQUE Eugene CNP  10:51 AM  1/20/2022     Above note edited to reflect my thoughts     I personally saw, examined and provided care for the patient. Radiographs, labs and medication list were reviewed by me independently. The case was discussed in detail and plans for care were established. Review of Iris GUTIERREZ CNP, documentation was conducted and revisions were made as appropriate directly by me. I agree with the above documented exam, problem list, and plan of care.      Singh Xiao MD  8:53 PM  1/20/2022

## 2022-01-21 VITALS
WEIGHT: 198.5 LBS | BODY MASS INDEX: 28.42 KG/M2 | DIASTOLIC BLOOD PRESSURE: 77 MMHG | OXYGEN SATURATION: 93 % | TEMPERATURE: 98.6 F | HEIGHT: 70 IN | SYSTOLIC BLOOD PRESSURE: 136 MMHG | RESPIRATION RATE: 20 BRPM | HEART RATE: 113 BPM

## 2022-01-21 LAB
ANION GAP SERPL CALCULATED.3IONS-SCNC: 12 MMOL/L (ref 7–16)
BUN BLDV-MCNC: 16 MG/DL (ref 6–23)
CALCIUM IONIZED: 1.2 MMOL/L (ref 1.15–1.33)
CALCIUM SERPL-MCNC: 8 MG/DL (ref 8.6–10.2)
CHLORIDE BLD-SCNC: 103 MMOL/L (ref 98–107)
CO2: 22 MMOL/L (ref 22–29)
CREAT SERPL-MCNC: 1 MG/DL (ref 0.7–1.2)
GFR AFRICAN AMERICAN: >60
GFR NON-AFRICAN AMERICAN: >60 ML/MIN/1.73
GLUCOSE BLD-MCNC: 120 MG/DL (ref 74–99)
HCT VFR BLD CALC: 26.7 % (ref 37–54)
HEMOGLOBIN: 8.5 G/DL (ref 12.5–16.5)
MAGNESIUM: 1.6 MG/DL (ref 1.6–2.6)
MCH RBC QN AUTO: 30.8 PG (ref 26–35)
MCHC RBC AUTO-ENTMCNC: 31.8 % (ref 32–34.5)
MCV RBC AUTO: 96.7 FL (ref 80–99.9)
PDW BLD-RTO: 17.9 FL (ref 11.5–15)
PLATELET # BLD: 229 E9/L (ref 130–450)
PMV BLD AUTO: 10.2 FL (ref 7–12)
POTASSIUM SERPL-SCNC: 3.5 MMOL/L (ref 3.5–5)
RBC # BLD: 2.76 E12/L (ref 3.8–5.8)
SODIUM BLD-SCNC: 137 MMOL/L (ref 132–146)
WBC # BLD: 8.9 E9/L (ref 4.5–11.5)

## 2022-01-21 PROCEDURE — 36415 COLL VENOUS BLD VENIPUNCTURE: CPT

## 2022-01-21 PROCEDURE — 82330 ASSAY OF CALCIUM: CPT

## 2022-01-21 PROCEDURE — 6370000000 HC RX 637 (ALT 250 FOR IP): Performed by: NURSE PRACTITIONER

## 2022-01-21 PROCEDURE — 85027 COMPLETE CBC AUTOMATED: CPT

## 2022-01-21 PROCEDURE — 2580000003 HC RX 258: Performed by: NURSE PRACTITIONER

## 2022-01-21 PROCEDURE — 80048 BASIC METABOLIC PNL TOTAL CA: CPT

## 2022-01-21 PROCEDURE — 2580000003 HC RX 258: Performed by: INTERNAL MEDICINE

## 2022-01-21 PROCEDURE — 6360000002 HC RX W HCPCS: Performed by: NURSE PRACTITIONER

## 2022-01-21 PROCEDURE — 6360000002 HC RX W HCPCS: Performed by: INTERNAL MEDICINE

## 2022-01-21 PROCEDURE — 83735 ASSAY OF MAGNESIUM: CPT

## 2022-01-21 RX ORDER — GABAPENTIN 300 MG/1
300 CAPSULE ORAL 2 TIMES DAILY
Qty: 60 CAPSULE | Refills: 0 | Status: ON HOLD | OUTPATIENT
Start: 2022-01-21 | End: 2022-06-01 | Stop reason: HOSPADM

## 2022-01-21 RX ORDER — POTASSIUM CHLORIDE 20 MEQ/1
20 TABLET, EXTENDED RELEASE ORAL ONCE
Status: COMPLETED | OUTPATIENT
Start: 2022-01-21 | End: 2022-01-21

## 2022-01-21 RX ADMIN — TAMSULOSIN HYDROCHLORIDE 0.4 MG: 0.4 CAPSULE ORAL at 09:06

## 2022-01-21 RX ADMIN — FLUTICASONE PROPIONATE 1 SPRAY: 50 SPRAY, METERED NASAL at 09:08

## 2022-01-21 RX ADMIN — Medication 10 ML: at 09:07

## 2022-01-21 RX ADMIN — CETIRIZINE HYDROCHLORIDE 10 MG: 10 TABLET, FILM COATED ORAL at 09:07

## 2022-01-21 RX ADMIN — HEPARIN SODIUM 5000 UNITS: 10000 INJECTION INTRAVENOUS; SUBCUTANEOUS at 00:17

## 2022-01-21 RX ADMIN — MONTELUKAST SODIUM 10 MG: 10 TABLET ORAL at 00:16

## 2022-01-21 RX ADMIN — AMPICILLIN SODIUM AND SULBACTAM SODIUM 3000 MG: 2; 1 INJECTION, POWDER, FOR SOLUTION INTRAMUSCULAR; INTRAVENOUS at 00:16

## 2022-01-21 RX ADMIN — FINASTERIDE 5 MG: 5 TABLET, FILM COATED ORAL at 09:08

## 2022-01-21 RX ADMIN — Medication 10 ML: at 00:16

## 2022-01-21 RX ADMIN — GABAPENTIN 300 MG: 300 CAPSULE ORAL at 09:07

## 2022-01-21 RX ADMIN — HEPARIN SODIUM 5000 UNITS: 10000 INJECTION INTRAVENOUS; SUBCUTANEOUS at 06:46

## 2022-01-21 RX ADMIN — AMPICILLIN SODIUM AND SULBACTAM SODIUM 3000 MG: 2; 1 INJECTION, POWDER, FOR SOLUTION INTRAMUSCULAR; INTRAVENOUS at 11:34

## 2022-01-21 RX ADMIN — POTASSIUM CHLORIDE 20 MEQ: 1500 TABLET, EXTENDED RELEASE ORAL at 10:39

## 2022-01-21 RX ADMIN — AMPICILLIN SODIUM AND SULBACTAM SODIUM 3000 MG: 2; 1 INJECTION, POWDER, FOR SOLUTION INTRAMUSCULAR; INTRAVENOUS at 06:46

## 2022-01-21 ASSESSMENT — PAIN SCALES - GENERAL
PAINLEVEL_OUTOF10: 0
PAINLEVEL_OUTOF10: 0

## 2022-01-21 NOTE — PROGRESS NOTES
CLINICAL PHARMACY NOTE: MEDS TO BEDS    Total # of Prescriptions Filled: 1   The following medications were delivered to the patient:  · Gabapentin 300 mg       Additional Documentation:

## 2022-01-21 NOTE — PROGRESS NOTES
Department of Internal Medicine  Nephrology Progress Note     Events reviewed. Patient is being discharged home today.      SUBJECTIVE:  We are following Mr Rai Backer for JAMIE. Reports no complaints.      Physical Exam:    VITALS:  /64   Pulse 99   Temp 98.5 °F (36.9 °C) (Oral)   Resp 18   Ht 5' 10\" (1.778 m)   Wt 198 lb 8 oz (90 kg)   SpO2 95%   BMI 28.48 kg/m²   24HR INTAKE/OUTPUT:       Intake/Output Summary (Last 24 hours) at 1/20/2022 1025  Last data filed at 1/20/2022 0505      Gross per 24 hour   Intake --   Output 4850 ml   Net -4850 ml      Constitutional:  Alert and oriented, NAD  Cardiovascular/Edema:  RRR, S1,S2  Respiratory:  CTA bilaterally  Gastrointestinal:  Soft, rounded, nontender, nondistended  Other:  ++ BLE     Scheduled Meds:  Scheduled Medications    ampicillin-sulbactam  3,000 mg IntraVENous Q6H    sodium chloride flush  5-40 mL IntraVENous 2 times per day    heparin (porcine)  5,000 Units SubCUTAneous 3 times per day    cetirizine  10 mg Oral Daily    fluticasone  1 spray Each Nostril Daily    gabapentin  300 mg Oral BID    montelukast  10 mg Oral Nightly    tamsulosin  0.4 mg Oral Daily    [Held by provider] metoprolol succinate  50 mg Oral Nightly    [Held by provider] metoprolol succinate  100 mg Oral QAM    finasteride  5 mg Oral Daily         Continuous Infusions:  Infusions Meds    sodium chloride      sodium chloride 12.5 mL/hr at 01/20/22 1001         PRN Meds:.   PRN Medications   ALPRAZolam, sodium chloride flush, sodium chloride, polyethylene glycol, prochlorperazine, albuterol       DATA:    CBC:         Lab Results   Component Value Date     WBC 9.5 01/20/2022     RBC 2.60 01/20/2022     HGB 8.0 01/20/2022     HCT 25.5 01/20/2022     MCV 98.1 01/20/2022     MCH 30.8 01/20/2022     MCHC 31.4 01/20/2022     RDW 17.5 01/20/2022      01/20/2022     MPV 10.1 01/20/2022      CMP:          Lab Results   Component Value Date      01/20/2022     K 4.0 01/20/2022     K 5.0 01/18/2022      01/20/2022     CO2 22 01/20/2022     BUN 24 01/20/2022     CREATININE 1.2 01/20/2022     GFRAA >60 01/20/2022     LABGLOM 58 01/20/2022     GLUCOSE 106 01/20/2022     PROT 6.6 01/18/2022     LABALBU 2.5 01/18/2022     CALCIUM 8.4 01/20/2022     BILITOT 0.4 01/18/2022     ALKPHOS 164 01/18/2022     AST 31 01/18/2022     ALT 38 01/18/2022      Magnesium:          Lab Results   Component Value Date     MG 1.9 12/25/2016      Phosphorus:          Lab Results   Component Value Date     PHOS 3.3 11/14/2016      Radiology Review:       CT abdomen and pelvis without contrast January 17, 2022   1.  Severe dilatation of the urinary bladder suggesting bladder outlet   obstruction.  Prostate gland is enlarged.       2.  Bilateral hydronephrosis and hydroureter with bilateral nephrolithiasis.    No evidence of ureterolithiasis.       3.  Partial interval resolution of abnormal soft tissue density in the fat   surrounding the upper right kidney.  This could be related to prior   infection, trauma, surgery or less likely neoplasm.       4.  Improvement in right pleural effusion with some residual alveolar opacity   in the medial right lower lobe which may represent pneumonia.  Interval   appearance of a moderate left effusion.       5.  Diffuse subcutaneous edema suggestive of anasarca.       6.  Penile prosthesis reservoir bulb is partially collapsed but unchanged   from the prior study.  No new abnormal fluid collections to suggest abscess   related to the penile prosthesis.  Mild edema may be related to diffuse   subcutaneous edema/anasarca.          Kidney ultrasound complete January 19, 2022   Interval resolution of the previously described bilateral hydronephrosis.       Bilateral nonobstructing renal calculi are present, measuring up to 6 mm in   the right kidney and 8 mm in the left kidney.       The urinary bladder is predominantly decompressed around a Davidson catheter.        Chest x-ray January 17, 2022   Unchanged airspace disease.  Removal of right-sided chest tube and placement   of right-sided PICC line as noted.             BRIEF SUMMARY OF INITIAL CONSULT:     Briefly Mr. 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 His baseline creatinine is 0.7 mg/dl. CT scan of the abdomen showed severe dilatation of the bladder with bilateral hydronephrosis.       IMPRESSION/RECOMMENDATIONS:       1. JAMIE stage II, obstructive uropathy 2/2 bladder outlet obstruction/BPH, status post Hernandez catheter. Renal function continues to improve with excellent diuresis post-obstruction. 2. Hypokalemia 2/2 obstructive diuresis, to replace  3. Bladder outlet obstruction/BPH, status post Hernandez catheter placed, on finasteride and tamsulosin, to be discharged home with hernandez. 4. Probably CKD, urine albumin/creatinine 229 mg/g, needs to be repeated in 3-6 months  5. HTN, on metoprolol  6. History of nephrolithiasis  7. Recent decortication of lung due to empyema, on ampicillin-sulbactam   8.  Normocytic anemia, ferritin level 827, iron saturation 60%, iron 113.   9. Nutrition, low potassium, fluid restriction 1.5 L       Plan:     · Continue to monitor renal  · Replace potassium  · No diuretics at this time due to post-obstructive diuresis · Discharge planning, please follow-up with our office in 3 to 4 weeks, BMP weekly x2      Electronically signed by SHANIQUE Mo CNP on 1/21/2022 at 11:36 AM

## 2022-01-21 NOTE — PROGRESS NOTES
Discharge instruction given with verbal understanding of medication and follow up. Pt aware of home health and IV ATB being administered awaiting meds to beds.  Tele removed

## 2022-01-21 NOTE — PROGRESS NOTES
1/21/2022 spoke with astrid from [facility], pt will be discharged with hernandez cath.

## 2022-01-21 NOTE — CARE COORDINATION
Discharge plan remains HOME with [facility]. Pt was active with [facility] prior to admission. [facility] orders placed- Olivia at [facility] notified of anticipated discharge. Updated signed IV script faxed to lissette at [facility].

## 2022-01-22 LAB
BLOOD CULTURE, ROUTINE: NORMAL
CULTURE, BLOOD 2: NORMAL

## 2022-01-22 NOTE — DISCHARGE SUMMARY
Physician Discharge Summary     Patient ID:  Sonu Vasquez  [id number]  67 y.o.  1945    Admit date: 1/17/2022    Discharge date and time: 1/21/2022    Admission Diagnoses:   Patient Active Problem List   Diagnosis    Abnormal PSA    Mitral valve prolapse    MR (mitral regurgitation)    Tricuspid valve regurgitation    Pulmonary HTN (HCC)    HTN (hypertension)    DM (diabetes mellitus) ([facility] [address]    Multiple sclerosis (HCC)    Hiatal hernia    GERD (gastroesophageal reflux disease)    History of kidney stones    Benign prostatic hyperplasia    S/P TURP    Urothelial carcinoma (HCC)    Asbestosis ([facility] [address]    Erectile dysfunction    Panic attacks    History of esophageal stricture    History of esophageal dilatation    Washburn's esophagus determined by biopsy    Anxiety    Claustrophobia    History of PSVT (paroxysmal supraventricular tachycardia)    H/O endocarditis    Nonrheumatic aortic valve insufficiency    Nonrheumatic pulmonary valve insufficiency    Empyema (HCC)    Pleural effusion    JAMIE (acute kidney injury) ([facility] [address]    Hyperkalemia    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 male of Ramirez Roldan MD  with a past medical history of DM, endocarditis, GERD, erectile dysfunction, hiatal hernia, hypertension, and mitral valve prolapse. Patient presented to the ED for complaints of renal failure. Patient was notified by his doctor that it his labs were abnormal he was advised to report to the ED. Patient was just discharged on 1/7/2021 for a right empyema/complicated parapneumonic effusion. Patient had a thoracotomy on 1/4/2021. Patient was discharged home. Patient states he can feel himself today. Patient was seen by the urologist on Friday and was diagnosed with a penile prosthesis infection and he was started on Bactrim.      JAMIE with volume overload in legs and abdomen   Monitor BUN/creatinine- 37/2.0>24/1.2  Hold all nephrotoxins  Stop fluids for now , pt is dyspneic - check cta chest   Renal eval   Strict I's&O's   Daily weight  Ct abdomen reviewed - b/l hydronephrosis and hydroureter   Renal US -resolution of bilateral hydronephrosis. Recent decortication of lung for empyema   Pt was on Unasyn at home q6 hrs 2 g until 1/27   Will resume this   Stop cefepime       implanted penile prothesis- no evidence of infection   Stop cefepime- no   fevers or wbc elevation   Urology following - appreciate input - signed off  Monitor Labs  Wbc/temp/cultures. Patient discharged home in stable condition with medications listed below. Patient was instructed to follow up with all consults and PCP within the next two week. Recent Labs     01/19/22  0346 01/20/22  0400 01/21/22  0827   WBC 10.4 9.5 8.9   HGB 8.1* 8.0* 8.5*   HCT 26.5* 25.5* 26.7*    222 229       Recent Labs     01/19/22  0346 01/20/22  0400 01/21/22  0827    135 137   K 4.4 4.0 3.5    105 103   CO2 24 22 22   BUN 31* 24* 16   CREATININE 1.4* 1.2 1.0   CALCIUM 8.6 8.4* 8.0*       CT ABDOMEN PELVIS WO CONTRAST Additional Contrast? None    Result Date: 1/17/2022  EXAMINATION: CT OF THE ABDOMEN AND PELVIS WITHOUT CONTRAST 1/17/2022 10:54 am TECHNIQUE: CT of the abdomen and pelvis was performed without the administration of intravenous contrast. Multiplanar reformatted images are provided for review. Dose modulation, iterative reconstruction, and/or weight based adjustment of the mA/kV was utilized to reduce the radiation dose to as low as reasonably achievable.  COMPARISON: 12/30/2021 HISTORY: ORDERING SYSTEM PROVIDED HISTORY: lower abd pain, question penile prosthesis infection, renal failure TECHNOLOGIST PROVIDED HISTORY: Reason for exam:->lower abd pain, question penile prosthesis infection, renal failure Additional Contrast?->None Decision Support Exception - unselect if not a suspected or confirmed emergency medical condition->Emergency Medical Condition (MA) FINDINGS: Lower Chest: Again noted is left atrial dilatation. There is calcification near the mitral valve annulus. A linear structure is noted in the SVC extending into the right atrium which could be related to central venous catheter. There is improved aeration of the right lower chest with improvement of the right pleural effusion. Patchy alveolar density is noted in the posterior basal segment of the right lower lobe with a trace right pleural effusion. There is a new moderate left pleural effusion and passive left basilar atelectasis. The pneumatized lung shows evidence of centrilobular emphysema. Organs: Liver again shows normal size with diffuse hepatic steatosis. Spleen is normal.  Stomach has a normal configuration. The gallbladder is normal in size with dependent minimal high density suggesting small calculi. Common bile duct is normal.  Pancreas appears normal.  The adrenal glands are normal.  There is unchanged stranding of the perinephric fat adjacent to the superolateral aspect of the right kidney, somewhat improved compared to the prior examination. This could be the residua of previous infection, postprocedural change or neoplasm. Both both kidneys demonstrate hydronephrosis and hydroureter with bilateral nephrolithiasis. No evidence of ureterolithiasis. Urinary bladder is distended. GI/Bowel: No evidence of bowel obstruction. Colon is moderately redundant with an elongated sigmoid portion. Stool volume appears appropriate. No CT evidence colitis. No CT evidence of mesenteric adenopathy or mass. Pelvis: Urinary bladder is severely distended measuring at least 20 x 13 x 14 cm. No gas in the urinary bladder. No bladder wall thickening. No calculus detected. Very slight stranding in the fat surrounding the urinary bladder. Prostate gland is significantly enlarged and lobulated. The gland measures 5.2 x 5.2 x 6 cm. The bulb for the penile prosthesis reservoir appears partially collapsed, unchanged from the prior study. Penile prosthesis appears unchanged from the prior study. No abnormal soft tissue gas or fluid collections other than subcutaneous edema which appears global. Peritoneum/Retroperitoneum: No evidence of retroperitoneal mass or adenopathy. No evidence of abdominal aortic aneurysm. Bones/Soft Tissues:  No acute abnormality of the visualized osseous structures. 1.  Severe dilatation of the urinary bladder suggesting bladder outlet obstruction. Prostate gland is enlarged. 2.  Bilateral hydronephrosis and hydroureter with bilateral nephrolithiasis. No evidence of ureterolithiasis. 3.  Partial interval resolution of abnormal soft tissue density in the fat surrounding the upper right kidney. This could be related to prior infection, trauma, surgery or less likely neoplasm. 4.  Improvement in right pleural effusion with some residual alveolar opacity in the medial right lower lobe which may represent pneumonia. Interval appearance of a moderate left effusion. 5.  Diffuse subcutaneous edema suggestive of anasarca. 6.  Penile prosthesis reservoir bulb is partially collapsed but unchanged from the prior study. No new abnormal fluid collections to suggest abscess related to the penile prosthesis. Mild edema may be related to diffuse subcutaneous edema/anasarca. US SCROTUM AND TESTICLES    Result Date: 1/17/2022  EXAMINATION: ULTRASOUND OF THE SCROTUM/TESTICLES WITH COLOR DOPPLER FLOW EVALUATION; DOPPLER EVALUATION OF THE PELVIS 1/17/2022 TECHNIQUE: Duplex ultrasound using B-mode/gray scaled imaging, Doppler spectral analysis and color flow Doppler was obtained of the testicles.; Duplex ultrasound using B-mode/gray scaled imaging and Doppler spectral analysis and color flow was obtained of the pelvis. COMPARISON: None.  HISTORY: ORDERING SYSTEM PROVIDED HISTORY: right sided pain TECHNOLOGIST PROVIDED HISTORY: Reason for exam:->right sided pain What reading provider will be dictating this exam?->CRC FINDINGS: The right testis measures approximately 3.2 x 4.3 x 2.2 cm in size, and the left testis measures approximately 3.2 x 4.0 x 2.3 cm in size. The bilateral testes are of homogeneous echotexture without focal lesion. Normal blood flow is noted bilaterally. There is a right epididymal cyst measuring approximately 8 mm in size. The left epididymis is not visualized. There is trace right and small left hydrocele. 1. Unremarkable sonographic evaluation of the bilateral testes. 2. Small right and trace left hydrocele. 3. Right epididymal cyst measures approximately 8 mm in size. XR CHEST PORTABLE    Result Date: 1/17/2022  EXAMINATION: ONE XRAY VIEW OF THE CHEST 1/17/2022 10:21 am COMPARISON: 6 January 2022 HISTORY: ORDERING SYSTEM PROVIDED HISTORY: SOB TECHNOLOGIST PROVIDED HISTORY: Reason for exam:->SOB FINDINGS: New right-sided PICC line terminates in the low SVC. Right chest tube is been removed. There is no visible pneumothorax. Airspace disease right greater than left is not appreciably changed. Unchanged airspace disease. Removal of right-sided chest tube and placement of right-sided PICC line as noted. US DUP ABD PEL RETRO SCROT COMPLETE    Result Date: 1/17/2022  EXAMINATION: ULTRASOUND OF THE SCROTUM/TESTICLES WITH COLOR DOPPLER FLOW EVALUATION; DOPPLER EVALUATION OF THE PELVIS 1/17/2022 TECHNIQUE: Duplex ultrasound using B-mode/gray scaled imaging, Doppler spectral analysis and color flow Doppler was obtained of the testicles.; Duplex ultrasound using B-mode/gray scaled imaging and Doppler spectral analysis and color flow was obtained of the pelvis. COMPARISON: None.  HISTORY: ORDERING SYSTEM PROVIDED HISTORY: right sided pain TECHNOLOGIST PROVIDED HISTORY: Reason for exam:->right sided pain What reading provider will be dictating this exam?->CRC FINDINGS: The right testis measures approximately 3.2 x 4.3 x 2.2 cm in size, and the left testis measures approximately 3.2 x 4.0 x 2.3 cm in size. The bilateral testes are of homogeneous echotexture without focal lesion. Normal blood flow is noted bilaterally. There is a right epididymal cyst measuring approximately 8 mm in size. The left epididymis is not visualized. There is trace right and small left hydrocele. 1. Unremarkable sonographic evaluation of the bilateral testes. 2. Small right and trace left hydrocele. 3. Right epididymal cyst measures approximately 8 mm in size. Discharge Exam:    HEENT: NCAT,  PERRLA, No JVD  Heart:  RRR, no murmurs, gallops, or rubs. Lungs:  CTA bilaterally, no wheeze, rales or rhonchi  Abd: bowel sounds present, nontender, nondistended, no masses  Extrem:  No clubbing, cyanosis, or edema    Disposition: home     Patient Condition at Discharge: Stable    Patient Instructions:      Medication List        START taking these medications      ampicillin-sulbactam  infusion  Commonly known as: UNASYN  Infuse 3,000 mg intravenously every 6 hours for 6 days Compound per protocol  Replaces: ampicillin-sulbactam 3 (2-1) g Solr            CHANGE how you take these medications      azelastine 0.1 % nasal spray  Commonly known as: ASTELIN  What changed: Another medication with the same name was removed. Continue taking this medication, and follow the directions you see here. fluticasone 50 MCG/ACT nasal spray  Commonly known as: FLONASE  What changed: Another medication with the same name was removed. Continue taking this medication, and follow the directions you see here.     gabapentin 300 MG capsule  Commonly known as: NEURONTIN  Take 1 capsule by mouth 2 times daily for 30 days. What changed:   when to take this  Another medication with the same name was removed. Continue taking this medication, and follow the directions you see here.             CONTINUE taking these medications      albuterol sulfate  (90 Base) MCG/ACT inhaler     ALPRAZolam 0.25 MG tablet  Commonly known as: XANAX     baclofen 10 MG tablet  Commonly known as: LIORESAL     cetirizine 10 MG tablet  Commonly known as: ZYRTEC     Cialis 5 MG tablet  Generic drug: tadalafil     dutasteride 0.5 MG capsule  Commonly known as: AVODART     fexofenadine 180 MG tablet  Commonly known as: ALLEGRA     meclizine 12.5 MG tablet  Commonly known as: ANTIVERT     MENS PROSTATE HEALTH FORMULA PO     montelukast 10 MG tablet  Commonly known as: SINGULAIR     oxyCODONE-acetaminophen 5-325 MG per tablet  Commonly known as: PERCOCET     REFRESH DRY EYE THERAPY OP     senna 8.6 MG tablet  Commonly known as: SENOKOT     sulfamethoxazole-trimethoprim 800-160 MG per tablet  Commonly known as: BACTRIM DS;SEPTRA DS     tamsulosin 0.4 MG capsule  Commonly known as: FLOMAX            STOP taking these medications      ampicillin-sulbactam 3 (2-1) g Solr  Commonly known as: UNASYN 3GM  Replaced by: ampicillin-sulbactam  infusion     metoprolol succinate 100 MG extended release tablet  Commonly known as: TOPROL XL               Where to Get Your Medications        These medications were sent to [address] [place] [address]      Phone: [phone]   gabapentin 300 MG capsule       You can get these medications from any pharmacy    Bring a paper prescription for each of these medications  ampicillin-sulbactam  infusion       Activity: activity as tolerated  Diet: diabetic diet    Pt has been advised to: Follow-up with Adrián Contreras MD in 1 week.   Follow-up with consultants as recommended by them    Note that over 30 minutes was spent in preparing discharge papers, discussing discharge with patient, medication review, etc.    Signed:  SHANIQUE Martinez CNP  1/21/2022  10:17 PM    Above note edited to reflect my thoughts     I personally saw, examined and provided care for the patient. Radiographs, labs and medication list were reviewed by me independently. The case was discussed in detail and plans for care were established. Review of Iris BAY- CNP, documentation was conducted and revisions were made as appropriate directly by me. I agree with the above documented exam, problem list, and plan of care.      Camelia Morales MD  10:44 PM  1/21/2022

## 2022-01-24 ENCOUNTER — TELEPHONE (OUTPATIENT)
Dept: CARDIOLOGY CLINIC | Age: 77
End: 2022-01-24

## 2022-01-24 NOTE — TELEPHONE ENCOUNTER
Nava from [place] called and while the pt was in the hospital they took him off his metropolol and the family is concerned and wants to know if you'll put him back on it?  Please advise, [phone]

## 2022-01-25 ENCOUNTER — TELEPHONE (OUTPATIENT)
Dept: ADMINISTRATIVE | Age: 77
End: 2022-01-25

## 2022-01-25 NOTE — TELEPHONE ENCOUNTER
Patient's son called needs hosp follow up with Dr Ishan Barr, discharged 01/21 please call patient and advise Thanks

## 2022-01-27 RX ORDER — METOPROLOL SUCCINATE 50 MG/1
50 TABLET, EXTENDED RELEASE ORAL DAILY
Qty: 30 TABLET | Refills: 3 | Status: CANCELLED | OUTPATIENT
Start: 2022-01-27

## 2022-01-31 RX ORDER — METOPROLOL SUCCINATE 50 MG/1
50 TABLET, EXTENDED RELEASE ORAL DAILY
Qty: 30 TABLET | Refills: 5 | Status: ON HOLD | OUTPATIENT
Start: 2022-01-31 | End: 2022-06-01 | Stop reason: HOSPADM

## 2022-02-07 ENCOUNTER — TELEPHONE (OUTPATIENT)
Dept: CARDIOLOGY CLINIC | Age: 77
End: 2022-02-07

## 2022-02-07 DIAGNOSIS — J30.2 SEASONAL ALLERGIC RHINITIS, UNSPECIFIED TRIGGER: Primary | ICD-10-CM

## 2022-02-07 LAB
FUNGUS (MYCOLOGY) CULTURE: NORMAL
FUNGUS STAIN: NORMAL

## 2022-02-07 RX ORDER — FLUTICASONE PROPIONATE 50 MCG
SPRAY, SUSPENSION (ML) NASAL
Qty: 48 G | Refills: 2 | Status: SHIPPED | OUTPATIENT
Start: 2022-02-07

## 2022-02-07 NOTE — TELEPHONE ENCOUNTER
Patient was last seen in office 1/7/2021 patient is not scheduled to come back in to the office. Patient would like a prescription refill for flonase.

## 2022-02-16 PROBLEM — R77.8 ELEVATED TROPONIN: Status: RESOLVED | Noted: 2022-01-17 | Resolved: 2022-02-16

## 2022-02-16 PROBLEM — R79.89 ELEVATED TROPONIN: Status: RESOLVED | Noted: 2022-01-17 | Resolved: 2022-02-16

## 2022-02-22 LAB
AFB CULTURE (MYCOBACTERIA): NORMAL
AFB SMEAR: NORMAL

## 2022-03-07 ENCOUNTER — OFFICE VISIT (OUTPATIENT)
Dept: CARDIOLOGY CLINIC | Age: 77
End: 2022-03-07
Payer: MEDICARE

## 2022-03-07 VITALS
HEART RATE: 82 BPM | DIASTOLIC BLOOD PRESSURE: 76 MMHG | SYSTOLIC BLOOD PRESSURE: 124 MMHG | RESPIRATION RATE: 18 BRPM | BODY MASS INDEX: 26.16 KG/M2 | WEIGHT: 182.7 LBS | HEIGHT: 70 IN

## 2022-03-07 DIAGNOSIS — I34.0 MITRAL VALVE INSUFFICIENCY, UNSPECIFIED ETIOLOGY: ICD-10-CM

## 2022-03-07 DIAGNOSIS — I34.1 MITRAL VALVE PROLAPSE: Primary | ICD-10-CM

## 2022-03-07 DIAGNOSIS — I34.0 NONRHEUMATIC MITRAL VALVE REGURGITATION: ICD-10-CM

## 2022-03-07 PROCEDURE — 99214 OFFICE O/P EST MOD 30 MIN: CPT | Performed by: CLINICAL NURSE SPECIALIST

## 2022-03-07 PROCEDURE — 93000 ELECTROCARDIOGRAM COMPLETE: CPT | Performed by: INTERNAL MEDICINE

## 2022-03-07 PROCEDURE — 1111F DSCHRG MED/CURRENT MED MERGE: CPT | Performed by: CLINICAL NURSE SPECIALIST
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
## 2022-03-16 ENCOUNTER — HOSPITAL ENCOUNTER (OUTPATIENT)
Age: 77
Discharge: HOME | End: 2022-03-18

## 2022-03-16 PROCEDURE — 88305 TISSUE EXAM BY PATHOLOGIST: CPT

## 2022-03-17 ENCOUNTER — HOSPITAL ENCOUNTER (OUTPATIENT)
Age: 77
Discharge: HOME | End: 2022-03-19

## 2022-03-17 LAB
ANION GAP SERPL CALCULATED.3IONS-SCNC: 10 MMOL/L (ref 7–16)
BUN BLDV-MCNC: 14 MG/DL (ref 6–23)
CALCIUM SERPL-MCNC: 8.4 MG/DL (ref 8.6–10.2)
CHLORIDE BLD-SCNC: 106 MMOL/L (ref 98–107)
CO2: 22 MMOL/L (ref 22–29)
CREAT SERPL-MCNC: 0.8 MG/DL (ref 0.7–1.2)
GFR AFRICAN AMERICAN: >60
GFR NON-AFRICAN AMERICAN: >60 ML/MIN/1.73
GLUCOSE BLD-MCNC: 168 MG/DL (ref 74–99)
HCT VFR BLD CALC: 34.9 % (ref 37–54)
HEMOGLOBIN: 11.2 G/DL (ref 12.5–16.5)
MCH RBC QN AUTO: 33.1 PG (ref 26–35)
MCHC RBC AUTO-ENTMCNC: 32.1 % (ref 32–34.5)
MCV RBC AUTO: 103.3 FL (ref 80–99.9)
PDW BLD-RTO: 13.1 FL (ref 11.5–15)
PLATELET # BLD: 181 E9/L (ref 130–450)
PMV BLD AUTO: 11.2 FL (ref 7–12)
POTASSIUM SERPL-SCNC: 4.2 MMOL/L (ref 3.5–5)
RBC # BLD: 3.38 E12/L (ref 3.8–5.8)
SODIUM BLD-SCNC: 138 MMOL/L (ref 132–146)
WBC # BLD: 13.3 E9/L (ref 4.5–11.5)

## 2022-03-17 PROCEDURE — 85027 COMPLETE CBC AUTOMATED: CPT

## 2022-03-17 PROCEDURE — 80048 BASIC METABOLIC PNL TOTAL CA: CPT
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
## 2022-04-07 ENCOUNTER — OFFICE VISIT (OUTPATIENT)
Dept: CARDIOLOGY CLINIC | Age: 77
End: 2022-04-07
Payer: MEDICARE

## 2022-04-07 ENCOUNTER — HOSPITAL ENCOUNTER (OUTPATIENT)
Dept: CARDIOLOGY | Age: 77
Discharge: HOME | End: 2022-04-07
Payer: MEDICARE

## 2022-04-07 ENCOUNTER — OFFICE VISIT (OUTPATIENT)
Dept: CARDIOTHORACIC SURGERY | Age: 77
End: 2022-04-07
Payer: MEDICARE

## 2022-04-07 VITALS
HEIGHT: 70 IN | HEART RATE: 83 BPM | TEMPERATURE: 97.9 F | BODY MASS INDEX: 25.77 KG/M2 | SYSTOLIC BLOOD PRESSURE: 139 MMHG | WEIGHT: 180 LBS | RESPIRATION RATE: 24 BRPM | DIASTOLIC BLOOD PRESSURE: 72 MMHG

## 2022-04-07 DIAGNOSIS — I34.0 SEVERE MITRAL REGURGITATION: Primary | ICD-10-CM

## 2022-04-07 DIAGNOSIS — I34.0 NONRHEUMATIC MITRAL VALVE REGURGITATION: ICD-10-CM

## 2022-04-07 DIAGNOSIS — I34.1 MITRAL VALVE PROLAPSE: Primary | ICD-10-CM

## 2022-04-07 DIAGNOSIS — I27.20 PULMONARY HTN (HCC): ICD-10-CM

## 2022-04-07 LAB
LV EF: 63 %
LVEF MODALITY: NORMAL

## 2022-04-07 PROCEDURE — 99204 OFFICE O/P NEW MOD 45 MIN: CPT | Performed by: THORACIC SURGERY (CARDIOTHORACIC VASCULAR SURGERY)

## 2022-04-07 PROCEDURE — 93306 TTE W/DOPPLER COMPLETE: CPT

## 2022-04-07 PROCEDURE — 99205 OFFICE O/P NEW HI 60 MIN: CPT | Performed by: INTERNAL MEDICINE

## 2022-04-07 RX ORDER — FUROSEMIDE 40 MG/1
40 TABLET ORAL 2 TIMES DAILY
Status: ON HOLD | COMMUNITY
End: 2022-04-22 | Stop reason: HOSPADM

## 2022-04-07 RX ORDER — POTASSIUM CHLORIDE 750 MG/1
10 CAPSULE, EXTENDED RELEASE ORAL DAILY
Status: ON HOLD | COMMUNITY
End: 2022-04-09

## 2022-04-07 NOTE — PATIENT INSTRUCTIONS
Carlos Or will be in touch to schedule heart catheterization and transesophageal echocardiogram    Please see your dentist for clearance prior to heart valve procedure - they can fax a clearance note to [phone]    Further recommendations pending above results
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
## 2022-04-07 NOTE — LETTER
[facility] Card[facility]  [address] [place]  Phone: [phone]  Fax: [phone]           Karolina Padilla MD      April 8, 2022     Patient: Sonu Vasquez   MR Number: [id number]   YOB: 1945   Date of Visit: 4/7/2022       Dear Dr. Yaz Hickey: Thank you for referring Sonu Vasquez to me for evaluation/treatment. Below are the relevant portions of my assessment and plan of care. If you have questions, please do not hesitate to call me. I look forward to following August along with you.     Sincerely,        Karolina Padilla MD    CC providers:  SHANIQUE Fontana - [facility]  [address] [address]  [place]

## 2022-04-08 ENCOUNTER — HOSPITAL ENCOUNTER (OUTPATIENT)
Age: 77
Setting detail: OBSERVATION
Discharge: HOME | End: 2022-04-09
Attending: EMERGENCY MEDICINE | Admitting: INTERNAL MEDICINE
Payer: MEDICARE

## 2022-04-08 DIAGNOSIS — I47.1 SVT (SUPRAVENTRICULAR TACHYCARDIA) (HCC): Primary | ICD-10-CM

## 2022-04-08 PROBLEM — I47.10 SVT (SUPRAVENTRICULAR TACHYCARDIA): Status: ACTIVE | Noted: 2022-04-08

## 2022-04-08 LAB
BASOPHILS ABSOLUTE: 0.06 E9/L (ref 0–0.2)
BASOPHILS RELATIVE PERCENT: 0.6 % (ref 0–2)
EOSINOPHILS ABSOLUTE: 0.2 E9/L (ref 0.05–0.5)
EOSINOPHILS RELATIVE PERCENT: 2 % (ref 0–6)
HCT VFR BLD CALC: 42.3 % (ref 37–54)
HEMOGLOBIN: 13.6 G/DL (ref 12.5–16.5)
IMMATURE GRANULOCYTES #: 0.06 E9/L
IMMATURE GRANULOCYTES %: 0.6 % (ref 0–5)
LYMPHOCYTES ABSOLUTE: 0.81 E9/L (ref 1.5–4)
LYMPHOCYTES RELATIVE PERCENT: 8.1 % (ref 20–42)
MCH RBC QN AUTO: 31.9 PG (ref 26–35)
MCHC RBC AUTO-ENTMCNC: 32.2 % (ref 32–34.5)
MCV RBC AUTO: 99.3 FL (ref 80–99.9)
MONOCYTES ABSOLUTE: 0.91 E9/L (ref 0.1–0.95)
MONOCYTES RELATIVE PERCENT: 9.1 % (ref 2–12)
NEUTROPHILS ABSOLUTE: 7.95 E9/L (ref 1.8–7.3)
NEUTROPHILS RELATIVE PERCENT: 79.6 % (ref 43–80)
PDW BLD-RTO: 13 FL (ref 11.5–15)
PLATELET # BLD: 317 E9/L (ref 130–450)
PMV BLD AUTO: 10 FL (ref 7–12)
RBC # BLD: 4.26 E12/L (ref 3.8–5.8)
WBC # BLD: 10 E9/L (ref 4.5–11.5)

## 2022-04-08 PROCEDURE — 84484 ASSAY OF TROPONIN QUANT: CPT

## 2022-04-08 PROCEDURE — 80053 COMPREHEN METABOLIC PANEL: CPT

## 2022-04-08 PROCEDURE — 93005 ELECTROCARDIOGRAM TRACING: CPT | Performed by: EMERGENCY MEDICINE

## 2022-04-08 PROCEDURE — 6360000002 HC RX W HCPCS: Performed by: EMERGENCY MEDICINE

## 2022-04-08 PROCEDURE — 93005 ELECTROCARDIOGRAM TRACING: CPT | Performed by: STUDENT IN AN ORGANIZED HEALTH CARE EDUCATION/TRAINING PROGRAM

## 2022-04-08 PROCEDURE — 85025 COMPLETE CBC W/AUTO DIFF WBC: CPT

## 2022-04-08 PROCEDURE — 83735 ASSAY OF MAGNESIUM: CPT

## 2022-04-08 PROCEDURE — 2580000003 HC RX 258: Performed by: STUDENT IN AN ORGANIZED HEALTH CARE EDUCATION/TRAINING PROGRAM

## 2022-04-08 PROCEDURE — 99283 EMERGENCY DEPT VISIT LOW MDM: CPT

## 2022-04-08 PROCEDURE — 6360000002 HC RX W HCPCS

## 2022-04-08 RX ORDER — SODIUM CHLORIDE 9 MG/ML
1000 INJECTION, SOLUTION INTRAVENOUS CONTINUOUS
Status: DISCONTINUED | OUTPATIENT
Start: 2022-04-08 | End: 2022-04-09 | Stop reason: HOSPADM

## 2022-04-08 RX ORDER — ADENOSINE 3 MG/ML
INJECTION, SOLUTION INTRAVENOUS
Status: COMPLETED
Start: 2022-04-08 | End: 2022-04-08

## 2022-04-08 RX ADMIN — ADENOSINE 6 MG: 3 INJECTION, SOLUTION INTRAVENOUS at 22:45

## 2022-04-08 RX ADMIN — ADENOSINE 12 MG: 3 INJECTION INTRAVENOUS at 22:48

## 2022-04-08 RX ADMIN — ADENOSINE 6 MG: 3 INJECTION INTRAVENOUS at 22:45

## 2022-04-08 RX ADMIN — SODIUM CHLORIDE 1000 ML: 9 INJECTION, SOLUTION INTRAVENOUS at 23:00

## 2022-04-08 ASSESSMENT — PAIN DESCRIPTION - PAIN TYPE: TYPE: ACUTE PAIN

## 2022-04-08 ASSESSMENT — PAIN SCALES - GENERAL: PAINLEVEL_OUTOF10: 6

## 2022-04-08 ASSESSMENT — PAIN DESCRIPTION - LOCATION: LOCATION: CHEST

## 2022-04-08 ASSESSMENT — PAIN DESCRIPTION - FREQUENCY: FREQUENCY: CONTINUOUS

## 2022-04-08 ASSESSMENT — PAIN DESCRIPTION - DESCRIPTORS: DESCRIPTORS: PRESSURE

## 2022-04-09 VITALS
TEMPERATURE: 98.2 F | RESPIRATION RATE: 18 BRPM | SYSTOLIC BLOOD PRESSURE: 113 MMHG | BODY MASS INDEX: 25.77 KG/M2 | HEART RATE: 79 BPM | OXYGEN SATURATION: 94 % | DIASTOLIC BLOOD PRESSURE: 66 MMHG | HEIGHT: 70 IN | WEIGHT: 180 LBS

## 2022-04-09 LAB
ALBUMIN SERPL-MCNC: 3.5 G/DL (ref 3.5–5.2)
ALBUMIN SERPL-MCNC: 3.8 G/DL (ref 3.5–5.2)
ALP BLD-CCNC: 127 U/L (ref 40–129)
ALP BLD-CCNC: 149 U/L (ref 40–129)
ALT SERPL-CCNC: 101 U/L (ref 0–40)
ALT SERPL-CCNC: 117 U/L (ref 0–40)
ANION GAP SERPL CALCULATED.3IONS-SCNC: 10 MMOL/L (ref 7–16)
ANION GAP SERPL CALCULATED.3IONS-SCNC: 12 MMOL/L (ref 7–16)
AST SERPL-CCNC: 105 U/L (ref 0–39)
AST SERPL-CCNC: 166 U/L (ref 0–39)
BASOPHILS ABSOLUTE: 0.03 E9/L (ref 0–0.2)
BASOPHILS RELATIVE PERCENT: 0.5 % (ref 0–2)
BILIRUB SERPL-MCNC: 0.2 MG/DL (ref 0–1.2)
BILIRUB SERPL-MCNC: 0.3 MG/DL (ref 0–1.2)
BUN BLDV-MCNC: 24 MG/DL (ref 6–23)
BUN BLDV-MCNC: 27 MG/DL (ref 6–23)
CALCIUM SERPL-MCNC: 8.6 MG/DL (ref 8.6–10.2)
CALCIUM SERPL-MCNC: 9.5 MG/DL (ref 8.6–10.2)
CHLORIDE BLD-SCNC: 102 MMOL/L (ref 98–107)
CHLORIDE BLD-SCNC: 110 MMOL/L (ref 98–107)
CO2: 21 MMOL/L (ref 22–29)
CO2: 25 MMOL/L (ref 22–29)
CREAT SERPL-MCNC: 0.8 MG/DL (ref 0.7–1.2)
CREAT SERPL-MCNC: 1 MG/DL (ref 0.7–1.2)
EKG ATRIAL RATE: 97 BPM
EKG P AXIS: 66 DEGREES
EKG P-R INTERVAL: 138 MS
EKG Q-T INTERVAL: 360 MS
EKG QRS DURATION: 70 MS
EKG QTC CALCULATION (BAZETT): 457 MS
EKG R AXIS: 2 DEGREES
EKG T AXIS: 37 DEGREES
EKG VENTRICULAR RATE: 97 BPM
EOSINOPHILS ABSOLUTE: 0.16 E9/L (ref 0.05–0.5)
EOSINOPHILS RELATIVE PERCENT: 2.4 % (ref 0–6)
GFR AFRICAN AMERICAN: >60
GFR AFRICAN AMERICAN: >60
GFR NON-AFRICAN AMERICAN: >60 ML/MIN/1.73
GFR NON-AFRICAN AMERICAN: >60 ML/MIN/1.73
GLUCOSE BLD-MCNC: 106 MG/DL (ref 74–99)
GLUCOSE BLD-MCNC: 133 MG/DL (ref 74–99)
HCT VFR BLD CALC: 33.6 % (ref 37–54)
HEMOGLOBIN: 10.8 G/DL (ref 12.5–16.5)
IMMATURE GRANULOCYTES #: 0.04 E9/L
IMMATURE GRANULOCYTES %: 0.6 % (ref 0–5)
LYMPHOCYTES ABSOLUTE: 0.79 E9/L (ref 1.5–4)
LYMPHOCYTES RELATIVE PERCENT: 12 % (ref 20–42)
MAGNESIUM: 2 MG/DL (ref 1.6–2.6)
MCH RBC QN AUTO: 32 PG (ref 26–35)
MCHC RBC AUTO-ENTMCNC: 32.1 % (ref 32–34.5)
MCV RBC AUTO: 99.4 FL (ref 80–99.9)
METER GLUCOSE: 90 MG/DL (ref 74–99)
MONOCYTES ABSOLUTE: 0.67 E9/L (ref 0.1–0.95)
MONOCYTES RELATIVE PERCENT: 10.2 % (ref 2–12)
NEUTROPHILS ABSOLUTE: 4.9 E9/L (ref 1.8–7.3)
NEUTROPHILS RELATIVE PERCENT: 74.3 % (ref 43–80)
PDW BLD-RTO: 13.1 FL (ref 11.5–15)
PLATELET # BLD: 243 E9/L (ref 130–450)
PMV BLD AUTO: 9.9 FL (ref 7–12)
POTASSIUM REFLEX MAGNESIUM: 4.2 MMOL/L (ref 3.5–5)
POTASSIUM SERPL-SCNC: 4.6 MMOL/L (ref 3.5–5)
RBC # BLD: 3.38 E12/L (ref 3.8–5.8)
SODIUM BLD-SCNC: 139 MMOL/L (ref 132–146)
SODIUM BLD-SCNC: 141 MMOL/L (ref 132–146)
TOTAL PROTEIN: 5.6 G/DL (ref 6.4–8.3)
TOTAL PROTEIN: 7 G/DL (ref 6.4–8.3)
TROPONIN, HIGH SENSITIVITY: 49 NG/L (ref 0–11)
TROPONIN, HIGH SENSITIVITY: 77 NG/L (ref 0–11)
TROPONIN, HIGH SENSITIVITY: 87 NG/L (ref 0–11)
TROPONIN, HIGH SENSITIVITY: 98 NG/L (ref 0–11)
WBC # BLD: 6.6 E9/L (ref 4.5–11.5)

## 2022-04-09 PROCEDURE — 6360000002 HC RX W HCPCS: Performed by: NURSE PRACTITIONER

## 2022-04-09 PROCEDURE — 99215 OFFICE O/P EST HI 40 MIN: CPT | Performed by: INTERNAL MEDICINE

## 2022-04-09 PROCEDURE — G0378 HOSPITAL OBSERVATION PER HR: HCPCS

## 2022-04-09 PROCEDURE — APPSS60 APP SPLIT SHARED TIME 46-60 MINUTES: Performed by: NURSE PRACTITIONER

## 2022-04-09 PROCEDURE — 85025 COMPLETE CBC W/AUTO DIFF WBC: CPT

## 2022-04-09 PROCEDURE — 82962 GLUCOSE BLOOD TEST: CPT

## 2022-04-09 PROCEDURE — 6370000000 HC RX 637 (ALT 250 FOR IP): Performed by: NURSE PRACTITIONER

## 2022-04-09 PROCEDURE — 96372 THER/PROPH/DIAG INJ SC/IM: CPT

## 2022-04-09 PROCEDURE — 84484 ASSAY OF TROPONIN QUANT: CPT

## 2022-04-09 PROCEDURE — 36415 COLL VENOUS BLD VENIPUNCTURE: CPT

## 2022-04-09 PROCEDURE — 93010 ELECTROCARDIOGRAM REPORT: CPT | Performed by: INTERNAL MEDICINE

## 2022-04-09 PROCEDURE — 80053 COMPREHEN METABOLIC PANEL: CPT

## 2022-04-09 RX ORDER — AZELASTINE 1 MG/ML
1 SPRAY, METERED NASAL 2 TIMES DAILY
Status: DISCONTINUED | OUTPATIENT
Start: 2022-04-09 | End: 2022-04-09 | Stop reason: CLARIF

## 2022-04-09 RX ORDER — SODIUM CHLORIDE 0.9 % (FLUSH) 0.9 %
5-40 SYRINGE (ML) INJECTION PRN
Status: DISCONTINUED | OUTPATIENT
Start: 2022-04-09 | End: 2022-04-09 | Stop reason: HOSPADM

## 2022-04-09 RX ORDER — ADENOSINE 3 MG/ML
6 INJECTION, SOLUTION INTRAVENOUS ONCE
Status: COMPLETED | OUTPATIENT
Start: 2022-04-08 | End: 2022-04-08

## 2022-04-09 RX ORDER — DEXTROSE MONOHYDRATE 25 G/50ML
12.5 INJECTION, SOLUTION INTRAVENOUS PRN
Status: DISCONTINUED | OUTPATIENT
Start: 2022-04-09 | End: 2022-04-09 | Stop reason: RX

## 2022-04-09 RX ORDER — ALBUTEROL SULFATE 90 UG/1
2 AEROSOL, METERED RESPIRATORY (INHALATION) EVERY 6 HOURS PRN
Status: DISCONTINUED | OUTPATIENT
Start: 2022-04-09 | End: 2022-04-09 | Stop reason: CLARIF

## 2022-04-09 RX ORDER — DEXTROSE MONOHYDRATE 50 MG/ML
100 INJECTION, SOLUTION INTRAVENOUS PRN
Status: DISCONTINUED | OUTPATIENT
Start: 2022-04-09 | End: 2022-04-09 | Stop reason: HOSPADM

## 2022-04-09 RX ORDER — NICOTINE POLACRILEX 4 MG
15 LOZENGE BUCCAL PRN
Status: DISCONTINUED | OUTPATIENT
Start: 2022-04-09 | End: 2022-04-09 | Stop reason: HOSPADM

## 2022-04-09 RX ORDER — METOPROLOL SUCCINATE 50 MG/1
50 TABLET, EXTENDED RELEASE ORAL DAILY
Status: DISCONTINUED | OUTPATIENT
Start: 2022-04-09 | End: 2022-04-09 | Stop reason: HOSPADM

## 2022-04-09 RX ORDER — MONTELUKAST SODIUM 10 MG/1
10 TABLET ORAL NIGHTLY
Status: DISCONTINUED | OUTPATIENT
Start: 2022-04-09 | End: 2022-04-09 | Stop reason: HOSPADM

## 2022-04-09 RX ORDER — SODIUM CHLORIDE 9 MG/ML
INJECTION, SOLUTION INTRAVENOUS PRN
Status: DISCONTINUED | OUTPATIENT
Start: 2022-04-09 | End: 2022-04-09 | Stop reason: HOSPADM

## 2022-04-09 RX ORDER — POTASSIUM CHLORIDE 750 MG/1
10 TABLET, EXTENDED RELEASE ORAL DAILY
Status: DISCONTINUED | OUTPATIENT
Start: 2022-04-09 | End: 2022-04-09 | Stop reason: HOSPADM

## 2022-04-09 RX ORDER — GABAPENTIN 300 MG/1
300 CAPSULE ORAL 2 TIMES DAILY
Status: DISCONTINUED | OUTPATIENT
Start: 2022-04-09 | End: 2022-04-09 | Stop reason: HOSPADM

## 2022-04-09 RX ORDER — SENNA PLUS 8.6 MG/1
1 TABLET ORAL NIGHTLY PRN
Status: DISCONTINUED | OUTPATIENT
Start: 2022-04-09 | End: 2022-04-09 | Stop reason: HOSPADM

## 2022-04-09 RX ORDER — FUROSEMIDE 40 MG/1
40 TABLET ORAL 2 TIMES DAILY
Status: DISCONTINUED | OUTPATIENT
Start: 2022-04-09 | End: 2022-04-09 | Stop reason: HOSPADM

## 2022-04-09 RX ORDER — ADENOSINE 3 MG/ML
12 INJECTION, SOLUTION INTRAVENOUS ONCE
Status: COMPLETED | OUTPATIENT
Start: 2022-04-08 | End: 2022-04-08

## 2022-04-09 RX ORDER — SODIUM CHLORIDE 0.9 % (FLUSH) 0.9 %
5-40 SYRINGE (ML) INJECTION EVERY 12 HOURS SCHEDULED
Status: DISCONTINUED | OUTPATIENT
Start: 2022-04-09 | End: 2022-04-09 | Stop reason: HOSPADM

## 2022-04-09 RX ORDER — FLUTICASONE PROPIONATE 50 MCG
1 SPRAY, SUSPENSION (ML) NASAL DAILY
Status: DISCONTINUED | OUTPATIENT
Start: 2022-04-09 | End: 2022-04-09 | Stop reason: HOSPADM

## 2022-04-09 RX ORDER — ALBUTEROL SULFATE 2.5 MG/3ML
2.5 SOLUTION RESPIRATORY (INHALATION) EVERY 6 HOURS PRN
Status: DISCONTINUED | OUTPATIENT
Start: 2022-04-09 | End: 2022-04-09 | Stop reason: HOSPADM

## 2022-04-09 RX ORDER — POLYETHYLENE GLYCOL 3350 17 G/17G
17 POWDER, FOR SOLUTION ORAL DAILY PRN
Status: DISCONTINUED | OUTPATIENT
Start: 2022-04-09 | End: 2022-04-09 | Stop reason: HOSPADM

## 2022-04-09 RX ADMIN — POTASSIUM CHLORIDE 10 MEQ: 750 TABLET, EXTENDED RELEASE ORAL at 08:32

## 2022-04-09 RX ADMIN — ENOXAPARIN SODIUM 40 MG: 40 INJECTION SUBCUTANEOUS at 08:32

## 2022-04-09 RX ADMIN — GABAPENTIN 300 MG: 300 CAPSULE ORAL at 08:32

## 2022-04-09 RX ADMIN — METOPROLOL SUCCINATE 50 MG: 50 TABLET, EXTENDED RELEASE ORAL at 08:32

## 2022-04-09 RX ADMIN — FUROSEMIDE 40 MG: 40 TABLET ORAL at 08:31

## 2022-04-09 RX ADMIN — FLUTICASONE PROPIONATE 1 SPRAY: 50 SPRAY, METERED NASAL at 08:32

## 2022-04-09 ASSESSMENT — ENCOUNTER SYMPTOMS
TROUBLE SWALLOWING: 0
DIARRHEA: 0
VOMITING: 0
COLOR CHANGE: 0
RECTAL PAIN: 0
NAUSEA: 0
WHEEZING: 0
VOICE CHANGE: 0
CONSTIPATION: 0
COUGH: 0
SHORTNESS OF BREATH: 0
ABDOMINAL PAIN: 0
BACK PAIN: 0

## 2022-04-09 ASSESSMENT — PAIN SCALES - GENERAL
PAINLEVEL_OUTOF10: 2
PAINLEVEL_OUTOF10: 0

## 2022-04-09 NOTE — CARE COORDINATION
Social Work/Discharge Planning:  Social Work consult noted. Met with patient and completed initial assessment. Explained Social Work role and discussed transition of care/discharge planning. He lives with his significant other Darylene Lopes in a one story condo with one step to enter. PTA he uses a cane or walker as needed. He has a home health care history with [facility]. His PCP is Dr. Teresa Dacosta and pharmacy is [facility]. He plans to return home and denies any home care needs at this time. Will continue to follow and assist if needed.   Electronically signed by SHERRIE Cardenas on 4/9/2022 at 10:02 AM
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
## 2022-04-09 NOTE — PROGRESS NOTES
Pharmacy Note    August VICTOR MANUEL Pedro was ordered azelastine (ASTELIN) nasal spray. Per the [address], this medication is non-formulary and not stocked by pharmacy for the reason indicated below. The medication can be reordered at discharge.      Medications in which risks outweigh benefits during hospitalization:         -  oral bisphosphonates         -  raloxifene (Evista)      Medications that lack necessity during an acute hospital stay:      -  nasal antihistamines (azelastine nasal spray)        -  nasal ipratropium 0.03% and 0.06%        -  nasal miacalcin        -  acyclovir topical cream/ointment orders for herpes labialis (cold sores)    [facility]  4/9/2022  2:43 AM

## 2022-04-09 NOTE — PROGRESS NOTES
Physical Therapy  Facility/Department: [address] INTERMEDIATE      NAME: Sonu Vasquez  : 1945  MRN: [id number]     Order received, chart reviewed and dicussed with pt. Pt reported he is independent around his room and does not need PT while in the hospital.  Will discontinue PT order at this time.       Celio Bennett, [phone]

## 2022-04-09 NOTE — ED PROVIDER NOTES
[facility]  EMERGENCY DEPARTMENT ENCOUNTER      Pt Name: Sonu Gresham  MRN: [id number]  [place] 1945  Date of evaluation: 4/8/2022      CHIEF COMPLAINT       Chief Complaint   Patient presents with    Palpitations     PT stated it started today        HPI  Sonu Vasquez is a 64 y.o. male history of SVT, and mitral valve prolapse, as JOEL scheduled tomorrow with Dr. Shireen Bolton, for possible valve replacement presents with palpitations starting this evening. States that he felt anxious and it occurred while he was having dinner. States that he felt anxious. No alleviating or exacerbating factors. He has not taken any medications or measures alleviate his symptoms. Denies any recent fevers, chills, nausea, vomiting, shortness of breath, abdominal pain, flank pain, dysuria, hematuria, diarrhea, constipation, new rashes or sores. Except as noted above the remainder of the review of systems was reviewed and negative. Review of Systems   Constitutional: Negative for appetite change, chills, diaphoresis, fatigue, fever and unexpected weight change. HENT: Negative for trouble swallowing and voice change. Eyes: Negative for visual disturbance. Respiratory: Negative for cough, shortness of breath and wheezing. Cardiovascular: Positive for palpitations. Negative for chest pain. Gastrointestinal: Negative for abdominal pain, constipation, diarrhea, nausea, rectal pain and vomiting. Endocrine: Negative for polyphagia and polyuria. Genitourinary: Negative for dysuria and frequency. Musculoskeletal: Negative for arthralgias and back pain. Skin: Negative for color change, pallor, rash and wound. Neurological: Negative for weakness and headaches. Hematological: Negative for adenopathy. Psychiatric/Behavioral: Negative for agitation. All other systems reviewed and are negative. Physical Exam  Vitals and nursing note reviewed.    Constitutional:       General: He is not in acute distress. Appearance: Normal appearance. He is not ill-appearing or diaphoretic. HENT:      Head: Normocephalic and atraumatic. Right Ear: External ear normal.      Left Ear: External ear normal.      Nose: Nose normal.   Eyes:      Extraocular Movements: Extraocular movements intact. Pupils: Pupils are equal, round, and reactive to light. Cardiovascular:      Rate and Rhythm: Normal rate and regular rhythm. Pulses: Normal pulses. Heart sounds: Normal heart sounds. Pulmonary:      Effort: Pulmonary effort is normal.      Breath sounds: Normal breath sounds. Abdominal:      General: Abdomen is flat. Palpations: Abdomen is soft. Tenderness: There is no abdominal tenderness. There is no right CVA tenderness, left CVA tenderness or rebound. Negative signs include Ladd's sign, Rovsing's sign and McBurney's sign. Musculoskeletal:         General: Normal range of motion. Cervical back: Normal range of motion. Skin:     General: Skin is warm and dry. Capillary Refill: Capillary refill takes less than 2 seconds. Neurological:      General: No focal deficit present. Mental Status: He is alert and oriented to person, place, and time. Psychiatric:         Mood and Affect: Mood normal.          Procedures     PROCEDURE  4/10/22       Time: 2245    CARDIOVERSION  Risks, benefits and alternatives (for applicable procedures below) described. Performed By: Corinne Come, MD and EM Attending Physician. Indication: supraventricular tachycardia. Informed consent: Verbal consent obtained. The patient was counseled regarding the procedure in person, it's indications, risks, potential complications and alternatives and any questions were answered. Verbal consent was obtained. Procedure:  Vagal maneuvers were not attempted.   Prior to the Procedure, anesthsia given: no.  Cardioversion was performed under my order and direction, and was attempted by adenosine 6mg, and then 12mg                                                The resultant rhythm was:  conversion to normal sinus rhythm. Complications: None. Patient tolerated the procedure well. Cardiology Consult Placed:  yes. MDM  Number of Diagnoses or Management Options  Diagnosis management comments: 64-year-old male history of mild chronic prolapse, with a scheduled JOEL this week presents with complaints of palpitations. Currently acutely. Patient presented here in SVT. Pacer pads placed. Patient placed on monitor. Fluids hanging. Patient given 6 mg of Benicar with no change of arrhythmia. Patient then 1012 mg of banana cart with change. Patient states he feels much better. Patient getting gentle hydration. Diagnostic labs and imaging unremarkable. Patient to be admitted hospital for observation. Amount and/or Complexity of Data Reviewed  Decide to obtain previous medical records or to obtain history from someone other than the patient: yes           Patient is awake alert, hemodynamic stable, afebrile and in no respiratory distress. Discussed with patient plan for close outpatient follow-up with the patient's PCP as well as return precautions and the patient understands and agrees to the plan. Patient was seen with attending. ED Course as of 04/10/22 0053   Fri Apr 08, 2022   2243 6 adenocart [JV]   2245 12 adenocart. Patient cardioverted.   [JV]      ED Course User Index  [JV] Kosta Medel MD           --------------------------------------------- PAST HISTORY ---------------------------------------------  Past Medical History:  has a past medical history of Abnormal PSA, Anxiety, Bronchitis, Claustrophobia, Diabetes mellitus ([address] Endocarditis, Enlarged LA (left atrium), Erectile dysfunction, GERD (gastroesophageal reflux disease), H/O complete arterial study of extremity, Hiatal hernia, History of EMG, Hypertension, Lung nodule, Mitral valve prolapse, Moderate tricuspid regurgitation, Multiple sclerosis ([address] Prostatitis, Renal calculi, Sinus problem, Supraventricular arrhythmia, Tachycardia, and Tilt table evaluation. Past Surgical History:  has a past surgical history that includes transthoracic echocardiogram (9/28/2011  Echo showed normal left ventricular size with borderline concentric left ventricular hypertrophy with normal left ventricular systolic function with stage I  LV diastolic dysfunction, normal, normal right ventricular size and function, mildly dilated left atrium, borderline dilated right atrium mildly thickened anterior mitral leaflet with bowing of the mitral leaflets without felipe prolapse with mild mitral annular calcification, mild-to-moderate eccentrically-directed jet of mitral regurgitation.); Diagnostic Cardiac Cath Lab Procedure; cardiovascular stress test (10/19/2011  Adenosine nuclear stress test (4 minute walking protocol) showed a minimally reversible minor, nontransmural defect involving the apical anterolateral wall, more consistent with soft tissue attenuation and motion artifact); Skin graft; Inguinal hernia repair; Nose surgery; Colonoscopy; Prostate Biopsy (july 2013); Prostate surgery (Aug,13); eye surgery (10/13); eye surgery (11/13); Bladder surgery; Dental surgery; Lithotripsy (12/6/17 @ [facility]); thoracotomy (Right, 1/4/2022); and picc line insertion nurse (1/6/2022). Social History:  reports that he quit smoking about 39 years ago. His smoking use included cigarettes. He has a 50.00 pack-year smoking history. He has never used smokeless tobacco. He reports current alcohol use. He reports that he does not use drugs. Family History: family history includes Diabetes in his mother; Other (age of onset: 48) in his father. The patients home medications have been reviewed.     Allergies: Aleve [naproxen], Baclofen, Dye [iodides], Paxil [paroxetine], and Shellfish allergy    -------------------------------------------------- RESULTS -------------------------------------------------    LABS:  Results for orders placed or performed during the hospital encounter of 04/08/22   CBC with Auto Differential   Result Value Ref Range    WBC 10.0 4.5 - 11.5 E9/L    RBC 4.26 3.80 - 5.80 E12/L    Hemoglobin 13.6 12.5 - 16.5 g/dL    Hematocrit 42.3 37.0 - 54.0 %    MCV 99.3 80.0 - 99.9 fL    MCH 31.9 26.0 - 35.0 pg    MCHC 32.2 32.0 - 34.5 %    RDW 13.0 11.5 - 15.0 fL    Platelets 435 170 - 912 E9/L    MPV 10.0 7.0 - 12.0 fL    Neutrophils % 79.6 43.0 - 80.0 %    Immature Granulocytes % 0.6 0.0 - 5.0 %    Lymphocytes % 8.1 (L) 20.0 - 42.0 %    Monocytes % 9.1 2.0 - 12.0 %    Eosinophils % 2.0 0.0 - 6.0 %    Basophils % 0.6 0.0 - 2.0 %    Neutrophils Absolute 7.95 (H) 1.80 - 7.30 E9/L    Immature Granulocytes # 0.06 E9/L    Lymphocytes Absolute 0.81 (L) 1.50 - 4.00 E9/L    Monocytes Absolute 0.91 0.10 - 0.95 E9/L    Eosinophils Absolute 0.20 0.05 - 0.50 E9/L    Basophils Absolute 0.06 0.00 - 0.20 E9/L   Comprehensive Metabolic Panel   Result Value Ref Range    Sodium 139 132 - 146 mmol/L    Potassium 4.6 3.5 - 5.0 mmol/L    Chloride 102 98 - 107 mmol/L    CO2 25 22 - 29 mmol/L    Anion Gap 12 7 - 16 mmol/L    Glucose 133 (H) 74 - 99 mg/dL    BUN 27 (H) 6 - 23 mg/dL    CREATININE 1.0 0.7 - 1.2 mg/dL    GFR Non-African American >60 >=60 mL/min/1.73    GFR African American >60     Calcium 9.5 8.6 - 10.2 mg/dL    Total Protein 7.0 6.4 - 8.3 g/dL    Albumin 3.8 3.5 - 5.2 g/dL    Total Bilirubin 0.3 0.0 - 1.2 mg/dL    Alkaline Phosphatase 149 (H) 40 - 129 U/L     (H) 0 - 40 U/L     (H) 0 - 39 U/L   Magnesium   Result Value Ref Range    Magnesium 2.0 1.6 - 2.6 mg/dL   Troponin   Result Value Ref Range    Troponin, High Sensitivity 49 (H) 0 - 11 ng/L   Troponin   Result Value Ref Range    Troponin, High Sensitivity 77 (H) 0 - 11 ng/L   CBC with Auto Differential   Result Value Ref Range    WBC 6.6 4.5 - 11.5 E9/L    RBC 3.38 (L) 3.80 - 5.80 E12/L    Hemoglobin 10.8 (L) 12.5 - 16.5 g/dL    Hematocrit 33.6 (L) 37.0 - 54.0 %    MCV 99.4 80.0 - 99.9 fL    MCH 32.0 26.0 - 35.0 pg    MCHC 32.1 32.0 - 34.5 %    RDW 13.1 11.5 - 15.0 fL    Platelets 556 506 - 649 E9/L    MPV 9.9 7.0 - 12.0 fL    Neutrophils % 74.3 43.0 - 80.0 %    Immature Granulocytes % 0.6 0.0 - 5.0 %    Lymphocytes % 12.0 (L) 20.0 - 42.0 %    Monocytes % 10.2 2.0 - 12.0 %    Eosinophils % 2.4 0.0 - 6.0 %    Basophils % 0.5 0.0 - 2.0 %    Neutrophils Absolute 4.90 1.80 - 7.30 E9/L    Immature Granulocytes # 0.04 E9/L    Lymphocytes Absolute 0.79 (L) 1.50 - 4.00 E9/L    Monocytes Absolute 0.67 0.10 - 0.95 E9/L    Eosinophils Absolute 0.16 0.05 - 0.50 E9/L    Basophils Absolute 0.03 0.00 - 0.20 E9/L   Comprehensive Metabolic Panel w/ Reflex to MG   Result Value Ref Range    Sodium 141 132 - 146 mmol/L    Potassium reflex Magnesium 4.2 3.5 - 5.0 mmol/L    Chloride 110 (H) 98 - 107 mmol/L    CO2 21 (L) 22 - 29 mmol/L    Anion Gap 10 7 - 16 mmol/L    Glucose 106 (H) 74 - 99 mg/dL    BUN 24 (H) 6 - 23 mg/dL    CREATININE 0.8 0.7 - 1.2 mg/dL    GFR Non-African American >60 >=60 mL/min/1.73    GFR African American >60     Calcium 8.6 8.6 - 10.2 mg/dL    Total Protein 5.6 (L) 6.4 - 8.3 g/dL    Albumin 3.5 3.5 - 5.2 g/dL    Total Bilirubin 0.2 0.0 - 1.2 mg/dL    Alkaline Phosphatase 127 40 - 129 U/L     (H) 0 - 40 U/L     (H) 0 - 39 U/L   Troponin   Result Value Ref Range    Troponin, High Sensitivity 87 (H) 0 - 11 ng/L   Troponin   Result Value Ref Range    Troponin, High Sensitivity 98 (H) 0 - 11 ng/L   POCT Glucose   Result Value Ref Range    Meter Glucose 90 74 - 99 mg/dL   EKG 12 Lead   Result Value Ref Range    Ventricular Rate 97 BPM    Atrial Rate 97 BPM    P-R Interval 138 ms    QRS Duration 70 ms    Q-T Interval 360 ms    QTc Calculation (Bazett) 457 ms    P Axis 66 degrees    R Axis 2 degrees    T Axis 37 degrees       RADIOLOGY:  No orders to display       EKG:   This EKG is signed and interpreted by me. Status post Adenocart. Heart rate 100. Sinus tachycardia. Left axis deviation. QTc 440. No ST elevations or depressions. Change compared to previous EKG for patient was in SVT.      ------------------------- NURSING NOTES AND VITALS REVIEWED ---------------------------  Date / Time Roomed:  4/8/2022 10:26 PM  ED Bed Assignment:  [id number]    The nursing notes within the ED encounter and vital signs as below have been reviewed. Patient Vitals for the past 24 hrs:   BP Temp Temp src Pulse Resp SpO2   04/09/22 0830 113/66 98.2 °F (36.8 °C) Oral 79 18 94 %   04/09/22 0200 114/63 97.7 °F (36.5 °C) Oral 84 18 96 %       Oxygen Saturation Interpretation: Normal    ------------------------------------------ PROGRESS NOTES ------------------------------------------  Re-evaluation(s):  Time: 2300  Patients symptoms are improving  Repeat physical examination is significantly improved    Counseling:  I have spoken with the patient and discussed todays results, in addition to providing specific details for the plan of care and counseling regarding the diagnosis and prognosis. Their questions are answered at this time and they are agreeable with the plan of admission.    --------------------------------- ADDITIONAL PROVIDER NOTES ---------------------------------  Consultations:  Spoke with admitting provider for Dr. Vicki Cantor. Discussed case. They will admit the patient. This patient's ED course included: a personal history and physicial examination, re-evaluation prior to disposition, multiple bedside re-evaluations, IV medications, cardiac monitoring and continuous pulse oximetry    This patient has remained hemodynamically stable during their ED course. Diagnosis:  1. SVT (supraventricular tachycardia) (HCC)        Disposition:  Patient's disposition: Admit to telemetry  Patient's condition is fair.          Breanne Preciado MD  Resident  04/10/22 3033

## 2022-04-09 NOTE — PLAN OF CARE
Problem: Pain:  Description: Pain management should include both nonpharmacologic and pharmacologic interventions.   Goal: Pain level will decrease  Description: Pain level will decrease  Outcome: Met This Shift  Goal: Control of acute pain  Description: Control of acute pain  Outcome: Met This Shift  Goal: Control of chronic pain  Description: Control of chronic pain  Outcome: Met This Shift     Problem: Falls - Risk of:  Goal: Will remain free from falls  Description: Will remain free from falls  4/9/2022 1013 by Iva Noble RN  Outcome: Met This Shift  4/9/2022 0248 by Tosha Meng RN  Outcome: Met This Shift  Goal: Absence of physical injury  Description: Absence of physical injury  Outcome: Met This Shift

## 2022-04-09 NOTE — PROGRESS NOTES
Secure message sent to Damion Benedict NP re: discharge. Per Aurelia MILTON monitor can be arranged outpatient and pt can be discharged today.

## 2022-04-09 NOTE — CONSULTS
I independently performed an evaluation on the patient. I have reviewed the above documentation completed by the advance practitioner. Please see my additional contributions to the HPI, physical exam, assessment and medical decision making. Physical Exam   /66   Pulse 79 Comment: NSR  Temp 98.2 °F (36.8 °C) (Oral)   Resp 18   Ht 5' 10\" (1.778 m)   Wt 180 lb (81.6 kg)   SpO2 94%   BMI 25.83 kg/m²   Constitutional: Oriented to person, place, and time. Well-developed and well-nourished. No distress. Head: Normocephalic and atraumatic. Eyes: EOM are normal. Pupils are equal, round, and reactive to light. Neck: Normal range of motion. Neck supple. No hepatojugular reflux and no JVD present. Carotid bruit is not present. No tracheal deviation present. No thyromegaly present. Cardiovascular: Normal rate, regular rhythm, normal heart sounds and intact distal pulses. Exam reveals no gallop and no friction rub. No murmur heard. Pulmonary/Chest: Effort normal and breath sounds normal. No respiratory distress. No wheezes. No rales. No tenderness. Abdominal: Soft. Bowel sounds are normal. No distension and no mass. No tenderness. No rebound and no guarding. Musculoskeletal: Normal range of motion. No edema and no tenderness. Lymphadenopathy:   No cervical adenopathy. Neurological: Alert and oriented to person, place, and time. Skin: Skin is warm and dry. No rash noted. Not diaphoretic. No erythema. Psychiatric: Normal mood and affect. Behavior is normal.     Echo Summary 4/8/2022:   Normal left ventricle size and systolic function. Ejection fraction is visually estimated at 60-65%. No regional wall motion abnormalities seen. Mild concentric left ventricular hypertrophy. Indeterminate diastolic function due to MAC. The left atrium is severely dilated. Mildly dilated right ventricle. Right ventricle global systolic function is normal . TAPSE 31 mm. Moderate mitral stenosis. Mitral valve mean gradient 7.5 mmHg (HR 83), MVA by continuity equation 1.8 cm2. Moderate-to-severe mitral regurgitation with centrally directed jet. ERO 0.4 cm2, no PV flow reversal noted. No hemodynamically significant aortic stenosis is present. Mild aortic regurgitation is noted. Moderate tricuspid regurgitation. RVSP is 84 mmHg. Pulmonary hypertension is severe . No evidence for hemodynamically significant pericardial effusion. See accompanying documentation for full consult. ASSESSMENT AND PLAN:     1. SVT:     Back in sinus. Monitor okay. Multiple symptomatic recurrences. Continue BB. Extra BB with episodes. Outpatient monitor. Outpatient EP. Move to discharge.      2. VHD: Hx of MVP and significant MR.      Continue to medically manage and follow volume. Under evaluation by [facility] for potential MV intervention.     3. Hx of MV endocarditis.      4. Elevated troponin: Secondary to tachycardia. Will have cath as part  Of his valve evaluation upcoming.      5. HTN: Observe.      6. Multiple sclerosis: Per primary service.     7. DM: Per primary service.      8. Hiatal hernia/GERD.     9.  Asbestosis/COPD. 10. Hx of ID issues/Empyema:     Recent parapneuomonic effusion s/p thoracotomy and decortication 1/2022.  Earlene Hunt D.O.   Cardiologist  Cardiology, [facility]

## 2022-04-09 NOTE — PROGRESS NOTES
Call placed to Nursing supervisor re: Felix Mcardle XT monitor. Per nursing supervisor, we do not have Zio XT monitor present at this facility nor do we have a program to apply them like they to [facility], multiple calls made to Felix Mcardle XT rep no answer/return calls. Unable to apply or obtain Zio XT monitor at this time. [facility] Cardiology notified.
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
## 2022-04-11 ENCOUNTER — NURSE ONLY (OUTPATIENT)
Dept: CARDIOLOGY CLINIC | Age: 77
End: 2022-04-11

## 2022-04-11 NOTE — PROGRESS NOTES
Patient was seen in office today for the placement of a 14 day ZIO XT per . Patient tolerated well & understood instructions. Device # [id number]  Dianne Curran MA.

## 2022-04-12 DIAGNOSIS — I34.0 MITRAL VALVE INSUFFICIENCY, UNSPECIFIED ETIOLOGY: ICD-10-CM

## 2022-04-12 DIAGNOSIS — I10 HYPERTENSION, UNSPECIFIED TYPE: ICD-10-CM

## 2022-04-12 DIAGNOSIS — Z01.810 PREOPERATIVE CARDIOVASCULAR EXAMINATION: Primary | ICD-10-CM

## 2022-04-12 LAB
EKG ATRIAL RATE: 78 BPM
EKG Q-T INTERVAL: 282 MS
EKG QRS DURATION: 64 MS
EKG QTC CALCULATION (BAZETT): 471 MS
EKG R AXIS: 12 DEGREES
EKG T AXIS: 41 DEGREES
EKG VENTRICULAR RATE: 168 BPM

## 2022-04-20 ENCOUNTER — APPOINTMENT (OUTPATIENT)
Dept: NUCLEAR MEDICINE | Age: 77
End: 2022-04-20
Payer: MEDICARE

## 2022-04-20 ENCOUNTER — APPOINTMENT (OUTPATIENT)
Dept: GENERAL RADIOLOGY | Age: 77
End: 2022-04-20
Payer: MEDICARE

## 2022-04-20 ENCOUNTER — HOSPITAL ENCOUNTER (OUTPATIENT)
Age: 77
Setting detail: OBSERVATION
Discharge: HOME | End: 2022-04-22
Attending: EMERGENCY MEDICINE | Admitting: INTERNAL MEDICINE
Payer: MEDICARE

## 2022-04-20 ENCOUNTER — APPOINTMENT (OUTPATIENT)
Dept: NON INVASIVE DIAGNOSTICS | Age: 77
End: 2022-04-20
Payer: MEDICARE

## 2022-04-20 DIAGNOSIS — I49.9 CARDIAC ARRHYTHMIA, UNSPECIFIED CARDIAC ARRHYTHMIA TYPE: Primary | ICD-10-CM

## 2022-04-20 DIAGNOSIS — R07.9 CHEST PAIN, UNSPECIFIED TYPE: ICD-10-CM

## 2022-04-20 DIAGNOSIS — R00.2 PALPITATIONS: ICD-10-CM

## 2022-04-20 LAB
ALBUMIN SERPL-MCNC: 3.5 G/DL (ref 3.5–5.2)
ALP BLD-CCNC: 125 U/L (ref 40–129)
ALT SERPL-CCNC: 65 U/L (ref 0–40)
ANION GAP SERPL CALCULATED.3IONS-SCNC: 13 MMOL/L (ref 7–16)
ANISOCYTOSIS: ABNORMAL
AST SERPL-CCNC: 84 U/L (ref 0–39)
BASOPHILS ABSOLUTE: 0.04 E9/L (ref 0–0.2)
BASOPHILS RELATIVE PERCENT: 0.5 % (ref 0–2)
BILIRUB SERPL-MCNC: 0.3 MG/DL (ref 0–1.2)
BUN BLDV-MCNC: 25 MG/DL (ref 6–23)
CALCIUM SERPL-MCNC: 8.9 MG/DL (ref 8.6–10.2)
CHLORIDE BLD-SCNC: 107 MMOL/L (ref 98–107)
CO2: 21 MMOL/L (ref 22–29)
CREAT SERPL-MCNC: 1.4 MG/DL (ref 0.7–1.2)
EKG ATRIAL RATE: 87 BPM
EKG P AXIS: 67 DEGREES
EKG P-R INTERVAL: 148 MS
EKG Q-T INTERVAL: 374 MS
EKG QRS DURATION: 82 MS
EKG QTC CALCULATION (BAZETT): 450 MS
EKG R AXIS: 13 DEGREES
EKG T AXIS: 43 DEGREES
EKG VENTRICULAR RATE: 87 BPM
EOSINOPHILS ABSOLUTE: 0.17 E9/L (ref 0.05–0.5)
EOSINOPHILS RELATIVE PERCENT: 2.3 % (ref 0–6)
GFR AFRICAN AMERICAN: 59
GFR NON-AFRICAN AMERICAN: 49 ML/MIN/1.73
GLUCOSE BLD-MCNC: 112 MG/DL (ref 74–99)
HCT VFR BLD CALC: 38.1 % (ref 37–54)
HEMOGLOBIN: 12.2 G/DL (ref 12.5–16.5)
IMMATURE GRANULOCYTES #: 0.04 E9/L
IMMATURE GRANULOCYTES %: 0.5 % (ref 0–5)
LV EF: 64 %
LVEF MODALITY: NORMAL
LYMPHOCYTES ABSOLUTE: 0.55 E9/L (ref 1.5–4)
LYMPHOCYTES RELATIVE PERCENT: 7.3 % (ref 20–42)
MCH RBC QN AUTO: 31 PG (ref 26–35)
MCHC RBC AUTO-ENTMCNC: 32 % (ref 32–34.5)
MCV RBC AUTO: 96.9 FL (ref 80–99.9)
MONOCYTES ABSOLUTE: 0.72 E9/L (ref 0.1–0.95)
MONOCYTES RELATIVE PERCENT: 9.5 % (ref 2–12)
NEUTROPHILS ABSOLUTE: 6.03 E9/L (ref 1.8–7.3)
NEUTROPHILS RELATIVE PERCENT: 79.9 % (ref 43–80)
OVALOCYTES: ABNORMAL
PDW BLD-RTO: 13 FL (ref 11.5–15)
PLATELET # BLD: 221 E9/L (ref 130–450)
PMV BLD AUTO: 10.1 FL (ref 7–12)
POIKILOCYTES: ABNORMAL
POLYCHROMASIA: ABNORMAL
POTASSIUM SERPL-SCNC: 4.6 MMOL/L (ref 3.5–5)
RBC # BLD: 3.93 E12/L (ref 3.8–5.8)
SODIUM BLD-SCNC: 141 MMOL/L (ref 132–146)
TOTAL PROTEIN: 6.5 G/DL (ref 6.4–8.3)
TROPONIN, HIGH SENSITIVITY: 66 NG/L (ref 0–11)
TROPONIN, HIGH SENSITIVITY: 89 NG/L (ref 0–11)
WBC # BLD: 7.6 E9/L (ref 4.5–11.5)

## 2022-04-20 PROCEDURE — 78452 HT MUSCLE IMAGE SPECT MULT: CPT

## 2022-04-20 PROCEDURE — 93016 CV STRESS TEST SUPVJ ONLY: CPT | Performed by: INTERNAL MEDICINE

## 2022-04-20 PROCEDURE — 6370000000 HC RX 637 (ALT 250 FOR IP): Performed by: INTERNAL MEDICINE

## 2022-04-20 PROCEDURE — 84484 ASSAY OF TROPONIN QUANT: CPT

## 2022-04-20 PROCEDURE — 93005 ELECTROCARDIOGRAM TRACING: CPT | Performed by: EMERGENCY MEDICINE

## 2022-04-20 PROCEDURE — 93017 CV STRESS TEST TRACING ONLY: CPT

## 2022-04-20 PROCEDURE — 3430000000 HC RX DIAGNOSTIC RADIOPHARMACEUTICAL: Performed by: RADIOLOGY

## 2022-04-20 PROCEDURE — G0378 HOSPITAL OBSERVATION PER HR: HCPCS

## 2022-04-20 PROCEDURE — 99205 OFFICE O/P NEW HI 60 MIN: CPT | Performed by: STUDENT IN AN ORGANIZED HEALTH CARE EDUCATION/TRAINING PROGRAM

## 2022-04-20 PROCEDURE — A9500 TC99M SESTAMIBI: HCPCS | Performed by: RADIOLOGY

## 2022-04-20 PROCEDURE — 99285 EMERGENCY DEPT VISIT HI MDM: CPT

## 2022-04-20 PROCEDURE — 2580000003 HC RX 258: Performed by: EMERGENCY MEDICINE

## 2022-04-20 PROCEDURE — 78452 HT MUSCLE IMAGE SPECT MULT: CPT | Performed by: INTERNAL MEDICINE

## 2022-04-20 PROCEDURE — 71045 X-RAY EXAM CHEST 1 VIEW: CPT

## 2022-04-20 PROCEDURE — 80053 COMPREHEN METABOLIC PANEL: CPT

## 2022-04-20 PROCEDURE — 93018 CV STRESS TEST I&R ONLY: CPT | Performed by: INTERNAL MEDICINE

## 2022-04-20 PROCEDURE — 2580000003 HC RX 258: Performed by: INTERNAL MEDICINE

## 2022-04-20 PROCEDURE — 6360000002 HC RX W HCPCS: Performed by: INTERNAL MEDICINE

## 2022-04-20 PROCEDURE — 6360000002 HC RX W HCPCS: Performed by: NURSE PRACTITIONER

## 2022-04-20 PROCEDURE — 6370000000 HC RX 637 (ALT 250 FOR IP): Performed by: NURSE PRACTITIONER

## 2022-04-20 PROCEDURE — 85025 COMPLETE CBC W/AUTO DIFF WBC: CPT

## 2022-04-20 PROCEDURE — 96372 THER/PROPH/DIAG INJ SC/IM: CPT

## 2022-04-20 RX ORDER — OXYCODONE HYDROCHLORIDE AND ACETAMINOPHEN 5; 325 MG/1; MG/1
1 TABLET ORAL EVERY 6 HOURS PRN
Status: DISCONTINUED | OUTPATIENT
Start: 2022-04-20 | End: 2022-04-22 | Stop reason: HOSPADM

## 2022-04-20 RX ORDER — SODIUM CHLORIDE 0.9 % (FLUSH) 0.9 %
5-40 SYRINGE (ML) INJECTION PRN
Status: DISCONTINUED | OUTPATIENT
Start: 2022-04-20 | End: 2022-04-22 | Stop reason: HOSPADM

## 2022-04-20 RX ORDER — SODIUM CHLORIDE 9 MG/ML
INJECTION, SOLUTION INTRAVENOUS PRN
Status: DISCONTINUED | OUTPATIENT
Start: 2022-04-20 | End: 2022-04-22 | Stop reason: HOSPADM

## 2022-04-20 RX ORDER — FLECAINIDE ACETATE 50 MG/1
50 TABLET ORAL EVERY 12 HOURS SCHEDULED
Status: DISCONTINUED | OUTPATIENT
Start: 2022-04-20 | End: 2022-04-22

## 2022-04-20 RX ORDER — ALBUTEROL SULFATE 2.5 MG/3ML
2.5 SOLUTION RESPIRATORY (INHALATION) EVERY 6 HOURS PRN
Status: DISCONTINUED | OUTPATIENT
Start: 2022-04-20 | End: 2022-04-22 | Stop reason: HOSPADM

## 2022-04-20 RX ORDER — ACETAMINOPHEN 325 MG/1
650 TABLET ORAL EVERY 6 HOURS PRN
Status: DISCONTINUED | OUTPATIENT
Start: 2022-04-20 | End: 2022-04-21 | Stop reason: SDUPTHER

## 2022-04-20 RX ORDER — ALPRAZOLAM 0.25 MG/1
0.25 TABLET ORAL NIGHTLY PRN
Status: DISCONTINUED | OUTPATIENT
Start: 2022-04-20 | End: 2022-04-22 | Stop reason: HOSPADM

## 2022-04-20 RX ORDER — ONDANSETRON 4 MG/1
4 TABLET, ORALLY DISINTEGRATING ORAL EVERY 8 HOURS PRN
Status: DISCONTINUED | OUTPATIENT
Start: 2022-04-20 | End: 2022-04-22 | Stop reason: HOSPADM

## 2022-04-20 RX ORDER — POLYETHYLENE GLYCOL 3350 17 G/17G
17 POWDER, FOR SOLUTION ORAL DAILY PRN
Status: DISCONTINUED | OUTPATIENT
Start: 2022-04-20 | End: 2022-04-22 | Stop reason: HOSPADM

## 2022-04-20 RX ORDER — 0.9 % SODIUM CHLORIDE 0.9 %
250 INTRAVENOUS SOLUTION INTRAVENOUS ONCE
Status: COMPLETED | OUTPATIENT
Start: 2022-04-20 | End: 2022-04-20

## 2022-04-20 RX ORDER — FLUTICASONE PROPIONATE 50 MCG
1 SPRAY, SUSPENSION (ML) NASAL DAILY
Status: DISCONTINUED | OUTPATIENT
Start: 2022-04-20 | End: 2022-04-22 | Stop reason: HOSPADM

## 2022-04-20 RX ORDER — ENOXAPARIN SODIUM 100 MG/ML
40 INJECTION SUBCUTANEOUS DAILY
Status: DISCONTINUED | OUTPATIENT
Start: 2022-04-20 | End: 2022-04-22 | Stop reason: HOSPADM

## 2022-04-20 RX ORDER — SODIUM CHLORIDE 0.9 % (FLUSH) 0.9 %
5-40 SYRINGE (ML) INJECTION EVERY 12 HOURS SCHEDULED
Status: DISCONTINUED | OUTPATIENT
Start: 2022-04-20 | End: 2022-04-22 | Stop reason: HOSPADM

## 2022-04-20 RX ORDER — GABAPENTIN 300 MG/1
300 CAPSULE ORAL 2 TIMES DAILY
Status: DISCONTINUED | OUTPATIENT
Start: 2022-04-20 | End: 2022-04-22 | Stop reason: HOSPADM

## 2022-04-20 RX ORDER — ACETAMINOPHEN 650 MG/1
650 SUPPOSITORY RECTAL EVERY 6 HOURS PRN
Status: DISCONTINUED | OUTPATIENT
Start: 2022-04-20 | End: 2022-04-21 | Stop reason: SDUPTHER

## 2022-04-20 RX ORDER — METOPROLOL SUCCINATE 50 MG/1
50 TABLET, EXTENDED RELEASE ORAL DAILY
Status: DISCONTINUED | OUTPATIENT
Start: 2022-04-20 | End: 2022-04-22 | Stop reason: HOSPADM

## 2022-04-20 RX ORDER — ONDANSETRON 2 MG/ML
4 INJECTION INTRAMUSCULAR; INTRAVENOUS EVERY 6 HOURS PRN
Status: DISCONTINUED | OUTPATIENT
Start: 2022-04-20 | End: 2022-04-22 | Stop reason: HOSPADM

## 2022-04-20 RX ADMIN — Medication 10 ML: at 21:06

## 2022-04-20 RX ADMIN — GABAPENTIN 300 MG: 300 CAPSULE ORAL at 16:24

## 2022-04-20 RX ADMIN — Medication 10.6 MILLICURIE: at 09:45

## 2022-04-20 RX ADMIN — METOPROLOL SUCCINATE 50 MG: 50 TABLET, EXTENDED RELEASE ORAL at 16:24

## 2022-04-20 RX ADMIN — ENOXAPARIN SODIUM 40 MG: 100 INJECTION SUBCUTANEOUS at 16:24

## 2022-04-20 RX ADMIN — FLECAINIDE ACETATE 50 MG: 50 TABLET ORAL at 21:03

## 2022-04-20 RX ADMIN — SODIUM CHLORIDE 250 ML: 9 INJECTION, SOLUTION INTRAVENOUS at 06:24

## 2022-04-20 RX ADMIN — Medication 32 MILLICURIE: at 10:55

## 2022-04-20 RX ADMIN — REGADENOSON 0.4 MG: 0.08 INJECTION, SOLUTION INTRAVENOUS at 10:49

## 2022-04-20 ASSESSMENT — PAIN DESCRIPTION - FREQUENCY: FREQUENCY: CONTINUOUS

## 2022-04-20 ASSESSMENT — PAIN SCALES - GENERAL
PAINLEVEL_OUTOF10: 0
PAINLEVEL_OUTOF10: 0
PAINLEVEL_OUTOF10: 2

## 2022-04-20 ASSESSMENT — PAIN DESCRIPTION - LOCATION: LOCATION: CHEST

## 2022-04-20 ASSESSMENT — PAIN DESCRIPTION - DESCRIPTORS: DESCRIPTORS: PRESSURE

## 2022-04-20 ASSESSMENT — PAIN - FUNCTIONAL ASSESSMENT: PAIN_FUNCTIONAL_ASSESSMENT: 0-10

## 2022-04-20 ASSESSMENT — PAIN DESCRIPTION - ORIENTATION: ORIENTATION: LEFT

## 2022-04-20 ASSESSMENT — PAIN DESCRIPTION - PAIN TYPE: TYPE: ACUTE PAIN
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
## 2022-04-20 NOTE — PROGRESS NOTES
Case discussed with Dr. Lokesh Addison. Patient presented for palpitations and tachycardia with known history of recurrent SVT --> reason for cardiology consult. Currently in ED observation. Recently seen inpatient earlier this month by Dr. April Daniel for same issue, with plan for outpatient event monitor (reportedly placed 4/11) and EP evaluation. Per Dr. Lokesh Addison, will switch consult to EP at this time for further evaluation and recommendations. Please call with any other general cardiology questions or concerns.       Sam Medina, [facility]

## 2022-04-20 NOTE — H&P
[address]                  [address] [facility]  [facility]                              HISTORY AND PHYSICAL    PATIENT NAME: Ervin Deras                    :        1945  MED REC NO:   [id number]                            ROOM:       8208  ACCOUNT NO:   [de-identified]                           ADMIT DATE: 2022  PROVIDER:     Marcelle Kim DO    CHIEF COMPLAINT:  Palpitations. HISTORY OF PRESENT ILLNESS:  The patient is a 75-year-old  male  who presented to the emergency room complaining of palpitations, heart  rate as high as 170 with chest pressure and jaw pain. He does have a  Zio patch on per Dr. Altagracia Rollins. He was seen in the emergency room and  admitted for further evaluation and treatment. The patient had echocardiogram on 2022 which revealed an ejection  fraction of 60% to 65%, moderate to severe MR, moderate mitral stenosis,  mild AR. The patient was scheduled for heart cath in the near future  with Dr. Altagracia Rollins. PAST MEDICAL HISTORY:  MS, pulmonary hypertension, valvular heart  disease, asbestosis. BPH, GERD. MEDICATIONS PRIOR TO ADMISSION:  Flonase, Toprol-XL, Neurontin, Astelin,  Senokot, Percocet p.r.n., Antivert p.r.n., Refresh eye drops, Men's  Prostate Health over-the-counter, Allegra p.r.n., Proventil, Xanax  p.r.n. PAST SURGICAL HISTORY: TURP, prostate biopsy, bilateral eye cataract  surgery, lithotripsy, thoracotomy, nose surgery. SOCIAL HISTORY:  The patient quit tobacco.  Admits to alcohol, wine and  scotch. REVIEW OF SYSTEMS:  Remarkable for above-stated chief complaint plus  lower extremity edema. ALLERGIES:  ALEVE, BACLOFEN, IODINE DYE, PAXIL. PRIMARY CARE PROVIDER:  Wellington Proctor MD    PHYSICAL EXAMINATION:  GENERAL APPEARANCE:  Physical exam reveals a 75-year-old  male  who is alert and oriented x3, cooperative and a fair historian.   VITAL SIGNS:  On admission, temperature 97.5, pulse 88, respirations 18,  blood pressure 123/85. HEENT:  Head:  Normocephalic, atraumatic. Eyes:  Pupils equal and  reactive to light. Extraocular muscles intact. Fundi not well  visualized. Nose, no obstruction, polyp or discharge noted. Mouth  mucosa without lesion. Pharynx noninjected without exudate. NECK:  Supple. No JVD. No thyromegaly. No carotid bruits. HEART:  Regular rate and rhythm with grade 3/6 systolic murmur. LUNGS:  Clear to auscultation bilaterally. ABDOMEN:  Positive bowel sounds, soft, nontender. No rebound or  guarding. No hepatosplenomegaly. No masses. BACK:  With increased thoracic kyphosis. EXTREMITIES:  With minimal edema in the bilateral lower extremities. LYMPH NODES:  No adenopathy noted. SKIN:  Without rash or lesion. IMPRESSION:  SVT, valvular heart disease, pulmonary hypertension, MS,  asbestosis, BPH, GERD. PLAN:  Admit. EP to see. Cardiac telemetry. Discharge plan, home when  stable.         Megha Kim DO    D: 04/20/2022 15:01:59       T: 04/20/2022 15:04:46     [email or web address]  Job#: [id number]     Doc#: [id number]    CC:

## 2022-04-20 NOTE — ED PROVIDER NOTES
Department of Emergency Medicine   ED  Provider Note  Admit Date/RoomTime: 4/20/2022  5:20 AM  ED Room: 6924/4640-J          History of Present Illness:  4/20/22, Time: 5:57 AM EDT  Chief Complaint   Patient presents with    Palpitations     and chest pressure started at 0100 this am. it started as jaw pain and then the palpitations began per pt heart rate was in the 170's. pt took x 2 324MG asa pta. Sonu Leslie is a 64 y.o. male presenting to the ED for heart racing and chest pressure, beginning prior to arrival.  The complaint has been intermittent, severe in severity, and worsened by nothing. Patient reports that he was at home and felt his heart racing. He said this happened around 1 AM.  He reports that he had pressure in his jaw and tightness in his chest.  He said he took aspirin at that time. He reports his HR was 170. Reports he has an wearable monitor for SVT from a few days ago. He is currently undergoing a work-up for severe mitral valve disease. He is a scheduled heart cath he said he has not been able. Reports that he is here today to monitor his symptoms. He says he feels much better now. His heart rate is 90 mg. He is to get back to normal just prior to arrival. He follows with Dr. Murriel Bumpers and the valve clinic.      Review of Systems:   A complete review of systems was performed and pertinent positives and negatives are stated within HPI, all other systems reviewed and are negative.        --------------------------------------------- PAST HISTORY ---------------------------------------------  Past Medical History:  has a past medical history of Abnormal PSA, Anxiety, Bronchitis, Claustrophobia, Diabetes mellitus ([address] Endocarditis, Enlarged LA (left atrium), Erectile dysfunction, GERD (gastroesophageal reflux disease), H/O complete arterial study of extremity, Hiatal hernia, History of EMG, Hypertension, Lung nodule, Mitral valve prolapse, Moderate tricuspid regurgitation, Multiple sclerosis ([address] Prostatitis, Renal calculi, Sinus problem, Supraventricular arrhythmia, Tachycardia, and Tilt table evaluation. Past Surgical History:  has a past surgical history that includes transthoracic echocardiogram (9/28/2011  Echo showed normal left ventricular size with borderline concentric left ventricular hypertrophy with normal left ventricular systolic function with stage I  LV diastolic dysfunction, normal, normal right ventricular size and function, mildly dilated left atrium, borderline dilated right atrium mildly thickened anterior mitral leaflet with bowing of the mitral leaflets without felipe prolapse with mild mitral annular calcification, mild-to-moderate eccentrically-directed jet of mitral regurgitation.); Diagnostic Cardiac Cath Lab Procedure; cardiovascular stress test (10/19/2011  Adenosine nuclear stress test (4 minute walking protocol) showed a minimally reversible minor, nontransmural defect involving the apical anterolateral wall, more consistent with soft tissue attenuation and motion artifact); Skin graft; Inguinal hernia repair; Nose surgery; Colonoscopy; Prostate Biopsy (july 2013); Prostate surgery (Aug,13); eye surgery (10/13); eye surgery (11/13); Bladder surgery; Dental surgery; Lithotripsy (12/6/17 @ [facility]); thoracotomy (Right, 1/4/2022); and picc line insertion nurse (1/6/2022). Social History:  reports that he quit smoking about 39 years ago. His smoking use included cigarettes. He has a 50.00 pack-year smoking history. He has never used smokeless tobacco. He reports current alcohol use. He reports that he does not use drugs. Family History: family history includes Diabetes in his mother; Other (age of onset: 48) in his father. . Unless otherwise noted, family history is non contributory    The patients home medications have been reviewed.     Allergies: Aleve [naproxen], Baclofen, Dye [iodides], and Paxil [paroxetine]    I have reviewed the 0.5 0.0 - 2.0 %    Neutrophils Absolute 6.03 1.80 - 7.30 E9/L    Immature Granulocytes # 0.04 E9/L    Lymphocytes Absolute 0.55 (L) 1.50 - 4.00 E9/L    Monocytes Absolute 0.72 0.10 - 0.95 E9/L    Eosinophils Absolute 0.17 0.05 - 0.50 E9/L    Basophils Absolute 0.04 0.00 - 0.20 E9/L    Anisocytosis 1+     Polychromasia 1+     Poikilocytes 1+     Ovalocytes 1+    Comprehensive Metabolic Panel   Result Value Ref Range    Sodium 141 132 - 146 mmol/L    Potassium 4.6 3.5 - 5.0 mmol/L    Chloride 107 98 - 107 mmol/L    CO2 21 (L) 22 - 29 mmol/L    Anion Gap 13 7 - 16 mmol/L    Glucose 112 (H) 74 - 99 mg/dL    BUN 25 (H) 6 - 23 mg/dL    CREATININE 1.4 (H) 0.7 - 1.2 mg/dL    GFR Non-African American 49 >=60 mL/min/1.73    GFR African American 59     Calcium 8.9 8.6 - 10.2 mg/dL    Total Protein 6.5 6.4 - 8.3 g/dL    Albumin 3.5 3.5 - 5.2 g/dL    Total Bilirubin 0.3 0.0 - 1.2 mg/dL    Alkaline Phosphatase 125 40 - 129 U/L    ALT 65 (H) 0 - 40 U/L    AST 84 (H) 0 - 39 U/L   Troponin   Result Value Ref Range    Troponin, High Sensitivity 66 (H) 0 - 11 ng/L   Troponin   Result Value Ref Range    Troponin, High Sensitivity 89 (H) 0 - 11 ng/L   EKG 12 Lead   Result Value Ref Range    Ventricular Rate 87 BPM    Atrial Rate 87 BPM    P-R Interval 148 ms    QRS Duration 82 ms    Q-T Interval 374 ms    QTc Calculation (Bazett) 450 ms    P Axis 67 degrees    R Axis 13 degrees    T Axis 43 degrees   ,       RADIOLOGY:  Interpreted by Radiologist unless otherwise specified  XR CHEST PORTABLE   Final Result   Persistent hazy right basilar opacities, which could represent atelectasis   versus pneumonia/aspiration.          NM Cardiac Stress Test Nuclear Imaging    (Results Pending)         EKG Interpretation  Interpreted by emergency department physician, Dr. Vince Oleary     Date of EK22  Time: 528    Rhythm: normal sinus   Rate: normal  Axis: normal  Conduction: normal  ST Segments: no acute change  T Waves: no acute change    Clinical Impression: Sinus rhythm, no acute ischemic changes  Comparison to prior EKG: stable as compared to patient's most recent EKG      ------------------------- NURSING NOTES AND VITALS REVIEWED ---------------------------   The nursing notes within the ED encounter and vital signs as below have been reviewed by myself  /71   Pulse 73   Temp 97.5 °F (36.4 °C) (Oral)   Resp 13   Ht 5' 10\" (1.778 m)   Wt 185 lb (83.9 kg)   SpO2 100%   BMI 26.54 kg/m²     Oxygen Saturation Interpretation: Normal    The patients available past medical records and past encounters were reviewed. ------------------------------ ED COURSE/MEDICAL DECISION MAKING----------------------  Medications   0.9 % sodium chloride bolus (0 mLs IntraVENous Stopped 4/20/22 0653)   regadenoson (LEXISCAN) injection 0.4 mg (0.4 mg IntraVENous Given 4/20/22 1049)   technetium sestamibi (CARDIOLITE) injection 35 millicurie (32 millicuries IntraVENous Given 4/20/22 1055)   technetium sestamibi (CARDIOLITE) injection 10 millicurie (26.4 millicuries IntraVENous Given 4/20/22 0945)           The cardiac monitor revealed sinus rhythm with a heart rate in the 80s as interpreted by me. The cardiac monitor was ordered secondary to the patient's palpitations and chest pain and to monitor the patient for dysrhythmia. CPT [id number]       I, Dr. Lisa Bird, am the primary provider of record    Medical Decision Making:   His history is suggestive of SVT v some other tachydysrhythmia based on his history. He is in sinus now. Has some JAMIE on labs, looks a bit dry clinically. EKG without acute ischemic changes. Repeat troponin increased. Consult placed to cardiology and EP. EP recommends admission and stress testing. Dr. Chad Lemus consulted for admission     Oxygen Saturation Interpretation: 100 % on RA.          Re-Evaluations:       improving      This patient's ED course included: a personal history and physicial examination, re-evaluation prior to disposition, multiple bedside re-evaluations, IV medications, cardiac monitoring, continuous pulse oximetry and complex medical decision making and emergency management    This patient has remained hemodynamically stable during their ED course. Consultations:  Cardiology  EP  Dr. Claire Herring      Counseling: The emergency provider has spoken with the patient and discussed todays results, in addition to providing specific details for the plan of care and counseling regarding the diagnosis and prognosis. Questions are answered at this time and they are agreeable with the plan.       --------------------------------- IMPRESSION AND DISPOSITION ---------------------------------    IMPRESSION  1. Cardiac arrhythmia, unspecified cardiac arrhythmia type    2. Palpitations    3. Chest pain, unspecified type        DISPOSITION  Disposition: Admit to telemetry  Patient condition is stable        NOTE: This report was transcribed using voice recognition software.  Every effort was made to ensure accuracy; however, inadvertent computerized transcription errors may be present       Nallely Motta MD  04/20/22 1257

## 2022-04-20 NOTE — CARE COORDINATION
Social Work Discharge Planning:  SW met with patient for initial assessment. Patient lives with spouse in a condo, no steps. Patient independent prior to admission. Patient has a straight cane and front wheeled walker. Patient has a hx with [facility], prefer again if needed. Patient has no hx with a SNF. Patient's PCP is Dr. Betty Harrell and Pharmacy is [facility] on [facility]. Patient's daughter Emmanuel Machado will transport the patient home at discharge. SW will continue to follow and assist with transition of care.   Electronically signed by SHERRIE Vilchis on 4/20/2022 at 7:28 PM

## 2022-04-20 NOTE — PROCEDURES
Procedure: Derrel Lela stress test     Ordering physician: SHANIQUE Alejandro CNP  Referring physician:SHANIQUE Lambert CNP    Indication: abnormal EKG    Pretest evaluation no chest pain, no shortness of breath    Resting EKG showed: sinus rhythm     Protocol: Patient was given 0.4mg of Lexiscan followed by Cardiolite injection    Heart rate response:   Resting heart rate: 78 BPM   Stress heart rate: 90 BPM     Blood pressure response:   Resting blood pressure:120/70 mmHg   Stress blood pressure: 126/68 mmHg     Symptoms and signs: none     EKG changes:  Resting EKG: nonischemic   Stress EKG : nonischemic     Impression:   Clinical: nonischemic   EKG: nonischemic     Cardiolite injected and nuclear images are pending

## 2022-04-20 NOTE — ED NOTES
Message left for Stress lab to send patient to assigned bed. Report called to [facility] & aware patient coming from stress lab. Chart tubed to 820.      Julián Osei RN  04/20/22 7518

## 2022-04-21 ENCOUNTER — ANESTHESIA (OUTPATIENT)
Dept: CARDIAC CATH/INVASIVE PROCEDURES | Age: 77
End: 2022-04-21
Payer: MEDICARE

## 2022-04-21 ENCOUNTER — ANESTHESIA EVENT (OUTPATIENT)
Dept: CARDIAC CATH/INVASIVE PROCEDURES | Age: 77
End: 2022-04-21
Payer: MEDICARE

## 2022-04-21 ENCOUNTER — APPOINTMENT (OUTPATIENT)
Dept: CARDIAC CATH/INVASIVE PROCEDURES | Age: 77
End: 2022-04-21
Payer: MEDICARE

## 2022-04-21 VITALS
RESPIRATION RATE: 15 BRPM | SYSTOLIC BLOOD PRESSURE: 85 MMHG | OXYGEN SATURATION: 98 % | DIASTOLIC BLOOD PRESSURE: 52 MMHG

## 2022-04-21 LAB
ABO/RH: NORMAL
ANION GAP SERPL CALCULATED.3IONS-SCNC: 9 MMOL/L (ref 7–16)
ANTIBODY SCREEN: NORMAL
BUN BLDV-MCNC: 19 MG/DL (ref 6–23)
CALCIUM SERPL-MCNC: 9.4 MG/DL (ref 8.6–10.2)
CHLORIDE BLD-SCNC: 108 MMOL/L (ref 98–107)
CO2: 23 MMOL/L (ref 22–29)
CREAT SERPL-MCNC: 0.9 MG/DL (ref 0.7–1.2)
EKG ATRIAL RATE: 82 BPM
EKG P AXIS: 67 DEGREES
EKG P-R INTERVAL: 148 MS
EKG Q-T INTERVAL: 396 MS
EKG QRS DURATION: 88 MS
EKG QTC CALCULATION (BAZETT): 462 MS
EKG R AXIS: 12 DEGREES
EKG T AXIS: 50 DEGREES
EKG VENTRICULAR RATE: 82 BPM
GFR AFRICAN AMERICAN: >60
GFR NON-AFRICAN AMERICAN: >60 ML/MIN/1.73
GLUCOSE BLD-MCNC: 89 MG/DL (ref 74–99)
LV EF: 63 %
LVEF MODALITY: NORMAL
MAGNESIUM: 1.9 MG/DL (ref 1.6–2.6)
POTASSIUM SERPL-SCNC: 4 MMOL/L (ref 3.5–5)
SODIUM BLD-SCNC: 140 MMOL/L (ref 132–146)

## 2022-04-21 PROCEDURE — 96361 HYDRATE IV INFUSION ADD-ON: CPT

## 2022-04-21 PROCEDURE — 93325 DOPPLER ECHO COLOR FLOW MAPG: CPT

## 2022-04-21 PROCEDURE — 93325 DOPPLER ECHO COLOR FLOW MAPG: CPT | Performed by: INTERNAL MEDICINE

## 2022-04-21 PROCEDURE — 83735 ASSAY OF MAGNESIUM: CPT

## 2022-04-21 PROCEDURE — 86901 BLOOD TYPING SEROLOGIC RH(D): CPT

## 2022-04-21 PROCEDURE — 2709999900 HC NON-CHARGEABLE SUPPLY

## 2022-04-21 PROCEDURE — 3700000000 HC ANESTHESIA ATTENDED CARE

## 2022-04-21 PROCEDURE — 93458 L HRT ARTERY/VENTRICLE ANGIO: CPT

## 2022-04-21 PROCEDURE — C1769 GUIDE WIRE: HCPCS

## 2022-04-21 PROCEDURE — 2500000003 HC RX 250 WO HCPCS

## 2022-04-21 PROCEDURE — C1887 CATHETER, GUIDING: HCPCS

## 2022-04-21 PROCEDURE — 80048 BASIC METABOLIC PNL TOTAL CA: CPT

## 2022-04-21 PROCEDURE — 93005 ELECTROCARDIOGRAM TRACING: CPT | Performed by: NURSE PRACTITIONER

## 2022-04-21 PROCEDURE — G0378 HOSPITAL OBSERVATION PER HR: HCPCS

## 2022-04-21 PROCEDURE — 96360 HYDRATION IV INFUSION INIT: CPT

## 2022-04-21 PROCEDURE — 93312 ECHO TRANSESOPHAGEAL: CPT

## 2022-04-21 PROCEDURE — 6370000000 HC RX 637 (ALT 250 FOR IP): Performed by: INTERNAL MEDICINE

## 2022-04-21 PROCEDURE — 6360000002 HC RX W HCPCS: Performed by: INTERNAL MEDICINE

## 2022-04-21 PROCEDURE — 3700000001 HC ADD 15 MINUTES (ANESTHESIA)

## 2022-04-21 PROCEDURE — C1894 INTRO/SHEATH, NON-LASER: HCPCS

## 2022-04-21 PROCEDURE — 2580000003 HC RX 258: Performed by: INTERNAL MEDICINE

## 2022-04-21 PROCEDURE — 96372 THER/PROPH/DIAG INJ SC/IM: CPT

## 2022-04-21 PROCEDURE — 93320 DOPPLER ECHO COMPLETE: CPT | Performed by: INTERNAL MEDICINE

## 2022-04-21 PROCEDURE — 86900 BLOOD TYPING SEROLOGIC ABO: CPT

## 2022-04-21 PROCEDURE — 93321 DOPPLER ECHO F-UP/LMTD STD: CPT

## 2022-04-21 PROCEDURE — 6370000000 HC RX 637 (ALT 250 FOR IP): Performed by: NURSE PRACTITIONER

## 2022-04-21 PROCEDURE — 86850 RBC ANTIBODY SCREEN: CPT

## 2022-04-21 PROCEDURE — 6360000002 HC RX W HCPCS

## 2022-04-21 PROCEDURE — 6360000002 HC RX W HCPCS: Performed by: NURSE ANESTHETIST, CERTIFIED REGISTERED

## 2022-04-21 PROCEDURE — 93312 ECHO TRANSESOPHAGEAL: CPT | Performed by: INTERNAL MEDICINE

## 2022-04-21 PROCEDURE — 2580000003 HC RX 258: Performed by: NURSE ANESTHETIST, CERTIFIED REGISTERED

## 2022-04-21 PROCEDURE — 36415 COLL VENOUS BLD VENIPUNCTURE: CPT

## 2022-04-21 RX ORDER — SODIUM CHLORIDE 9 MG/ML
INJECTION, SOLUTION INTRAVENOUS PRN
Status: DISCONTINUED | OUTPATIENT
Start: 2022-04-21 | End: 2022-04-22 | Stop reason: HOSPADM

## 2022-04-21 RX ORDER — SODIUM CHLORIDE 0.9 % (FLUSH) 0.9 %
5-40 SYRINGE (ML) INJECTION PRN
Status: DISCONTINUED | OUTPATIENT
Start: 2022-04-21 | End: 2022-04-22 | Stop reason: HOSPADM

## 2022-04-21 RX ORDER — SODIUM CHLORIDE 9 MG/ML
INJECTION, SOLUTION INTRAVENOUS CONTINUOUS PRN
Status: DISCONTINUED | OUTPATIENT
Start: 2022-04-21 | End: 2022-04-21 | Stop reason: SDUPTHER

## 2022-04-21 RX ORDER — PREDNISONE 20 MG/1
50 TABLET ORAL EVERY 6 HOURS
Status: COMPLETED | OUTPATIENT
Start: 2022-04-21 | End: 2022-04-21

## 2022-04-21 RX ORDER — PROPOFOL 10 MG/ML
INJECTION, EMULSION INTRAVENOUS PRN
Status: DISCONTINUED | OUTPATIENT
Start: 2022-04-21 | End: 2022-04-21 | Stop reason: SDUPTHER

## 2022-04-21 RX ORDER — SODIUM CHLORIDE 9 MG/ML
INJECTION, SOLUTION INTRAVENOUS CONTINUOUS
Status: ACTIVE | OUTPATIENT
Start: 2022-04-21 | End: 2022-04-21

## 2022-04-21 RX ORDER — SODIUM CHLORIDE 0.9 % (FLUSH) 0.9 %
5-40 SYRINGE (ML) INJECTION EVERY 12 HOURS SCHEDULED
Status: DISCONTINUED | OUTPATIENT
Start: 2022-04-21 | End: 2022-04-22 | Stop reason: HOSPADM

## 2022-04-21 RX ORDER — ACETAMINOPHEN 325 MG/1
650 TABLET ORAL EVERY 4 HOURS PRN
Status: DISCONTINUED | OUTPATIENT
Start: 2022-04-21 | End: 2022-04-22 | Stop reason: HOSPADM

## 2022-04-21 RX ORDER — ASPIRIN 81 MG/1
243 TABLET, CHEWABLE ORAL ONCE
Status: COMPLETED | OUTPATIENT
Start: 2022-04-21 | End: 2022-04-21

## 2022-04-21 RX ADMIN — PREDNISONE 50 MG: 20 TABLET ORAL at 17:29

## 2022-04-21 RX ADMIN — FLUTICASONE PROPIONATE 1 SPRAY: 50 SPRAY, METERED NASAL at 08:19

## 2022-04-21 RX ADMIN — SODIUM CHLORIDE: 9 INJECTION, SOLUTION INTRAVENOUS at 17:32

## 2022-04-21 RX ADMIN — FLECAINIDE ACETATE 50 MG: 50 TABLET ORAL at 08:20

## 2022-04-21 RX ADMIN — GABAPENTIN 300 MG: 300 CAPSULE ORAL at 17:28

## 2022-04-21 RX ADMIN — PROPOFOL 340 MG: 10 INJECTION, EMULSION INTRAVENOUS at 15:48

## 2022-04-21 RX ADMIN — METOPROLOL SUCCINATE 50 MG: 50 TABLET, EXTENDED RELEASE ORAL at 08:19

## 2022-04-21 RX ADMIN — PREDNISONE 50 MG: 20 TABLET ORAL at 10:17

## 2022-04-21 RX ADMIN — SODIUM CHLORIDE: 9 INJECTION, SOLUTION INTRAVENOUS at 15:28

## 2022-04-21 RX ADMIN — FLECAINIDE ACETATE 50 MG: 50 TABLET ORAL at 20:02

## 2022-04-21 RX ADMIN — ENOXAPARIN SODIUM 40 MG: 100 INJECTION SUBCUTANEOUS at 08:20

## 2022-04-21 RX ADMIN — Medication 10 ML: at 08:20

## 2022-04-21 RX ADMIN — ASPIRIN 243 MG: 81 TABLET, CHEWABLE ORAL at 12:25

## 2022-04-21 RX ADMIN — GABAPENTIN 300 MG: 300 CAPSULE ORAL at 08:20

## 2022-04-21 ASSESSMENT — PAIN SCALES - GENERAL
PAINLEVEL_OUTOF10: 0
PAINLEVEL_OUTOF10: 0

## 2022-04-21 ASSESSMENT — LIFESTYLE VARIABLES: SMOKING_STATUS: 0

## 2022-04-21 ASSESSMENT — ENCOUNTER SYMPTOMS: SHORTNESS OF BREATH: 1

## 2022-04-21 NOTE — PROGRESS NOTES
Hospital Medicine    Subjective:  Pt alert conversive denies sob or cp      Current Facility-Administered Medications:     predniSONE (DELTASONE) tablet 50 mg, 50 mg, Oral, Q6H, Milagros Craig MD, 50 mg at 04/21/22 1017    albuterol (PROVENTIL) nebulizer solution 2.5 mg, 2.5 mg, Nebulization, Q6H PRN, Mikey Irwin Malmer, DO    ALPRAZolam Kayla Purvi) tablet 0.25 mg, 0.25 mg, Oral, Nightly PRN, Mikey Irwin Malmer, DO    fluticasone (FLONASE) 50 MCG/ACT nasal spray 1 spray, 1 spray, Each Nostril, Daily, Mikey Irwin Malmer, DO, 1 spray at 04/21/22 6849    gabapentin (NEURONTIN) capsule 300 mg, 300 mg, Oral, BID, Konrad Baker, DO, 300 mg at 04/21/22 0820    metoprolol succinate (TOPROL XL) extended release tablet 50 mg, 50 mg, Oral, Daily, Mikey Irwin Malmer, DO, 50 mg at 04/21/22 4969    oxyCODONE-acetaminophen (PERCOCET) 5-325 MG per tablet 1 tablet, 1 tablet, Oral, Q6H PRN, Mikey Irwin Malmer, DO    sodium chloride flush 0.9 % injection 5-40 mL, 5-40 mL, IntraVENous, 2 times per day, Konrad Baker DO, 10 mL at 04/21/22 0820    sodium chloride flush 0.9 % injection 5-40 mL, 5-40 mL, IntraVENous, PRN, Mikey Irwin Malmer, DO    0.9 % sodium chloride infusion, , IntraVENous, PRN, Mikey Irwin Malmer, DO    enoxaparin (LOVENOX) injection 40 mg, 40 mg, SubCUTAneous, Daily, Mikey Irwin Malmer, DO, 40 mg at 04/21/22 0820    ondansetron (ZOFRAN-ODT) disintegrating tablet 4 mg, 4 mg, Oral, Q8H PRN **OR** ondansetron (ZOFRAN) injection 4 mg, 4 mg, IntraVENous, Q6H PRN, Mikey Irwin Malmer, DO    polyethylene glycol (GLYCOLAX) packet 17 g, 17 g, Oral, Daily PRN, Mikey Irwin Malmer, DO    acetaminophen (TYLENOL) tablet 650 mg, 650 mg, Oral, Q6H PRN **OR** acetaminophen (TYLENOL) suppository 650 mg, 650 mg, Rectal, Q6H PRN, Mikey Jairo Farnsworth DO    flecainide (TAMBOCOR) tablet 50 mg, 50 mg, Oral, 2 times per day, SHANIQUE Menchaca - CNP, 50 mg at 04/21/22 0820    Objective:    /80   Pulse 90   Temp 97.4 °F (36.3 °C) (Temporal)   Resp 16   Ht 5' 10\" (1.778 m)   Wt 185 lb (83.9 kg)   SpO2 100%   BMI 26.54 kg/m²     Heart:  Reg with syst murmur  Lungs:  ctab  Abd: + bs soft nontender  Extrem:  W/o edema    CBC with Differential:    Lab Results   Component Value Date    WBC 7.6 04/20/2022    RBC 3.93 04/20/2022    HGB 12.2 04/20/2022    HCT 38.1 04/20/2022     04/20/2022    MCV 96.9 04/20/2022    MCH 31.0 04/20/2022    MCHC 32.0 04/20/2022    RDW 13.0 04/20/2022    NRBC 0.0 01/17/2022    SEGSPCT 70 06/08/2011    LYMPHOPCT 7.3 04/20/2022    MONOPCT 9.5 04/20/2022    MYELOPCT 2.6 01/04/2022    BASOPCT 0.5 04/20/2022    MONOSABS 0.72 04/20/2022    LYMPHSABS 0.55 04/20/2022    EOSABS 0.17 04/20/2022    BASOSABS 0.04 04/20/2022     CMP:    Lab Results   Component Value Date     04/21/2022    K 4.0 04/21/2022    K 4.2 04/09/2022     04/21/2022    CO2 23 04/21/2022    BUN 19 04/21/2022    CREATININE 0.9 04/21/2022    GFRAA >60 04/21/2022    LABGLOM >60 04/21/2022    GLUCOSE 89 04/21/2022    PROT 6.5 04/20/2022    LABALBU 3.5 04/20/2022    CALCIUM 9.4 04/21/2022    BILITOT 0.3 04/20/2022    ALKPHOS 125 04/20/2022    AST 84 04/20/2022    ALT 65 04/20/2022     Warfarin PT/INR:    Lab Results   Component Value Date    INR 1.8 12/31/2021    INR 1.0 12/25/2016    INR 1.0 12/21/2015    PROTIME 20.0 (H) 12/31/2021    PROTIME 11.0 12/25/2016    PROTIME 10.9 12/21/2015       Assessment:    Principal Problem:    Arrhythmia  Active Problems:    SVT (supraventricular tachycardia) ([address]  Resolved Problems:    * No resolved hospital problems. *      Plan:   For brannon heart cath        Devon Pino DO  12:37 PM  4/21/2022

## 2022-04-21 NOTE — PROCEDURES
PRELIMINARY TRANSESOPHAGEAL ECHOCARDIOGRAPHY REPORT    Date of Procedure: 4/21/2022    Indication:  MR    Sedation: Propofol    Complications: None    Preliminary findings:  Normal LV function   Severe MR  Moderate TR  Moderate AR    Full report to follow    Meryl Ferrell MD, [address] [facility]
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
## 2022-04-21 NOTE — ANESTHESIA POSTPROCEDURE EVALUATION
Department of Anesthesiology  Postprocedure Note    Patient: Sonu Vasquez  MRN: [id number]  YOB: 1945  Date of evaluation: 4/21/2022  Time:  5:30 PM     Procedure Summary     Date: 04/21/22 Room / Location: [facility] CATH LAB; [facility] ECHO    Anesthesia Start: 1528 Anesthesia Stop: 2977    Procedure: [facility] CARDIAC CATH W/ ANES Diagnosis:     Scheduled Providers: SHANIQUE Lester - CRNA; Isadora Goldberg MD Responsible Provider: Isadora Goldberg MD    Anesthesia Type: MAC ASA Status: 3          Anesthesia Type: MAC    Gil Phase I:      Gil Phase II:      Last vitals: Reviewed and per EMR flowsheets.        Anesthesia Post Evaluation    Patient location during evaluation: PACU  Patient participation: complete - patient participated  Level of consciousness: awake  Pain score: 3  Airway patency: patent  Nausea & Vomiting: no nausea and no vomiting  Complications: no  Cardiovascular status: blood pressure returned to baseline  Respiratory status: acceptable  Hydration status: euvolemic

## 2022-04-21 NOTE — CARE COORDINATION
4/21 Care Coordination: Plan for Cardiac Cath today. Discharge plan remains Home. Patient lives with spouse in a condo, no steps. Patient independent prior to admission. Patient has a straight cane and front wheeled walker. Patient has a hx with [facility], prefer again if needed. CM/SW will continue to follow for discharge planning.    Lisa SHEPPARD,RN--BC  [phone]

## 2022-04-21 NOTE — PLAN OF CARE
Problem: Chronic Conditions and Co-morbidities  Goal: Patient's chronic conditions and co-morbidity symptoms are monitored and maintained or improved  Outcome: Progressing     Problem: Pain  Goal: Verbalizes/displays adequate comfort level or baseline comfort level  Outcome: Progressing
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
## 2022-04-22 VITALS
TEMPERATURE: 97.5 F | DIASTOLIC BLOOD PRESSURE: 81 MMHG | WEIGHT: 185 LBS | SYSTOLIC BLOOD PRESSURE: 126 MMHG | OXYGEN SATURATION: 95 % | HEART RATE: 88 BPM | HEIGHT: 70 IN | RESPIRATION RATE: 16 BRPM | BODY MASS INDEX: 26.48 KG/M2

## 2022-04-22 LAB
ANION GAP SERPL CALCULATED.3IONS-SCNC: 10 MMOL/L (ref 7–16)
BUN BLDV-MCNC: 16 MG/DL (ref 6–23)
CALCIUM SERPL-MCNC: 9 MG/DL (ref 8.6–10.2)
CHLORIDE BLD-SCNC: 108 MMOL/L (ref 98–107)
CO2: 22 MMOL/L (ref 22–29)
CREAT SERPL-MCNC: 0.8 MG/DL (ref 0.7–1.2)
GFR AFRICAN AMERICAN: >60
GFR NON-AFRICAN AMERICAN: >60 ML/MIN/1.73
GLUCOSE BLD-MCNC: 116 MG/DL (ref 74–99)
HCT VFR BLD CALC: 38.2 % (ref 37–54)
HEMOGLOBIN: 12.4 G/DL (ref 12.5–16.5)
MCH RBC QN AUTO: 31.3 PG (ref 26–35)
MCHC RBC AUTO-ENTMCNC: 32.5 % (ref 32–34.5)
MCV RBC AUTO: 96.5 FL (ref 80–99.9)
PDW BLD-RTO: 12.7 FL (ref 11.5–15)
PLATELET # BLD: 234 E9/L (ref 130–450)
PMV BLD AUTO: 9.9 FL (ref 7–12)
POTASSIUM SERPL-SCNC: 4.4 MMOL/L (ref 3.5–5)
RBC # BLD: 3.96 E12/L (ref 3.8–5.8)
SODIUM BLD-SCNC: 140 MMOL/L (ref 132–146)
T4 FREE: 1.05 NG/DL (ref 0.93–1.7)
TSH SERPL DL<=0.05 MIU/L-ACNC: 0.6 UIU/ML (ref 0.27–4.2)
WBC # BLD: 13.3 E9/L (ref 4.5–11.5)

## 2022-04-22 PROCEDURE — G0378 HOSPITAL OBSERVATION PER HR: HCPCS

## 2022-04-22 PROCEDURE — 84439 ASSAY OF FREE THYROXINE: CPT

## 2022-04-22 PROCEDURE — 36415 COLL VENOUS BLD VENIPUNCTURE: CPT

## 2022-04-22 PROCEDURE — 6370000000 HC RX 637 (ALT 250 FOR IP): Performed by: INTERNAL MEDICINE

## 2022-04-22 PROCEDURE — 84443 ASSAY THYROID STIM HORMONE: CPT

## 2022-04-22 PROCEDURE — 2580000003 HC RX 258: Performed by: INTERNAL MEDICINE

## 2022-04-22 PROCEDURE — 96372 THER/PROPH/DIAG INJ SC/IM: CPT

## 2022-04-22 PROCEDURE — 80048 BASIC METABOLIC PNL TOTAL CA: CPT

## 2022-04-22 PROCEDURE — 6360000002 HC RX W HCPCS: Performed by: INTERNAL MEDICINE

## 2022-04-22 PROCEDURE — 85027 COMPLETE CBC AUTOMATED: CPT

## 2022-04-22 PROCEDURE — 99214 OFFICE O/P EST MOD 30 MIN: CPT | Performed by: NURSE PRACTITIONER

## 2022-04-22 RX ORDER — AMIODARONE HYDROCHLORIDE 200 MG/1
200 TABLET ORAL DAILY
Status: DISCONTINUED | OUTPATIENT
Start: 2022-05-11 | End: 2022-04-22 | Stop reason: HOSPADM

## 2022-04-22 RX ORDER — AMIODARONE HYDROCHLORIDE 200 MG/1
200 TABLET ORAL
Qty: 51 TABLET | Refills: 0 | Status: SHIPPED | OUTPATIENT
Start: 2022-04-24 | End: 2022-05-13

## 2022-04-22 RX ORDER — AMIODARONE HYDROCHLORIDE 200 MG/1
200 TABLET ORAL
Status: DISCONTINUED | OUTPATIENT
Start: 2022-04-24 | End: 2022-04-22 | Stop reason: HOSPADM

## 2022-04-22 RX ORDER — AMIODARONE HYDROCHLORIDE 200 MG/1
200 TABLET ORAL DAILY
Qty: 30 TABLET | Refills: 0 | Status: ON HOLD | OUTPATIENT
Start: 2022-05-11 | End: 2022-06-01 | Stop reason: SDUPTHER

## 2022-04-22 RX ADMIN — SODIUM CHLORIDE, PRESERVATIVE FREE 10 ML: 5 INJECTION INTRAVENOUS at 09:00

## 2022-04-22 RX ADMIN — ENOXAPARIN SODIUM 40 MG: 100 INJECTION SUBCUTANEOUS at 08:40

## 2022-04-22 RX ADMIN — Medication 10 ML: at 09:00

## 2022-04-22 RX ADMIN — FLUTICASONE PROPIONATE 1 SPRAY: 50 SPRAY, METERED NASAL at 08:41

## 2022-04-22 RX ADMIN — GABAPENTIN 300 MG: 300 CAPSULE ORAL at 08:40

## 2022-04-22 RX ADMIN — METOPROLOL SUCCINATE 50 MG: 50 TABLET, EXTENDED RELEASE ORAL at 08:40

## 2022-04-22 ASSESSMENT — PAIN SCALES - GENERAL: PAINLEVEL_OUTOF10: 0

## 2022-04-22 NOTE — PROGRESS NOTES
Hospital Medicine    Subjective:  Pt alert conversive denies cp or sob      Current Facility-Administered Medications:     [START ON 4/24/2022] amiodarone (CORDARONE) tablet 200 mg, 200 mg, Oral, TID WC **FOLLOWED BY** [START ON 5/11/2022] amiodarone (CORDARONE) tablet 200 mg, 200 mg, Oral, Daily, Aime Laird, APRN - CNP    sodium chloride flush 0.9 % injection 5-40 mL, 5-40 mL, IntraVENous, 2 times per day, Selina Meredith MD, 10 mL at 04/22/22 0900    sodium chloride flush 0.9 % injection 5-40 mL, 5-40 mL, IntraVENous, PRN, Selina Meredith MD    0.9 % sodium chloride infusion, , IntraVENous, PRN, Selina Meredith MD    acetaminophen (TYLENOL) tablet 650 mg, 650 mg, Oral, Q4H PRN, Selina Meredith MD    albuterol (PROVENTIL) nebulizer solution 2.5 mg, 2.5 mg, Nebulization, Q6H PRN, Anuj Farnsworth DO    ALPRAZolam Unice Nigh) tablet 0.25 mg, 0.25 mg, Oral, Nightly PRN, Anuj Farnsworth DO    fluticasone (FLONASE) 50 MCG/ACT nasal spray 1 spray, 1 spray, Each Nostril, Daily, Anuj Farnsworth DO, 1 spray at 04/22/22 0841    gabapentin (NEURONTIN) capsule 300 mg, 300 mg, Oral, BID, Anuj Farnsworth DO, 300 mg at 04/22/22 0840    metoprolol succinate (TOPROL XL) extended release tablet 50 mg, 50 mg, Oral, Daily, Anuj Farnsworth DO, 50 mg at 04/22/22 0840    oxyCODONE-acetaminophen (PERCOCET) 5-325 MG per tablet 1 tablet, 1 tablet, Oral, Q6H PRN, Anuj Farnsworth DO    sodium chloride flush 0.9 % injection 5-40 mL, 5-40 mL, IntraVENous, 2 times per day, Mckenna Soria DO, 10 mL at 04/22/22 0900    sodium chloride flush 0.9 % injection 5-40 mL, 5-40 mL, IntraVENous, PRN, Anuj Farnsworth, DO    0.9 % sodium chloride infusion, , IntraVENous, PRN, Anuj Farnsworth, DO    enoxaparin (LOVENOX) injection 40 mg, 40 mg, SubCUTAneous, Daily, Anuj Farnsworth DO, 40 mg at 04/22/22 0840    ondansetron (ZOFRAN-ODT) disintegrating tablet 4 mg, 4 mg, Oral, Q8H PRN **OR** ondansetron (ZOFRAN) injection 4 mg, 4 mg, IntraVENous, Q6H PRN, Oksana Farnsworth DO    polyethylene glycol (GLYCOLAX) packet 17 g, 17 g, Oral, Daily PRN, Oksana Farnsworth DO    Objective:    /81   Pulse 88   Temp 97.5 °F (36.4 °C) (Temporal)   Resp 16   Ht 5' 10\" (1.778 m)   Wt 185 lb (83.9 kg)   SpO2 95%   BMI 26.54 kg/m²     Heart:  Reg with syst murmur  Lungs:  ctab  Abd: + bs soft nontender  Extrem:  W/o edema    CBC with Differential:    Lab Results   Component Value Date    WBC 13.3 04/22/2022    RBC 3.96 04/22/2022    HGB 12.4 04/22/2022    HCT 38.2 04/22/2022     04/22/2022    MCV 96.5 04/22/2022    MCH 31.3 04/22/2022    MCHC 32.5 04/22/2022    RDW 12.7 04/22/2022    NRBC 0.0 01/17/2022    SEGSPCT 70 06/08/2011    LYMPHOPCT 7.3 04/20/2022    MONOPCT 9.5 04/20/2022    MYELOPCT 2.6 01/04/2022    BASOPCT 0.5 04/20/2022    MONOSABS 0.72 04/20/2022    LYMPHSABS 0.55 04/20/2022    EOSABS 0.17 04/20/2022    BASOSABS 0.04 04/20/2022     CMP:    Lab Results   Component Value Date     04/22/2022    K 4.4 04/22/2022    K 4.2 04/09/2022     04/22/2022    CO2 22 04/22/2022    BUN 16 04/22/2022    CREATININE 0.8 04/22/2022    GFRAA >60 04/22/2022    LABGLOM >60 04/22/2022    GLUCOSE 116 04/22/2022    PROT 6.5 04/20/2022    LABALBU 3.5 04/20/2022    CALCIUM 9.0 04/22/2022    BILITOT 0.3 04/20/2022    ALKPHOS 125 04/20/2022    AST 84 04/20/2022    ALT 65 04/20/2022     Warfarin PT/INR:    Lab Results   Component Value Date    INR 1.8 12/31/2021    INR 1.0 12/25/2016    INR 1.0 12/21/2015    PROTIME 20.0 (H) 12/31/2021    PROTIME 11.0 12/25/2016    PROTIME 10.9 12/21/2015       Assessment:    Principal Problem:    Arrhythmia  Active Problems:    SVT (supraventricular tachycardia) (HCC)  Resolved Problems:    * No resolved hospital problems.  *  cad  Valvular heart dz  Plan:  Start amio per ep dc home outpt f/u with valve clinic        Fernando Chu DO  12:28 PM  4/22/2022
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
## 2022-04-22 NOTE — PROGRESS NOTES
CLINICAL PHARMACY NOTE: MEDS TO BEDS    Total # of Prescriptions Filled: 1   The following medications were delivered to the patient:  · Amiodarone 200mg    Additional Documentation:  Picked up

## 2022-04-22 NOTE — PROCEDURES
[address]                  [address] [place],  [facility]                            CARDIAC CATHETERIZATION    PATIENT NAME: Panfilo Kearns                    :        1945  MED REC NO:   [id number]                            ROOM:       8208  ACCOUNT NO:   [de-identified]                           ADMIT DATE: 2022  PROVIDER:     Tomas Umanzor MD    DATE OF PROCEDURE:  2022    INDICATION FOR PROCEDURE:  Moderate mitral stenosis and moderate to  severe mitral regurgitation. DESCRIPTION OF PROCEDURE:  After obtaining a signed consent from the  patient, he was brought to the cardiac cath lab in his usual fasting  state. Under sterile condition and under local anesthetic and using  conscious sedation, a 6-Portuguese Terumo Slender sheath was inserted into  his right radial artery. The patient received 5000 units of intravenous  heparin. He also received 200 mcg of intraarterial nitroglycerin via  the right radial sheath. Then, a 5-Portuguese TIG diagnostic catheter was  advanced over a J-tip wire to the ascending aorta without difficulty. The left main coronary artery was engaged and a coronary angiogram was  done. Then, the right coronary artery was engaged and a coronary  angiogram was done. This catheter was exchanged over a guidewire to a  5-Portuguese pigtail which was advanced into the left ventricle without  difficulty. The left ventricular end-diastolic pressure was measured. No left ventriculogram was done due to known ejection fraction by  echocardiogram.  There was no significant gradient across the aortic  valve. At the end of the procedure, the pigtail was pulled out. The  Terumo Slender sheath was pulled out and a Vasc Band was applied over  the right radial artery with good hemostasis. The patient tolerated the  procedure very well and no complications were noted.   No significant  blood loss occurred during the procedure. FINDINGS:  HEMODYNAMICS:  Aortic pressure 104/63 mmHg. Left ventricular end-diastolic pressure 18 mmHg. CORONARY ANATOMY:  LEFT MAIN:  It is a long and large artery with 50% discrete ostial  stenosis noted mainly on the GETACHEW cranial view and 30% - 40%  ddl-mg-jxuvjl eccentric stenosis. LAD:  It is a large artery wrapping around the apex and giving rise to  two diagonal branches and several septal perforators. The LAD was  heavily calcified in its proximal to mid segment. There was 40% tubular  proximal to mid LAD stenosis. There was 60% discrete mid stenosis after  the takeoff of a second small diagonal branch. There was 30% - 40%  tubular mid LAD stenosis. The first diagonal was large and bifurcating  with 70% discrete ostial proximal stenosis. The second diagonal was  very small. LEFT CIRCUMFLEX:  It is a large artery giving rise to large bifurcating  obtuse marginal branch, then to a second small obtuse marginal branch  and then continues to AV groove. There were minor irregularities noted  in the first obtuse marginal branch. RAMUS BRANCH:  It is a large and bifurcating with probably 50% - 60%  discrete ostial stenosis noted mainly on the spider view. The ramus was  calcified in its proximal segment. RIGHT CORONARY ARTERY:  It is a large dominant artery giving rise to a  large conus branch, a large RV marginal branch, a PDA and a bifurcating  PLV branch. The RCA was severely calcified from its proximal to distal  segment. There was probable 80% discrete mid stenosis and 80% discrete  distal stenosis. Severe mitral annulus calcification was noted under  fluoroscopy. IMPRESSION:  1.  50% discrete ostial left main and 30% - 40% mid to distal left main  stenosis. 2.  60% discrete mid LAD stenosis and 70% ostial proximal diagonal  stenosis. 3.  Probable 50% - 60% discrete ostial ramus branch stenosis.   4.  Probable 80% discrete mid RCA stenosis and 80% discrete distal RCA  stenosis. 5.  Severe mitral annulus scarification. 6.  Elevated LVEDP at 18 mmHg. RECOMMENDATIONS:  1. Aggressive medical therapy. 2.  CABG if mitral valve replacement is considered at this time. PROCEDURE START TIME:  0592. PROCEDURE END TIME:  1504. CONTRAST VOLUME:  45 mL of Isovue. FLUOROSCOPY TIME:  4.1 minutes. CONSCIOUS SEDATION:  1 mg of intravenous Versed and 50 mcg of  intravenous fentanyl.         Sujatha Diaz MD    D: 04/21/2022 15:34:34       T: 04/21/2022 15:38:12     [email or web address]  Job#: [id number]     Doc#: [id number]    CC:

## 2022-04-27 ENCOUNTER — HOSPITAL ENCOUNTER (OUTPATIENT)
Dept: CARDIAC CATH/INVASIVE PROCEDURES | Age: 77
Discharge: HOME | End: 2022-04-27
Payer: MEDICARE

## 2022-04-29 ENCOUNTER — NURSE ONLY (OUTPATIENT)
Dept: NON INVASIVE DIAGNOSTICS | Age: 77
End: 2022-04-29
Payer: MEDICARE

## 2022-04-29 DIAGNOSIS — I47.1 SVT (SUPRAVENTRICULAR TACHYCARDIA) (HCC): ICD-10-CM

## 2022-04-29 DIAGNOSIS — Z79.899 ENCOUNTER FOR DRUG THERAPY: Primary | ICD-10-CM

## 2022-04-29 PROCEDURE — 93000 ELECTROCARDIOGRAM COMPLETE: CPT | Performed by: STUDENT IN AN ORGANIZED HEALTH CARE EDUCATION/TRAINING PROGRAM

## 2022-04-29 NOTE — PROGRESS NOTES
Patient was seen in the Office today to have EKG per Dr. Dede Nesbitt after starting Flecainide. Pt tolerated EKG. Pt states he feels good since starting the Flecainide.      Electronically signed by Genet Roldan MA on 4/29/2022 at 10:41 AM

## 2022-05-02 ENCOUNTER — TELEPHONE (OUTPATIENT)
Dept: CARDIOLOGY CLINIC | Age: 77
End: 2022-05-02

## 2022-05-02 NOTE — TELEPHONE ENCOUNTER
Shows August had SVT and this was 175 bpm.  Sustained for 60 seconds. Event occurred 4/20/2022 at 4:20 a.m. Triggered by the patient. Report will be posted soon. Pg 18. Strip #5.

## 2022-05-03 ENCOUNTER — HOSPITAL ENCOUNTER (OUTPATIENT)
Age: 77
Setting detail: SPECIMEN
Discharge: HOME | End: 2022-05-03
Payer: MEDICARE

## 2022-05-03 PROCEDURE — 89055 LEUKOCYTE ASSESSMENT FECAL: CPT

## 2022-05-03 PROCEDURE — 87077 CULTURE AEROBIC IDENTIFY: CPT

## 2022-05-03 PROCEDURE — 87045 FECES CULTURE AEROBIC BACT: CPT

## 2022-05-03 PROCEDURE — 87329 GIARDIA AG IA: CPT

## 2022-05-03 PROCEDURE — 82270 OCCULT BLOOD FECES: CPT

## 2022-05-04 LAB
GIARDIA ANTIGEN STOOL: NORMAL
OCCULT BLOOD SCREENING: NORMAL
WHITE BLOOD CELLS (WBC), STOOL: NORMAL

## 2022-05-05 NOTE — PROGRESS NOTES
Subjective:      Patient ID: Sonu Parkinson is a 76 y.o. male. CC: fatigue    HPI   This is a 64y.o.-year-old male  returning to office presenting to discuss possible surgical interventions of his mitral valve disease. He was last seen on 4/7/2022 at the [address] [place] [address] for continued evaluation of mitral valve disease . He has a past medical history of Abnormal PSA, Anxiety, Bronchitis, Claustrophobia, Diabetes mellitus ([address] Endocarditis, Enlarged [place] (left atrium), Erectile dysfunction, GERD (gastroesophageal reflux disease), Washburn's esophagus with history of dilation, H/O complete arterial study of extremity, Hiatal hernia, History of EMG, Hypertension, Lung nodule, Mitral valve prolapse, Moderate tricuspid regurgitation, Multiple sclerosis ([address] Prostatitis, Renal calculi, Sinus problem, Supraventricular arrhythmia, Tachycardia, and Tilt table evaluation.     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 In 2016 EF was 60%. He was seen by cardiology in 12/2020 and at that time was given \" reassurance\" and consideration for valve clinic referral, but appears to have been lost to follow up. Then in January 2022 He was admitted and found to have sepsis with parapneumonic effusion. He underwent right thoracotomy, decortication by Dr. Gen Knox 1/4/22. He was readmitted 1/17/22 with JAMIE and hydronephrosis. He has since recovered. Prior to thoracotomy he underwent cardiac risk assessment which sparked rediscussion of cardiac issues. At that time he had no echo since 2021. He reports some COLE as well as orthopnea especially at the beginning of the night- he uses an inhaler when it happens. He denies CP. He has recently been experiencing SVT, and has been treated with beta-blocker, and has been wearing a Zio patch per cardiology. Toma Ragsdale   He was recently admitted to the [facility] a couple of weeks ago after presenting to the emergency room complaining of palpitations, with a heart rate as high as 170, as well as chest pressure and jaw pain. Echocardiogram on 4/7/2022 revealed an EF of 6065%, moderate to severe MR, moderate mitral stenosis, mild AR. EP evaluated him during his hospital admission and initiated amiodarone. He underwent cardiac catheterization on 4/21/2022 which revealed CAD. JOEL on same day revealed severe MR, moderate AR, and moderate TR. He was discharged home on 4/22/2022. He reports some COLE as well as orthopnea especially at the beginning of the night- he uses an inhaler when it happens. He denies CP.       Review of Systems  Constitutional: Denies fevers, chills, or weight loss. HEENT: Denies visual changes or hearing loss. Heart: As per HPI. Lungs: Denies shortness of breath, cough, or wheezing. Gastrointestinal: Denies nausea, vomiting, constipation, or diarrhea. Genitourinary: dysuria or hematuria. Psychiatric: Patient denies anxiety or depression. Neurologic: Patient denies weakness of the extremities, dizziness, or headaches. All other ROS checked and found to be negative. Objective:   Physical Exam  General Appearance: Pleasant 64y.o. year old male who appears stated age. Communicates well, no acute distress. HEENT: Head is normocephalic, atraumatic. EOMs intact, PERRL. Trachea midline. Lungs: Normal respiratory rate and normal effort. He is not in respiratory distress. Breath sounds clear to auscultation. No wheezes. Heart: Normal rate. Regular rhythm. S1 normal and S2 normal. Positive for murmur. Chest: Symmetric chest wall expansion. Extremities: Normal range of motion. Neurological: Patient is alert and oriented to person, place and time. Patient has normal reflexes. Skin: Warm and dry. Abdomen: Abdomen is soft and non-distended. Bowel sounds are normal. There is no abdominal tenderness tenderness.  There is no guarding. There is no mass. Pulses: Distal pulses are intact. Skin: Warm and dry without lesions.     Assessment:      AI/MR/MS/TR/CAD/SVT      Plan:      AVR/MVR/TVRp/CABG to LAD, Diag and PDA with WALLACE. All risks, benefits, alternatives and potential complications explained thoroughly to patient and son including, but not limited to, bleeding, infection, lung injury, kidney injury, stroke, heart attack, prolonged ventilation, wound complication, need for re-operation, and death, and the patient agrees to proceed.   PAT on 5/18 with OR 5/23 at 7am.

## 2022-05-06 DIAGNOSIS — I47.1 SVT (SUPRAVENTRICULAR TACHYCARDIA) (HCC): ICD-10-CM

## 2022-05-07 LAB — CULTURE, STOOL: NORMAL

## 2022-05-07 NOTE — DISCHARGE SUMMARY
[address]                  [address] [facility],  [facility]                               DISCHARGE SUMMARY    PATIENT NAME: Miah Vargas                    :        1945  MED REC NO:   [id number]                            ROOM:       8208  ACCOUNT NO:   [de-identified]                           ADMIT DATE: 2022  PROVIDER:     Tika Munoz DO                  DISCHARGE DATE:  2022    DISCHARGE DIAGNOSES:  1. SVT. 2.  Valvular heart disease. 3.  Pulmonary hypertension. 4.  Coronary artery disease. 5.  Multiple sclerosis. 6.  Asbestosis. 7.  BPH. 8.  GERD. HOSPITAL COURSE:  The patient is a 75-year-old  male who  presented to the emergency room complaining of palpitations. He was  seen in the emergency room and assigned observation status. He was seen  by EP. He underwent JOEL and heart catheterization. JOEL revealed  ejection fraction 60% to 65%, severe mitral regurgitation, moderate  aortic valve regurgitation, moderate tricuspid regurgitation. Heart  catheterization revealed coronary artery disease. Cardiology  recommended aggressive medical therapy. Consider CABG if mitral valve  replacement is considered. The patient's medications were adjusted. He  was discharged home in stable condition on 2022. DISCHARGE MEDICATIONS:  As per discharge med rec which include:  1. Amiodarone as directed per ID. 2.  Albuterol inhaler p.r.n.  3.  Xanax p.r.n.  4.  Flonase. 5.  Gabapentin. 6.  Toprol-XL. 7.  Percocet p.r.n.  8.  Refresh eye drops p.r.n.  9.  Senokot p.r.n. FOLLOWUP:  The patient instructed to follow up with EP, call office for  appointment. Follow up with the valve clinic, call office for  appointment.         Malick Bonds DO    D: 2022 14:44:26       T: 2022 14:47:55     [email or web address]  Job#: [id number]     Doc#: [id number]    CC:

## 2022-05-10 ENCOUNTER — INITIAL CONSULT (OUTPATIENT)
Dept: CARDIOTHORACIC SURGERY | Age: 77
End: 2022-05-10
Payer: MEDICARE

## 2022-05-10 VITALS
SYSTOLIC BLOOD PRESSURE: 150 MMHG | HEART RATE: 73 BPM | WEIGHT: 180 LBS | BODY MASS INDEX: 25.77 KG/M2 | HEIGHT: 70 IN | DIASTOLIC BLOOD PRESSURE: 79 MMHG

## 2022-05-10 DIAGNOSIS — I35.1 NONRHEUMATIC AORTIC VALVE INSUFFICIENCY: ICD-10-CM

## 2022-05-10 DIAGNOSIS — I34.0 MITRAL VALVE INSUFFICIENCY, UNSPECIFIED ETIOLOGY: ICD-10-CM

## 2022-05-10 DIAGNOSIS — I34.0 SEVERE MITRAL REGURGITATION: Primary | ICD-10-CM

## 2022-05-10 DIAGNOSIS — Z87.19 HISTORY OF ESOPHAGEAL STRICTURE: ICD-10-CM

## 2022-05-10 DIAGNOSIS — I05.0 MITRAL VALVE STENOSIS, UNSPECIFIED ETIOLOGY: ICD-10-CM

## 2022-05-10 DIAGNOSIS — I25.10 CAD IN NATIVE ARTERY: ICD-10-CM

## 2022-05-10 DIAGNOSIS — I47.1 SVT (SUPRAVENTRICULAR TACHYCARDIA) (HCC): ICD-10-CM

## 2022-05-10 DIAGNOSIS — I07.1 TRICUSPID VALVE INSUFFICIENCY, UNSPECIFIED ETIOLOGY: ICD-10-CM

## 2022-05-10 PROCEDURE — 99214 OFFICE O/P EST MOD 30 MIN: CPT | Performed by: NURSE PRACTITIONER

## 2022-05-10 RX ORDER — BACLOFEN 10 MG/1
10 TABLET ORAL 3 TIMES DAILY PRN
Status: ON HOLD | COMMUNITY
End: 2022-06-01 | Stop reason: HOSPADM

## 2022-05-10 RX ORDER — FEXOFENADINE HCL 180 MG/1
180 TABLET ORAL DAILY
COMMUNITY
End: 2022-06-03

## 2022-05-10 RX ORDER — MECLIZINE HCL 12.5 MG/1
12.5 TABLET ORAL 3 TIMES DAILY PRN
Status: ON HOLD | COMMUNITY
End: 2022-06-06 | Stop reason: HOSPADM

## 2022-05-10 RX ORDER — FUROSEMIDE 40 MG/1
40 TABLET ORAL 2 TIMES DAILY
COMMUNITY
End: 2022-05-13

## 2022-05-10 RX ORDER — MONTELUKAST SODIUM 10 MG/1
10 TABLET ORAL NIGHTLY
COMMUNITY
End: 2022-06-03

## 2022-05-11 ENCOUNTER — PREP FOR PROCEDURE (OUTPATIENT)
Dept: CARDIOTHORACIC SURGERY | Age: 77
End: 2022-05-11

## 2022-05-11 DIAGNOSIS — I25.10 CAD IN NATIVE ARTERY: ICD-10-CM

## 2022-05-11 DIAGNOSIS — R09.89 BRUIT: ICD-10-CM

## 2022-05-11 DIAGNOSIS — Z01.818 PRE-OP EVALUATION: Primary | ICD-10-CM

## 2022-05-11 RX ORDER — CHLORHEXIDINE GLUCONATE 4 G/100ML
SOLUTION TOPICAL ONCE
Status: CANCELLED | OUTPATIENT
Start: 2022-05-11 | End: 2022-05-11

## 2022-05-11 RX ORDER — SODIUM CHLORIDE 9 MG/ML
INJECTION, SOLUTION INTRAVENOUS PRN
Status: CANCELLED | OUTPATIENT
Start: 2022-05-11

## 2022-05-11 RX ORDER — SODIUM CHLORIDE 9 MG/ML
INJECTION, SOLUTION INTRAVENOUS CONTINUOUS
Status: CANCELLED | OUTPATIENT
Start: 2022-05-11

## 2022-05-11 RX ORDER — SODIUM CHLORIDE 0.9 % (FLUSH) 0.9 %
5-40 SYRINGE (ML) INJECTION PRN
Status: CANCELLED | OUTPATIENT
Start: 2022-05-11

## 2022-05-11 RX ORDER — CHLORHEXIDINE GLUCONATE 0.12 MG/ML
15 RINSE ORAL ONCE
Status: CANCELLED | OUTPATIENT
Start: 2022-05-11 | End: 2022-05-11

## 2022-05-11 RX ORDER — SODIUM CHLORIDE 0.9 % (FLUSH) 0.9 %
5-40 SYRINGE (ML) INJECTION EVERY 12 HOURS SCHEDULED
Status: CANCELLED | OUTPATIENT
Start: 2022-05-11
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
## 2022-05-16 ENCOUNTER — TELEPHONE (OUTPATIENT)
Dept: CARDIOTHORACIC SURGERY | Age: 77
End: 2022-05-16

## 2022-05-18 ENCOUNTER — HOSPITAL ENCOUNTER (OUTPATIENT)
Dept: PULMONOLOGY | Age: 77
Discharge: HOME | End: 2022-05-18
Payer: MEDICARE

## 2022-05-18 ENCOUNTER — HOSPITAL ENCOUNTER (OUTPATIENT)
Dept: CT IMAGING | Age: 77
Discharge: HOME | End: 2022-05-20
Payer: MEDICARE

## 2022-05-18 ENCOUNTER — HOSPITAL ENCOUNTER (OUTPATIENT)
Dept: INTERVENTIONAL RADIOLOGY/VASCULAR | Age: 77
Discharge: HOME | End: 2022-05-20
Payer: MEDICARE

## 2022-05-18 ENCOUNTER — HOSPITAL ENCOUNTER (OUTPATIENT)
Dept: GENERAL RADIOLOGY | Age: 77
Discharge: HOME | End: 2022-05-20
Payer: MEDICARE

## 2022-05-18 ENCOUNTER — HOSPITAL ENCOUNTER (OUTPATIENT)
Dept: ULTRASOUND IMAGING | Age: 77
Discharge: HOME | End: 2022-05-20
Payer: MEDICARE

## 2022-05-18 ENCOUNTER — HOSPITAL ENCOUNTER (OUTPATIENT)
Dept: PREADMISSION TESTING | Age: 77
Discharge: HOME | End: 2022-05-18
Payer: MEDICARE

## 2022-05-18 ENCOUNTER — TELEPHONE (OUTPATIENT)
Dept: CARDIOTHORACIC SURGERY | Age: 77
End: 2022-05-18

## 2022-05-18 VITALS
OXYGEN SATURATION: 96 % | WEIGHT: 189.2 LBS | HEIGHT: 70 IN | SYSTOLIC BLOOD PRESSURE: 150 MMHG | TEMPERATURE: 98.4 F | RESPIRATION RATE: 16 BRPM | BODY MASS INDEX: 27.09 KG/M2 | DIASTOLIC BLOOD PRESSURE: 89 MMHG | HEART RATE: 78 BPM

## 2022-05-18 DIAGNOSIS — Z01.818 PRE-OP EVALUATION: ICD-10-CM

## 2022-05-18 DIAGNOSIS — I25.10 CAD IN NATIVE ARTERY: ICD-10-CM

## 2022-05-18 DIAGNOSIS — R09.89 BRUIT: ICD-10-CM

## 2022-05-18 LAB
ABO/RH: NORMAL
ALBUMIN SERPL-MCNC: 4.2 G/DL (ref 3.5–5.2)
ALP BLD-CCNC: 111 U/L (ref 40–129)
ALT SERPL-CCNC: 15 U/L (ref 0–40)
ANION GAP SERPL CALCULATED.3IONS-SCNC: 9 MMOL/L (ref 7–16)
ANTIBODY SCREEN: NORMAL
APTT: 31 SEC (ref 24.5–35.1)
AST SERPL-CCNC: 15 U/L (ref 0–39)
BACTERIA: ABNORMAL /HPF
BILIRUB SERPL-MCNC: 0.6 MG/DL (ref 0–1.2)
BILIRUBIN URINE: NEGATIVE
BLOOD, URINE: ABNORMAL
BUN BLDV-MCNC: 26 MG/DL (ref 6–23)
CALCIUM SERPL-MCNC: 9.2 MG/DL (ref 8.6–10.2)
CHLORIDE BLD-SCNC: 109 MMOL/L (ref 98–107)
CLARITY: ABNORMAL
CO2: 24 MMOL/L (ref 22–29)
COLOR: YELLOW
CREAT SERPL-MCNC: 1.3 MG/DL (ref 0.7–1.2)
EPITHELIAL CELLS, UA: ABNORMAL /HPF
GFR AFRICAN AMERICAN: >60
GFR NON-AFRICAN AMERICAN: 53 ML/MIN/1.73
GLUCOSE BLD-MCNC: 101 MG/DL (ref 74–99)
GLUCOSE URINE: NEGATIVE MG/DL
HBA1C MFR BLD: 5.1 % (ref 4–5.6)
HCT VFR BLD CALC: 39.6 % (ref 37–54)
HEMOGLOBIN: 13 G/DL (ref 12.5–16.5)
INR BLD: 1.1
KETONES, URINE: NEGATIVE MG/DL
LEUKOCYTE ESTERASE, URINE: ABNORMAL
MCH RBC QN AUTO: 30.7 PG (ref 26–35)
MCHC RBC AUTO-ENTMCNC: 32.8 % (ref 32–34.5)
MCV RBC AUTO: 93.4 FL (ref 80–99.9)
NITRITE, URINE: POSITIVE
PDW BLD-RTO: 13.2 FL (ref 11.5–15)
PH UA: 5.5 (ref 5–9)
PLATELET # BLD: 221 E9/L (ref 130–450)
PMV BLD AUTO: 10.1 FL (ref 7–12)
POTASSIUM SERPL-SCNC: 4.2 MMOL/L (ref 3.5–5)
PROTEIN UA: NEGATIVE MG/DL
PROTHROMBIN TIME: 12.2 SEC (ref 9.3–12.4)
RBC # BLD: 4.24 E12/L (ref 3.8–5.8)
RBC UA: ABNORMAL /HPF (ref 0–2)
SODIUM BLD-SCNC: 142 MMOL/L (ref 132–146)
SPECIFIC GRAVITY UA: 1.02 (ref 1–1.03)
TOTAL PROTEIN: 7.3 G/DL (ref 6.4–8.3)
UROBILINOGEN, URINE: 0.2 E.U./DL
WBC # BLD: 7 E9/L (ref 4.5–11.5)
WBC UA: >20 /HPF (ref 0–5)

## 2022-05-18 PROCEDURE — 93922 UPR/L XTREMITY ART 2 LEVELS: CPT | Performed by: SURGERY

## 2022-05-18 PROCEDURE — 94375 RESPIRATORY FLOW VOLUME LOOP: CPT

## 2022-05-18 PROCEDURE — 93880 EXTRACRANIAL BILAT STUDY: CPT

## 2022-05-18 PROCEDURE — 85610 PROTHROMBIN TIME: CPT

## 2022-05-18 PROCEDURE — 87088 URINE BACTERIA CULTURE: CPT

## 2022-05-18 PROCEDURE — 86901 BLOOD TYPING SEROLOGIC RH(D): CPT

## 2022-05-18 PROCEDURE — 93922 UPR/L XTREMITY ART 2 LEVELS: CPT

## 2022-05-18 PROCEDURE — 87081 CULTURE SCREEN ONLY: CPT

## 2022-05-18 PROCEDURE — 81001 URINALYSIS AUTO W/SCOPE: CPT

## 2022-05-18 PROCEDURE — 93970 EXTREMITY STUDY: CPT | Performed by: RADIOLOGY

## 2022-05-18 PROCEDURE — 94729 DIFFUSING CAPACITY: CPT

## 2022-05-18 PROCEDURE — 86850 RBC ANTIBODY SCREEN: CPT

## 2022-05-18 PROCEDURE — 86900 BLOOD TYPING SEROLOGIC ABO: CPT

## 2022-05-18 PROCEDURE — 85027 COMPLETE CBC AUTOMATED: CPT

## 2022-05-18 PROCEDURE — 85730 THROMBOPLASTIN TIME PARTIAL: CPT

## 2022-05-18 PROCEDURE — 93880 EXTRACRANIAL BILAT STUDY: CPT | Performed by: RADIOLOGY

## 2022-05-18 PROCEDURE — 71046 X-RAY EXAM CHEST 2 VIEWS: CPT

## 2022-05-18 PROCEDURE — 36415 COLL VENOUS BLD VENIPUNCTURE: CPT

## 2022-05-18 PROCEDURE — 93970 EXTREMITY STUDY: CPT

## 2022-05-18 PROCEDURE — 80053 COMPREHEN METABOLIC PANEL: CPT

## 2022-05-18 PROCEDURE — 83036 HEMOGLOBIN GLYCOSYLATED A1C: CPT

## 2022-05-18 PROCEDURE — 86923 COMPATIBILITY TEST ELECTRIC: CPT

## 2022-05-18 PROCEDURE — 71250 CT THORAX DX C-: CPT

## 2022-05-19 LAB
MRSA CULTURE ONLY: NORMAL
URINE CULTURE, ROUTINE: NORMAL

## 2022-05-21 ENCOUNTER — ANESTHESIA EVENT (OUTPATIENT)
Dept: ICU | Age: 77
DRG: 219 | End: 2022-05-21
Payer: MEDICARE

## 2022-05-23 ENCOUNTER — ANESTHESIA (OUTPATIENT)
Dept: ICU | Age: 77
DRG: 219 | End: 2022-05-23
Payer: MEDICARE

## 2022-05-23 ENCOUNTER — HOSPITAL ENCOUNTER (INPATIENT)
Age: 77
LOS: 10 days | Discharge: NURSING, REHAB OR LONG-TERM CARE | DRG: 219 | End: 2022-06-02
Attending: THORACIC SURGERY (CARDIOTHORACIC VASCULAR SURGERY) | Admitting: THORACIC SURGERY (CARDIOTHORACIC VASCULAR SURGERY)
Payer: MEDICARE

## 2022-05-23 DIAGNOSIS — Z98.890 S/P TVR (TRICUSPID VALVE REPAIR): ICD-10-CM

## 2022-05-23 DIAGNOSIS — Z95.1 S/P CABG (CORONARY ARTERY BYPASS GRAFT): ICD-10-CM

## 2022-05-23 DIAGNOSIS — G89.18 ACUTE POST-OPERATIVE PAIN: Primary | ICD-10-CM

## 2022-05-23 DIAGNOSIS — I34.0 MITRAL VALVE INSUFFICIENCY, UNSPECIFIED ETIOLOGY: ICD-10-CM

## 2022-05-23 DIAGNOSIS — Z95.2 S/P MVR (MITRAL VALVE REPLACEMENT): ICD-10-CM

## 2022-05-23 DIAGNOSIS — I25.10 CAD IN NATIVE ARTERY: ICD-10-CM

## 2022-05-23 DIAGNOSIS — I10 HYPERTENSION, UNSPECIFIED TYPE: ICD-10-CM

## 2022-05-23 DIAGNOSIS — Z01.810 PREOPERATIVE CARDIOVASCULAR EXAMINATION: ICD-10-CM

## 2022-05-23 DIAGNOSIS — Z98.890 S/P AORTIC VALVE REPAIR: ICD-10-CM

## 2022-05-23 LAB
ANION GAP SERPL CALCULATED.3IONS-SCNC: 9 MMOL/L (ref 7–16)
BACTERIA: ABNORMAL /HPF
BILIRUBIN URINE: NEGATIVE
BLOOD, URINE: ABNORMAL
BUN BLDV-MCNC: 23 MG/DL (ref 6–23)
CALCIUM SERPL-MCNC: 8.6 MG/DL (ref 8.6–10.2)
CHLORIDE BLD-SCNC: 107 MMOL/L (ref 98–107)
CLARITY: CLEAR
CO2: 22 MMOL/L (ref 22–29)
COLOR: YELLOW
CREAT SERPL-MCNC: 1.1 MG/DL (ref 0.7–1.2)
GFR AFRICAN AMERICAN: >60
GFR NON-AFRICAN AMERICAN: >60 ML/MIN/1.73
GLUCOSE BLD-MCNC: 97 MG/DL (ref 74–99)
GLUCOSE URINE: NEGATIVE MG/DL
KETONES, URINE: NEGATIVE MG/DL
LEUKOCYTE ESTERASE, URINE: ABNORMAL
NITRITE, URINE: NEGATIVE
PH UA: 6 (ref 5–9)
POTASSIUM SERPL-SCNC: 4.3 MMOL/L (ref 3.5–5)
PRO-BNP: 1002 PG/ML (ref 0–450)
PROTEIN UA: NEGATIVE MG/DL
RBC UA: ABNORMAL /HPF (ref 0–2)
SODIUM BLD-SCNC: 138 MMOL/L (ref 132–146)
SPECIFIC GRAVITY UA: 1.01 (ref 1–1.03)
UROBILINOGEN, URINE: 0.2 E.U./DL
WBC UA: ABNORMAL /HPF (ref 0–5)

## 2022-05-23 PROCEDURE — 80048 BASIC METABOLIC PNL TOTAL CA: CPT

## 2022-05-23 PROCEDURE — 81001 URINALYSIS AUTO W/SCOPE: CPT

## 2022-05-23 PROCEDURE — 6370000000 HC RX 637 (ALT 250 FOR IP): Performed by: PHYSICIAN ASSISTANT

## 2022-05-23 PROCEDURE — 87088 URINE BACTERIA CULTURE: CPT

## 2022-05-23 PROCEDURE — 36415 COLL VENOUS BLD VENIPUNCTURE: CPT

## 2022-05-23 PROCEDURE — 2580000003 HC RX 258: Performed by: INTERNAL MEDICINE

## 2022-05-23 PROCEDURE — 83880 ASSAY OF NATRIURETIC PEPTIDE: CPT

## 2022-05-23 PROCEDURE — 6360000002 HC RX W HCPCS: Performed by: INTERNAL MEDICINE

## 2022-05-23 PROCEDURE — 6370000000 HC RX 637 (ALT 250 FOR IP)

## 2022-05-23 PROCEDURE — 2580000003 HC RX 258: Performed by: PHYSICIAN ASSISTANT

## 2022-05-23 PROCEDURE — 6370000000 HC RX 637 (ALT 250 FOR IP): Performed by: INTERNAL MEDICINE

## 2022-05-23 PROCEDURE — 2140000000 HC CCU INTERMEDIATE R&B

## 2022-05-23 PROCEDURE — 82962 GLUCOSE BLOOD TEST: CPT

## 2022-05-23 RX ORDER — SODIUM CHLORIDE 0.9 % (FLUSH) 0.9 %
5-40 SYRINGE (ML) INJECTION PRN
Status: DISCONTINUED | OUTPATIENT
Start: 2022-05-23 | End: 2022-05-25

## 2022-05-23 RX ORDER — ALPRAZOLAM 0.25 MG/1
0.25 TABLET ORAL NIGHTLY PRN
Status: DISCONTINUED | OUTPATIENT
Start: 2022-05-23 | End: 2022-05-25

## 2022-05-23 RX ORDER — SODIUM CHLORIDE 9 MG/ML
INJECTION, SOLUTION INTRAVENOUS CONTINUOUS
Status: DISCONTINUED | OUTPATIENT
Start: 2022-05-23 | End: 2022-05-24

## 2022-05-23 RX ORDER — CHLORHEXIDINE GLUCONATE 4 G/100ML
SOLUTION TOPICAL ONCE
Status: DISCONTINUED | OUTPATIENT
Start: 2022-05-23 | End: 2022-05-25

## 2022-05-23 RX ORDER — SODIUM CHLORIDE 0.9 % (FLUSH) 0.9 %
5-40 SYRINGE (ML) INJECTION EVERY 12 HOURS SCHEDULED
Status: DISCONTINUED | OUTPATIENT
Start: 2022-05-23 | End: 2022-05-25

## 2022-05-23 RX ORDER — FLUTICASONE PROPIONATE 50 MCG
1 SPRAY, SUSPENSION (ML) NASAL DAILY
Status: DISCONTINUED | OUTPATIENT
Start: 2022-05-23 | End: 2022-05-25

## 2022-05-23 RX ORDER — METOPROLOL SUCCINATE 50 MG/1
50 TABLET, EXTENDED RELEASE ORAL DAILY
Status: DISCONTINUED | OUTPATIENT
Start: 2022-05-23 | End: 2022-05-25

## 2022-05-23 RX ORDER — AMIODARONE HYDROCHLORIDE 200 MG/1
200 TABLET ORAL DAILY
Status: DISCONTINUED | OUTPATIENT
Start: 2022-05-23 | End: 2022-05-25

## 2022-05-23 RX ORDER — GABAPENTIN 300 MG/1
300 CAPSULE ORAL 2 TIMES DAILY
Status: DISCONTINUED | OUTPATIENT
Start: 2022-05-23 | End: 2022-05-25

## 2022-05-23 RX ORDER — SENNA PLUS 8.6 MG/1
1 TABLET ORAL NIGHTLY PRN
Status: DISCONTINUED | OUTPATIENT
Start: 2022-05-23 | End: 2022-05-25

## 2022-05-23 RX ORDER — SODIUM CHLORIDE 9 MG/ML
INJECTION, SOLUTION INTRAVENOUS PRN
Status: DISCONTINUED | OUTPATIENT
Start: 2022-05-23 | End: 2022-05-25

## 2022-05-23 RX ORDER — CHLORHEXIDINE GLUCONATE 0.12 MG/ML
15 RINSE ORAL ONCE
Status: COMPLETED | OUTPATIENT
Start: 2022-05-23 | End: 2022-05-23

## 2022-05-23 RX ADMIN — 0.12% CHLORHEXIDINE GLUCONATE 15 ML: 1.2 RINSE ORAL at 05:50

## 2022-05-23 RX ADMIN — MEROPENEM 1000 MG: 1 INJECTION, POWDER, FOR SOLUTION INTRAVENOUS at 15:47

## 2022-05-23 RX ADMIN — ALPRAZOLAM 0.25 MG: 0.25 TABLET ORAL at 22:28

## 2022-05-23 RX ADMIN — MEROPENEM 1000 MG: 1 INJECTION, POWDER, FOR SOLUTION INTRAVENOUS at 23:51

## 2022-05-23 RX ADMIN — DAPTOMYCIN 450 MG: 500 INJECTION, POWDER, LYOPHILIZED, FOR SOLUTION INTRAVENOUS at 16:14

## 2022-05-23 RX ADMIN — SODIUM CHLORIDE: 9 INJECTION, SOLUTION INTRAVENOUS at 13:23

## 2022-05-23 RX ADMIN — GABAPENTIN 300 MG: 300 CAPSULE ORAL at 22:28

## 2022-05-23 ASSESSMENT — ENCOUNTER SYMPTOMS: DYSPNEA ACTIVITY LEVEL: AFTER AMBULATING 1 FLIGHT OF STAIRS

## 2022-05-23 ASSESSMENT — PAIN - FUNCTIONAL ASSESSMENT: PAIN_FUNCTIONAL_ASSESSMENT: NONE - DENIES PAIN

## 2022-05-23 ASSESSMENT — LIFESTYLE VARIABLES
HOW OFTEN DO YOU HAVE A DRINK CONTAINING ALCOHOL: 2-3 TIMES A WEEK
HOW MANY STANDARD DRINKS CONTAINING ALCOHOL DO YOU HAVE ON A TYPICAL DAY: 1 OR 2

## 2022-05-23 ASSESSMENT — PAIN SCALES - GENERAL: PAINLEVEL_OUTOF10: 0

## 2022-05-23 NOTE — PROGRESS NOTES
Repeat BMP shows Cr. 1.1. He is facing a huge operation and would prefer him to be optimized prior to it. Admit, ask Renal and ID to see. Tentatively plan for OR Wednesday morning. D/W patient and son.   Rosanne Gastelum MD
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
## 2022-05-23 NOTE — LETTER
[id number]      Transportation Provider Information   4. Provider Name:    5. [place] Medicaid Provider Number:  National Provider Identifier (NPI):      Certification  7. Criteria:  By signing this document, the practitioner certifies that two statements are true:  A. This individual must be accompanied by a mobility-related assistive device from the point of pick-up to the point of drop-off. B. Transport of this individual by standard passenger vehicle or common carrier is precluded or contraindicated. 8. Period Beginning Date:    9. Length  [x] Not more than 10 day(s)  [] One Year     Additional Information Relevant to Certification   10. Comments or Explanations, If Necessary or Appropriate     CABG, fall risk,      Certifying Practitioner Information   11. Name of Practitioner:    12. [place] Medicaid Provider Number, If Applicable:  [address] Provider Identifier (NPI):      Signature Information   14. Date of Signature:  15. Name of Person Signing: Jerrol Spurling LSW   16. Signature and Professional Designation: Electronically signed by Jerrol Spurling, MSW, LSW on 2022 at 10:14 AM       [facility] [id number]  Rev. 2015 Address:  ,           Group Number:   Insurance Type:     Subscriber Name:   Subscriber :     Subscriber ID:   Pat.  Rel. to Sub:             CSN: [id number]

## 2022-05-23 NOTE — PROGRESS NOTES
Rashi Coughlin cancelled surgery and will admit the patient to [facility]
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
## 2022-05-23 NOTE — INTERVAL H&P NOTE
H and P from 5/11/22 is reviewed and there are no changes.   Will check a stat BMP to make sure Cr is back down to normal.   Jovanni Rossi
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
## 2022-05-23 NOTE — PROGRESS NOTES
Patient arrived to PACU from [facility]. Surgery was cancelled for today and patient is waiting for a bed.

## 2022-05-23 NOTE — CONSULTS
[address]                  [address] [facility],  [facility]                                  CONSULTATION    PATIENT NAME: Diana Fonseca                    :        1945  MED REC NO:   [id number]                            ROOM:       6506  ACCOUNT NO:   [de-identified]                           ADMIT DATE: 2022  PROVIDER:     Frantz Salmon DO    CONSULT DATE:  2022    ADMITTING PROVIDER:  Guerline Berry MD    REASON FOR CONSULTATION:  Medical optimization. PRIMARY CARE PROVIDER:  Laina Kumar MD    CHIEF COMPLAINT:  Valvular heart disease. HISTORY OF PRESENT ILLNESS:  The patient is a 78-year-old  male  who is admitted to the hospital in anticipation of surgery for valvular  heart disease by Dr. Kimberly Tompkins. PAST MEDICAL HISTORY:  Valvular heart disease, asbestosis, multiple  sclerosis, pulmonary hypertension, BPH, GERD. MEDICATIONS PRIOR TO ADMISSION:  Baclofen, fexofenadine, Antivert,  Singulair, Cordarone, Flonase, Toprol-XL, Neurontin, Senokot, Percocet  p.r.n.,  Refresh eye drops, Ventolin inhaler p.r.n., Xanax p.r.n. REVIEW OF SYSTEMS:  Remarkable for above-stated chief complaint plus  allergy to ALEVE, BACLOFEN, IODINE DYE and PAXIL. The patient is on  chronic BACLOFEN despite above allergy. PAST SURGICAL HISTORY:  TURP, prostate biopsy, bilateral eye cataract  surgery, lithotripsy, thoracotomy, nose surgery. PHYSICAL EXAMINATION:  GENERAL APPEARANCE:  Physical exam reveals a 78-year-old  male  who is alert and oriented x3, cooperative and a good historian. VITAL SIGNS:  Temperature 95.7, pulse 78, respirations 16, blood  pressure 146/89. HEENT:  Head:  Normocephalic, atraumatic. Eyes:  Pupils equal and  reactive to light. Extraocular muscles intact. Fundi not well  visualized. Nose, no obstruction, polyp or discharge noted. Mouth  mucosa without lesion.   Pharynx noninjected without exudate. NECK:  Supple. No JVD. No thyromegaly. No carotid bruits. HEART:  Regular rate and rhythm with grade 2/6 systolic murmur. LUNGS:  Clear to auscultation bilaterally. ABDOMEN:  Positive bowel sounds, soft, nontender. No rebound or  guarding. No hepatosplenomegaly. No masses. BACK:  With increased thoracic kyphosis. EXTREMITIES:  With edema in the bilateral lower extremities. LYMPH NODES:  No adenopathy noted. SKIN:  Without rash or lesion. IMPRESSION:  Valvular heart disease, pulmonary hypertension, MS,  asbestosis, BPH, GERD. PLAN:  Continue as per Cardiothoracic Surgery. Infectious Disease and  Nephrology have both been consulted by Cardiothoracic Surgery for  optimization of preop status. Discharge plan, home when stable.         Nora Sales DO    D: 05/23/2022 15:53:38       T: 05/23/2022 15:56:14     [id number]  Job#: [id number]     Doc#: [id number]    CC:

## 2022-05-23 NOTE — PLAN OF CARE
Problem: Discharge Planning  Goal: Discharge to home or other facility with appropriate resources  Outcome: Progressing  Flowsheets (Taken 5/23/2022 1335)  Discharge to home or other facility with appropriate resources: Identify barriers to discharge with patient and caregiver     Problem: Chronic Conditions and Co-morbidities  Goal: Patient's chronic conditions and co-morbidity symptoms are monitored and maintained or improved  Outcome: Progressing  Flowsheets (Taken 5/23/2022 1338)  Care Plan - Patient's Chronic Conditions and Co-Morbidity Symptoms are Monitored and Maintained or Improved: Monitor and assess patient's chronic conditions and comorbid symptoms for stability, deterioration, or improvement     Problem: ABCDS Injury Assessment  Goal: Absence of physical injury  Outcome: Progressing     Problem: Skin/Tissue Integrity  Goal: Absence of new skin breakdown  Description: 1. Monitor for areas of redness and/or skin breakdown  2. Assess vascular access sites hourly  3. Every 4-6 hours minimum:  Change oxygen saturation probe site  4. Every 4-6 hours:  If on nasal continuous positive airway pressure, respiratory therapy assess nares and determine need for appliance change or resting period.   Outcome: Progressing

## 2022-05-24 ENCOUNTER — ANESTHESIA EVENT (OUTPATIENT)
Dept: OPERATING ROOM | Age: 77
DRG: 219 | End: 2022-05-24
Payer: MEDICARE

## 2022-05-24 LAB
ABO/RH: NORMAL
ANION GAP SERPL CALCULATED.3IONS-SCNC: 14 MMOL/L (ref 7–16)
ANTIBODY SCREEN: NORMAL
APTT: 32.1 SEC (ref 24.5–35.1)
BUN BLDV-MCNC: 16 MG/DL (ref 6–23)
CALCIUM SERPL-MCNC: 8.8 MG/DL (ref 8.6–10.2)
CHLORIDE BLD-SCNC: 105 MMOL/L (ref 98–107)
CO2: 19 MMOL/L (ref 22–29)
CREAT SERPL-MCNC: 1 MG/DL (ref 0.7–1.2)
CREATININE URINE: 21 MG/DL (ref 40–278)
CREATININE URINE: 22 MG/DL (ref 40–278)
GFR AFRICAN AMERICAN: >60
GFR NON-AFRICAN AMERICAN: >60 ML/MIN/1.73
GLUCOSE BLD-MCNC: 93 MG/DL (ref 74–99)
HCT VFR BLD CALC: 37.1 % (ref 37–54)
HEMOGLOBIN: 12.3 G/DL (ref 12.5–16.5)
INR BLD: 1.3
MCH RBC QN AUTO: 31.4 PG (ref 26–35)
MCHC RBC AUTO-ENTMCNC: 33.2 % (ref 32–34.5)
MCV RBC AUTO: 94.6 FL (ref 80–99.9)
METER GLUCOSE: 102 MG/DL (ref 74–99)
MICROALBUMIN UR-MCNC: <12 MG/L
MICROALBUMIN/CREAT UR-RTO: ABNORMAL (ref 0–30)
PDW BLD-RTO: 13.9 FL (ref 11.5–15)
PLATELET # BLD: 174 E9/L (ref 130–450)
PMV BLD AUTO: 10.5 FL (ref 7–12)
POTASSIUM SERPL-SCNC: 4.1 MMOL/L (ref 3.5–5)
PROTEIN PROTEIN: 5 MG/DL (ref 0–12)
PROTEIN/CREAT RATIO: 0.2
PROTEIN/CREAT RATIO: 0.2 (ref 0–0.2)
PROTHROMBIN TIME: 14 SEC (ref 9.3–12.4)
RBC # BLD: 3.92 E12/L (ref 3.8–5.8)
SODIUM BLD-SCNC: 138 MMOL/L (ref 132–146)
WBC # BLD: 9.9 E9/L (ref 4.5–11.5)

## 2022-05-24 PROCEDURE — 94729 DIFFUSING CAPACITY: CPT | Performed by: INTERNAL MEDICINE

## 2022-05-24 PROCEDURE — 85027 COMPLETE CBC AUTOMATED: CPT

## 2022-05-24 PROCEDURE — 82570 ASSAY OF URINE CREATININE: CPT

## 2022-05-24 PROCEDURE — 80048 BASIC METABOLIC PNL TOTAL CA: CPT

## 2022-05-24 PROCEDURE — A4216 STERILE WATER/SALINE, 10 ML: HCPCS | Performed by: INTERNAL MEDICINE

## 2022-05-24 PROCEDURE — 86923 COMPATIBILITY TEST ELECTRIC: CPT

## 2022-05-24 PROCEDURE — 6360000002 HC RX W HCPCS: Performed by: INTERNAL MEDICINE

## 2022-05-24 PROCEDURE — 86850 RBC ANTIBODY SCREEN: CPT

## 2022-05-24 PROCEDURE — 36415 COLL VENOUS BLD VENIPUNCTURE: CPT

## 2022-05-24 PROCEDURE — 2140000000 HC CCU INTERMEDIATE R&B

## 2022-05-24 PROCEDURE — 84156 ASSAY OF PROTEIN URINE: CPT

## 2022-05-24 PROCEDURE — 6370000000 HC RX 637 (ALT 250 FOR IP): Performed by: INTERNAL MEDICINE

## 2022-05-24 PROCEDURE — 85730 THROMBOPLASTIN TIME PARTIAL: CPT

## 2022-05-24 PROCEDURE — 6370000000 HC RX 637 (ALT 250 FOR IP): Performed by: PHYSICIAN ASSISTANT

## 2022-05-24 PROCEDURE — 82044 UR ALBUMIN SEMIQUANTITATIVE: CPT

## 2022-05-24 PROCEDURE — 86901 BLOOD TYPING SEROLOGIC RH(D): CPT

## 2022-05-24 PROCEDURE — 85610 PROTHROMBIN TIME: CPT

## 2022-05-24 PROCEDURE — 86900 BLOOD TYPING SEROLOGIC ABO: CPT

## 2022-05-24 PROCEDURE — 2580000003 HC RX 258: Performed by: PHYSICIAN ASSISTANT

## 2022-05-24 PROCEDURE — 6370000000 HC RX 637 (ALT 250 FOR IP)

## 2022-05-24 PROCEDURE — 2580000003 HC RX 258: Performed by: INTERNAL MEDICINE

## 2022-05-24 RX ORDER — MECOBALAMIN 5000 MCG
5 TABLET,DISINTEGRATING ORAL NIGHTLY PRN
Status: DISCONTINUED | OUTPATIENT
Start: 2022-05-24 | End: 2022-05-25

## 2022-05-24 RX ORDER — SODIUM CHLORIDE 9 MG/ML
INJECTION, SOLUTION INTRAVENOUS PRN
Status: DISCONTINUED | OUTPATIENT
Start: 2022-05-24 | End: 2022-05-25

## 2022-05-24 RX ORDER — CHLORHEXIDINE GLUCONATE 4 G/100ML
SOLUTION TOPICAL SEE ADMIN INSTRUCTIONS
Status: DISCONTINUED | OUTPATIENT
Start: 2022-05-24 | End: 2022-05-25 | Stop reason: HOSPADM

## 2022-05-24 RX ORDER — SODIUM CHLORIDE 0.9 % (FLUSH) 0.9 %
10 SYRINGE (ML) INJECTION PRN
Status: DISCONTINUED | OUTPATIENT
Start: 2022-05-24 | End: 2022-05-25

## 2022-05-24 RX ORDER — CHLORHEXIDINE GLUCONATE 0.12 MG/ML
15 RINSE ORAL ONCE
Status: COMPLETED | OUTPATIENT
Start: 2022-05-25 | End: 2022-05-25

## 2022-05-24 RX ORDER — SODIUM CHLORIDE 0.9 % (FLUSH) 0.9 %
5-40 SYRINGE (ML) INJECTION EVERY 12 HOURS SCHEDULED
Status: DISCONTINUED | OUTPATIENT
Start: 2022-05-24 | End: 2022-05-25

## 2022-05-24 RX ADMIN — Medication 10 ML: at 08:31

## 2022-05-24 RX ADMIN — SODIUM CHLORIDE: 9 INJECTION, SOLUTION INTRAVENOUS at 14:53

## 2022-05-24 RX ADMIN — Medication 5 MG: at 20:36

## 2022-05-24 RX ADMIN — GABAPENTIN 300 MG: 300 CAPSULE ORAL at 08:31

## 2022-05-24 RX ADMIN — MUPIROCIN: 20 OINTMENT TOPICAL at 22:15

## 2022-05-24 RX ADMIN — GABAPENTIN 300 MG: 300 CAPSULE ORAL at 20:36

## 2022-05-24 RX ADMIN — DAPTOMYCIN 450 MG: 500 INJECTION, POWDER, LYOPHILIZED, FOR SOLUTION INTRAVENOUS at 16:32

## 2022-05-24 RX ADMIN — FLUTICASONE PROPIONATE 1 SPRAY: 50 SPRAY, METERED NASAL at 11:43

## 2022-05-24 RX ADMIN — AMIODARONE HYDROCHLORIDE 200 MG: 200 TABLET ORAL at 08:31

## 2022-05-24 RX ADMIN — ALPRAZOLAM 0.25 MG: 0.25 TABLET ORAL at 18:01

## 2022-05-24 RX ADMIN — MEROPENEM 1000 MG: 1 INJECTION, POWDER, FOR SOLUTION INTRAVENOUS at 16:25

## 2022-05-24 RX ADMIN — MEROPENEM 1000 MG: 1 INJECTION, POWDER, FOR SOLUTION INTRAVENOUS at 08:34

## 2022-05-24 RX ADMIN — METOPROLOL SUCCINATE 50 MG: 50 TABLET, EXTENDED RELEASE ORAL at 08:31

## 2022-05-24 RX ADMIN — MEROPENEM 1000 MG: 1 INJECTION, POWDER, FOR SOLUTION INTRAVENOUS at 23:32

## 2022-05-24 ASSESSMENT — PAIN SCALES - GENERAL
PAINLEVEL_OUTOF10: 0
PAINLEVEL_OUTOF10: 2
PAINLEVEL_OUTOF10: 0

## 2022-05-24 ASSESSMENT — PAIN DESCRIPTION - ONSET: ONSET: ON-GOING

## 2022-05-24 ASSESSMENT — COPD QUESTIONNAIRES: CAT_SEVERITY: MODERATE

## 2022-05-24 ASSESSMENT — ENCOUNTER SYMPTOMS: DYSPNEA ACTIVITY LEVEL: AFTER AMBULATING 1 FLIGHT OF STAIRS

## 2022-05-24 ASSESSMENT — PAIN DESCRIPTION - PAIN TYPE: TYPE: CHRONIC PAIN

## 2022-05-24 ASSESSMENT — PAIN - FUNCTIONAL ASSESSMENT: PAIN_FUNCTIONAL_ASSESSMENT: ACTIVITIES ARE NOT PREVENTED

## 2022-05-24 ASSESSMENT — PAIN DESCRIPTION - LOCATION: LOCATION: GROIN

## 2022-05-24 ASSESSMENT — PAIN DESCRIPTION - ORIENTATION: ORIENTATION: MID

## 2022-05-24 ASSESSMENT — PAIN DESCRIPTION - DESCRIPTORS: DESCRIPTORS: ACHING;DISCOMFORT;TIGHTNESS

## 2022-05-24 ASSESSMENT — PAIN DESCRIPTION - FREQUENCY: FREQUENCY: INTERMITTENT

## 2022-05-24 NOTE — PLAN OF CARE
Patient's chart updated to reflect:      . - HF care plan, HF education points and HF discharge instructions.  -Orders: 2 gram sodium diet, daily weights, I/O.  -PCP and/or Cardiologist appointment to be scheduled within 7 days of hospital discharge.  -History of HF, not primary admission Dx.   Patient admitted for treatment of elective CABG and valve replacements  Julio C Gill RN RN, BSN  Heart Failure Navigator

## 2022-05-24 NOTE — CARE COORDINATION
.Advance Care Planning   Healthcare Decision Maker:    Primary Decision Maker: Arryan Charlie - Domestic Partner - [phone]    Secondary Decision Maker: Cecy Goldstein - [place] Child - [phone]    Supplemental (Other) Decision Maker: Michelle Lowe - Child - [phone]    Click here to complete Healthcare Decision Makers including selection of the Healthcare Decision Maker Relationship (ie \"Primary\").

## 2022-05-24 NOTE — PROGRESS NOTES
[facility] Medicine    Subjective:  Pt alert conversive no new c/o      Current Facility-Administered Medications:     0.9 % sodium chloride infusion, , IntraVENous, Continuous, Gary Carmona MD, Last Rate: 75 mL/hr at 05/23/22 1838, Rate Verify at 05/23/22 1838    sodium chloride flush 0.9 % injection 5-40 mL, 5-40 mL, IntraVENous, 2 times per day, DERRELL Myles    sodium chloride flush 0.9 % injection 5-40 mL, 5-40 mL, IntraVENous, PRN, DERRELL Myles    0.9 % sodium chloride infusion, , IntraVENous, PRN, DERRELL Myles    chlorhexidine (HIBICLENS) 4 % liquid, , Topical, Once, DERRELL Awan    fluticasone (FLONASE) 50 MCG/ACT nasal spray 1 spray, 1 spray, Each Nostril, Daily, SHANIQUE Barillas CNP    amiodarone (CORDARONE) tablet 200 mg, 200 mg, Oral, Daily, SHANIQUE Barillas CNP    metoprolol succinate (TOPROL XL) extended release tablet 50 mg, 50 mg, Oral, Daily, SHANIQUE Barillas CNP    senna (SENOKOT) tablet 8.6 mg, 1 tablet, Oral, Nightly PRN, SHANIQUE Castillo CNP    gabapentin (NEURONTIN) capsule 300 mg, 300 mg, Oral, BID, SHANIQUE Castillo CNP, 300 mg at 05/23/22 2228    meropenem (MERREM) 1,000 mg in sodium chloride 0.9 % 100 mL IVPB (mini-bag), 1,000 mg, IntraVENous, Q8H, Hiro Esquivel MD, Stopped at 05/24/22 0101    DAPTOmycin (CUBICIN) 450 mg in sodium chloride 0.9 % 50 mL IVPB, 6 mg/kg (Ideal), IntraVENous, Q24H, Hiro Esquivel MD, Last Rate: 1,500 mL/hr at 05/23/22 1614, 450 mg at 05/23/22 1614    ALPRAZolam (XANAX) tablet 0.25 mg, 0.25 mg, Oral, Nightly PRN, Kenneth Farnsworth DO, 0.25 mg at 05/23/22 2228    Objective:    BP (!) 148/80   Pulse 82   Temp 97.8 °F (36.6 °C) (Temporal)   Resp 16   Ht 5' 10\" (1.778 m)   Wt 189 lb (85.7 kg)   SpO2 97%   BMI 27.12 kg/m²     Heart:  reg  Lungs:  ctab  Abd: + bs soft nontender  Extrem:  Min edema legs    CBC with Differential:    Lab Results   Component Value Date    WBC 9.9 05/24/2022    RBC 3.92 05/24/2022    HGB 12.3 05/24/2022    HCT 37.1 05/24/2022     05/24/2022    MCV 94.6 05/24/2022    MCH 31.4 05/24/2022    MCHC 33.2 05/24/2022    RDW 13.9 05/24/2022    NRBC 0.0 01/17/2022    SEGSPCT 70 06/08/2011    LYMPHOPCT 7.3 04/20/2022    MONOPCT 9.5 04/20/2022    MYELOPCT 2.6 01/04/2022    BASOPCT 0.5 04/20/2022    MONOSABS 0.72 04/20/2022    LYMPHSABS 0.55 04/20/2022    EOSABS 0.17 04/20/2022    BASOSABS 0.04 04/20/2022     CMP:    Lab Results   Component Value Date     05/24/2022    K 4.1 05/24/2022    K 4.2 04/09/2022     05/24/2022    CO2 19 05/24/2022    BUN 16 05/24/2022    CREATININE 1.0 05/24/2022    GFRAA >60 05/24/2022    LABGLOM >60 05/24/2022    GLUCOSE 93 05/24/2022    PROT 7.3 05/18/2022    LABALBU 4.2 05/18/2022    CALCIUM 8.8 05/24/2022    BILITOT 0.6 05/18/2022    ALKPHOS 111 05/18/2022    AST 15 05/18/2022    ALT 15 05/18/2022     Warfarin PT/INR:    Lab Results   Component Value Date    INR 1.1 05/18/2022    INR 1.8 12/31/2021    INR 1.0 12/25/2016    PROTIME 12.2 05/18/2022    PROTIME 20.0 (H) 12/31/2021    PROTIME 11.0 12/25/2016       Assessment:    Principal Problem:    CAD in native artery  Resolved Problems:    * No resolved hospital problems.  *  valvular heart dz    Plan:  As per cts med stable for OR        Nieves Estrada DO  7:10 AM  5/24/2022

## 2022-05-24 NOTE — CARE COORDINATION
Met with pt to discuss discharge planning/transition of care. Pt lives with domestic partner in a condo, one story no steps to enter. Pt has a walker and a cane, uses as needed, and a raised toilet seat. Pt is an active . Dr. Jazmine Oliver is PCP and [facility] [address] is pharmacy. Pt has had hhc in past and will need again, he does not remember hhc name. Reviewed chart, [employer] has been active with pt in past, pt is agreeable to use them again. Referral to [facility], they will follow. Per pt his son or stepdaughter will transport pt home. Completed decision making. Sherren Ales, MSW, LSW

## 2022-05-24 NOTE — PROGRESS NOTES
Department of Internal Medicine  Nephrology Attending Consult Note    Events reviewed. SUBJECTIVE: We are following Mr. Grant Beth for JAMIE. Reports no complaints.      PHYSICAL EXAM:      Vitals:    VITALS:  BP (!) 143/69   Pulse 96   Temp 97 °F (36.1 °C) (Temporal)   Resp 18   Ht 5' 10\" (1.778 m)   Wt 189 lb (85.7 kg)   SpO2 92%   BMI 27.12 kg/m²   24HR INTAKE/OUTPUT:      Intake/Output Summary (Last 24 hours) at 5/24/2022 8736  Last data filed at 5/24/2022 0597  Gross per 24 hour   Intake 1243.02 ml   Output 2150 ml   Net -906.98 ml       Constitutional: Patient is awake alert in no distress  HEENT: Pupils are equal reactive  Respiratory: Lungs are clear  Cardiovascular/Edema: Heart sounds are regular  Gastrointestinal: Abdomen soft  Neurologic: Nonfocal  Skin: No lesions  Other: No edema    Scheduled Meds:   sodium chloride flush  5-40 mL IntraVENous 2 times per day    ceFAZolin (ANCEF) IVPB  2,000 mg IntraVENous See Admin Instructions    mupirocin   Nasal BID    [START ON 5/25/2022] chlorhexidine  15 mL Mouth/Throat Once    chlorhexidine   Topical See Admin Instructions    daptomycin (CUBICIN) in NS IV syringe  6 mg/kg (Ideal) IntraVENous Q24H    sodium chloride flush  5-40 mL IntraVENous 2 times per day    chlorhexidine   Topical Once    fluticasone  1 spray Each Nostril Daily    amiodarone  200 mg Oral Daily    [Held by provider] metoprolol succinate  50 mg Oral Daily    gabapentin  300 mg Oral BID    meropenem  1,000 mg IntraVENous Q8H     Continuous Infusions:   sodium chloride      sodium chloride 75 mL/hr at 05/24/22 1453    sodium chloride       PRN Meds:.melatonin, sodium chloride flush, sodium chloride, sodium chloride flush, sodium chloride, senna, ALPRAZolam      DATA:    CBC with Differential:    Lab Results   Component Value Date    WBC 9.9 05/24/2022    RBC 3.92 05/24/2022    HGB 12.3 05/24/2022    HCT 37.1 05/24/2022     05/24/2022    MCV 94.6 05/24/2022    MCH 31.4 05/24/2022    MCHC 33.2 05/24/2022    RDW 13.9 05/24/2022    NRBC 0.0 01/17/2022    SEGSPCT 70 06/08/2011    LYMPHOPCT 7.3 04/20/2022    MONOPCT 9.5 04/20/2022    MYELOPCT 2.6 01/04/2022    BASOPCT 0.5 04/20/2022    MONOSABS 0.72 04/20/2022    LYMPHSABS 0.55 04/20/2022    EOSABS 0.17 04/20/2022    BASOSABS 0.04 04/20/2022     CMP:    Lab Results   Component Value Date     05/24/2022    K 4.1 05/24/2022    K 4.2 04/09/2022     05/24/2022    CO2 19 05/24/2022    BUN 16 05/24/2022    CREATININE 1.0 05/24/2022    GFRAA >60 05/24/2022    LABGLOM >60 05/24/2022    GLUCOSE 93 05/24/2022    PROT 7.3 05/18/2022    LABALBU 4.2 05/18/2022    CALCIUM 8.8 05/24/2022    BILITOT 0.6 05/18/2022    ALKPHOS 111 05/18/2022    AST 15 05/18/2022    ALT 15 05/18/2022     Magnesium:    Lab Results   Component Value Date    MG 1.9 04/21/2022     Phosphorus:    Lab Results   Component Value Date    PHOS 3.3 11/14/2016       BRIEF SUMMARY OF INITIAL CONSULT:    Briefly Mr. Maco Huerta is a 75-year-old man with history of HTN, type II DM, nephrolithiasis s/p lithotripsy, CAD, MVP with significant MR, MV endocarditis, paroxysmal SVT,multiple sclerosis, asbestosis, erectile dysfunction status post penile implant, BPH status post TURP, bladder cancer, Washburn's esophagus, recent admission for pneumonia complicated with right parapneumonic effusion s/p right thoracotomy with decortication who presented to the hospital today 5/23/22 for CABG and valve replacements with Dr. Mariola Marie. However, the surgery was cancelled as Cardiothoracic surgery would like patient optimized before proceeding with procedure. To note, patient did have lab work drawn on 5/18/22 which demonstrated an elevated creatinine of 1.3mg/dL. Repeat Lab work today revealed creatinine 1.1mg/dL, ProBNP 1,002. Renal was consulted for optimization before surgery.   His baseline creatinine is 0.8-0.9 mg/dL         IMPRESSION/RECOMMENDATIONS:      1. JAMIE, stage I,  Rule out component of obstructive uropathy, bladder scan not done. Renal function relatively stable or improved with fair urine output. No evidence of CKD, EGFR >60, UACR <30, normal kidney ultrasound. 2. HTN, on metoprolol  3. HFpEF, LVEF 60-65% on 4/7/22 echo. ProBnp 1,002  4. History of Nephrolithiasis  -----------------------------------  5. CAD, for CABG planned for Wednesday. 6. PAF, on amiodarone and Metoprolol  7. History of lung decortication due to empyema   8. UTI, ID consulted  9. Multiple Sclerosis   10.  Nutrition, Regular     Plan:    · Continue to monitor renal function   · Discontinue IV fluids  · Obtain PVR  · Okay for surgery from renal point of view
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
## 2022-05-24 NOTE — PATIENT CARE CONFERENCE
[facility] Quality Flow/Interdisciplinary Rounds Progress Note        Quality Flow Rounds held on May 24, 2022    Disciplines Attending:  Bedside Nurse, ,  and Nursing Unit Leadership    Sonu Vasquez was admitted on 5/23/2022  5:09 AM    Anticipated Discharge Date:       Disposition:    Enzo Score:  Enzo Scale Score: 20    Readmission Risk              Risk of Unplanned Readmission:  21           Discussed patient goal for the day, patient clinical progression, and barriers to discharge.   The following Goal(s) of the Day/Commitment(s) have been identified:  Diagnostics - Report Results and Labs - Report Results      Vangie Cheung, RN  May 24, 2022

## 2022-05-24 NOTE — PROGRESS NOTES
Infectious Disease  Progress Note  [facility]    Chief Complaint: no complaint    Subjective: bacteriruia in the face of heart surgery    Scheduled Meds:   sodium chloride flush  5-40 mL IntraVENous 2 times per day    ceFAZolin (ANCEF) IVPB  2,000 mg IntraVENous See Admin Instructions    mupirocin   Nasal BID    [START ON 5/25/2022] chlorhexidine  15 mL Mouth/Throat Once    chlorhexidine   Topical See Admin Instructions    sodium chloride flush  5-40 mL IntraVENous 2 times per day    chlorhexidine   Topical Once    fluticasone  1 spray Each Nostril Daily    amiodarone  200 mg Oral Daily    [Held by provider] metoprolol succinate  50 mg Oral Daily    gabapentin  300 mg Oral BID    meropenem  1,000 mg IntraVENous Q8H    daptomycin (CUBICIN) IVPB  6 mg/kg (Ideal) IntraVENous Q24H     Continuous Infusions:   sodium chloride      sodium chloride 75 mL/hr at 05/23/22 1838    sodium chloride       PRN Meds:melatonin, sodium chloride flush, sodium chloride, sodium chloride flush, sodium chloride, senna, ALPRAZolam    Patient Vitals for the past 24 hrs:   BP Temp Temp src Pulse Resp SpO2   05/24/22 0739 (!) 143/69 97 °F (36.1 °C) Temporal 96 18 92 %   05/24/22 0010 (!) 148/80 97.8 °F (36.6 °C) Temporal 82 16 97 %   05/23/22 1940 (!) 164/92 98.1 °F (36.7 °C) Temporal 88 18 98 %   05/23/22 1500 (!) 146/89 95.7 °F (35.4 °C) Temporal 78 16 99 %   05/23/22 1248 129/76 96.6 °F (35.9 °C) Temporal 73 16 97 %   05/23/22 1115 133/72 97.8 °F (36.6 °C)  93 17 93 %       CBC with Differential:    Lab Results   Component Value Date    WBC 9.9 05/24/2022    RBC 3.92 05/24/2022    HGB 12.3 05/24/2022    HCT 37.1 05/24/2022     05/24/2022    MCV 94.6 05/24/2022    MCH 31.4 05/24/2022    MCHC 33.2 05/24/2022    RDW 13.9 05/24/2022    NRBC 0.0 01/17/2022    SEGSPCT 70 06/08/2011    LYMPHOPCT 7.3 04/20/2022    MONOPCT 9.5 04/20/2022    MYELOPCT 2.6 01/04/2022    BASOPCT 0.5 04/20/2022    MONOSABS 0.72 04/20/2022    LYMPHSABS 0.55 04/20/2022    EOSABS 0.17 04/20/2022    BASOSABS 0.04 04/20/2022     CMP:    Lab Results   Component Value Date     05/24/2022    K 4.1 05/24/2022    K 4.2 04/09/2022     05/24/2022    CO2 19 05/24/2022    BUN 16 05/24/2022    CREATININE 1.0 05/24/2022    GFRAA >60 05/24/2022    LABGLOM >60 05/24/2022    GLUCOSE 93 05/24/2022    PROT 7.3 05/18/2022    LABALBU 4.2 05/18/2022    CALCIUM 8.8 05/24/2022    BILITOT 0.6 05/18/2022    ALKPHOS 111 05/18/2022    AST 15 05/18/2022    ALT 15 05/18/2022       BP (!) 143/69   Pulse 96   Temp 97 °F (36.1 °C) (Temporal)   Resp 18   Ht 5' 10\" (1.778 m)   Wt 189 lb (85.7 kg)   SpO2 92%   BMI 27.12 kg/m²     Physical Exam  Const/Neuro- unchanged, no signs of acute distress, Alert  ENMT- Within Normal Limits, Normocephalic, mucous membranes pink/moist, No thrush  Neck: Neck supple  Heart- Regular, Rate, Rhythm- no murmur appreciated. Lungs- clear to ascultation. Respirations even and nonlabored. Abdomen- Soft, bowel sounds positive, non tender  Musculo/Extremities-  Equal and symmetrical, no edema. No tenderness. Skin:  Warm and dry, free from rashes. Cultures reviewed    Radiology reviewed  No orders to display       Assessment  Wednesday surgery- MVR, AVR, TVRp, CABG to LAD.  Diag and PDA with WALLACE   Hx of endocarditis   Hx of prostate surgery TURP   GERD   Hx kidney stones   Mitral regurgitation   Tricuspid valve regurgitation   Aortic valve disease   Washburn's esophagus   Abnormal urinalysis on may 18 Large leuk esterase Pos nitrite   May 19 finalized urinary   I talked to microbiology       Plan  I agree with Dr Vince Garcia that this is a huge surgery and one would want a [place] environment for those prostheses etc   In view of that This is overkill however I think the benefit outweighs the risk   I do not have definitive organisms so I am going to empirically give him a few days pre and post op daptomycin and merrem   Check cultures   Baseline ESR, CRP Monitor labs   Will follow with you   I don't want to use vancomycin because he already has evidence of renal insufficiency   Also repeat urinalysis and urine culture  Repeat urinalysis showing small nitrite and small leuk esterase  Urine culture pending          Electronically signed by Danny Albrecht MD on 5/24/2022 at 10:55 AM
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
## 2022-05-25 ENCOUNTER — APPOINTMENT (OUTPATIENT)
Dept: GENERAL RADIOLOGY | Age: 77
DRG: 219 | End: 2022-05-25
Attending: THORACIC SURGERY (CARDIOTHORACIC VASCULAR SURGERY)
Payer: MEDICARE

## 2022-05-25 ENCOUNTER — ANESTHESIA (OUTPATIENT)
Dept: OPERATING ROOM | Age: 77
DRG: 219 | End: 2022-05-25
Payer: MEDICARE

## 2022-05-25 LAB
ACTIVATED CLOTTING TIME: 111 SECONDS (ref 99–130)
ACTIVATED CLOTTING TIME: 425 SECONDS (ref 99–130)
ACTIVATED CLOTTING TIME: 684 SECONDS (ref 99–130)
ALBUMIN SERPL-MCNC: 2.6 G/DL (ref 3.5–5.2)
ALBUMIN SERPL-MCNC: 3.7 G/DL (ref 3.5–5.2)
ALP BLD-CCNC: 71 U/L (ref 40–129)
ALP BLD-CCNC: 78 U/L (ref 40–129)
ALT SERPL-CCNC: 12 U/L (ref 0–40)
ALT SERPL-CCNC: 13 U/L (ref 0–40)
ANION GAP SERPL CALCULATED.3IONS-SCNC: 10 MMOL/L (ref 7–16)
ANION GAP SERPL CALCULATED.3IONS-SCNC: 13 MMOL/L (ref 7–16)
ANION GAP SERPL CALCULATED.3IONS-SCNC: 13 MMOL/L (ref 7–16)
ANION GAP: 10 MMOL/L (ref 7–16)
ANION GAP: 10 MMOL/L (ref 7–16)
ANION GAP: 11 MMOL/L (ref 7–16)
ANION GAP: 13 MMOL/L (ref 7–16)
ANION GAP: 13 MMOL/L (ref 7–16)
APTT: 38.9 SEC (ref 24.5–35.1)
AST SERPL-CCNC: 49 U/L (ref 0–39)
AST SERPL-CCNC: 54 U/L (ref 0–39)
B.E.: -3.5 MMOL/L (ref -3–3)
B.E.: -3.6 MMOL/L (ref -3–3)
B.E.: -4.5 MMOL/L (ref -3–3)
B.E.: -5.1 MMOL/L (ref -3–3)
B.E.: -5.6 MMOL/L (ref -3–3)
B.E.: -5.7 MMOL/L (ref -3–3)
B.E.: -5.9 MMOL/L (ref -3–3)
B.E.: -5.9 MMOL/L (ref -3–3)
B.E.: -6.1 MMOL/L (ref -3–3)
B.E.: -6.1 MMOL/L (ref -3–3)
B.E.: -6.3 MMOL/L (ref -3–3)
B.E.: -7.2 MMOL/L (ref -3–3)
B.E.: -7.4 MMOL/L (ref -3–3)
B.E.: -8.8 MMOL/L (ref -3–3)
BILIRUB SERPL-MCNC: 0.9 MG/DL (ref 0–1.2)
BILIRUB SERPL-MCNC: 1.4 MG/DL (ref 0–1.2)
BILIRUBIN DIRECT: 0.6 MG/DL (ref 0–0.3)
BILIRUBIN, INDIRECT: 0.3 MG/DL (ref 0–1)
BLOOD BANK DISPENSE STATUS: NORMAL
BLOOD BANK PRODUCT CODE: NORMAL
BPU ID: NORMAL
BUN BLDV-MCNC: 16 MG/DL (ref 6–23)
BUN BLDV-MCNC: 17 MG/DL (ref 6–23)
BUN BLDV-MCNC: 19 MG/DL (ref 6–23)
CALCIUM IONIZED: 1.21 MMOL/L (ref 1.15–1.33)
CALCIUM SERPL-MCNC: 8 MG/DL (ref 8.6–10.2)
CALCIUM SERPL-MCNC: 8.1 MG/DL (ref 8.6–10.2)
CALCIUM SERPL-MCNC: 8.8 MG/DL (ref 8.6–10.2)
CARDIOPULMONARY BYPASS: NO
CARDIOPULMONARY BYPASS: YES
CHLORIDE BLD-SCNC: 106 MMOL/L (ref 98–107)
CHLORIDE BLD-SCNC: 106 MMOL/L (ref 98–107)
CHLORIDE BLD-SCNC: 107 MMOL/L (ref 98–107)
CO2: 19 MMOL/L (ref 22–29)
CO2: 20 MMOL/L (ref 22–29)
CO2: 22 MMOL/L (ref 22–29)
CREAT SERPL-MCNC: 0.9 MG/DL (ref 0.7–1.2)
CREAT SERPL-MCNC: 1 MG/DL (ref 0.7–1.2)
CREAT SERPL-MCNC: 1.1 MG/DL (ref 0.7–1.2)
CRITICAL NOTIFICATION: YES
CRITICAL NOTIFICATION: YES
DELIVERY SYSTEMS: ABNORMAL
DESCRIPTION BLOOD BANK: NORMAL
DEVICE: ABNORMAL
FIO2: 50
FIO2: 60
FIO2: 60
GFR AFRICAN AMERICAN: >60
GFR NON-AFRICAN AMERICAN: >60 ML/MIN/1.73
GFR, ESTIMATED: >60 ML/MIN/1.73
GLUCOSE BLD-MCNC: 172 MG/DL (ref 74–99)
GLUCOSE BLD-MCNC: 181 MG/DL (ref 74–99)
GLUCOSE BLD-MCNC: 215 MG/DL (ref 74–99)
GLUCOSE BLD-MCNC: 216 MG/DL (ref 74–99)
GLUCOSE BLD-MCNC: 218 MG/DL (ref 74–99)
GLUCOSE BLD-MCNC: 221 MG/DL (ref 74–99)
GLUCOSE BLD-MCNC: 245 MG/DL (ref 74–99)
GLUCOSE BLD-MCNC: 97 MG/DL (ref 74–99)
HCO3: 19.5 MMOL/L (ref 22–26)
HCO3: 19.9 MMOL/L (ref 22–26)
HCO3: 20.5 MMOL/L (ref 22–26)
HCO3: 20.6 MMOL/L (ref 22–26)
HCO3: 20.8 MMOL/L (ref 22–26)
HCO3: 20.8 MMOL/L (ref 22–26)
HCO3: 21 MMOL/L (ref 22–26)
HCO3: 21.6 MMOL/L (ref 22–26)
HCO3: 21.8 MMOL/L (ref 22–26)
HCO3: 21.8 MMOL/L (ref 22–26)
HCO3: 21.9 MMOL/L (ref 22–26)
HCO3: 22.4 MMOL/L (ref 22–26)
HCO3: 22.9 MMOL/L (ref 22–26)
HCO3: 22.9 MMOL/L (ref 22–26)
HCT VFR BLD CALC: 39 % (ref 37–54)
HCT VFR BLD CALC: 40.7 % (ref 37–54)
HCT VFR BLD CALC: 41 % (ref 37–54)
HEMATOCRIT: 22 % (ref 37–54)
HEMATOCRIT: 24 % (ref 37–54)
HEMATOCRIT: 24 % (ref 37–54)
HEMATOCRIT: 26 % (ref 37–54)
HEMATOCRIT: 30 % (ref 37–54)
HEMATOCRIT: 31 % (ref 37–54)
HEMATOCRIT: 31 % (ref 37–54)
HEMATOCRIT: 34 % (ref 37–54)
HEMATOCRIT: 34 % (ref 37–54)
HEMATOCRIT: 37 % (ref 37–54)
HEMATOCRIT: 37 % (ref 37–54)
HEMATOCRIT: 39 % (ref 37–54)
HEMATOCRIT: 40 % (ref 37–54)
HEMATOCRIT: 40 % (ref 37–54)
HEMOGLOBIN: 10.2 G/DL (ref 12.5–15.5)
HEMOGLOBIN: 10.5 G/DL (ref 12.5–15.5)
HEMOGLOBIN: 10.5 G/DL (ref 12.5–15.5)
HEMOGLOBIN: 11.6 G/DL (ref 12.5–15.5)
HEMOGLOBIN: 11.7 G/DL (ref 12.5–15.5)
HEMOGLOBIN: 12.4 G/DL (ref 12.5–16.5)
HEMOGLOBIN: 12.6 G/DL (ref 12.5–15.5)
HEMOGLOBIN: 12.6 G/DL (ref 12.5–15.5)
HEMOGLOBIN: 13 G/DL (ref 12.5–16.5)
HEMOGLOBIN: 13.2 G/DL (ref 12.5–15.5)
HEMOGLOBIN: 13.5 G/DL (ref 12.5–16.5)
HEMOGLOBIN: 13.7 G/DL (ref 12.5–15.5)
HEMOGLOBIN: 13.8 G/DL (ref 12.5–15.5)
HEMOGLOBIN: 7.4 G/DL (ref 12.5–15.5)
HEMOGLOBIN: 8.1 G/DL (ref 12.5–15.5)
HEMOGLOBIN: 8.2 G/DL (ref 12.5–15.5)
HEMOGLOBIN: 8.7 G/DL (ref 12.5–15.5)
INR BLD: 1.5
LACTIC ACID: 5.9 MMOL/L (ref 0.5–2.2)
LACTIC ACID: 6.2 MMOL/L (ref 0.5–2.2)
LACTIC ACID: 6.4 MMOL/L (ref 0.5–2.2)
LACTIC ACID: 6.8 MMOL/L (ref 0.5–2.2)
LACTIC ACID: 6.9 MMOL/L (ref 0.5–2.2)
LACTIC ACID: 7.1 MMOL/L (ref 0.5–2.2)
LACTIC ACID: 7.1 MMOL/L (ref 0.5–2.2)
LACTIC ACID: 7.2 MMOL/L (ref 0.5–2.2)
LACTIC ACID: 7.6 MMOL/L (ref 0.5–2.2)
LACTIC ACID: 7.6 MMOL/L (ref 0.5–2.2)
MAGNESIUM: 2 MG/DL (ref 1.6–2.6)
MAGNESIUM: 2.3 MG/DL (ref 1.6–2.6)
MCH RBC QN AUTO: 30.8 PG (ref 26–35)
MCH RBC QN AUTO: 31 PG (ref 26–35)
MCH RBC QN AUTO: 31 PG (ref 26–35)
MCHC RBC AUTO-ENTMCNC: 31.8 % (ref 32–34.5)
MCHC RBC AUTO-ENTMCNC: 31.9 % (ref 32–34.5)
MCHC RBC AUTO-ENTMCNC: 32.9 % (ref 32–34.5)
MCV RBC AUTO: 94 FL (ref 80–99.9)
MCV RBC AUTO: 96.9 FL (ref 80–99.9)
MCV RBC AUTO: 97 FL (ref 80–99.9)
METER GLUCOSE: 157 MG/DL (ref 74–99)
METER GLUCOSE: 160 MG/DL (ref 74–99)
METER GLUCOSE: 164 MG/DL (ref 74–99)
METER GLUCOSE: 165 MG/DL (ref 74–99)
METER GLUCOSE: 165 MG/DL (ref 74–99)
METER GLUCOSE: 171 MG/DL (ref 74–99)
METER GLUCOSE: 171 MG/DL (ref 74–99)
METER GLUCOSE: 178 MG/DL (ref 74–99)
METER GLUCOSE: 182 MG/DL (ref 74–99)
METER GLUCOSE: 185 MG/DL (ref 74–99)
METER GLUCOSE: 193 MG/DL (ref 74–99)
MODE: ABNORMAL
MODE: AC
O2 SATURATION: 100 % (ref 92–98.5)
O2 SATURATION: 77.3 % (ref 92–98.5)
O2 SATURATION: 81.5 % (ref 92–98.5)
O2 SATURATION: 82 % (ref 92–98.5)
O2 SATURATION: 82.4 % (ref 92–98.5)
O2 SATURATION: 85.6 % (ref 92–98.5)
O2 SATURATION: 96.1 % (ref 92–98.5)
O2 SATURATION: 96.1 % (ref 92–98.5)
O2 SATURATION: 96.9 % (ref 92–98.5)
O2 SATURATION: 98.3 % (ref 92–98.5)
O2 SATURATION: 98.9 % (ref 92–98.5)
O2 SATURATION: 99 % (ref 92–98.5)
OPERATOR ID: 1064
OPERATOR ID: 2863
OPERATOR ID: 2863
OPERATOR ID: 475
OPERATOR ID: ABNORMAL
PCO2 37: 38.3 MMHG (ref 35–45)
PCO2 37: 40 MMHG (ref 35–45)
PCO2 37: 43.1 MMHG (ref 35–45)
PCO2 37: 43.7 MMHG (ref 35–45)
PCO2 37: 45.7 MMHG (ref 35–45)
PCO2 37: 45.9 MMHG (ref 35–45)
PCO2 37: 46.9 MMHG (ref 35–45)
PCO2 37: 49.3 MMHG (ref 35–45)
PCO2 37: 50.7 MMHG (ref 35–45)
PCO2 37: 51.7 MMHG (ref 35–45)
PCO2 37: 53.5 MMHG (ref 35–45)
PCO2 37: 54.1 MMHG (ref 35–45)
PCO2 37: 54.5 MMHG (ref 35–45)
PCO2 37: 59.1 MMHG (ref 35–45)
PDW BLD-RTO: 13.9 FL (ref 11.5–15)
PDW BLD-RTO: 13.9 FL (ref 11.5–15)
PDW BLD-RTO: 14 FL (ref 11.5–15)
PH 37: 7.16 (ref 7.35–7.45)
PH 37: 7.2 (ref 7.35–7.45)
PH 37: 7.21 (ref 7.35–7.45)
PH 37: 7.21 (ref 7.35–7.45)
PH 37: 7.22 (ref 7.35–7.45)
PH 37: 7.23 (ref 7.35–7.45)
PH 37: 7.25 (ref 7.35–7.45)
PH 37: 7.25 (ref 7.35–7.45)
PH 37: 7.27 (ref 7.35–7.45)
PH 37: 7.29 (ref 7.35–7.45)
PH 37: 7.3 (ref 7.35–7.45)
PH 37: 7.32 (ref 7.35–7.45)
PH 37: 7.32 (ref 7.35–7.45)
PH 37: 7.34 (ref 7.35–7.45)
PLATELET # BLD: 127 E9/L (ref 130–450)
PLATELET # BLD: 128 E9/L (ref 130–450)
PLATELET # BLD: 196 E9/L (ref 130–450)
PMV BLD AUTO: 10.3 FL (ref 7–12)
PMV BLD AUTO: 10.6 FL (ref 7–12)
PMV BLD AUTO: 10.7 FL (ref 7–12)
PO2 37: 106.2 MMHG (ref 60–80)
PO2 37: 119.8 MMHG (ref 60–80)
PO2 37: 149.2 MMHG (ref 60–80)
PO2 37: 156.2 MMHG (ref 60–80)
PO2 37: 475.3 MMHG (ref 60–80)
PO2 37: 48.9 MMHG (ref 60–80)
PO2 37: 51.7 MMHG (ref 60–80)
PO2 37: 513.4 MMHG (ref 60–80)
PO2 37: 519.1 MMHG (ref 60–80)
PO2 37: 55.5 MMHG (ref 60–80)
PO2 37: 58.3 MMHG (ref 60–80)
PO2 37: 65 MMHG (ref 60–80)
PO2 37: 94.6 MMHG (ref 60–80)
PO2 37: 96.7 MMHG (ref 60–80)
POC BUN: 14 MG/DL (ref 8–23)
POC BUN: 16 MG/DL (ref 8–23)
POC BUN: 16 MG/DL (ref 8–23)
POC BUN: 17 MG/DL (ref 8–23)
POC BUN: 18 MG/DL (ref 8–23)
POC CHLORIDE: 102 MMOL/L (ref 100–108)
POC CHLORIDE: 102 MMOL/L (ref 100–108)
POC CHLORIDE: 104 MMOL/L (ref 100–108)
POC CHLORIDE: 106 MMOL/L (ref 100–108)
POC CHLORIDE: 107 MMOL/L (ref 100–108)
POC CO2: 20.3 MMOL/L (ref 22–29)
POC CO2: 20.9 MMOL/L (ref 22–29)
POC CO2: 21 MMOL/L (ref 22–29)
POC CO2: 21.1 MMOL/L (ref 22–29)
POC CO2: 22.5 MMOL/L (ref 22–29)
POC CREATININE: 0.9 MG/DL (ref 0.7–1.2)
POC CREATININE: 0.9 MG/DL (ref 0.7–1.2)
POC CREATININE: 1 MG/DL (ref 0.7–1.2)
POC IONIZED CALCIUM: 1.1 (ref 1.1–1.3)
POC IONIZED CALCIUM: 1.3 (ref 1.1–1.3)
POC LACTIC ACID: 0.8 (ref 0.5–2.2)
POC LACTIC ACID: 1.6 (ref 0.5–2.2)
POC LACTIC ACID: 3 (ref 0.5–2.2)
POC LACTIC ACID: 5.2 (ref 0.5–2.2)
POC LACTIC ACID: 5.7 (ref 0.5–2.2)
POC SODIUM: 133 MMOL/L (ref 132–146)
POC SODIUM: 134 MMOL/L (ref 132–146)
POC SODIUM: 136 MMOL/L (ref 132–146)
POC SODIUM: 139 MMOL/L (ref 132–146)
POC SODIUM: 141 MMOL/L (ref 132–146)
POC SOURCE: ABNORMAL
POSITIVE END EXP PRESS: 8 CMH2O
POTASSIUM SERPL-SCNC: 3.2 MMOL/L (ref 3.5–5)
POTASSIUM SERPL-SCNC: 3.2 MMOL/L (ref 3.5–5.5)
POTASSIUM SERPL-SCNC: 3.5 MMOL/L (ref 3.5–5)
POTASSIUM SERPL-SCNC: 3.5 MMOL/L (ref 3.5–5.5)
POTASSIUM SERPL-SCNC: 4 MMOL/L (ref 3.5–5)
POTASSIUM SERPL-SCNC: 4.1 MMOL/L (ref 3.5–5)
POTASSIUM SERPL-SCNC: 4.7 MMOL/L (ref 3.5–5.5)
POTASSIUM SERPL-SCNC: 4.8 MMOL/L (ref 3.5–5.5)
POTASSIUM SERPL-SCNC: 5.2 MMOL/L (ref 3.5–5.5)
PROTHROMBIN TIME: 17.3 SEC (ref 9.3–12.4)
RBC # BLD: 4.02 E12/L (ref 3.8–5.8)
RBC # BLD: 4.2 E12/L (ref 3.8–5.8)
RBC # BLD: 4.36 E12/L (ref 3.8–5.8)
RESPIRATORY RATE: 20 B/MIN
RESPIRATORY RATE: 24 B/MIN
SODIUM BLD-SCNC: 138 MMOL/L (ref 132–146)
SODIUM BLD-SCNC: 139 MMOL/L (ref 132–146)
SODIUM BLD-SCNC: 139 MMOL/L (ref 132–146)
TIDAL VOLUME: 500 ML
TIDAL VOLUME: 500 ML
TIDAL VOLUME: 550 ML
TOTAL PROTEIN: 4.8 G/DL (ref 6.4–8.3)
TOTAL PROTEIN: 5.6 G/DL (ref 6.4–8.3)
URINE CULTURE, ROUTINE: NORMAL
WBC # BLD: 11.1 E9/L (ref 4.5–11.5)
WBC # BLD: 30.5 E9/L (ref 4.5–11.5)
WBC # BLD: 34.1 E9/L (ref 4.5–11.5)

## 2022-05-25 PROCEDURE — 2580000003 HC RX 258

## 2022-05-25 PROCEDURE — 33390 VALVULOPLASTY AORTIC VALVE: CPT | Performed by: THORACIC SURGERY (CARDIOTHORACIC VASCULAR SURGERY)

## 2022-05-25 PROCEDURE — 2580000003 HC RX 258: Performed by: NURSE PRACTITIONER

## 2022-05-25 PROCEDURE — 2500000003 HC RX 250 WO HCPCS: Performed by: THORACIC SURGERY (CARDIOTHORACIC VASCULAR SURGERY)

## 2022-05-25 PROCEDURE — 33533 CABG ARTERIAL SINGLE: CPT | Performed by: THORACIC SURGERY (CARDIOTHORACIC VASCULAR SURGERY)

## 2022-05-25 PROCEDURE — 83735 ASSAY OF MAGNESIUM: CPT

## 2022-05-25 PROCEDURE — 84132 ASSAY OF SERUM POTASSIUM: CPT

## 2022-05-25 PROCEDURE — 33518 CABG ARTERY-VEIN TWO: CPT | Performed by: STUDENT IN AN ORGANIZED HEALTH CARE EDUCATION/TRAINING PROGRAM

## 2022-05-25 PROCEDURE — 33508 ENDOSCOPIC VEIN HARVEST: CPT | Performed by: THORACIC SURGERY (CARDIOTHORACIC VASCULAR SURGERY)

## 2022-05-25 PROCEDURE — 80076 HEPATIC FUNCTION PANEL: CPT

## 2022-05-25 PROCEDURE — 94002 VENT MGMT INPAT INIT DAY: CPT

## 2022-05-25 PROCEDURE — 6370000000 HC RX 637 (ALT 250 FOR IP): Performed by: NURSE PRACTITIONER

## 2022-05-25 PROCEDURE — C1729 CATH, DRAINAGE: HCPCS | Performed by: THORACIC SURGERY (CARDIOTHORACIC VASCULAR SURGERY)

## 2022-05-25 PROCEDURE — P9047 ALBUMIN (HUMAN), 25%, 50ML: HCPCS | Performed by: NURSE PRACTITIONER

## 2022-05-25 PROCEDURE — 7100000000 HC PACU RECOVERY - FIRST 15 MIN

## 2022-05-25 PROCEDURE — 33463 VALVULOPLASTY TRICUSPID: CPT | Performed by: THORACIC SURGERY (CARDIOTHORACIC VASCULAR SURGERY)

## 2022-05-25 PROCEDURE — C1713 ANCHOR/SCREW BN/BN,TIS/BN: HCPCS | Performed by: THORACIC SURGERY (CARDIOTHORACIC VASCULAR SURGERY)

## 2022-05-25 PROCEDURE — 06BP4ZZ EXCISION OF RIGHT SAPHENOUS VEIN, PERCUTANEOUS ENDOSCOPIC APPROACH: ICD-10-PCS | Performed by: THORACIC SURGERY (CARDIOTHORACIC VASCULAR SURGERY)

## 2022-05-25 PROCEDURE — 2500000003 HC RX 250 WO HCPCS: Performed by: NURSE PRACTITIONER

## 2022-05-25 PROCEDURE — 33518 CABG ARTERY-VEIN TWO: CPT | Performed by: THORACIC SURGERY (CARDIOTHORACIC VASCULAR SURGERY)

## 2022-05-25 PROCEDURE — 6370000000 HC RX 637 (ALT 250 FOR IP): Performed by: PHYSICIAN ASSISTANT

## 2022-05-25 PROCEDURE — 88305 TISSUE EXAM BY PATHOLOGIST: CPT

## 2022-05-25 PROCEDURE — 6360000002 HC RX W HCPCS: Performed by: NURSE PRACTITIONER

## 2022-05-25 PROCEDURE — A4648 IMPLANTABLE TISSUE MARKER: HCPCS | Performed by: THORACIC SURGERY (CARDIOTHORACIC VASCULAR SURGERY)

## 2022-05-25 PROCEDURE — 99291 CRITICAL CARE FIRST HOUR: CPT | Performed by: NURSE PRACTITIONER

## 2022-05-25 PROCEDURE — 85610 PROTHROMBIN TIME: CPT

## 2022-05-25 PROCEDURE — 2780000006 HC MISC HEART VALVE: Performed by: THORACIC SURGERY (CARDIOTHORACIC VASCULAR SURGERY)

## 2022-05-25 PROCEDURE — 02RG08Z REPLACEMENT OF MITRAL VALVE WITH ZOOPLASTIC TISSUE, OPEN APPROACH: ICD-10-PCS | Performed by: THORACIC SURGERY (CARDIOTHORACIC VASCULAR SURGERY)

## 2022-05-25 PROCEDURE — 6360000002 HC RX W HCPCS: Performed by: THORACIC SURGERY (CARDIOTHORACIC VASCULAR SURGERY)

## 2022-05-25 PROCEDURE — 7100000001 HC PACU RECOVERY - ADDTL 15 MIN

## 2022-05-25 PROCEDURE — 3700000001 HC ADD 15 MINUTES (ANESTHESIA): Performed by: THORACIC SURGERY (CARDIOTHORACIC VASCULAR SURGERY)

## 2022-05-25 PROCEDURE — 85027 COMPLETE CBC AUTOMATED: CPT

## 2022-05-25 PROCEDURE — 2500000003 HC RX 250 WO HCPCS

## 2022-05-25 PROCEDURE — 5A1935Z RESPIRATORY VENTILATION, LESS THAN 24 CONSECUTIVE HOURS: ICD-10-PCS | Performed by: NURSE PRACTITIONER

## 2022-05-25 PROCEDURE — 02100Z9 BYPASS CORONARY ARTERY, ONE ARTERY FROM LEFT INTERNAL MAMMARY, OPEN APPROACH: ICD-10-PCS | Performed by: THORACIC SURGERY (CARDIOTHORACIC VASCULAR SURGERY)

## 2022-05-25 PROCEDURE — C1889 IMPLANT/INSERT DEVICE, NOC: HCPCS | Performed by: THORACIC SURGERY (CARDIOTHORACIC VASCULAR SURGERY)

## 2022-05-25 PROCEDURE — 33268 EXCL LAA OPN OTH PX ANY METH: CPT | Performed by: STUDENT IN AN ORGANIZED HEALTH CARE EDUCATION/TRAINING PROGRAM

## 2022-05-25 PROCEDURE — 80053 COMPREHEN METABOLIC PANEL: CPT

## 2022-05-25 PROCEDURE — A4216 STERILE WATER/SALINE, 10 ML: HCPCS | Performed by: NURSE PRACTITIONER

## 2022-05-25 PROCEDURE — 37799 UNLISTED PX VASCULAR SURGERY: CPT

## 2022-05-25 PROCEDURE — 33430 REPLACEMENT OF MITRAL VALVE: CPT | Performed by: THORACIC SURGERY (CARDIOTHORACIC VASCULAR SURGERY)

## 2022-05-25 PROCEDURE — 2709999900 HC NON-CHARGEABLE SUPPLY: Performed by: THORACIC SURGERY (CARDIOTHORACIC VASCULAR SURGERY)

## 2022-05-25 PROCEDURE — 2580000003 HC RX 258: Performed by: THORACIC SURGERY (CARDIOTHORACIC VASCULAR SURGERY)

## 2022-05-25 PROCEDURE — 33268 EXCL LAA OPN OTH PX ANY METH: CPT | Performed by: THORACIC SURGERY (CARDIOTHORACIC VASCULAR SURGERY)

## 2022-05-25 PROCEDURE — 6360000002 HC RX W HCPCS: Performed by: PHYSICIAN ASSISTANT

## 2022-05-25 PROCEDURE — 2000000000 HC ICU R&B

## 2022-05-25 PROCEDURE — 94664 DEMO&/EVAL PT USE INHALER: CPT

## 2022-05-25 PROCEDURE — 33430 REPLACEMENT OF MITRAL VALVE: CPT | Performed by: STUDENT IN AN ORGANIZED HEALTH CARE EDUCATION/TRAINING PROGRAM

## 2022-05-25 PROCEDURE — 02L70CK OCCLUSION OF LEFT ATRIAL APPENDAGE WITH EXTRALUMINAL DEVICE, OPEN APPROACH: ICD-10-PCS | Performed by: THORACIC SURGERY (CARDIOTHORACIC VASCULAR SURGERY)

## 2022-05-25 PROCEDURE — 94010 BREATHING CAPACITY TEST: CPT | Performed by: INTERNAL MEDICINE

## 2022-05-25 PROCEDURE — 36415 COLL VENOUS BLD VENIPUNCTURE: CPT

## 2022-05-25 PROCEDURE — 71045 X-RAY EXAM CHEST 1 VIEW: CPT

## 2022-05-25 PROCEDURE — P9045 ALBUMIN (HUMAN), 5%, 250 ML: HCPCS

## 2022-05-25 PROCEDURE — 85347 COAGULATION TIME ACTIVATED: CPT

## 2022-05-25 PROCEDURE — A4217 STERILE WATER/SALINE, 500 ML: HCPCS | Performed by: THORACIC SURGERY (CARDIOTHORACIC VASCULAR SURGERY)

## 2022-05-25 PROCEDURE — 6360000002 HC RX W HCPCS

## 2022-05-25 PROCEDURE — P9045 ALBUMIN (HUMAN), 5%, 250 ML: HCPCS | Performed by: NURSE PRACTITIONER

## 2022-05-25 PROCEDURE — 3600000018 HC SURGERY OHS ADDTL 15MIN: Performed by: THORACIC SURGERY (CARDIOTHORACIC VASCULAR SURGERY)

## 2022-05-25 PROCEDURE — 021109W BYPASS CORONARY ARTERY, TWO ARTERIES FROM AORTA WITH AUTOLOGOUS VENOUS TISSUE, OPEN APPROACH: ICD-10-PCS | Performed by: THORACIC SURGERY (CARDIOTHORACIC VASCULAR SURGERY)

## 2022-05-25 PROCEDURE — 88311 DECALCIFY TISSUE: CPT

## 2022-05-25 PROCEDURE — 2580000003 HC RX 258: Performed by: PHYSICIAN ASSISTANT

## 2022-05-25 PROCEDURE — 33463 VALVULOPLASTY TRICUSPID: CPT | Performed by: STUDENT IN AN ORGANIZED HEALTH CARE EDUCATION/TRAINING PROGRAM

## 2022-05-25 PROCEDURE — C9113 INJ PANTOPRAZOLE SODIUM, VIA: HCPCS | Performed by: NURSE PRACTITIONER

## 2022-05-25 PROCEDURE — 82803 BLOOD GASES ANY COMBINATION: CPT

## 2022-05-25 PROCEDURE — 82962 GLUCOSE BLOOD TEST: CPT

## 2022-05-25 PROCEDURE — 3700000000 HC ANESTHESIA ATTENDED CARE: Performed by: THORACIC SURGERY (CARDIOTHORACIC VASCULAR SURGERY)

## 2022-05-25 PROCEDURE — 33390 VALVULOPLASTY AORTIC VALVE: CPT | Performed by: STUDENT IN AN ORGANIZED HEALTH CARE EDUCATION/TRAINING PROGRAM

## 2022-05-25 PROCEDURE — 6370000000 HC RX 637 (ALT 250 FOR IP)

## 2022-05-25 PROCEDURE — 80048 BASIC METABOLIC PNL TOTAL CA: CPT

## 2022-05-25 PROCEDURE — 2720000010 HC SURG SUPPLY STERILE: Performed by: THORACIC SURGERY (CARDIOTHORACIC VASCULAR SURGERY)

## 2022-05-25 PROCEDURE — 33533 CABG ARTERIAL SINGLE: CPT | Performed by: STUDENT IN AN ORGANIZED HEALTH CARE EDUCATION/TRAINING PROGRAM

## 2022-05-25 PROCEDURE — 85730 THROMBOPLASTIN TIME PARTIAL: CPT

## 2022-05-25 PROCEDURE — 3600000008 HC SURGERY OHS BASE: Performed by: THORACIC SURGERY (CARDIOTHORACIC VASCULAR SURGERY)

## 2022-05-25 PROCEDURE — 82330 ASSAY OF CALCIUM: CPT

## 2022-05-25 PROCEDURE — 83605 ASSAY OF LACTIC ACID: CPT

## 2022-05-25 PROCEDURE — 02HV33Z INSERTION OF INFUSION DEVICE INTO SUPERIOR VENA CAVA, PERCUTANEOUS APPROACH: ICD-10-PCS | Performed by: THORACIC SURGERY (CARDIOTHORACIC VASCULAR SURGERY)

## 2022-05-25 DEVICE — VLV 310C33 MOSAIC MIT CINCH US TEMP IND
Type: IMPLANTABLE DEVICE | Site: HEART | Status: FUNCTIONAL
Brand: MOSAIC®

## 2022-05-25 DEVICE — DEVICE OCCL CLP L40MM PLUNG GRP FLX SHFT FOR GILLINOV: Type: IMPLANTABLE DEVICE | Site: HEART | Status: FUNCTIONAL

## 2022-05-25 DEVICE — LOCKING SCREW,AXS,SELF-DRILLING
Type: IMPLANTABLE DEVICE | Site: STERNUM | Status: FUNCTIONAL
Brand: AXS, SMARTLOCK

## 2022-05-25 DEVICE — STERNALPLATE, LADDER, NARROW: Type: IMPLANTABLE DEVICE | Site: STERNUM | Status: FUNCTIONAL

## 2022-05-25 DEVICE — STERNALPLATE, BOX: Type: IMPLANTABLE DEVICE | Site: STERNUM | Status: FUNCTIONAL

## 2022-05-25 RX ORDER — EPHEDRINE SULFATE/0.9% NACL/PF 50 MG/5 ML
SYRINGE (ML) INTRAVENOUS PRN
Status: DISCONTINUED | OUTPATIENT
Start: 2022-05-25 | End: 2022-05-25 | Stop reason: SDUPTHER

## 2022-05-25 RX ORDER — LIDOCAINE HYDROCHLORIDE 20 MG/ML
INJECTION, SOLUTION INTRAVENOUS PRN
Status: DISCONTINUED | OUTPATIENT
Start: 2022-05-25 | End: 2022-05-25 | Stop reason: SDUPTHER

## 2022-05-25 RX ORDER — DEXTROSE MONOHYDRATE 25 G/50ML
25 INJECTION, SOLUTION INTRAVENOUS PRN
Status: DISCONTINUED | OUTPATIENT
Start: 2022-05-25 | End: 2022-05-27

## 2022-05-25 RX ORDER — LANOLIN ALCOHOL/MO/W.PET/CERES
400 CREAM (GRAM) TOPICAL DAILY
Status: DISCONTINUED | OUTPATIENT
Start: 2022-05-26 | End: 2022-06-02 | Stop reason: HOSPADM

## 2022-05-25 RX ORDER — ESMOLOL HYDROCHLORIDE 10 MG/ML
INJECTION INTRAVENOUS PRN
Status: DISCONTINUED | OUTPATIENT
Start: 2022-05-25 | End: 2022-05-25 | Stop reason: SDUPTHER

## 2022-05-25 RX ORDER — MAGNESIUM SULFATE IN WATER 40 MG/ML
2000 INJECTION, SOLUTION INTRAVENOUS PRN
Status: DISCONTINUED | OUTPATIENT
Start: 2022-05-25 | End: 2022-05-27

## 2022-05-25 RX ORDER — SODIUM CHLORIDE, SODIUM LACTATE, POTASSIUM CHLORIDE, CALCIUM CHLORIDE 600; 310; 30; 20 MG/100ML; MG/100ML; MG/100ML; MG/100ML
INJECTION, SOLUTION INTRAVENOUS CONTINUOUS PRN
Status: DISCONTINUED | OUTPATIENT
Start: 2022-05-25 | End: 2022-05-25 | Stop reason: SDUPTHER

## 2022-05-25 RX ORDER — OXYCODONE HYDROCHLORIDE 10 MG/1
10 TABLET ORAL EVERY 4 HOURS PRN
Status: DISCONTINUED | OUTPATIENT
Start: 2022-05-25 | End: 2022-05-27

## 2022-05-25 RX ORDER — PROPOFOL 10 MG/ML
INJECTION, EMULSION INTRAVENOUS PRN
Status: DISCONTINUED | OUTPATIENT
Start: 2022-05-25 | End: 2022-05-25 | Stop reason: SDUPTHER

## 2022-05-25 RX ORDER — EPINEPHRINE 1 MG/ML
INJECTION INTRAMUSCULAR; INTRAVENOUS; SUBCUTANEOUS PRN
Status: DISCONTINUED | OUTPATIENT
Start: 2022-05-25 | End: 2022-05-25 | Stop reason: SDUPTHER

## 2022-05-25 RX ORDER — ALBUMIN, HUMAN INJ 5% 5 %
25 SOLUTION INTRAVENOUS PRN
Status: DISCONTINUED | OUTPATIENT
Start: 2022-05-25 | End: 2022-05-27

## 2022-05-25 RX ORDER — SENNA AND DOCUSATE SODIUM 50; 8.6 MG/1; MG/1
1 TABLET, FILM COATED ORAL 2 TIMES DAILY
Status: DISCONTINUED | OUTPATIENT
Start: 2022-05-26 | End: 2022-05-27

## 2022-05-25 RX ORDER — MILRINONE LACTATE 1 MG/ML
INJECTION, SOLUTION INTRAVENOUS PRN
Status: DISCONTINUED | OUTPATIENT
Start: 2022-05-25 | End: 2022-05-25 | Stop reason: SDUPTHER

## 2022-05-25 RX ORDER — VECURONIUM BROMIDE 1 MG/ML
INJECTION, POWDER, LYOPHILIZED, FOR SOLUTION INTRAVENOUS PRN
Status: DISCONTINUED | OUTPATIENT
Start: 2022-05-25 | End: 2022-05-25 | Stop reason: SDUPTHER

## 2022-05-25 RX ORDER — SODIUM CHLORIDE 0.9 % (FLUSH) 0.9 %
5-40 SYRINGE (ML) INJECTION EVERY 12 HOURS SCHEDULED
Status: DISCONTINUED | OUTPATIENT
Start: 2022-05-25 | End: 2022-06-02 | Stop reason: HOSPADM

## 2022-05-25 RX ORDER — DEXTROSE MONOHYDRATE 25 G/50ML
12.5 INJECTION, SOLUTION INTRAVENOUS PRN
Status: DISCONTINUED | OUTPATIENT
Start: 2022-05-25 | End: 2022-05-27

## 2022-05-25 RX ORDER — SODIUM CHLORIDE 9 MG/ML
INJECTION, SOLUTION INTRAVENOUS PRN
Status: DISCONTINUED | OUTPATIENT
Start: 2022-05-25 | End: 2022-05-27

## 2022-05-25 RX ORDER — ALBUMIN, HUMAN INJ 5% 5 %
SOLUTION INTRAVENOUS
Status: COMPLETED
Start: 2022-05-25 | End: 2022-05-25

## 2022-05-25 RX ORDER — FENTANYL CITRATE 50 UG/ML
25 INJECTION, SOLUTION INTRAMUSCULAR; INTRAVENOUS
Status: DISCONTINUED | OUTPATIENT
Start: 2022-05-25 | End: 2022-05-27

## 2022-05-25 RX ORDER — VASOPRESSIN 20 U/ML
INJECTION PARENTERAL PRN
Status: DISCONTINUED | OUTPATIENT
Start: 2022-05-25 | End: 2022-05-25 | Stop reason: SDUPTHER

## 2022-05-25 RX ORDER — MIDAZOLAM HYDROCHLORIDE 1 MG/ML
INJECTION INTRAMUSCULAR; INTRAVENOUS PRN
Status: DISCONTINUED | OUTPATIENT
Start: 2022-05-25 | End: 2022-05-25 | Stop reason: SDUPTHER

## 2022-05-25 RX ORDER — AMINOCAPROIC ACID 250 MG/ML
INJECTION, SOLUTION INTRAVENOUS PRN
Status: DISCONTINUED | OUTPATIENT
Start: 2022-05-25 | End: 2022-05-25 | Stop reason: SDUPTHER

## 2022-05-25 RX ORDER — ONDANSETRON 2 MG/ML
4 INJECTION INTRAMUSCULAR; INTRAVENOUS EVERY 6 HOURS PRN
Status: DISCONTINUED | OUTPATIENT
Start: 2022-05-25 | End: 2022-06-02 | Stop reason: HOSPADM

## 2022-05-25 RX ORDER — 0.9 % SODIUM CHLORIDE 0.9 %
250 INTRAVENOUS SOLUTION INTRAVENOUS CONTINUOUS PRN
Status: DISCONTINUED | OUTPATIENT
Start: 2022-05-25 | End: 2022-05-27

## 2022-05-25 RX ORDER — SODIUM CHLORIDE 9 MG/ML
INJECTION, SOLUTION INTRAVENOUS CONTINUOUS PRN
Status: DISCONTINUED | OUTPATIENT
Start: 2022-05-25 | End: 2022-05-25 | Stop reason: SDUPTHER

## 2022-05-25 RX ORDER — ACETAMINOPHEN 325 MG/1
650 TABLET ORAL EVERY 4 HOURS PRN
Status: DISCONTINUED | OUTPATIENT
Start: 2022-05-25 | End: 2022-05-27

## 2022-05-25 RX ORDER — CHLORHEXIDINE GLUCONATE 0.12 MG/ML
15 RINSE ORAL 2 TIMES DAILY
Status: DISCONTINUED | OUTPATIENT
Start: 2022-05-25 | End: 2022-05-26

## 2022-05-25 RX ORDER — OXYCODONE HYDROCHLORIDE 5 MG/1
5 TABLET ORAL EVERY 4 HOURS PRN
Status: DISCONTINUED | OUTPATIENT
Start: 2022-05-25 | End: 2022-05-27

## 2022-05-25 RX ORDER — DEXTROSE MONOHYDRATE 50 MG/ML
100 INJECTION, SOLUTION INTRAVENOUS PRN
Status: DISCONTINUED | OUTPATIENT
Start: 2022-05-25 | End: 2022-05-27

## 2022-05-25 RX ORDER — ASPIRIN 81 MG/1
81 TABLET ORAL DAILY
Status: DISCONTINUED | OUTPATIENT
Start: 2022-05-26 | End: 2022-05-26

## 2022-05-25 RX ORDER — ALBUMIN (HUMAN) 12.5 G/50ML
50 SOLUTION INTRAVENOUS ONCE
Status: COMPLETED | OUTPATIENT
Start: 2022-05-25 | End: 2022-05-25

## 2022-05-25 RX ORDER — HEPARIN SODIUM 10000 [USP'U]/ML
INJECTION, SOLUTION INTRAVENOUS; SUBCUTANEOUS PRN
Status: DISCONTINUED | OUTPATIENT
Start: 2022-05-25 | End: 2022-05-25 | Stop reason: SDUPTHER

## 2022-05-25 RX ORDER — PANTOPRAZOLE SODIUM 40 MG/1
40 TABLET, DELAYED RELEASE ORAL DAILY
Status: DISCONTINUED | OUTPATIENT
Start: 2022-05-26 | End: 2022-06-02 | Stop reason: HOSPADM

## 2022-05-25 RX ORDER — MEPERIDINE HYDROCHLORIDE 50 MG/ML
25 INJECTION INTRAMUSCULAR; INTRAVENOUS; SUBCUTANEOUS
Status: ACTIVE | OUTPATIENT
Start: 2022-05-25 | End: 2022-05-25

## 2022-05-25 RX ORDER — PROPOFOL 10 MG/ML
INJECTION, EMULSION INTRAVENOUS
Status: COMPLETED
Start: 2022-05-25 | End: 2022-05-25

## 2022-05-25 RX ORDER — CALCIUM CHLORIDE 100 MG/ML
INJECTION INTRAVENOUS; INTRAVENTRICULAR PRN
Status: DISCONTINUED | OUTPATIENT
Start: 2022-05-25 | End: 2022-05-25 | Stop reason: SDUPTHER

## 2022-05-25 RX ORDER — FIBRINOGEN HUMAN AND THROMBIN HUMAN 1 ML
KIT TOPICAL PRN
Status: DISCONTINUED | OUTPATIENT
Start: 2022-05-25 | End: 2022-05-25 | Stop reason: HOSPADM

## 2022-05-25 RX ORDER — SODIUM CHLORIDE 9 MG/ML
30 INJECTION, SOLUTION INTRAVENOUS CONTINUOUS
Status: DISCONTINUED | OUTPATIENT
Start: 2022-05-25 | End: 2022-05-27

## 2022-05-25 RX ORDER — ALBUMIN, HUMAN INJ 5% 5 %
25 SOLUTION INTRAVENOUS ONCE
Status: DISCONTINUED | OUTPATIENT
Start: 2022-05-25 | End: 2022-05-25 | Stop reason: SDUPTHER

## 2022-05-25 RX ORDER — SODIUM CHLORIDE 0.9 % (FLUSH) 0.9 %
5-40 SYRINGE (ML) INJECTION PRN
Status: DISCONTINUED | OUTPATIENT
Start: 2022-05-25 | End: 2022-06-02 | Stop reason: HOSPADM

## 2022-05-25 RX ORDER — ONDANSETRON 4 MG/1
4 TABLET, ORALLY DISINTEGRATING ORAL EVERY 8 HOURS PRN
Status: DISCONTINUED | OUTPATIENT
Start: 2022-05-25 | End: 2022-06-02 | Stop reason: HOSPADM

## 2022-05-25 RX ORDER — IPRATROPIUM BROMIDE AND ALBUTEROL SULFATE 2.5; .5 MG/3ML; MG/3ML
1 SOLUTION RESPIRATORY (INHALATION)
Status: DISCONTINUED | OUTPATIENT
Start: 2022-05-25 | End: 2022-06-02 | Stop reason: HOSPADM

## 2022-05-25 RX ORDER — FENTANYL CITRATE 0.05 MG/ML
INJECTION, SOLUTION INTRAMUSCULAR; INTRAVENOUS PRN
Status: DISCONTINUED | OUTPATIENT
Start: 2022-05-25 | End: 2022-05-25 | Stop reason: SDUPTHER

## 2022-05-25 RX ORDER — PROTAMINE SULFATE 10 MG/ML
INJECTION, SOLUTION INTRAVENOUS PRN
Status: DISCONTINUED | OUTPATIENT
Start: 2022-05-25 | End: 2022-05-25 | Stop reason: SDUPTHER

## 2022-05-25 RX ORDER — ALBUTEROL SULFATE 90 UG/1
AEROSOL, METERED RESPIRATORY (INHALATION) PRN
Status: DISCONTINUED | OUTPATIENT
Start: 2022-05-25 | End: 2022-05-25 | Stop reason: SDUPTHER

## 2022-05-25 RX ORDER — ATORVASTATIN CALCIUM 40 MG/1
40 TABLET, FILM COATED ORAL NIGHTLY
Status: DISCONTINUED | OUTPATIENT
Start: 2022-05-25 | End: 2022-06-02 | Stop reason: HOSPADM

## 2022-05-25 RX ORDER — PROPOFOL 10 MG/ML
10 INJECTION, EMULSION INTRAVENOUS CONTINUOUS PRN
Status: DISCONTINUED | OUTPATIENT
Start: 2022-05-25 | End: 2022-05-26

## 2022-05-25 RX ORDER — BISACODYL 10 MG
10 SUPPOSITORY, RECTAL RECTAL DAILY PRN
Status: DISCONTINUED | OUTPATIENT
Start: 2022-05-26 | End: 2022-05-27

## 2022-05-25 RX ORDER — POTASSIUM CHLORIDE 7.45 MG/ML
INJECTION INTRAVENOUS PRN
Status: DISCONTINUED | OUTPATIENT
Start: 2022-05-25 | End: 2022-05-25 | Stop reason: SDUPTHER

## 2022-05-25 RX ORDER — FENTANYL CITRATE 50 UG/ML
50 INJECTION, SOLUTION INTRAMUSCULAR; INTRAVENOUS
Status: DISCONTINUED | OUTPATIENT
Start: 2022-05-25 | End: 2022-05-27

## 2022-05-25 RX ORDER — POTASSIUM CHLORIDE 29.8 MG/ML
20 INJECTION INTRAVENOUS PRN
Status: DISCONTINUED | OUTPATIENT
Start: 2022-05-25 | End: 2022-05-27

## 2022-05-25 RX ORDER — INSULIN GLARGINE-YFGN 100 [IU]/ML
0.15 INJECTION, SOLUTION SUBCUTANEOUS NIGHTLY
Status: DISCONTINUED | OUTPATIENT
Start: 2022-05-26 | End: 2022-05-27

## 2022-05-25 RX ADMIN — POTASSIUM CHLORIDE 20 MEQ: 29.8 INJECTION, SOLUTION INTRAVENOUS at 15:04

## 2022-05-25 RX ADMIN — FENTANYL CITRATE 50 MCG: 50 INJECTION, SOLUTION INTRAMUSCULAR; INTRAVENOUS at 12:23

## 2022-05-25 RX ADMIN — VASOPRESSIN 0.08 UNITS/MIN: 20 INJECTION INTRAVENOUS at 13:46

## 2022-05-25 RX ADMIN — FENTANYL CITRATE 50 MCG: 50 INJECTION, SOLUTION INTRAMUSCULAR; INTRAVENOUS at 11:50

## 2022-05-25 RX ADMIN — LIDOCAINE HYDROCHLORIDE 100 MG: 20 INJECTION, SOLUTION INTRAVENOUS at 06:50

## 2022-05-25 RX ADMIN — VASOPRESSIN 1 UNITS: 20 INJECTION INTRAVENOUS at 08:01

## 2022-05-25 RX ADMIN — PROPOFOL 90 MG: 10 INJECTION, EMULSION INTRAVENOUS at 06:50

## 2022-05-25 RX ADMIN — SODIUM BICARBONATE 50 MEQ: 84 INJECTION, SOLUTION INTRAVENOUS at 12:41

## 2022-05-25 RX ADMIN — CEFAZOLIN 2000 MG: 2 INJECTION, POWDER, FOR SOLUTION INTRAMUSCULAR; INTRAVENOUS at 07:26

## 2022-05-25 RX ADMIN — FENTANYL CITRATE 100 MCG: 50 INJECTION, SOLUTION INTRAMUSCULAR; INTRAVENOUS at 13:02

## 2022-05-25 RX ADMIN — SODIUM BICARBONATE 50 MEQ: 84 INJECTION INTRAVENOUS at 16:30

## 2022-05-25 RX ADMIN — PHENYLEPHRINE HYDROCHLORIDE 100 MCG: 10 INJECTION INTRAVENOUS at 06:56

## 2022-05-25 RX ADMIN — ALBUMIN (HUMAN) 12.5 G: 12.5 INJECTION, SOLUTION INTRAVENOUS at 14:00

## 2022-05-25 RX ADMIN — FENTANYL CITRATE 25 MCG: 50 INJECTION, SOLUTION INTRAMUSCULAR; INTRAVENOUS at 21:14

## 2022-05-25 RX ADMIN — ATORVASTATIN CALCIUM 40 MG: 40 TABLET, FILM COATED ORAL at 20:43

## 2022-05-25 RX ADMIN — Medication 5 MG: at 08:18

## 2022-05-25 RX ADMIN — VASOPRESSIN 2 UNITS: 20 INJECTION INTRAVENOUS at 08:18

## 2022-05-25 RX ADMIN — 0.12% CHLORHEXIDINE GLUCONATE 15 ML: 1.2 RINSE ORAL at 05:43

## 2022-05-25 RX ADMIN — CALCIUM CHLORIDE 1 G: 100 INJECTION, SOLUTION INTRAVENOUS at 11:59

## 2022-05-25 RX ADMIN — SODIUM CHLORIDE 3.33 UNITS/HR: 9 INJECTION, SOLUTION INTRAVENOUS at 13:52

## 2022-05-25 RX ADMIN — SODIUM BICARBONATE 50 MEQ: 84 INJECTION, SOLUTION INTRAVENOUS at 12:21

## 2022-05-25 RX ADMIN — SODIUM BICARBONATE 50 MEQ: 84 INJECTION, SOLUTION INTRAVENOUS at 10:10

## 2022-05-25 RX ADMIN — VASOPRESSIN 0.08 UNITS/MIN: 20 INJECTION INTRAVENOUS at 17:16

## 2022-05-25 RX ADMIN — PROPOFOL 10 MCG/KG/MIN: 10 INJECTION, EMULSION INTRAVENOUS at 13:19

## 2022-05-25 RX ADMIN — VASOPRESSIN 1 UNITS: 20 INJECTION INTRAVENOUS at 08:10

## 2022-05-25 RX ADMIN — EPINEPHRINE 0.12 MCG/KG/MIN: 1 INJECTION PARENTERAL at 11:06

## 2022-05-25 RX ADMIN — 0.12% CHLORHEXIDINE GLUCONATE 15 ML: 1.2 RINSE ORAL at 14:43

## 2022-05-25 RX ADMIN — VASOPRESSIN 1 UNITS: 20 INJECTION INTRAVENOUS at 06:59

## 2022-05-25 RX ADMIN — SODIUM CHLORIDE, PRESERVATIVE FREE 10 ML: 5 INJECTION INTRAVENOUS at 20:43

## 2022-05-25 RX ADMIN — SODIUM CHLORIDE: 9 INJECTION, SOLUTION INTRAVENOUS at 07:16

## 2022-05-25 RX ADMIN — FENTANYL CITRATE 100 MCG: 50 INJECTION, SOLUTION INTRAMUSCULAR; INTRAVENOUS at 06:50

## 2022-05-25 RX ADMIN — PHENYLEPHRINE HYDROCHLORIDE 100 MCG: 10 INJECTION INTRAVENOUS at 06:59

## 2022-05-25 RX ADMIN — POTASSIUM CHLORIDE 20 MEQ: 29.8 INJECTION, SOLUTION INTRAVENOUS at 18:53

## 2022-05-25 RX ADMIN — VASOPRESSIN 1 UNITS: 20 INJECTION INTRAVENOUS at 06:55

## 2022-05-25 RX ADMIN — Medication 15 MG: at 08:40

## 2022-05-25 RX ADMIN — VASOPRESSIN 3 UNITS: 20 INJECTION INTRAVENOUS at 07:12

## 2022-05-25 RX ADMIN — DAPTOMYCIN 450 MG: 500 INJECTION, POWDER, LYOPHILIZED, FOR SOLUTION INTRAVENOUS at 16:31

## 2022-05-25 RX ADMIN — FENTANYL CITRATE 100 MCG: 50 INJECTION, SOLUTION INTRAMUSCULAR; INTRAVENOUS at 07:54

## 2022-05-25 RX ADMIN — EPINEPHRINE 10 MCG/MIN: 1 INJECTION, SOLUTION, CONCENTRATE INTRAVENOUS at 13:35

## 2022-05-25 RX ADMIN — MEROPENEM 1000 MG: 1 INJECTION, POWDER, FOR SOLUTION INTRAVENOUS at 16:30

## 2022-05-25 RX ADMIN — POTASSIUM CHLORIDE 10 MEQ: 7.46 INJECTION, SOLUTION INTRAVENOUS at 12:08

## 2022-05-25 RX ADMIN — IPRATROPIUM BROMIDE AND ALBUTEROL SULFATE 1 AMPULE: .5; 2.5 SOLUTION RESPIRATORY (INHALATION) at 19:57

## 2022-05-25 RX ADMIN — HEPARIN SODIUM 35000 UNITS: 10000 INJECTION, SOLUTION INTRAVENOUS; SUBCUTANEOUS at 08:24

## 2022-05-25 RX ADMIN — CEFAZOLIN 2000 MG: 2 INJECTION, POWDER, FOR SOLUTION INTRAMUSCULAR; INTRAVENOUS at 11:26

## 2022-05-25 RX ADMIN — PROTAMINE SULFATE 250 MG: 10 INJECTION, SOLUTION INTRAVENOUS at 11:39

## 2022-05-25 RX ADMIN — VASOPRESSIN 2 UNITS: 20 INJECTION INTRAVENOUS at 08:16

## 2022-05-25 RX ADMIN — SODIUM CHLORIDE: 9 INJECTION, SOLUTION INTRAVENOUS at 16:28

## 2022-05-25 RX ADMIN — Medication 10 MG: at 08:42

## 2022-05-25 RX ADMIN — AMINOCAPROIC ACID 1 G/HR: 250 INJECTION, SOLUTION INTRAVENOUS at 07:31

## 2022-05-25 RX ADMIN — HEPARIN SODIUM 10000 UNITS: 10000 INJECTION, SOLUTION INTRAVENOUS; SUBCUTANEOUS at 09:20

## 2022-05-25 RX ADMIN — VASOPRESSIN 1 UNITS: 20 INJECTION INTRAVENOUS at 08:07

## 2022-05-25 RX ADMIN — INSULIN HUMAN 7 UNITS: 100 INJECTION, SOLUTION PARENTERAL at 09:25

## 2022-05-25 RX ADMIN — 0.12% CHLORHEXIDINE GLUCONATE 15 ML: 1.2 RINSE ORAL at 20:43

## 2022-05-25 RX ADMIN — FENTANYL CITRATE 100 MCG: 50 INJECTION, SOLUTION INTRAMUSCULAR; INTRAVENOUS at 13:08

## 2022-05-25 RX ADMIN — SODIUM CHLORIDE 30 ML/HR: 9 INJECTION, SOLUTION INTRAVENOUS at 13:49

## 2022-05-25 RX ADMIN — ALBUTEROL SULFATE 2 PUFF: 90 AEROSOL, METERED RESPIRATORY (INHALATION) at 07:16

## 2022-05-25 RX ADMIN — MILRINONE LACTATE 5000 MCG: 1 INJECTION, SOLUTION INTRAVENOUS at 07:25

## 2022-05-25 RX ADMIN — MUPIROCIN: 20 OINTMENT TOPICAL at 14:43

## 2022-05-25 RX ADMIN — PANTOPRAZOLE SODIUM 40 MG: 40 INJECTION, POWDER, FOR SOLUTION INTRAVENOUS at 14:43

## 2022-05-25 RX ADMIN — MIDAZOLAM 2 MG: 1 INJECTION INTRAMUSCULAR; INTRAVENOUS at 06:37

## 2022-05-25 RX ADMIN — PHENYLEPHRINE HYDROCHLORIDE 100 MCG: 10 INJECTION INTRAVENOUS at 07:56

## 2022-05-25 RX ADMIN — SODIUM CHLORIDE: 9 INJECTION, SOLUTION INTRAVENOUS at 06:37

## 2022-05-25 RX ADMIN — MIDAZOLAM 1 MG: 1 INJECTION INTRAMUSCULAR; INTRAVENOUS at 07:48

## 2022-05-25 RX ADMIN — FENTANYL CITRATE 50 MCG: 50 INJECTION, SOLUTION INTRAMUSCULAR; INTRAVENOUS at 07:48

## 2022-05-25 RX ADMIN — SODIUM CHLORIDE, POTASSIUM CHLORIDE, SODIUM LACTATE AND CALCIUM CHLORIDE: 600; 310; 30; 20 INJECTION, SOLUTION INTRAVENOUS at 08:45

## 2022-05-25 RX ADMIN — FENTANYL CITRATE 50 MCG: 50 INJECTION, SOLUTION INTRAMUSCULAR; INTRAVENOUS at 12:52

## 2022-05-25 RX ADMIN — ALBUMIN (HUMAN) 50 G: 0.25 INJECTION, SOLUTION INTRAVENOUS at 16:48

## 2022-05-25 RX ADMIN — VASOPRESSIN 1 UNITS: 20 INJECTION INTRAVENOUS at 08:12

## 2022-05-25 RX ADMIN — VASOPRESSIN 1 UNITS: 20 INJECTION INTRAVENOUS at 08:14

## 2022-05-25 RX ADMIN — POTASSIUM CHLORIDE 20 MEQ: 29.8 INJECTION, SOLUTION INTRAVENOUS at 19:37

## 2022-05-25 RX ADMIN — SODIUM CHLORIDE, POTASSIUM CHLORIDE, SODIUM LACTATE AND CALCIUM CHLORIDE: 600; 310; 30; 20 INJECTION, SOLUTION INTRAVENOUS at 06:37

## 2022-05-25 RX ADMIN — ESMOLOL HYDROCHLORIDE 40 MG: 10 INJECTION, SOLUTION INTRAVENOUS at 06:46

## 2022-05-25 RX ADMIN — ALBUMIN (HUMAN) 12.5 G: 12.5 INJECTION, SOLUTION INTRAVENOUS at 15:06

## 2022-05-25 RX ADMIN — SODIUM CHLORIDE 7 UNITS/HR: 9 INJECTION, SOLUTION INTRAVENOUS at 09:04

## 2022-05-25 RX ADMIN — IPRATROPIUM BROMIDE AND ALBUTEROL SULFATE 1 AMPULE: .5; 2.5 SOLUTION RESPIRATORY (INHALATION) at 15:42

## 2022-05-25 RX ADMIN — INSULIN HUMAN 2 UNITS: 100 INJECTION, SOLUTION PARENTERAL at 10:24

## 2022-05-25 RX ADMIN — MIDAZOLAM 1 MG: 1 INJECTION INTRAMUSCULAR; INTRAVENOUS at 07:29

## 2022-05-25 RX ADMIN — VECURONIUM BROMIDE 10 MG: 1 INJECTION, POWDER, LYOPHILIZED, FOR SOLUTION INTRAVENOUS at 12:11

## 2022-05-25 RX ADMIN — VECURONIUM BROMIDE 20 MG: 1 INJECTION, POWDER, LYOPHILIZED, FOR SOLUTION INTRAVENOUS at 06:50

## 2022-05-25 RX ADMIN — SODIUM BICARBONATE 50 MEQ: 84 INJECTION, SOLUTION INTRAVENOUS at 20:44

## 2022-05-25 RX ADMIN — AMIODARONE HYDROCHLORIDE 0.5 MG/MIN: 50 INJECTION, SOLUTION INTRAVENOUS at 13:47

## 2022-05-25 RX ADMIN — FENTANYL CITRATE 25 MCG: 50 INJECTION, SOLUTION INTRAMUSCULAR; INTRAVENOUS at 18:45

## 2022-05-25 RX ADMIN — INSULIN HUMAN 8 UNITS: 100 INJECTION, SOLUTION PARENTERAL at 10:52

## 2022-05-25 RX ADMIN — ALBUTEROL SULFATE 2 PUFF: 90 AEROSOL, METERED RESPIRATORY (INHALATION) at 07:13

## 2022-05-25 RX ADMIN — VASOPRESSIN 1 UNITS: 20 INJECTION INTRAVENOUS at 07:03

## 2022-05-25 RX ADMIN — POTASSIUM CHLORIDE 20 MEQ: 29.8 INJECTION, SOLUTION INTRAVENOUS at 14:14

## 2022-05-25 RX ADMIN — POTASSIUM CHLORIDE 10 MEQ: 7.46 INJECTION, SOLUTION INTRAVENOUS at 12:00

## 2022-05-25 RX ADMIN — VASOPRESSIN 0.08 UNITS/HR: 20 INJECTION INTRAVENOUS at 11:06

## 2022-05-25 RX ADMIN — EPINEPHRINE 10 MCG: 1 INJECTION INTRAMUSCULAR; INTRAVENOUS; SUBCUTANEOUS at 12:12

## 2022-05-25 RX ADMIN — INSULIN HUMAN 5 UNITS: 100 INJECTION, SOLUTION PARENTERAL at 10:16

## 2022-05-25 RX ADMIN — MUPIROCIN: 20 OINTMENT TOPICAL at 20:43

## 2022-05-25 RX ADMIN — AMINOCAPROIC ACID 5000 MG: 250 INJECTION, SOLUTION INTRAVENOUS at 06:58

## 2022-05-25 RX ADMIN — VASOPRESSIN 0.08 UNITS/MIN: 20 INJECTION INTRAVENOUS at 21:29

## 2022-05-25 ASSESSMENT — PAIN SCALES - GENERAL
PAINLEVEL_OUTOF10: 2
PAINLEVEL_OUTOF10: 0
PAINLEVEL_OUTOF10: 4
PAINLEVEL_OUTOF10: 0

## 2022-05-25 ASSESSMENT — PULMONARY FUNCTION TESTS
PIF_VALUE: 23
PIF_VALUE: 23
PIF_VALUE: 26
PIF_VALUE: 23
PIF_VALUE: 25
PIF_VALUE: 23
PIF_VALUE: 25
PIF_VALUE: 26
PIF_VALUE: 24
PIF_VALUE: 22
PIF_VALUE: 22
PIF_VALUE: 24
PIF_VALUE: 24
PIF_VALUE: 28
PIF_VALUE: 23
PIF_VALUE: 25
PIF_VALUE: 27
PIF_VALUE: 23
PIF_VALUE: 26
PIF_VALUE: 23
PIF_VALUE: 28
PIF_VALUE: 26

## 2022-05-25 ASSESSMENT — PAIN DESCRIPTION - LOCATION: LOCATION: CHEST

## 2022-05-25 ASSESSMENT — PAIN DESCRIPTION - DESCRIPTORS: DESCRIPTORS: SORE

## 2022-05-25 ASSESSMENT — PAIN DESCRIPTION - ORIENTATION: ORIENTATION: MID

## 2022-05-25 ASSESSMENT — COPD QUESTIONNAIRES: CAT_SEVERITY: MODERATE

## 2022-05-25 ASSESSMENT — ENCOUNTER SYMPTOMS: DYSPNEA ACTIVITY LEVEL: AFTER AMBULATING 1 FLIGHT OF STAIRS

## 2022-05-25 NOTE — BRIEF OP NOTE
Brief Postoperative Note    Sonu Vasquez  YOB: 1945  [id number]    Pre-operative Diagnosis: AI/MS/MR/TR/CAD/SVT    Post-operative Diagnosis: Same    Operation: Urgent sternotomy/Urgent Aortic valve repair by leaflet debridement/Complex mitral valve replacement with annular debridement and a 33mm Medtronic Mosaic bioprosthetic/Tricuspid valve repair by bicuspidization/CABG x 3 (LIMA-LAD, SVG-Diag, SVG-PDA)/WALLACE exclusion with 40mm AtriClip/EVH RLE/Rigid internal fixation of the sternum with Juan Carlos plates x 3/31 modifier    Anesthesia: General    Surgeon: Hossein Yee    Assistants: Philip/Houston/Bassam/Mack    Estimated Blood Loss: 8770    Complications: None    Specimens:   ID Type Source Tests Collected by Time Destination   A : RUPTURED CORD Tissue Heart SURGICAL PATHOLOGY Eliz Sandoval MD 5/25/2022 7582    B : MITRAL VALVE Tissue Heart SURGICAL PATHOLOGY Eliz Sandoval MD 5/25/2022 1020    C : ANTERIOR MITRAL LEAFLETS Tissue Heart SURGICAL PATHOLOGY Eliz Sandoval MD 5/25/2022 7587        Implants:  Implant Name Type Inv.  Item Serial No.  Lot No. LRB No. Used Action   DEVICE OCCL CLP L40MM PLUNG GRP FLX SHFT FOR GILLINOV - [id number]  DEVICE OCCL CLP L40MM PLUNG GRP FLX SHFT FOR GILLINOV  [employer]WD [id number] N/A 1 Implanted   VALVE MI H23MM [id number] ORIFICE [id number] SUT RNG [id number] - [id number] Mitral valves VALVE MI H23MM [id number] ORIFICE [id number] SUT RNG [id number] [employer] [place] [employer]  N/A 1 Implanted   DEVICE FIXATION LADDER PLATE NARROW 14 HOLES - [id number]  DEVICE FIXATION LADDER PLATE NARROW 14 HOLES  [employer] CARL[employer] CASANDR[employer]WD  N/A 1 Implanted   SCREW LOCKING SD 2.3X16MM 5P - [id number]  SCREW LOCKING SD 2.3X16MM 5P  [employer] CASANDR[employer]WD  N/A 15 Implanted   PLATE FIXATION BOX STERNAL 4 HOLES - [id number]  PLATE FIXATION BOX STERNAL 4 HOLES  [employer] CASANDR[employer]WD  N/A 1 Implanted   SCREW LOCKING SD 2.3X18MM 5P - [id number]  SCREW LOCKING SD 2.3X18MM 5P  [employer]AN[employer]WD  N/A 1 Implanted         Drains: Chest Tube Mediastinal 1 (Active)   Status Continuous Suction 05/25/22 1204   Dressing Status New dressing applied;Clean, dry & intact 05/25/22 1204   Chest Tube Dressing Other (Comment) 05/25/22 1204       Chest Tube Mediastinal 2 (Active)   Status Continuous Suction 05/25/22 1204   Dressing Status New dressing applied;Clean, dry & intact 05/25/22 1204   Chest Tube Dressing Other (Comment) 05/25/22 1204       Chest Tube Pleural 3 (Active)   Status Continuous Suction 05/25/22 1204   Dressing Status New dressing applied;Clean, dry & intact 05/25/22 1204   Chest Tube Dressing Other (Comment) 05/25/22 1204       Chest Tube Pleural 4 (Active)   Status Continuous Suction 05/25/22 1205   Dressing Status New dressing applied;Clean, dry & intact 05/25/22 1205   Chest Tube Dressing Other (Comment) 05/25/22 1205       Urinary Catheter Davidson (Active)   Catheter Indications Urinary retention (acute or chronic), continuous bladder irrigation or bladder outlet obstruction 05/24/22 2252   Site Assessment No urethral drainage 05/24/22 2252   Urine Color Yellow 05/24/22 2252   Urine Appearance Clear 05/24/22 2252   Collection Container Leg bag 05/24/22 2252   Securement Method Leg strap 05/24/22 2252   Catheter Care Completed Yes 05/24/22 2252   Catheter Best Practices  Catheter secured to thigh 05/24/22 2252   Status Draining 05/24/22 2252   Output (mL) 200 mL 05/24/22 2252       Findings: complex calcification of the aortic-mitral continuity    Electronically signed by Warren Najjar, MD on 5/25/2022 at 12:44 PM     #[id number]

## 2022-05-25 NOTE — ANESTHESIA PROCEDURE NOTES
Procedure Performed: JOEL     Start Time:        End Time:      Preanesthesia Checklist:  Patient identified, IV assessed, risks and benefits discussed, monitors and equipment assessed, procedure being performed at surgeon's request and anesthesia consent obtained. General Procedure Information  Diagnostic Indications for Echo:  assessment of surgical repair, hemodynamic monitoring and assessment of valve function  Physician Requesting Echo: Justine Lugo MD  Location performed:  OR  Intubated  Bite block placed  Heart visualized  Probe Insertion:  Easy  Probe Type:  3D and mulitplane  Modalities:  3D, color flow mapping, pulse wave Doppler and continuous wave Doppler    Echocardiographic and Doppler Measurements    Ventricles    Right Ventricle:  Cavity size normal.  Hypertrophy not present. Thrombus not present. Global function normal.    Left Ventricle:  Cavity size normal.  Hypertrophy not present. Thrombus not present. Global Function normal.  Ejection Fraction 60%. Ventricular Regional Function:  1- Basal Anteroseptal:  normal  2- Basal Anterior:  normal  3- Basal Anterolateral:  normal  4- Basal Inferolateral:  normal  5- Basal Inferior:  normal  6- Basal Inferoseptal:  normal  7- Mid Anteroseptal:  normal  8- Mid Anterior:  normal  9- Mid Anterolateral:  normal  10- Mid Inferolateral:  normal  11- Mid Inferior:  normal  12- Mid Inferoseptal:  normal  13- Apical Anterior:  normal  14- Apical Lateral:  normal  15- Apical Inferior:  normal  16- Apical Septal:  normal  17- [place]:  normal      Valves    Aortic Valve: Annulus normal.  Stenosis not present. Regurgitation moderate. Leaflets calcified and thickened. Mitral Valve: Annulus normal.  Stenosis mild. Regurgitation moderate. Leaflets calcified and thickened. Leaflet motions flail and restricted. Tricuspid Valve: Annulus normal.  Stenosis not present. Regurgitation moderate. Leaflets normal.    Pulmonic Valve:   Annulus normal. Stenosis not present. Regurgitation mild. Other Valve Findings:       Mitral valve heavily calcified, anterior leaflet calcified. Torn posterior chord with small flail fragment. Prolapse at P1 and 3. ERO = 0.39 RV = 41. MG ~ 2mmHg    Aortic valve with calcification on RCC. Central AI jet and second jet between the commissure of the LCC and NCC. VC = 0.4 largest central jet. Aorta    Ascending Aorta:  Size normal.  Dissection not present. Aortic Arch:  Size normal.  Dissection not present. Descending Aorta:  Size normal.  Dissection not present. Other Aortic Findings:       Mayank Guzman grade 1 atheroma throughout the aorta    Atria    Right Atrium:  Size normal.  Spontaneous echo contrast not present. Thrombus not present. Tumor not present. Device not present. Left Atrium:  Size normal.  Spontaneous echo contrast not present. Thrombus not present. Tumor not present. Device not present. Left atrial appendage normal.      Septa    Atrial Septum:  Intra-atrial septal morphology normal.      Ventricular Septum:  Intra-ventricular septum morphology normal.          Other Findings  Pericardium:  normal  Pleural Effusion:  none  Pulmonary Arteries:  normal  Pulmonary Venous Flow:  blunted (decreased) systolic flow    Anesthesia Information  Performed Personally  Anesthesiologist:  Mindi Sterling,       Echocardiogram Comments:       S/p MVR, TVr, CABG x 3. LVEF ~ 60% RVEF WNL. Mitral valve seated well, trace paravalvular regurgitation none intravalvular. MG ~ 2mmHg. TVr with trace TR. MG ~ 1mmHg. AV same as pre bypass.   All findings d/w surgeonPost Intervention Follow-up Study  Aortic Function: unchangedMitral Function: improvedTricuspid Function: improvedNone
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
## 2022-05-25 NOTE — ANESTHESIA POSTPROCEDURE EVALUATION
Department of Anesthesiology  Postprocedure Note    Patient: Sonu Vasquez  MRN: [id number]  YOB: 1945  Date of evaluation: 5/25/2022  Time:  1:19 PM     Procedure Summary     Date: 05/25/22 Room / Location: Located within [facility] / [facility]    Anesthesia Start: 5635 Anesthesia Stop: 9997    Procedure: CABG CORONARY ARTERY BYPASS, JOEL, MITRAL VALVE REPLACEMENT, TRICUSPID VALVE REPAIR (N/A Chest) Diagnosis:       CAD in native artery      (/)    Surgeons: Christina Johnston MD Responsible Provider: Kwame Cordero DO    Anesthesia Type: general ASA Status: 4          Anesthesia Type: No value filed. Gil Phase I: Gil Score: 10    Gil Phase II:      Last vitals: Reviewed and per EMR flowsheets.        Anesthesia Post Evaluation    Patient location during evaluation: ICU  Patient participation: complete - patient cannot participate  Level of consciousness: sedated and ventilated  Airway patency: patent  Nausea & Vomiting: no nausea and no vomiting  Complications: no  Cardiovascular status: blood pressure returned to baseline  Respiratory status: acceptable  Hydration status: euvolemic  Multimodal analgesia pain management approach

## 2022-05-25 NOTE — PROGRESS NOTES
CVICU Admission Note    Name: Sonu Vasquez  MRN: [id number]    CC: Postoperative Critical Care Management     Indication for Surgery/Procedure: AI/MS/MR/TR/CAD/SVT  LVEF:  60%    RVF:  NML    Important/Relevant PMH/PSH: Empyema s/p right thoracotomy and decortication 1/2022, multiple sclerosis, left foot drop, radiculopathy on gabapentin, Washburn's esophagus, severe COPD, Bladder CA, BPH s/p TURP, pulmonary hypertension, HTN, DMII, paroxysmal supraventricular tachycardia, endocarditis, nephrolithiasis      Procedure/Surgeries: 5/25/2022  Urgent sternotomy/Urgent Aortic valve repair by leaflet debridement/Complex mitral valve replacement with annular debridement and a 33mm Medtronic Mosaic bioprosthetic/Tricuspid valve repair by bicuspidization/CABG x 3 (LIMA-LAD, SVG-Diag, SVG-PDA)/WALLACE exclusion with 40mm AtriClip/EVH RLE/Rigid internal fixation of the sternum with Hollywood plates x 4/50 modifier     Pacing wires: Atrial and Ventricular       Physical Exam:    BP (!) 120/51   Pulse (!) 105   Temp (!) 96.1 °F (35.6 °C) (Axillary)   Resp 20   Ht 5' 10\" (1.778 m)   Wt 189 lb (85.7 kg)   SpO2 100%   BMI 27.12 kg/m²     Recent Labs     05/25/22  1236 05/25/22  1314 05/25/22  1349   WBC  --  34.1*  --    RBC  --  4.20  --    HGB  --  13.0  --    HCT   < > 40.7 40.0  40.0   MCV  --  96.9  --    MCH  --  31.0  --    MCHC  --  31.9*  --    RDW  --  13.9  --    PLT  --  128*  --    MPV  --  10.7  --     < > = values in this interval not displayed. Recent Labs     05/25/22  0446 05/25/22  0917 05/25/22  1236     --   --    K 4.1   < > 3.5     --   --    CO2 19*  --   --    BUN 16  --   --    CREATININE 0.9   < > 1.0   GLUCOSE 97  --   --    CALCIUM 8.8  --   --     < > = values in this interval not displayed. Post operative CXR:      Atelectasis, No pneumothorax noted, Mildly increased vascular markings bilateral R>L, ? Small eft pleural effusion.  ETT/lines/drains appear to be in proper position. Final Radiology report pending. General Appearance: Intubated, sedated, on inopressor support   Eyes: PERRL  Pulmonary: Diminished bibasilar. No wheezes, no accessory muscle use noted   Ventilator: Mode: AC/VC; 60 FiO2, 8 PEEP, 550 Vt   Cardiovascular: RRR, no heaves or thrills palpated   Telemetry: ST  Abdomen: Soft, OG to LIWS  Extremities: Palpable pulses all extremities, no edema   Neurologic/Psych: Sedated   Skin: Warm and dry   Incision: MSI with brinda dressing intact, RLE SVG sites with ace wrap on, left groin stitch     Assessment/Plan: Day of Surgery     1. VHD/CAD S/p Urgent Aortic valve repair by leaflet debridement/Complex mitral valve replacement with annular debridement and a 33mm Medtronic Mosaic bioprosthetic/Tricuspid valve repair by bicuspidization, CABG x 3 (LIMA-LAD, SVG-Diag, SVG-PDA)/WALLACE exclusion with 40mm AtriClip  - ASA, Lipitor   - ABX  - IV amio for afib prophylaxis   - Monitor chest tube output and hemodynamics closely     2. Acute Pulmonary Insufficiency Following Surgery, Hx of severe copd     - Expected 2/2 surgery  - Intubated on ventilator; maintain lung protective ventilation   - Duonebs   - ABGs per protocol and PRN     3. Postoperative hypotension   -Normal biventricular fxn   -Vasopressin and Epi for hemodynamic support, RVSP 50s. CI 2.5, SVR 1092, SVO2 58  -Target maps>70  -Trend CO, mixed venous and lactic acid    4. JAMIE   - Scr 1.3-->0. 9 preop   - Nephrology following   -avoid hypotension     5. DMII   -RHI gtt per nomogram for glycemic control     6. Acute Post Operative Pain   - PRN fentanyl for pain management until able to take PO     7. Urinary retention   -Hx of BPH s/p TURP   -evaluated by urology preop   -hernandez to GD     8. UTI  -Daptomycin and meropenem per ID     9. Hx of Multiple sclerosis       This patient has a high probability of sudden deterioration, which requires preparedness to urgently intervene.  I participated in the decision making process and managed the direct care of the patient that required frequent assessment and treatment. I personally spent 42 minutes of critical care time treating the patient.        Electronically signed by SHANIQUE Giraldo CNP on 5/25/2022 at 2:00 PM

## 2022-05-25 NOTE — ANESTHESIA PROCEDURE NOTES
Arterial Line:    An arterial line was placed using surface landmarks, in the OR for the following indication(s): continuous blood pressure monitoring and blood sampling needed. A 20 gauge (size), 5 cm (length), Arrow (type) catheter was placed, Seldinger technique not used, into the right brachial artery, secured by tape. Anesthesia type: Local    Events:  patient tolerated procedure well with no complications. Anesthesiologist: Jorge Luis Stevenson DO  Resident/CRNA: Jet Enrique, APRN - CRNA  Other anesthesia staff: Tad Velez RN  Performed:  Other anesthesia staff   Preanesthetic Checklist  Completed: patient identified, IV checked, site marked, risks and benefits discussed, surgical/procedural consents, equipment checked, pre-op evaluation, timeout performed, anesthesia consent given, oxygen available and monitors applied/VS acknowledged

## 2022-05-25 NOTE — ANESTHESIA PROCEDURE NOTES
Central Venous Line:    A central venous line was placed using ultrasound guidance, in the OR for the following indication(s): central venous access. Sterility preparation included the following: hand hygiene performed prior to procedure, maximum sterile barriers used and sterile technique used to drape from head to toe. The patient was placed in Trendelenburg position. The right internal jugular vein was prepped. The site was prepped with Chloraprep. A 9 Fr (size), introducer double lumen was placed. During the procedure, the following specific steps were taken: target vein identified, needle advanced into vein and blood aspirated and guidewire advanced into vein. Intravenous verification was obtained by ultrasound and venous blood return. Post insertion care included: all ports aspirated, all ports flushed easily, guidewire removed intact, Biopatch applied, line sutured in place and dressing applied. During the procedure the patient experienced: patient tolerated procedure well with no complications. permanent images obtained  Anesthesia type: localA(n) non-oximetric, 7.5 (size) Pulmonary Artery Catheter (PAC) was placed through the Introducer CVL in the right internal jugular vein. The PAC placement was confirmed by pressure tracing changes and JOEL. The patient experienced the following events during the procedure: patient tolerated procedure well with no complications. PA Cath placed?: Yes  Staffing  Performed: Anesthesiologist   Anesthesiologist: Kwame Cordero,   Preanesthetic Checklist  Completed: patient identified, IV checked, site marked, risks and benefits discussed, surgical/procedural consents, equipment checked, pre-op evaluation, timeout performed, anesthesia consent given, oxygen available and monitors applied/VS acknowledged

## 2022-05-25 NOTE — PLAN OF CARE
Problem: Discharge Planning  Goal: Discharge to home or other facility with appropriate resources  Outcome: Not Progressing  Flowsheets (Taken 5/25/2022 1305)  Discharge to home or other facility with appropriate resources:   Identify barriers to discharge with patient and caregiver   Arrange for needed discharge resources and transportation as appropriate     Problem: Chronic Conditions and Co-morbidities  Goal: Patient's chronic conditions and co-morbidity symptoms are monitored and maintained or improved  Outcome: Progressing  Flowsheets (Taken 5/25/2022 1305)  Care Plan - Patient's Chronic Conditions and Co-Morbidity Symptoms are Monitored and Maintained or Improved:   Monitor and assess patient's chronic conditions and comorbid symptoms for stability, deterioration, or improvement   Collaborate with multidisciplinary team to address chronic and comorbid conditions and prevent exacerbation or deterioration     Problem: Skin/Tissue Integrity  Goal: Absence of new skin breakdown  Description: 1. Monitor for areas of redness and/or skin breakdown  2. Assess vascular access sites hourly  3. Every 4-6 hours minimum:  Change oxygen saturation probe site  4. Every 4-6 hours:  If on nasal continuous positive airway pressure, respiratory therapy assess nares and determine need for appliance change or resting period.   Outcome: Progressing     Problem: Safety - Adult  Goal: Free from fall injury  Outcome: Progressing     Problem: Cardiovascular - Adult  Goal: Maintains optimal cardiac output and hemodynamic stability  Outcome: Progressing  Flowsheets (Taken 5/25/2022 1305)  Maintains optimal cardiac output and hemodynamic stability:   Monitor blood pressure and heart rate   Monitor urine output and notify Licensed Independent Practitioner for values outside of normal range   Assess for signs of decreased cardiac output   Administer fluid and/or volume expanders as ordered   Administer vasoactive medications as ordered  Goal: Absence of cardiac dysrhythmias or at baseline  Outcome: Progressing  Flowsheets (Taken 5/25/2022 1305)  Absence of cardiac dysrhythmias or at baseline:   Monitor cardiac rate and rhythm   Assess for signs of decreased cardiac output   Administer antiarrhythmia medication and electrolyte replacement as ordered     Problem: Metabolic/Fluid and Electrolytes - Adult  Goal: Hemodynamic stability and optimal renal function maintained  Outcome: Progressing  Flowsheets (Taken 5/25/2022 1305)  Hemodynamic stability and optimal renal function maintained:   Monitor labs and assess for signs and symptoms of volume excess or deficit   Monitor intake, output and patient weight   Monitor urine specific gravity, serum osmolarity and serum sodium as indicated or ordered  Goal: Electrolytes maintained within normal limits  Outcome: Progressing  Flowsheets (Taken 5/25/2022 1305)  Electrolytes maintained within normal limits:   Monitor labs and assess patient for signs and symptoms of electrolyte imbalances   Administer electrolyte replacement as ordered   Monitor response to electrolyte replacements, including repeat lab results as appropriate  Goal: Glucose maintained within prescribed range  Outcome: Progressing  Flowsheets (Taken 5/25/2022 1305)  Glucose maintained within prescribed range:   Monitor blood glucose as ordered   Assess for signs and symptoms of hyperglycemia and hypoglycemia   Administer ordered medications to maintain glucose within target range     Problem: Respiratory - Adult  Goal: Achieves optimal ventilation and oxygenation  Outcome: Progressing  Flowsheets (Taken 5/25/2022 1305)  Achieves optimal ventilation and oxygenation:   Assess for changes in respiratory status   Position to facilitate oxygenation and minimize respiratory effort   Oxygen supplementation based on oxygen saturation or arterial blood gases   Assess the need for suctioning and aspirate as needed   Respiratory therapy support hydration  Goal: Maintains adequate nutritional intake  Outcome: Progressing  Flowsheets (Taken 5/25/2022 1305)  Maintains adequate nutritional intake:   Monitor intake and output, weight and lab values   Identify factors contributing to decreased intake, treat as appropriate     Problem: Genitourinary - Adult  Goal: Absence of urinary retention  Outcome: Progressing  Flowsheets (Taken 5/25/2022 1305)  Absence of urinary retention:   Assess patients ability to void and empty bladder   Monitor intake/output and perform bladder scan as needed  Goal: Urinary catheter remains patent  Outcome: Progressing  Flowsheets (Taken 5/25/2022 1305)  Urinary catheter remains patent: Assess patency of urinary catheter     Problem: Infection - Adult  Goal: Absence of infection at discharge  Outcome: Progressing  Flowsheets (Taken 5/25/2022 1305)  Absence of infection at discharge:   Assess and monitor for signs and symptoms of infection   Monitor lab/diagnostic results  Goal: Absence of infection during hospitalization  Outcome: Progressing  Flowsheets (Taken 5/25/2022 1305)  Absence of infection during hospitalization:   Assess and monitor for signs and symptoms of infection   Monitor lab/diagnostic results   Monitor all insertion sites i.e., indwelling lines, tubes and drains     Problem: Hematologic - Adult  Goal: Maintains hematologic stability  Outcome: Progressing  Flowsheets (Taken 5/25/2022 1305)  Maintains hematologic stability:   Assess for signs and symptoms of bleeding or hemorrhage   Monitor labs for bleeding or clotting disorders     Problem: Pain  Goal: Verbalizes/displays adequate comfort level or baseline comfort level  Outcome: Progressing  Flowsheets (Taken 5/25/2022 1305)  Verbalizes/displays adequate comfort level or baseline comfort level:   Assess pain using appropriate pain scale   Administer analgesics based on type and severity of pain and evaluate response   Implement non-pharmacological measures as appropriate and evaluate response

## 2022-05-25 NOTE — PROCEDURES
[address]                  [address] [facility],  [facility]                               PULMONARY FUNCTION    PATIENT NAME: Lisa Townsend                    :        1945  MED REC NO:   [id number]                            ROOM:  ACCOUNT NO:   [de-identified]                           ADMIT DATE: 2022  PROVIDER:     DO Roshni    DATE OF PROCEDURE:  2022    REFERRING PROVIDER:  Sirena Wilson MD    DIAGNOSES:  1. Preop. 2.  Dyspnea after any exertion. 3.  Tobacco use. The patient smoked one pack a day for 15 years, quit  30 years ago. HEIGHT:  70 inches. WEIGHT:  185 pounds. SPIROMETRY:  FVC is 2.39 liters which is 60% of predicted. FEV1 is 1.34  liters which is 46% of predicted. FEV1/FVC ratio is 56. MVV is 58  which is 49% of predicted. DIFFUSION:  Diffusion is 7.49 which is 23% of predicted, corrects for  alveolar ventilation to 58% of predicted. FLOW VOLUME LOOP:  Flow volume loop shows significant scooping of the  expiratory limb, consistent with obstruction. OVERALL INTERPRETATION:  Pulmonary function testing is consistent with  severe obstruction. The MVV is moderately reduced which may be  secondary to difficulty with testing versus neuromuscular weakness  versus deconditioning. Diffusion is also severely reduced and only  partially corrects for alveolar ventilation. This may be secondary to  significant parenchymal disease. Please clinically correlate.         Daija Bey DO    D: 2022 19:27:16       T: 2022 19:30:00     [email or web address]  Job#: [id number]     Doc#: [id number]

## 2022-05-26 ENCOUNTER — APPOINTMENT (OUTPATIENT)
Dept: GENERAL RADIOLOGY | Age: 77
DRG: 219 | End: 2022-05-26
Attending: THORACIC SURGERY (CARDIOTHORACIC VASCULAR SURGERY)
Payer: MEDICARE

## 2022-05-26 LAB
ACTIVATED CLOTTING TIME: 131 SECONDS (ref 99–130)
ACTIVATED CLOTTING TIME: 449 SECONDS (ref 99–130)
ACTIVATED CLOTTING TIME: 516 SECONDS (ref 99–130)
ACTIVATED CLOTTING TIME: 519 SECONDS (ref 99–130)
ACTIVATED CLOTTING TIME: 537 SECONDS (ref 99–130)
ALBUMIN SERPL-MCNC: 3.8 G/DL (ref 3.5–5.2)
ALBUMIN SERPL-MCNC: 3.8 G/DL (ref 3.5–5.2)
ALP BLD-CCNC: 59 U/L (ref 40–129)
ALP BLD-CCNC: 73 U/L (ref 40–129)
ALT SERPL-CCNC: 10 U/L (ref 0–40)
ALT SERPL-CCNC: 12 U/L (ref 0–40)
ANION GAP SERPL CALCULATED.3IONS-SCNC: 10 MMOL/L (ref 7–16)
ANION GAP SERPL CALCULATED.3IONS-SCNC: 9 MMOL/L (ref 7–16)
AST SERPL-CCNC: 45 U/L (ref 0–39)
AST SERPL-CCNC: 46 U/L (ref 0–39)
B.E.: -0.1 MMOL/L (ref -3–3)
B.E.: -0.2 MMOL/L (ref -3–3)
B.E.: -0.6 MMOL/L (ref -3–3)
B.E.: -0.6 MMOL/L (ref -3–3)
B.E.: -0.7 MMOL/L (ref -3–3)
B.E.: -0.9 MMOL/L (ref -3–3)
B.E.: -2.5 MMOL/L (ref -3–3)
B.E.: -2.7 MMOL/L (ref -3–3)
B.E.: 0.4 MMOL/L (ref -3–3)
B.E.: 0.7 MMOL/L (ref -3–3)
BILIRUB SERPL-MCNC: 0.6 MG/DL (ref 0–1.2)
BILIRUB SERPL-MCNC: 0.9 MG/DL (ref 0–1.2)
BUN BLDV-MCNC: 24 MG/DL (ref 6–23)
BUN BLDV-MCNC: 30 MG/DL (ref 6–23)
CALCIUM IONIZED: 1.21 MMOL/L (ref 1.15–1.33)
CALCIUM SERPL-MCNC: 8.3 MG/DL (ref 8.6–10.2)
CALCIUM SERPL-MCNC: 8.5 MG/DL (ref 8.6–10.2)
CHLORIDE BLD-SCNC: 107 MMOL/L (ref 98–107)
CHLORIDE BLD-SCNC: 109 MMOL/L (ref 98–107)
CO2: 24 MMOL/L (ref 22–29)
CO2: 25 MMOL/L (ref 22–29)
CREAT SERPL-MCNC: 1 MG/DL (ref 0.7–1.2)
CREAT SERPL-MCNC: 1.2 MG/DL (ref 0.7–1.2)
DELIVERY SYSTEMS: ABNORMAL
DEVICE: ABNORMAL
FIO2: 4
FIO2: 40
GFR AFRICAN AMERICAN: >60
GFR AFRICAN AMERICAN: >60
GFR NON-AFRICAN AMERICAN: 58 ML/MIN/1.73
GFR NON-AFRICAN AMERICAN: >60 ML/MIN/1.73
GLUCOSE BLD-MCNC: 106 MG/DL (ref 74–99)
GLUCOSE BLD-MCNC: 123 MG/DL (ref 74–99)
HCO3: 22 MMOL/L (ref 22–26)
HCO3: 23.1 MMOL/L (ref 22–26)
HCO3: 23.8 MMOL/L (ref 22–26)
HCO3: 24.3 MMOL/L (ref 22–26)
HCO3: 24.3 MMOL/L (ref 22–26)
HCO3: 24.7 MMOL/L (ref 22–26)
HCO3: 24.9 MMOL/L (ref 22–26)
HCO3: 25.6 MMOL/L (ref 22–26)
HCT VFR BLD CALC: 30.9 % (ref 37–54)
HCT VFR BLD CALC: 31 % (ref 37–54)
HEMATOCRIT: 28 % (ref 37–54)
HEMATOCRIT: 29 % (ref 37–54)
HEMATOCRIT: 30 % (ref 37–54)
HEMATOCRIT: 31 % (ref 37–54)
HEMOGLOBIN: 10 G/DL (ref 12.5–15.5)
HEMOGLOBIN: 10.1 G/DL (ref 12.5–16.5)
HEMOGLOBIN: 10.2 G/DL (ref 12.5–15.5)
HEMOGLOBIN: 10.2 G/DL (ref 12.5–16.5)
HEMOGLOBIN: 10.6 G/DL (ref 12.5–15.5)
HEMOGLOBIN: 9.4 G/DL (ref 12.5–15.5)
HEMOGLOBIN: 9.5 G/DL (ref 12.5–15.5)
HEMOGLOBIN: 9.5 G/DL (ref 12.5–15.5)
HEMOGLOBIN: 9.6 G/DL (ref 12.5–15.5)
HEMOGLOBIN: 9.6 G/DL (ref 12.5–15.5)
HEMOGLOBIN: 9.8 G/DL (ref 12.5–15.5)
HEMOGLOBIN: 9.9 G/DL (ref 12.5–15.5)
LACTIC ACID: 1.3 MMOL/L (ref 0.5–2.2)
LACTIC ACID: 1.6 MMOL/L (ref 0.5–2.2)
LACTIC ACID: 1.7 MMOL/L (ref 0.5–2.2)
LACTIC ACID: 1.9 MMOL/L (ref 0.5–2.2)
LACTIC ACID: 2.1 MMOL/L (ref 0.5–2.2)
LACTIC ACID: 2.2 MMOL/L (ref 0.5–2.2)
LACTIC ACID: 2.6 MMOL/L (ref 0.5–2.2)
LACTIC ACID: 3.3 MMOL/L (ref 0.5–2.2)
LACTIC ACID: 4.1 MMOL/L (ref 0.5–2.2)
LACTIC ACID: 5.3 MMOL/L (ref 0.5–2.2)
MCH RBC QN AUTO: 30.8 PG (ref 26–35)
MCH RBC QN AUTO: 31 PG (ref 26–35)
MCHC RBC AUTO-ENTMCNC: 32.6 % (ref 32–34.5)
MCHC RBC AUTO-ENTMCNC: 33 % (ref 32–34.5)
MCV RBC AUTO: 93.9 FL (ref 80–99.9)
MCV RBC AUTO: 94.5 FL (ref 80–99.9)
METER GLUCOSE: 100 MG/DL (ref 74–99)
METER GLUCOSE: 102 MG/DL (ref 74–99)
METER GLUCOSE: 103 MG/DL (ref 74–99)
METER GLUCOSE: 111 MG/DL (ref 74–99)
METER GLUCOSE: 116 MG/DL (ref 74–99)
METER GLUCOSE: 124 MG/DL (ref 74–99)
METER GLUCOSE: 128 MG/DL (ref 74–99)
METER GLUCOSE: 133 MG/DL (ref 74–99)
METER GLUCOSE: 135 MG/DL (ref 74–99)
METER GLUCOSE: 92 MG/DL (ref 74–99)
MODE: ABNORMAL
MODE: AC
O2 SATURATION: 72.3 % (ref 92–98.5)
O2 SATURATION: 75.1 % (ref 92–98.5)
O2 SATURATION: 77.2 % (ref 92–98.5)
O2 SATURATION: 81.7 % (ref 92–98.5)
O2 SATURATION: 94.7 % (ref 92–98.5)
O2 SATURATION: 97.4 % (ref 92–98.5)
O2 SATURATION: 97.6 % (ref 92–98.5)
O2 SATURATION: 97.7 % (ref 92–98.5)
O2 SATURATION: 97.8 % (ref 92–98.5)
O2 SATURATION: 98.2 % (ref 92–98.5)
OPERATOR ID: 1721
OPERATOR ID: 421
OPERATOR ID: 421
PCO2 37: 35 MMHG (ref 35–45)
PCO2 37: 36 MMHG (ref 35–45)
PCO2 37: 36.8 MMHG (ref 35–45)
PCO2 37: 37 MMHG (ref 35–45)
PCO2 37: 38.2 MMHG (ref 35–45)
PCO2 37: 39 MMHG (ref 35–45)
PCO2 37: 40.5 MMHG (ref 35–45)
PCO2 37: 42.4 MMHG (ref 35–45)
PCO2 37: 43.2 MMHG (ref 35–45)
PCO2 37: 43.6 MMHG (ref 35–45)
PDW BLD-RTO: 14.4 FL (ref 11.5–15)
PDW BLD-RTO: 14.7 FL (ref 11.5–15)
PH 37: 7.34 (ref 7.35–7.45)
PH 37: 7.36 (ref 7.35–7.45)
PH 37: 7.38 (ref 7.35–7.45)
PH 37: 7.38 (ref 7.35–7.45)
PH 37: 7.39 (ref 7.35–7.45)
PH 37: 7.39 (ref 7.35–7.45)
PH 37: 7.41 (ref 7.35–7.45)
PH 37: 7.42 (ref 7.35–7.45)
PH 37: 7.44 (ref 7.35–7.45)
PH 37: 7.44 (ref 7.35–7.45)
PLATELET # BLD: 119 E9/L (ref 130–450)
PLATELET # BLD: 121 E9/L (ref 130–450)
PMV BLD AUTO: 11.1 FL (ref 7–12)
PMV BLD AUTO: 11.2 FL (ref 7–12)
PO2 37: 101 MMHG (ref 60–80)
PO2 37: 108.6 MMHG (ref 60–80)
PO2 37: 39 MMHG (ref 60–80)
PO2 37: 40.7 MMHG (ref 60–80)
PO2 37: 43.5 MMHG (ref 60–80)
PO2 37: 48.6 MMHG (ref 60–80)
PO2 37: 70.7 MMHG (ref 60–80)
PO2 37: 90.3 MMHG (ref 60–80)
PO2 37: 96.7 MMHG (ref 60–80)
PO2 37: 97.1 MMHG (ref 60–80)
POC SOURCE: ABNORMAL
POSITIVE END EXP PRESS: 8 CMH2O
POTASSIUM SERPL-SCNC: 4.6 MMOL/L (ref 3.5–5)
POTASSIUM SERPL-SCNC: 5.3 MMOL/L (ref 3.5–5)
PRESSURE SUPPORT: 8 CMH2O
RBC # BLD: 3.28 E12/L (ref 3.8–5.8)
RBC # BLD: 3.29 E12/L (ref 3.8–5.8)
RESPIRATORY RATE: 24 B/MIN
SODIUM BLD-SCNC: 140 MMOL/L (ref 132–146)
SODIUM BLD-SCNC: 144 MMOL/L (ref 132–146)
TIDAL VOLUME: 550 ML
TOTAL PROTEIN: 5.5 G/DL (ref 6.4–8.3)
TOTAL PROTEIN: 5.7 G/DL (ref 6.4–8.3)
WBC # BLD: 25.8 E9/L (ref 4.5–11.5)
WBC # BLD: 27.3 E9/L (ref 4.5–11.5)

## 2022-05-26 PROCEDURE — 83605 ASSAY OF LACTIC ACID: CPT

## 2022-05-26 PROCEDURE — 82330 ASSAY OF CALCIUM: CPT

## 2022-05-26 PROCEDURE — 6370000000 HC RX 637 (ALT 250 FOR IP): Performed by: INTERNAL MEDICINE

## 2022-05-26 PROCEDURE — 6360000002 HC RX W HCPCS: Performed by: NURSE PRACTITIONER

## 2022-05-26 PROCEDURE — 85027 COMPLETE CBC AUTOMATED: CPT

## 2022-05-26 PROCEDURE — 6370000000 HC RX 637 (ALT 250 FOR IP): Performed by: NURSE PRACTITIONER

## 2022-05-26 PROCEDURE — 2500000003 HC RX 250 WO HCPCS: Performed by: NURSE PRACTITIONER

## 2022-05-26 PROCEDURE — 80053 COMPREHEN METABOLIC PANEL: CPT

## 2022-05-26 PROCEDURE — 2580000003 HC RX 258: Performed by: NURSE PRACTITIONER

## 2022-05-26 PROCEDURE — S5553 INSULIN LONG ACTING 5 U: HCPCS | Performed by: NURSE PRACTITIONER

## 2022-05-26 PROCEDURE — 71045 X-RAY EXAM CHEST 1 VIEW: CPT

## 2022-05-26 PROCEDURE — 2580000003 HC RX 258: Performed by: INTERNAL MEDICINE

## 2022-05-26 PROCEDURE — 2000000000 HC ICU R&B

## 2022-05-26 PROCEDURE — 99291 CRITICAL CARE FIRST HOUR: CPT | Performed by: NURSE PRACTITIONER

## 2022-05-26 PROCEDURE — 82803 BLOOD GASES ANY COMBINATION: CPT

## 2022-05-26 PROCEDURE — 37799 UNLISTED PX VASCULAR SURGERY: CPT

## 2022-05-26 PROCEDURE — 2700000000 HC OXYGEN THERAPY PER DAY

## 2022-05-26 PROCEDURE — 82962 GLUCOSE BLOOD TEST: CPT

## 2022-05-26 PROCEDURE — 94003 VENT MGMT INPAT SUBQ DAY: CPT

## 2022-05-26 PROCEDURE — 94640 AIRWAY INHALATION TREATMENT: CPT

## 2022-05-26 PROCEDURE — 36415 COLL VENOUS BLD VENIPUNCTURE: CPT

## 2022-05-26 PROCEDURE — 6360000002 HC RX W HCPCS: Performed by: INTERNAL MEDICINE

## 2022-05-26 PROCEDURE — A4216 STERILE WATER/SALINE, 10 ML: HCPCS | Performed by: INTERNAL MEDICINE

## 2022-05-26 RX ORDER — ALPRAZOLAM 0.25 MG/1
0.25 TABLET ORAL NIGHTLY PRN
Status: DISCONTINUED | OUTPATIENT
Start: 2022-05-26 | End: 2022-05-27

## 2022-05-26 RX ORDER — ASPIRIN 81 MG/1
81 TABLET, CHEWABLE ORAL DAILY
Status: DISCONTINUED | OUTPATIENT
Start: 2022-05-26 | End: 2022-05-27 | Stop reason: SDUPTHER

## 2022-05-26 RX ORDER — DEXTROSE AND SODIUM CHLORIDE 5; .9 G/100ML; G/100ML
INJECTION, SOLUTION INTRAVENOUS CONTINUOUS
Status: DISCONTINUED | OUTPATIENT
Start: 2022-05-26 | End: 2022-05-27

## 2022-05-26 RX ORDER — INSULIN LISPRO 100 [IU]/ML
0-12 INJECTION, SOLUTION INTRAVENOUS; SUBCUTANEOUS EVERY 4 HOURS
Status: DISCONTINUED | OUTPATIENT
Start: 2022-05-26 | End: 2022-05-27

## 2022-05-26 RX ADMIN — ATORVASTATIN CALCIUM 40 MG: 40 TABLET, FILM COATED ORAL at 20:30

## 2022-05-26 RX ADMIN — VASOPRESSIN 0.08 UNITS/MIN: 20 INJECTION INTRAVENOUS at 05:52

## 2022-05-26 RX ADMIN — FENTANYL CITRATE 25 MCG: 50 INJECTION, SOLUTION INTRAMUSCULAR; INTRAVENOUS at 01:16

## 2022-05-26 RX ADMIN — DAPTOMYCIN 450 MG: 500 INJECTION, POWDER, LYOPHILIZED, FOR SOLUTION INTRAVENOUS at 17:00

## 2022-05-26 RX ADMIN — IPRATROPIUM BROMIDE AND ALBUTEROL SULFATE 1 AMPULE: .5; 2.5 SOLUTION RESPIRATORY (INHALATION) at 15:59

## 2022-05-26 RX ADMIN — ASPIRIN 81 MG 81 MG: 81 TABLET ORAL at 08:29

## 2022-05-26 RX ADMIN — IPRATROPIUM BROMIDE AND ALBUTEROL SULFATE 1 AMPULE: .5; 2.5 SOLUTION RESPIRATORY (INHALATION) at 07:58

## 2022-05-26 RX ADMIN — IPRATROPIUM BROMIDE AND ALBUTEROL SULFATE 1 AMPULE: .5; 2.5 SOLUTION RESPIRATORY (INHALATION) at 19:02

## 2022-05-26 RX ADMIN — VASOPRESSIN 0.02 UNITS/MIN: 20 INJECTION INTRAVENOUS at 13:52

## 2022-05-26 RX ADMIN — DEXTROSE AND SODIUM CHLORIDE: 5; 900 INJECTION, SOLUTION INTRAVENOUS at 21:04

## 2022-05-26 RX ADMIN — EPINEPHRINE 8.5 MCG/MIN: 1 INJECTION, SOLUTION, CONCENTRATE INTRAVENOUS at 01:16

## 2022-05-26 RX ADMIN — IPRATROPIUM BROMIDE AND ALBUTEROL SULFATE 1 AMPULE: .5; 2.5 SOLUTION RESPIRATORY (INHALATION) at 11:51

## 2022-05-26 RX ADMIN — VASOPRESSIN 0.08 UNITS/MIN: 20 INJECTION INTRAVENOUS at 01:40

## 2022-05-26 RX ADMIN — MUPIROCIN: 20 OINTMENT TOPICAL at 08:30

## 2022-05-26 RX ADMIN — SENNOSIDES AND DOCUSATE SODIUM 1 TABLET: 50; 8.6 TABLET ORAL at 08:29

## 2022-05-26 RX ADMIN — FENTANYL CITRATE 25 MCG: 50 INJECTION, SOLUTION INTRAMUSCULAR; INTRAVENOUS at 20:51

## 2022-05-26 RX ADMIN — SODIUM CHLORIDE, PRESERVATIVE FREE 10 ML: 5 INJECTION INTRAVENOUS at 08:30

## 2022-05-26 RX ADMIN — INSULIN GLARGINE-YFGN 13 UNITS: 100 INJECTION, SOLUTION SUBCUTANEOUS at 20:30

## 2022-05-26 RX ADMIN — FENTANYL CITRATE 50 MCG: 50 INJECTION, SOLUTION INTRAMUSCULAR; INTRAVENOUS at 17:03

## 2022-05-26 RX ADMIN — FENTANYL CITRATE 25 MCG: 50 INJECTION, SOLUTION INTRAMUSCULAR; INTRAVENOUS at 06:49

## 2022-05-26 RX ADMIN — MEROPENEM 1000 MG: 1 INJECTION, POWDER, FOR SOLUTION INTRAVENOUS at 07:54

## 2022-05-26 RX ADMIN — FENTANYL CITRATE 50 MCG: 50 INJECTION, SOLUTION INTRAMUSCULAR; INTRAVENOUS at 14:58

## 2022-05-26 RX ADMIN — AMIODARONE HYDROCHLORIDE 0.5 MG/MIN: 50 INJECTION, SOLUTION INTRAVENOUS at 04:18

## 2022-05-26 RX ADMIN — AMIODARONE HYDROCHLORIDE 0.5 MG/MIN: 50 INJECTION, SOLUTION INTRAVENOUS at 20:35

## 2022-05-26 RX ADMIN — Medication 400 MG: at 08:29

## 2022-05-26 RX ADMIN — SENNOSIDES AND DOCUSATE SODIUM 1 TABLET: 50; 8.6 TABLET ORAL at 20:30

## 2022-05-26 RX ADMIN — SODIUM CHLORIDE 9.49 UNITS/HR: 9 INJECTION, SOLUTION INTRAVENOUS at 01:15

## 2022-05-26 RX ADMIN — FENTANYL CITRATE 50 MCG: 50 INJECTION, SOLUTION INTRAMUSCULAR; INTRAVENOUS at 18:16

## 2022-05-26 RX ADMIN — FENTANYL CITRATE 50 MCG: 50 INJECTION, SOLUTION INTRAMUSCULAR; INTRAVENOUS at 08:56

## 2022-05-26 RX ADMIN — 0.12% CHLORHEXIDINE GLUCONATE 15 ML: 1.2 RINSE ORAL at 08:29

## 2022-05-26 RX ADMIN — FENTANYL CITRATE 25 MCG: 50 INJECTION, SOLUTION INTRAMUSCULAR; INTRAVENOUS at 05:00

## 2022-05-26 RX ADMIN — SODIUM ZIRCONIUM CYCLOSILICATE 10 G: 10 POWDER, FOR SUSPENSION ORAL at 20:36

## 2022-05-26 RX ADMIN — FENTANYL CITRATE 50 MCG: 50 INJECTION, SOLUTION INTRAMUSCULAR; INTRAVENOUS at 10:09

## 2022-05-26 RX ADMIN — EPINEPHRINE 3.5 MCG/MIN: 1 INJECTION, SOLUTION, CONCENTRATE INTRAVENOUS at 15:43

## 2022-05-26 RX ADMIN — ALPRAZOLAM 0.25 MG: 0.25 TABLET ORAL at 20:29

## 2022-05-26 RX ADMIN — MUPIROCIN: 20 OINTMENT TOPICAL at 20:36

## 2022-05-26 RX ADMIN — SODIUM CHLORIDE, PRESERVATIVE FREE 10 ML: 5 INJECTION INTRAVENOUS at 20:30

## 2022-05-26 RX ADMIN — MEROPENEM 1000 MG: 1 INJECTION, POWDER, FOR SOLUTION INTRAVENOUS at 17:15

## 2022-05-26 RX ADMIN — PANTOPRAZOLE SODIUM 40 MG: 40 TABLET, DELAYED RELEASE ORAL at 08:29

## 2022-05-26 RX ADMIN — OXYCODONE HYDROCHLORIDE 10 MG: 10 TABLET ORAL at 20:29

## 2022-05-26 RX ADMIN — MEROPENEM 1000 MG: 1 INJECTION, POWDER, FOR SOLUTION INTRAVENOUS at 00:27

## 2022-05-26 RX ADMIN — FENTANYL CITRATE 50 MCG: 50 INJECTION, SOLUTION INTRAMUSCULAR; INTRAVENOUS at 13:57

## 2022-05-26 RX ADMIN — PROPOFOL 12 MCG/KG/MIN: 10 INJECTION, EMULSION INTRAVENOUS at 02:25

## 2022-05-26 ASSESSMENT — PAIN SCALES - GENERAL
PAINLEVEL_OUTOF10: 0
PAINLEVEL_OUTOF10: 4
PAINLEVEL_OUTOF10: 0
PAINLEVEL_OUTOF10: 0
PAINLEVEL_OUTOF10: 4
PAINLEVEL_OUTOF10: 8
PAINLEVEL_OUTOF10: 3
PAINLEVEL_OUTOF10: 6
PAINLEVEL_OUTOF10: 8
PAINLEVEL_OUTOF10: 7
PAINLEVEL_OUTOF10: 0
PAINLEVEL_OUTOF10: 7
PAINLEVEL_OUTOF10: 0
PAINLEVEL_OUTOF10: 0
PAINLEVEL_OUTOF10: 3
PAINLEVEL_OUTOF10: 9
PAINLEVEL_OUTOF10: 3
PAINLEVEL_OUTOF10: 5
PAINLEVEL_OUTOF10: 0
PAINLEVEL_OUTOF10: 7
PAINLEVEL_OUTOF10: 8
PAINLEVEL_OUTOF10: 0
PAINLEVEL_OUTOF10: 10
PAINLEVEL_OUTOF10: 0
PAINLEVEL_OUTOF10: 0
PAINLEVEL_OUTOF10: 4
PAINLEVEL_OUTOF10: 2
PAINLEVEL_OUTOF10: 4
PAINLEVEL_OUTOF10: 10

## 2022-05-26 ASSESSMENT — PULMONARY FUNCTION TESTS
PIF_VALUE: 16
PIF_VALUE: 26
PIF_VALUE: 26
PIF_VALUE: 28
PIF_VALUE: 28
PIF_VALUE: 29
PIF_VALUE: 27
PIF_VALUE: 28
PIF_VALUE: 17
PIF_VALUE: 28
PIF_VALUE: 29
PIF_VALUE: 28
PIF_VALUE: 28
PIF_VALUE: 17

## 2022-05-26 ASSESSMENT — PAIN DESCRIPTION - LOCATION
LOCATION: CHEST;STERNUM
LOCATION: CHEST
LOCATION: CHEST;STERNUM
LOCATION: CHEST
LOCATION: CHEST;STERNUM
LOCATION: CHEST
LOCATION: CHEST;STERNUM
LOCATION: CHEST

## 2022-05-26 ASSESSMENT — PAIN DESCRIPTION - DESCRIPTORS
DESCRIPTORS: SHARP
DESCRIPTORS: SHARP
DESCRIPTORS: SORE
DESCRIPTORS: SHARP
DESCRIPTORS: ACHING;DISCOMFORT
DESCRIPTORS: SHARP;SHOOTING
DESCRIPTORS: SHARP
DESCRIPTORS: ACHING;DISCOMFORT
DESCRIPTORS: SHARP
DESCRIPTORS: ACHING
DESCRIPTORS: SHOOTING
DESCRIPTORS: DISCOMFORT;ACHING

## 2022-05-26 ASSESSMENT — PAIN DESCRIPTION - PAIN TYPE
TYPE: ACUTE PAIN;SURGICAL PAIN
TYPE: SURGICAL PAIN
TYPE: ACUTE PAIN;SURGICAL PAIN

## 2022-05-26 ASSESSMENT — PAIN - FUNCTIONAL ASSESSMENT
PAIN_FUNCTIONAL_ASSESSMENT: PREVENTS OR INTERFERES SOME ACTIVE ACTIVITIES AND ADLS

## 2022-05-26 ASSESSMENT — PAIN DESCRIPTION - ORIENTATION
ORIENTATION: MID

## 2022-05-26 ASSESSMENT — PAIN DESCRIPTION - FREQUENCY
FREQUENCY: CONTINUOUS

## 2022-05-26 ASSESSMENT — PAIN DESCRIPTION - ONSET
ONSET: ON-GOING

## 2022-05-26 ASSESSMENT — PAIN SCALES - WONG BAKER: WONGBAKER_NUMERICALRESPONSE: 0

## 2022-05-26 NOTE — PROGRESS NOTES
Infectious Disease  Progress Note  [facility]    Chief Complaint:  Looks remarkable    Not really many complaints except a little trouble breathing    Talked to his son    Subjective:  AVR   Scheduled Meds:   aspirin  81 mg Oral Daily    insulin lispro  0-12 Units SubCUTAneous Q4H    sodium chloride flush  5-40 mL IntraVENous 2 times per day    chlorhexidine  15 mL Mouth/Throat BID    magnesium oxide  400 mg Oral Daily    mupirocin   Nasal BID    sennosides-docusate sodium  1 tablet Oral BID    pantoprazole  40 mg Oral Daily    ipratropium-albuterol  1 ampule Inhalation Q4H WA    insulin glargine  0.15 Units/kg SubCUTAneous Nightly    atorvastatin  40 mg Oral Nightly    daptomycin (CUBICIN) in NS IV syringe  6 mg/kg (Ideal) IntraVENous Q24H    meropenem  1,000 mg IntraVENous Q8H     Continuous Infusions:   sodium chloride 30 mL/hr (05/26/22 0600)    sodium chloride 25 mL/hr at 05/25/22 1628    propofol Stopped (05/26/22 0755)    sodium chloride      norepinephrine      insulin Stopped (05/26/22 0407)    dextrose      EPINEPHrine infusion 5.5 mcg/min (05/26/22 1020)    vasopressin (Septic Shock) infusion 0.04 Units/min (05/26/22 0924)    amiodarone 450mg/250ml D5W infusion 0.5 mg/min (05/26/22 0600)     PRN Meds:sodium chloride flush, sodium chloride, ondansetron **OR** ondansetron, acetaminophen, oxyCODONE **OR** oxyCODONE, fentanNYL **OR** fentanNYL, propofol, magnesium hydroxide, bisacodyl, potassium chloride, magnesium sulfate, calcium chloride IVPB **OR** calcium chloride IVPB, albumin human, sodium chloride, norepinephrine, glucose, dextrose **OR** dextrose, glucagon (rDNA), dextrose    Patient Vitals for the past 24 hrs:   BP Temp Temp src Pulse Resp SpO2 Weight   05/26/22 1000    88 23 95 %    05/26/22 0930    87 27 95 %    05/26/22 0900    87 22 96 %    05/26/22 0852      95 %    05/26/22 0830    85 24 98 %    05/26/22 0800  100.2 °F (37.9 °C) CORE 84 21 99 %  05/26/22 0752    84 23 99 %    05/26/22 0730    83 28 100 %    05/26/22 0700    83 24 100 %    05/26/22 0649     27     05/26/22 0600  99.9 °F (37.7 °C) CORE 85 24 99 %    05/26/22 0548       199 lb 8 oz (90.5 kg)   05/26/22 0530     24     05/26/22 0500    88 24 99 %    05/26/22 0400  99.9 °F (37.7 °C) CORE 88 24 99 %    05/26/22 0300    90 24 99 %    05/26/22 0200  99.9 °F (37.7 °C) CORE 91 24 100 %    05/26/22 0146     29     05/26/22 0116     26     05/26/22 0100    95 23 100 %    05/26/22 0051    94 24 100 %    05/26/22 0000  99.5 °F (37.5 °C) CORE 97 24 99 %    05/25/22 2300    97 29 100 %    05/25/22 2200  99 °F (37.2 °C) CORE 97 24 100 %    05/25/22 2144     24     05/25/22 2114     28     05/25/22 2100    (!) 102 26 100 %    05/25/22 2000  99 °F (37.2 °C) CORE (!) 102 25 99 %    05/25/22 1957    (!) 102 29 99 %    05/25/22 1900    (!) 103 24 97 %    05/25/22 1830    (!) 107 25 97 %    05/25/22 1800  98.6 °F (37 °C) CORE (!) 106 24 97 %    05/25/22 1730    (!) 108 24 98 %    05/25/22 1700    (!) 108 24 100 %    05/25/22 1630    97 24 99 %    05/25/22 1600    (!) 104 24 100 %    05/25/22 1542    (!) 104 24 100 %    05/25/22 1530    (!) 104 24 100 %    05/25/22 1500  (!) 94.6 °F (34.8 °C) CORE (!) 103 24 99 %    05/25/22 1445    (!) 104 24 99 %    05/25/22 1430  (!) 94.6 °F (34.8 °C) CORE (!) 104 24 99 %    05/25/22 1415    (!) 104 24 100 %    05/25/22 1400  (!) 96.2 °F (35.7 °C) Axillary (!) 105 24 100 %    05/25/22 1345    (!) 105 20 100 %    05/25/22 1330    (!) 106 20 100 %    05/25/22 1325    (!) 105 20 98 %    05/25/22 1315    (!) 106 20 100 %    05/25/22 1309 (!) 120/51   (!) 106 20 99 %    05/25/22 1305 (!) 128/51 (!) 96.1 °F (35.6 °C) Axillary (!) 105 20 100 %        CBC with Differential:    Lab Results   Component Value Date    WBC 25.8 05/26/2022    RBC 3.28 05/26/2022    HGB 10.1 05/26/2022    HCT 29.0 05/26/2022     05/26/2022    MCV 94.5 05/26/2022    MCH 30.8 05/26/2022    MCHC 32.6 05/26/2022    RDW 14.4 05/26/2022    NRBC 0.0 01/17/2022    SEGSPCT 70 06/08/2011    LYMPHOPCT 7.3 04/20/2022    MONOPCT 9.5 04/20/2022    MYELOPCT 2.6 01/04/2022    BASOPCT 0.5 04/20/2022    MONOSABS 0.72 04/20/2022    LYMPHSABS 0.55 04/20/2022    EOSABS 0.17 04/20/2022    BASOSABS 0.04 04/20/2022     CMP:    Lab Results   Component Value Date     05/26/2022    K 4.6 05/26/2022    K 4.2 04/09/2022     05/26/2022    CO2 25 05/26/2022    BUN 24 05/26/2022    CREATININE 1.2 05/26/2022    GFRAA >60 05/26/2022    LABGLOM 58 05/26/2022    GLUCOSE 106 05/26/2022    PROT 5.5 05/26/2022    LABALBU 3.8 05/26/2022    CALCIUM 8.5 05/26/2022    BILITOT 0.6 05/26/2022    ALKPHOS 59 05/26/2022    AST 45 05/26/2022    ALT 10 05/26/2022       BP (!) 120/51   Pulse 88   Temp 100.2 °F (37.9 °C) (Core)   Resp 23   Ht 5' 10\" (1.778 m)   Wt 199 lb 8 oz (90.5 kg)   SpO2 95%   BMI 28.63 kg/m²     Physical Exam  Const/Neuro- unchanged, no signs of acute distress, Alert  ENMT- Within Normal Limits, Normocephalic, mucous membranes pink/moist, No thrush  Neck: Neck supple  Heart- Regular, Rate, Rhythm- no murmur appreciated. Lungs- clear to ascultation. Respirations even and nonlabored. Abdomen- Soft, bowel sounds positive, non tender  Musculo/Extremities-  Equal and symmetrical, no edema. No tenderness. Skin:  Warm and dry, free from rashes. Cultures reviewed    Radiology reviewed  XR CHEST PORTABLE   Final Result   Postoperative changes with no significant change of the past 18 hours. XR CHEST PORTABLE   Final Result   Small bilateral pleural effusions with compressive atelectasis. Moderate pulmonary edema. Endotracheal tube terminates 4 cm from vidal.          XR CHEST PORTABLE    (Results Pending)       Assessment  Wednesday surgery- MVR, AVR, TVRp, CABG to LAD.  Diag and PDA with WALLACE   Hx of endocarditis   Hx of prostate surgery TURP   GERD   Hx kidney stones   Mitral regurgitation   Tricuspid valve regurgitation   Aortic valve disease   Washburn's esophagus   WBC 25,000  Post op   Mod AI, mod MS, severe MR, severe TR  Severe multivessel CAD  Right sided empyema with thoractomy  Urgent sternotomy, urgent AV repair with billeaflet debridment, complex MV replacement with annular debridemtn and a 33 mm Medtronic mosaic bioprosthesis ,  Tricuspid valve repair by bicuspidization, CABG x 3    Left atrial appendage exclusion with a 40mm Atriclip  I read the extensive op note        Plan  I agree with Dr Kirt Christensen that this is a huge surgery and one would want a Pristine environment for those prostheses etc   In view of that This is overkill however I think the benefit outweighs the risk   I do not have definitive organisms so I am going to empirically give him a few days pre and post op daptomycin and merrem   Check cultures   Baseline ESR, CRP   Monitor labs   Will follow with you   I don't want to use vancomycin because he already has evidence of renal insufficiency   Also repeat urinalysis and urine culture  Doing well post op     At this juncture his repeat urine culture was negative    He doing well post op    To prevent any collateral damage from his antibiotics    Will stop dapto and merrem after today        Electronically signed by Colleen Galindo MD on 5/26/2022 at 10:23 AM

## 2022-05-26 NOTE — PROGRESS NOTES
CVICU Progress Note    Name: Sonu Vasquez  MRN: [id number]    CC: Postoperative Critical Care Management     Indication for Surgery/Procedure: AI/MS/MR/TR/CAD/SVT  LVEF:  60%    RVF:  NML     Important/Relevant PMH/PSH: Empyema s/p right thoracotomy and decortication 1/2022, multiple sclerosis, left foot drop, radiculopathy on gabapentin, Washburn's esophagus, severe COPD, Bladder CA, BPH s/p TURP, pulmonary hypertension, HTN, DMII, paroxysmal supraventricular tachycardia, endocarditis, nephrolithiasis       Procedure/Surgeries: 5/25/2022  Urgent sternotomy/Urgent Aortic valve repair by leaflet debridement/Complex mitral valve replacement with annular debridement and a 33mm Medtronic Mosaic bioprosthetic/Tricuspid valve repair by bicuspidization/CABG x 3 (LIMA-LAD, SVG-Diag, SVG-PDA)/WALLACE exclusion with 40mm AtriClip/EVH RLE/Rigid internal fixation of the sternum with Juan Carlos plates x 3/86 modifier     Pacing wires: Atrial and Ventricular       Intake/Output Summary (Last 24 hours) at 5/26/2022 0919  Last data filed at 5/26/2022 0700  Gross per 24 hour   Intake 5034.27 ml   Output 4235 ml   Net 799.27 ml       Recent Labs     05/25/22  1314 05/25/22  1349 05/25/22  1800 05/25/22  1811 05/26/22  0404 05/26/22  0405 05/26/22  0407 05/26/22  0611 05/26/22  0837   WBC 34.1*  --  30.5*  --   --  25.8*  --   --   --    HGB 13.0  --  12.4*  --   --  10.1*  --   --   --    HCT 40.7   < > 39.0   < >   < > 31.0* 28.0* 28.0*  28.0* 29.0*   *  --  127*  --   --  119*  --   --   --     < > = values in this interval not displayed.       Lab Results   Component Value Date     05/26/2022    K 4.6 05/26/2022    K 4.2 04/09/2022     05/26/2022    CO2 25 05/26/2022    BUN 24 05/26/2022    CREATININE 1.2 05/26/2022    GLUCOSE 106 05/26/2022    CALCIUM 8.5 05/26/2022         Physical Exam:    BP (!) 120/51   Pulse 84   Temp 100.2 °F (37.9 °C) (Core)   Resp 21   Ht 5' 10\" (1.778 m)   Wt 199 lb 8 oz (90.5 kg) SpO2 95%   BMI 28.63 kg/m²       CXR pending     General: Awake, alert. Intubated on ventilator   Eyes: PERRL, anicteric   Pulmonary: Diminished bibasilar. No wheezes, no accessory muscle use noted   Ventilator: Mode: PSV, 40 FiO2, 8 PEEP, 8 PS  Cardiovascular:  RRR, no heaves or thrills on palpation  Tele: SR  Abdomen: Soft, nontender, OG to LIWS  Extremities: Palpable pulses all extremities, no edema   Neurologic/Psych: intubated, awake following commands   Skin: Warm and dry  Incisions: MSI with brinda dressing intact, RLE SVG sites with ace wrap on, left groin stitch       Assessment/Plan: POD #1  1. VHD/CAD S/p Urgent Aortic valve repair by leaflet debridement/Complex mitral valve replacement with annular debridement and a 33mm Medtronic Mosaic bioprosthetic/Tricuspid valve repair by bicuspidization, CABG x 3 (LIMA-LAD, SVG-Diag, SVG-PDA)/WALLACE exclusion with 40mm AtriClip  - ASA, Lipitor   - ABX  - IV amio for afib prophylaxis   - Chest tube with minimal output, will plan to remove MS chest tube later today      2. Acute Pulmonary Insufficiency Following Surgery  - Expected 2/2 surgery, Hx of severe copd     -Remains intubated, tolerating PSV, plan to extubate this AM. Dian     3. Postoperative hypotension   -Normal biventricular fxn   -Maps ~80, Weaning inopressors, CI 3.2, , lactic normalized      4. JAMIE   - Scr 1.3-->0. 9 preop   - Scr stable 1.2. Nephrology following   - Avoid hypotension      5. DMII   -Off RHI gtt, change to SSI q 4hrs      6. Acute Post Operative Pain   - PRN fentanyl for pain management until able to take PO      7. Urinary retention   -Hx of BPH s/p TURP   -evaluated by urology preop   -hernandez to GD      8. UTI  -Daptomycin and meropenem per ID      9.  Hx of Multiple sclerosis         VTE Prophylaxis: Pharmacologic/Mechanical:  Yes, SCDs   Line infection prevention: Can CVC or arterial line be removed: No   Continued need for urinary catheter:  Yes - clinical indication: Patient post major surgery requiring fluid balance and input and output measurement. This patient has a high probability of sudden deterioration, which requires preparedness to urgently intervene. I participated in the decision making process and managed the direct care of the patient that required frequent assessment and treatment. I personally spent 40 minutes of critical care time treating the patient.        Dispo: CVICU    Electronically signed by SHANIQUE Sherman CNP on 5/26/2022 at 9:19 AM

## 2022-05-26 NOTE — PLAN OF CARE
Problem: Discharge Planning  Goal: Discharge to home or other facility with appropriate resources  Outcome: Progressing     Problem: Chronic Conditions and Co-morbidities  Goal: Patient's chronic conditions and co-morbidity symptoms are monitored and maintained or improved  5/26/2022 1054 by Kailey Lisa RN  Outcome: Progressing     Problem: Skin/Tissue Integrity  Goal: Absence of new skin breakdown  Description: 1. Monitor for areas of redness and/or skin breakdown  2. Assess vascular access sites hourly  3. Every 4-6 hours minimum:  Change oxygen saturation probe site  4. Every 4-6 hours:  If on nasal continuous positive airway pressure, respiratory therapy assess nares and determine need for appliance change or resting period.   5/26/2022 1054 by Kailey Lisa RN  Outcome: Progressing     Problem: Safety - Adult  Goal: Free from fall injury  5/26/2022 1054 by Kailey Lisa RN  Outcome: Progressing     Problem: Cardiovascular - Adult  Goal: Maintains optimal cardiac output and hemodynamic stability  5/26/2022 1054 by Kailey Lisa RN  Outcome: Progressing     Problem: Cardiovascular - Adult  Goal: Absence of cardiac dysrhythmias or at baseline  5/26/2022 1054 by Kailey Lisa RN  Outcome: Progressing     Problem: Metabolic/Fluid and Electrolytes - Adult  Goal: Hemodynamic stability and optimal renal function maintained  5/26/2022 1054 by Kailey Lisa RN  Outcome: Progressing     Problem: Metabolic/Fluid and Electrolytes - Adult  Goal: Electrolytes maintained within normal limits  5/26/2022 1054 by Kailey Lisa RN  Outcome: Progressing     Problem: Metabolic/Fluid and Electrolytes - Adult  Goal: Glucose maintained within prescribed range  5/26/2022 1054 by Kailey Lisa RN  Outcome: Progressing     Problem: Respiratory - Adult  Goal: Achieves optimal ventilation and oxygenation  5/26/2022 1054 by Kailey Lisa RN  Outcome: Progressing     Problem: Skin/Tissue Integrity - Adult  Goal: Skin integrity remains intact  5/26/2022 1054 by Julia Kelly RN  Outcome: Progressing     Problem: Skin/Tissue Integrity - Adult  Goal: Incisions, wounds, or drain sites healing without S/S of infection  5/26/2022 1054 by Julia Kelly RN  Outcome: Progressing     Problem: Skin/Tissue Integrity - Adult  Goal: Oral mucous membranes remain intact  5/26/2022 1054 by Julia Kelly RN  Outcome: Progressing     Problem: Musculoskeletal - Adult  Goal: Return mobility to safest level of function  5/26/2022 1054 by Julia Kelly RN  Outcome: Progressing     Problem: Gastrointestinal - Adult  Goal: Minimal or absence of nausea and vomiting  5/26/2022 1054 by Julia Kelly RN  Outcome: Progressing     Problem: Gastrointestinal - Adult  Goal: Maintains or returns to baseline bowel function  5/26/2022 1054 by Julia Kelly RN  Outcome: Progressing     Problem: Gastrointestinal - Adult  Goal: Maintains adequate nutritional intake  5/26/2022 1054 by Julia Kelly RN  Outcome: Progressing     Problem: Genitourinary - Adult  Goal: Absence of urinary retention  5/26/2022 1054 by Julia Kelly RN  Outcome: Progressing     Problem: Genitourinary - Adult  Goal: Urinary catheter remains patent  5/26/2022 1054 by Julia Kelly RN  Outcome: Progressing     Problem: Infection - Adult  Goal: Absence of infection at discharge  5/26/2022 1054 by Julia Kelly RN  Outcome: Progressing     Problem: Infection - Adult  Goal: Absence of infection during hospitalization  5/26/2022 1054 by Julia Kelly RN  Outcome: Progressing

## 2022-05-26 NOTE — PROGRESS NOTES
Department of Internal Medicine  Nephrology Attending Consult Note    Events reviewed. SUBJECTIVE: We are following Mr. Dorcas Enrique for JAMIE. Reports no complaints. PHYSICAL EXAM:      Vitals:    VITALS:  BP (!) 120/51   Pulse 87   Temp 100.2 °F (37.9 °C) (Core)   Resp 22   Ht 5' 10\" (1.778 m)   Wt 199 lb 8 oz (90.5 kg)   SpO2 96%   BMI 28.63 kg/m²   24HR INTAKE/OUTPUT:      Intake/Output Summary (Last 24 hours) at 5/26/2022 0945  Last data filed at 5/26/2022 0900  Gross per 24 hour   Intake 5034.27 ml   Output 4395 ml   Net 639.27 ml       Constitutional: Patient is awake alert in no distress.   HEENT: Pupils are equal reactive  Respiratory: Lungs are clear  Cardiovascular/Edema: Heart sounds are regular  Gastrointestinal: Abdomen soft, hypoactive bowel sounds  Neurologic: chemical sedation  Skin: s/p CABG  Other: No edema    Scheduled Meds:   aspirin  81 mg Oral Daily    insulin lispro  0-12 Units SubCUTAneous Q4H    sodium chloride flush  5-40 mL IntraVENous 2 times per day    chlorhexidine  15 mL Mouth/Throat BID    magnesium oxide  400 mg Oral Daily    mupirocin   Nasal BID    sennosides-docusate sodium  1 tablet Oral BID    pantoprazole  40 mg Oral Daily    ipratropium-albuterol  1 ampule Inhalation Q4H WA    insulin glargine  0.15 Units/kg SubCUTAneous Nightly    atorvastatin  40 mg Oral Nightly    daptomycin (CUBICIN) in NS IV syringe  6 mg/kg (Ideal) IntraVENous Q24H    meropenem  1,000 mg IntraVENous Q8H     Continuous Infusions:   sodium chloride 30 mL/hr (05/26/22 0600)    sodium chloride 25 mL/hr at 05/25/22 1628    propofol Stopped (05/26/22 0755)    sodium chloride      norepinephrine      insulin Stopped (05/26/22 0407)    dextrose      EPINEPHrine infusion 6 mcg/min (05/26/22 0723)    vasopressin (Septic Shock) infusion 0.04 Units/min (05/26/22 0924)    amiodarone 450mg/250ml D5W infusion 0.5 mg/min (05/26/22 0600)     PRN Meds:.sodium chloride flush, sodium chloride, ondansetron **OR** ondansetron, acetaminophen, oxyCODONE **OR** oxyCODONE, fentanNYL **OR** fentanNYL, propofol, magnesium hydroxide, bisacodyl, potassium chloride, magnesium sulfate, calcium chloride IVPB **OR** calcium chloride IVPB, albumin human, sodium chloride, norepinephrine, glucose, dextrose **OR** dextrose, glucagon (rDNA), dextrose      DATA:    CBC with Differential:    Lab Results   Component Value Date    WBC 25.8 05/26/2022    RBC 3.28 05/26/2022    HGB 10.1 05/26/2022    HCT 29.0 05/26/2022     05/26/2022    MCV 94.5 05/26/2022    MCH 30.8 05/26/2022    MCHC 32.6 05/26/2022    RDW 14.4 05/26/2022    NRBC 0.0 01/17/2022    SEGSPCT 70 06/08/2011    LYMPHOPCT 7.3 04/20/2022    MONOPCT 9.5 04/20/2022    MYELOPCT 2.6 01/04/2022    BASOPCT 0.5 04/20/2022    MONOSABS 0.72 04/20/2022    LYMPHSABS 0.55 04/20/2022    EOSABS 0.17 04/20/2022    BASOSABS 0.04 04/20/2022     CMP:    Lab Results   Component Value Date     05/26/2022    K 4.6 05/26/2022    K 4.2 04/09/2022     05/26/2022    CO2 25 05/26/2022    BUN 24 05/26/2022    CREATININE 1.2 05/26/2022    GFRAA >60 05/26/2022    LABGLOM 58 05/26/2022    GLUCOSE 106 05/26/2022    PROT 5.5 05/26/2022    LABALBU 3.8 05/26/2022    CALCIUM 8.5 05/26/2022    BILITOT 0.6 05/26/2022    ALKPHOS 59 05/26/2022    AST 45 05/26/2022    ALT 10 05/26/2022     Magnesium:    Lab Results   Component Value Date    MG 2.0 05/25/2022     Phosphorus:    Lab Results   Component Value Date    PHOS 3.3 11/14/2016       BRIEF SUMMARY OF INITIAL CONSULT:    Briefly Mr. Tiny Cole is a 40-year-old man with history of HTN, type II DM, nephrolithiasis s/p lithotripsy, CAD, MVP with significant MR, MV endocarditis, paroxysmal SVT,multiple sclerosis, asbestosis, erectile dysfunction status post penile implant, BPH status post TURP, bladder cancer, Washburn's esophagus, recent admission for pneumonia complicated with right parapneumonic effusion s/p right thoracotomy with decortication who presented to the hospital today 5/23/22 for CABG and valve replacements with Dr. Josselyn Hall. However, the surgery was cancelled as Cardiothoracic surgery would like patient optimized before proceeding with procedure. To note, patient did have lab work drawn on 5/18/22 which demonstrated an elevated creatinine of 1.3mg/dL. Repeat Lab work today revealed creatinine 1.1mg/dL, ProBNP 1,002. Renal was consulted for optimization before surgery. His baseline creatinine is 0.8-0.9 mg/dL     Problems resolved:    · JAMIE, stage I,  2/2 obstructive uropathy--, hernandez catheter placed and urology consulted-- renal function improved over the last 24 hrs, creatinine 0.9 mg/dl today. IMPRESSION/RECOMMENDATIONS:        1. Recurrent JAMIE, creatinine level mildly increased, probably hemodynamically mediated postsurgery. Presently with adequate urine output, we will continue to monitor renal function  2. Hemodynamic shock, following CABG on vaso and epi to maintain MAP >65  3. HFpEF, LVEF 60-65% on 4/7/22 echo. ProBnp 1,002  4. Lactic acidosis, 2/2 #2, resolved  5. History of Nephrolithiasis  6. Obstructive Uropathy--hernandez catheter placed, urology evaluating  -----------------------------------------------------------  7. CAD s/p 3V CABG with AVR, TVR, MVR on 5/25/2022  8. PAF, on amiodarone and Metoprolol  9. History of lung decortication due to empyema   10. UTI, ID consulted  6. Multiple Sclerosis   12. Anemia s/p surgery  13.  Nutrition, still n.p.o.    Plan:    · Continue to monitor renal function, BMP at 4 PM  · Continue hernandez catheter      Electronically signed by Smiley Browning MD on 5/26/2022 at 9:45 AM

## 2022-05-26 NOTE — PROGRESS NOTES
Patient was extubated to 4 liters/min via nasal cannula. Breath Sounds post extubation were diminished. Stridor was not present post extubation. SPO2 was 96%. [facility]

## 2022-05-26 NOTE — PROGRESS NOTES
Patient intubated and sedated. Attempting to pull at critical lines and tubes. Soft bilateral wrist restraints placed on patient for safety.  Will continue to monitor

## 2022-05-26 NOTE — OP NOTE
[address]                  [address] [facility],  [facility]                                OPERATIVE REPORT    PATIENT NAME: Megan Sam                    :        1945  MED REC NO:   [id number]                            ROOM:       3818  ACCOUNT NO:   [de-identified]                           ADMIT DATE: 2022  PROVIDER:     Patsy Acevedo MD    DATE OF PROCEDURE:  2022    PREOPERATIVE DIAGNOSES:  Moderate aortic insufficiency, moderate mitral  stenosis, severe mitral regurgitation, severe tricuspid regurgitation,  severe multivessel coronary artery disease, history of SVT, history of  right-sided empyema with thoracotomy, anxiety, bronchitis, diabetes,  history of possible endocarditis, hiatal hernia, hypertension, multiple  sclerosis, prostatitis. POSTOPERATIVE DIAGNOSES:  Moderate aortic insufficiency, moderate mitral  stenosis, severe mitral regurgitation, severe tricuspid regurgitation,  severe multivessel coronary artery disease, history of SVT, history of  right-sided empyema with thoracotomy, anxiety, bronchitis, diabetes,  history of possible endocarditis, hiatal hernia, hypertension, multiple  sclerosis, prostatitis. INDICATIONS:  Moderate aortic insufficiency, moderate mitral stenosis,  severe mitral regurgitation, severe tricuspid regurgitation, severe  multivessel coronary artery disease, history of SVT, history of  right-sided empyema with thoracotomy, anxiety, bronchitis, diabetes,  history of possible endocarditis, hiatal hernia, hypertension, multiple  sclerosis, prostatitis. SURGEON:  Patsy Acevedo MD    ASSISTANT:  Dr. Vito Lanza (no other resident was adequately  trained to be able to assist). Dr. Morgan Ku was present and assisting  throughout the critical portion of the operation.     SECOND ASSISTANTS:  DERRELL Daley; Tanvi Major NP; SHARON Abdul harvested the vein).    COMPLICATIONS:  None, tolerated well. ESTIMATED BLOOD LOSS:  Approximately 1000 mL. ANESTHESIA:  General endotracheal.    ANESTHESIA ASSISTANT:  Giuliano Lee DO    SPECIMENS OBTAINED:  Include ruptured chord of the mitral valve and the anterior  leaflet of the mitral valve. DRAINS:  Two in the mediastinum, one in the left chest and one in the  right chest.    OPERATIONS:  1. Urgent sternotomy. 2.  Urgent aortic valve repair with bileaflet debridement. 3.  Complex mitral valve replacement with annular debridement and a  33-mm Medtronic Mosaic bioprosthesis. 4.  Tricuspid valve repair by bicuspidization. 5.  Coronary artery bypass grafting x3; left internal mammary artery to  the LAD, saphenous vein graft to the diagonal branch to the LAD,  saphenous vein graft to the PDA. 6.  Left atrial appendage exclusion with a 40-mm AtriClip. 7.  Endoscopic harvesting of right lower extremity greater saphenous  vein. 8.  Rigid internal fixation of sternum with [place] plates x2.  9.  22 Modifier. HISTORY:  This is a 68-year-old man who has had a complex last six  months. He was noted to have increase in dyspnea on exertion, underwent  a transesophageal echo which showed terribly calcified mitral valve with  moderate mitral stenosis and severe mitral regurgitation. Also was  noted to have moderate aortic insufficiency and moderate-to-severe  tricuspid regurgitation. He underwent cardiac cath, showing severe  multivessel coronary artery disease. He was evaluated by heart team and  it was felt that there was nothing significant that could be done  percutaneously and that his only option was surgical repair. The  patient was described the procedure in full including risks and  complications including but not limited to bleeding, infection, need for  reoperation, hemothorax, pneumothorax, stroke, myocardial infarction,  and death; and the patient agreed to proceed.     FINDINGS:  Preoperative JOEL revealed preserved ejection fraction with  the above findings on his JOEL. Intraoperatively left internal mammary  artery was a very thin wall and small vessel, but had decent flow. The  vein was relatively small, but fair-to-good conduit for bypass. All of  his coronary arteries were extremely thin-walled and very weak and back  all of his tissues were very weak in quality. His LAD was 1.8 mm vessel  and a good target for bypass. His diagonal branch was a 1.8 mm vessel  and a good target for bypass, and the PDA was a 1.5 mm vessel and a fair  target for bypass. We approached his mitral valve and he had horrendous  calcification of his lateral commissure, this was extending up to the  aortic mitral continuity. Extensive and tedious dissection and repeated  attempts at placing sutures was necessary and because of dissection work  and the extent of the operation, certainly a 22 Modifier was indicated. I did debride extensive amounts of A1 and A2 as well as P1 and the  remainder of the valve was essentially left in place. Upon doing this  and replacing the valve, we then examined the aortic valve with  intention of replacement; however, with the placement in the mitral  valve, the valve sutures and calcifications and the subvalvular space of  the aortic valve was very unfavorable. I was worried that if I removed this aortic  valve, getting a new aortic valve in would be almost impossible. Because of this, there was some mild calcification at the tip of the  leaflet of the right coronary cusp and this was removed, and we avoided  any further manipulation. The tricuspid valve was repaired by  bicuspidization, excluding the posterior leaflet. The patient was   from cardiopulmonary bypass with the assistance of a moderate  amount of ionotropic support. Postoperative JOEL revealed preserved  ejection fraction with a well-functioning bioprosthesis with no  transvalvular and a trace perivalvular leak. The mean gradient was 2. Tricuspid valve with trace TR and mean gradient of 1. The aortic valve  with the same residual moderate aortic insufficiency. Because the  patient had CHADS-VASc score of 3 with history of SVT, there was obvious  high risk for postoperative atrial fibrillation, so an AtriClip was  placed at the base of the left atrial appendage. The patient was  transferred to cardiothoracic intensive care unit in critical condition. All sponge, instrument, and needle counts were correct at the end of the  case. DESCRIPTION OF PROCEDURE:  After adequate informed consent was obtained  and adequate preoperative antibiotics were given, the patient was  brought to the operating room in stable condition. He was laid in the  supine position and induced under general endotracheal anesthesia by the  Anesthesia staff. Davidson catheter and adequate monitoring lines were  placed including a [place]-Odilia catheter. The patient's chest, abdomen,  pelvis, lower extremities were prepped and draped in the usual sterile  fashion. Right lower extremity greater saphenous vein was harvested in  an endoscopic technique and prepared on the back table for anastomosis. These wounds were closed in multiple layers of absorbable stitch. Standard sternotomy was performed. Left internal mammary artery was  harvested in a pedicle fashion. The patient was systemically  heparinized and the mammary was clipped distally and transected. Excellent pulsatile flow was noted. It was injected with papaverine,  clipped distally, and placed in the left chest.  Frances retractor was  then placed and the pericardium was entered and tacked to the chest  wall. After assuring adequate ACT, the patient was instituted onto  cardiopulmonary bypass by cannulating the ascending aorta using an  18-Upper sorbian aortic cannula and the SVC and IVC respectively.   The IVC was  cannulated via the left femoral vein in a modified Seldinger technique  under echo guidance. Retrograde catheter and root vent were placed. The aorta was cross-clamped and the heart was arrested with cold blood  antegrade and retrograde Buckberg cardioplegia. Excellent diastolic  arrest was noted. Topical ice was used and cardioplegia was given  intermittently throughout the remainder of the operation. 40-mm  AtriClip was placed at the base of the left atrial appendage. The vein  was brought into the field and was grafted to the diagonal branch of the  LAD using 7-0 Prolene. It was brought to the ascending aorta and  proximal anastomosis was created using 6-0 Prolene. The vein was then  grafted to the PDA using 7-0 Prolene. The mammary artery was then  brought into the field and was grafted to the LAD in its mid portion  using 7-0 Prolene. The pedicle was tacked to the epicardium. Interatrial groove was developed and the left atrium was entered. Visualization of the mitral valve was adequate. The above findings were  noted. Extensive and tedious dissection was ensued to remove almost all  of A2, A1 and P1. This was debrided back into the annulus where the  entire aortic mitral continuity was calcium. The placement of the  sutures in this area was extremely difficult and I used an SH needle to  go around a good portion of the calcium. 2-0 Ethibond sutures were  placed circumferentially around the annulus with pledgets on the  ventricular side. 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 I measured the annulus to  fit a 33-mm Mosaic bioprosthesis. The sutures were passed through the  valve. The valve was seated appropriately and all sutures were tied  using Cor-Knot. I closed the atrium in two layers with 3-0 Prolene. I  then did a standard aortotomy and the above findings for the aortic  valve were noted.   The small calcification on the right coronary cusps  were then removed and I felt that the leaflets were relatively friable  and because of the subvalvular apparatus, I did not feel replacement  would be achievable, and so I closed the aortotomy in two layers of 4-0  Prolene. We had a proximal anastomosis to the PDA was then placed on  the ascending aorta using 6-0 Prolene. I came around and taped the SVC  and IVC. I entered the right atrium. Visualization of the tricuspid  valve was excellent. A single 4-0 Prolene horizontal mattress pledget  suture was then used to do a bicuspidization of the valve eliminating  the posterior leaflet. I tested the valve and noted to be competent. The patient was placed in Trendelenburg position. Warm retrograde shot  was given. The cross-clamp was released. The patient eventually  returned to sinus rhythm. Ventricular and atrial pacing wires were  placed. The right atrium was closed in two layers of 4-0 Prolene. The  patient was then easily weaned from cardiopulmonary bypass with the  assistance of moderate amount of inotropic support. Once all bypassed,  the patient was decannulated in the usual fashion and protamine was  given. Mediastinum was inspected for hemostasis. Hemostasis was  achieved using Bovie electrocautery and stitches. Two drains were  placed in the mediastinum, one in the left chest and one in the right  chest.  Of note, there was adhesions in the right chest and a Wonda Osmin was  placed just barely into the pleural space. With the patient warm and  hemodynamically stable and in sinus rhythm, I closed the chest using #6  steel wires including double wires as well as rigid internal fixation of  the sternum with [place] plates x2. The remainder of the wounds were  closed in multiple layers of absorbable stitch. Dry sterile dressing  was applied overtop. The patient tolerated the procedure well. The  patient was transferred to cardiothoracic intensive care unit in  critical condition.   All sponges, instruments, and needle counts were  correct at the end of the case.         Jack Martinez MD    D: 05/25/2022 15:55:52       T: 05/26/2022 0:53:20     [id number]  Job#: [id number]     Doc#: [id number]    CC:  MD Sharmin Marie, MD Presley Guajardo, DO Ananda Deng, MD Vanda Norman, DO Se Epstein, MD Shilo Narayan, MD David Coats, MD Pranav Barlow MD

## 2022-05-26 NOTE — PROGRESS NOTES
Patient extubated to 6L NC per RT. Lung sounds clear, patient following commands, OTERO equally.  Voice intact

## 2022-05-26 NOTE — CARE COORDINATION
Pod #1 s/p Aortic Valve repair, MVR, Tricuspid repair, Cabg x3. Extubated today. On 4L o2. Weaning iv vaso and epi. On iv amio for a fib prophylaxis. Met with pt. No family in room at present. Pt verified that domestic partner Alvaro Summers will be with him 24/7 when discharged to home. [facility] is following.

## 2022-05-26 NOTE — PLAN OF CARE
Problem: Discharge Planning  Goal: Discharge to home or other facility with appropriate resources  5/25/2022 1735 by Cristina Eaton RN  Outcome: Not Progressing  Flowsheets (Taken 5/25/2022 1305)  Discharge to home or other facility with appropriate resources:   Identify barriers to discharge with patient and caregiver   Arrange for needed discharge resources and transportation as appropriate     Problem: Chronic Conditions and Co-morbidities  Goal: Patient's chronic conditions and co-morbidity symptoms are monitored and maintained or improved  5/26/2022 0035 by Sarika Hammond RN  Outcome: Progressing  5/25/2022 1735 by Cristina Eaton RN  Outcome: Progressing  Flowsheets (Taken 5/25/2022 1305)  Care Plan - Patient's Chronic Conditions and Co-Morbidity Symptoms are Monitored and Maintained or Improved:   Monitor and assess patient's chronic conditions and comorbid symptoms for stability, deterioration, or improvement   Collaborate with multidisciplinary team to address chronic and comorbid conditions and prevent exacerbation or deterioration     Problem: Skin/Tissue Integrity  Goal: Absence of new skin breakdown  Description: 1. Monitor for areas of redness and/or skin breakdown  2. Assess vascular access sites hourly  3. Every 4-6 hours minimum:  Change oxygen saturation probe site  4. Every 4-6 hours:  If on nasal continuous positive airway pressure, respiratory therapy assess nares and determine need for appliance change or resting period.   5/26/2022 0035 by Sarika Hammond RN  Outcome: Progressing  5/25/2022 1735 by Cristina Eaton RN  Outcome: Progressing     Problem: Safety - Adult  Goal: Free from fall injury  5/26/2022 0035 by Sarika Hammond RN  Outcome: Progressing  5/25/2022 1735 by Cristina Eaton RN  Outcome: Progressing     Problem: Cardiovascular - Adult  Goal: Maintains optimal cardiac output and hemodynamic stability  5/26/2022 0035 by Sarika Hammond RN  Outcome: Progressing  5/25/2022 1735 by Jose E Moody RN  Outcome: Progressing  Flowsheets (Taken 5/25/2022 1305)  Maintains optimal cardiac output and hemodynamic stability:   Monitor blood pressure and heart rate   Monitor urine output and notify Licensed Independent Practitioner for values outside of normal range   Assess for signs of decreased cardiac output   Administer fluid and/or volume expanders as ordered   Administer vasoactive medications as ordered  Goal: Absence of cardiac dysrhythmias or at baseline  5/26/2022 0035 by Denise Pantoja RN  Outcome: Progressing  5/25/2022 1735 by Jose E Moody RN  Outcome: Progressing  Flowsheets (Taken 5/25/2022 1305)  Absence of cardiac dysrhythmias or at baseline:   Monitor cardiac rate and rhythm   Assess for signs of decreased cardiac output   Administer antiarrhythmia medication and electrolyte replacement as ordered     Problem: Metabolic/Fluid and Electrolytes - Adult  Goal: Hemodynamic stability and optimal renal function maintained  5/26/2022 0035 by Denise Pantoja RN  Outcome: Progressing  5/25/2022 1735 by Jose E Moody RN  Outcome: Progressing  Flowsheets (Taken 5/25/2022 1305)  Hemodynamic stability and optimal renal function maintained:   Monitor labs and assess for signs and symptoms of volume excess or deficit   Monitor intake, output and patient weight   Monitor urine specific gravity, serum osmolarity and serum sodium as indicated or ordered  Goal: Electrolytes maintained within normal limits  5/26/2022 0035 by Denise Pantoja RN  Outcome: Progressing  5/25/2022 1735 by Jose E Moody RN  Outcome: Progressing  Flowsheets (Taken 5/25/2022 1305)  Electrolytes maintained within normal limits:   Monitor labs and assess patient for signs and symptoms of electrolyte imbalances   Administer electrolyte replacement as ordered   Monitor response to electrolyte replacements, including repeat lab results as appropriate  Goal: Glucose maintained within prescribed range  5/26/2022 0035 by Denise Pantoja RN  Outcome: Progressing  5/25/2022 1735 by Elroy Nicole RN  Outcome: Progressing  Flowsheets (Taken 5/25/2022 1305)  Glucose maintained within prescribed range:   Monitor blood glucose as ordered   Assess for signs and symptoms of hyperglycemia and hypoglycemia   Administer ordered medications to maintain glucose within target range     Problem: Pain  Goal: Verbalizes/displays adequate comfort level or baseline comfort level  5/26/2022 0035 by Ahsan Langley RN  Outcome: Progressing  5/25/2022 1735 by Elroy Nicole RN  Outcome: Progressing  Flowsheets (Taken 5/25/2022 1305)  Verbalizes/displays adequate comfort level or baseline comfort level:   Assess pain using appropriate pain scale   Administer analgesics based on type and severity of pain and evaluate response   Implement non-pharmacological measures as appropriate and evaluate response     Problem: Respiratory - Adult  Goal: Achieves optimal ventilation and oxygenation  5/26/2022 0035 by Ahsan Langley RN  Outcome: Progressing  5/25/2022 1735 by Elroy Nicole RN  Outcome: Progressing  Flowsheets (Taken 5/25/2022 1305)  Achieves optimal ventilation and oxygenation:   Assess for changes in respiratory status   Position to facilitate oxygenation and minimize respiratory effort   Oxygen supplementation based on oxygen saturation or arterial blood gases   Assess the need for suctioning and aspirate as needed   Respiratory therapy support as indicated     Problem: Skin/Tissue Integrity - Adult  Goal: Skin integrity remains intact  5/26/2022 0035 by Ahsan Langley RN  Outcome: Progressing  5/25/2022 1735 by Elroy iNcole RN  Outcome: Progressing  Flowsheets (Taken 5/25/2022 1305)  Skin Integrity Remains Intact:   Monitor for areas of redness and/or skin breakdown   Every 4-6 hours minimum: Change oxygen saturation probe site   Assess vascular access sites hourly  Goal: Incisions, wounds, or drain sites healing without S/S of infection  5/26/2022 0035 by Yung Byers Pearl Garcia RN  Outcome: Progressing  5/25/2022 1735 by Enriqueta Higgins RN  Outcome: Progressing  Flowsheets (Taken 5/25/2022 1305)  Incisions, Wounds, or Drain Sites Healing Without Sign and Symptoms of Infection:   ADMISSION and DAILY: Assess and document risk factors for pressure ulcer development   Implement wound care per orders  Goal: Oral mucous membranes remain intact  5/26/2022 0035 by Richard Acharya RN  Outcome: Progressing  5/25/2022 1735 by Enriqueta Higgins RN  Outcome: Progressing  Flowsheets (Taken 5/25/2022 1305)  Oral Mucous Membranes Remain Intact:   Assess oral mucosa and hygiene practices   Implement preventative oral hygiene regimen   Implement oral medicated treatments as ordered     Problem: Musculoskeletal - Adult  Goal: Return mobility to safest level of function  5/26/2022 0035 by Richard Acharya RN  Outcome: Progressing  5/25/2022 1735 by Enriqueta Higgins RN  Outcome: Progressing  Flowsheets (Taken 5/25/2022 1305)  Return Mobility to Safest Level of Function:   Obtain physical therapy/occupational therapy consults as needed   Assess patient stability and activity tolerance for standing, transferring and ambulating with or without assistive devices     Problem: Gastrointestinal - Adult  Goal: Minimal or absence of nausea and vomiting  5/26/2022 0035 by Richard Acharya RN  Outcome: Progressing  5/25/2022 1735 by Enriqueta Higgins RN  Outcome: Progressing  Flowsheets (Taken 5/25/2022 1305)  Minimal or absence of nausea and vomiting:   Administer IV fluids as ordered to ensure adequate hydration   Maintain NPO status until nausea and vomiting are resolved   Nasogastric tube to low intermittent suction as ordered  Goal: Maintains or returns to baseline bowel function  5/26/2022 0035 by iRchard Acharya RN  Outcome: Progressing  5/25/2022 1735 by Enriqueta Higgins RN  Outcome: Progressing  Flowsheets (Taken 5/25/2022 1305)  Maintains or returns to baseline bowel function:   Assess bowel function   Administer IV Nahid Ashby, RN  Outcome: Progressing  Flowsheets (Taken 5/25/2022 1305)  Maintains hematologic stability:   Assess for signs and symptoms of bleeding or hemorrhage   Monitor labs for bleeding or clotting disorders     Problem: Safety - Medical Restraint  Goal: Remains free of injury from restraints (Restraint for Interference with Medical Device)  Description: INTERVENTIONS:  1. Determine that other, less restrictive measures have been tried or would not be effective before applying the restraint  2. Evaluate the patient's condition at the time of restraint application  3. Inform patient/family regarding the reason for restraint  4.  Q2H: Monitor safety, psychosocial status, comfort, nutrition and hydration  Outcome: Progressing

## 2022-05-26 NOTE — PROGRESS NOTES
Physical Therapy  Physical Therapy Attempt    Name: Sonu Vasquez  : 1945  MRN: [id number]      Date of Service: 2022  Chart reviewed. Spoke with NP - plan for extubation today. Will hold initial evaluation and re-attempt as able.     Melissa Drew, PT, DPT  [id number]

## 2022-05-27 ENCOUNTER — TELEPHONE (OUTPATIENT)
Dept: CARDIOTHORACIC SURGERY | Age: 77
End: 2022-05-27

## 2022-05-27 ENCOUNTER — APPOINTMENT (OUTPATIENT)
Dept: GENERAL RADIOLOGY | Age: 77
DRG: 219 | End: 2022-05-27
Attending: THORACIC SURGERY (CARDIOTHORACIC VASCULAR SURGERY)
Payer: MEDICARE

## 2022-05-27 LAB
ALBUMIN SERPL-MCNC: 3.3 G/DL (ref 3.5–5.2)
ALP BLD-CCNC: 68 U/L (ref 40–129)
ALT SERPL-CCNC: 12 U/L (ref 0–40)
ANION GAP SERPL CALCULATED.3IONS-SCNC: 10 MMOL/L (ref 7–16)
AST SERPL-CCNC: 37 U/L (ref 0–39)
B.E.: 3.4 MMOL/L (ref -3–3)
BILIRUB SERPL-MCNC: 0.8 MG/DL (ref 0–1.2)
BUN BLDV-MCNC: 31 MG/DL (ref 6–23)
CALCIUM IONIZED: 1.22 MMOL/L (ref 1.15–1.33)
CALCIUM SERPL-MCNC: 8.4 MG/DL (ref 8.6–10.2)
CHLORIDE BLD-SCNC: 107 MMOL/L (ref 98–107)
CO2: 22 MMOL/L (ref 22–29)
COHB: 0.5 % (ref 0–1.5)
CREAT SERPL-MCNC: 0.8 MG/DL (ref 0.7–1.2)
CRITICAL: ABNORMAL
DATE ANALYZED: ABNORMAL
DATE OF COLLECTION: ABNORMAL
GFR AFRICAN AMERICAN: >60
GFR NON-AFRICAN AMERICAN: >60 ML/MIN/1.73
GLUCOSE BLD-MCNC: 102 MG/DL (ref 74–99)
HCO3: 27 MMOL/L (ref 22–26)
HCT VFR BLD CALC: 31.1 % (ref 37–54)
HEMOGLOBIN: 10.1 G/DL (ref 12.5–16.5)
HHB: 6.4 % (ref 0–5)
LAB: ABNORMAL
LACTIC ACID: 0.9 MMOL/L (ref 0.5–2.2)
LACTIC ACID: 1 MMOL/L (ref 0.5–2.2)
LACTIC ACID: 2 MMOL/L (ref 0.5–2.2)
Lab: ABNORMAL
MCH RBC QN AUTO: 30.9 PG (ref 26–35)
MCHC RBC AUTO-ENTMCNC: 32.5 % (ref 32–34.5)
MCV RBC AUTO: 95.1 FL (ref 80–99.9)
METER GLUCOSE: 67 MG/DL (ref 74–99)
METER GLUCOSE: 78 MG/DL (ref 74–99)
METER GLUCOSE: 79 MG/DL (ref 74–99)
METER GLUCOSE: 85 MG/DL (ref 74–99)
METER GLUCOSE: 87 MG/DL (ref 74–99)
METER GLUCOSE: 94 MG/DL (ref 74–99)
METER GLUCOSE: 97 MG/DL (ref 74–99)
METHB: 0.3 % (ref 0–1.5)
MODE: ABNORMAL
O2 SATURATION: 93.5 % (ref 92–98.5)
O2HB: 92.8 % (ref 94–97)
OPERATOR ID: 7221
PATIENT TEMP: 37 C
PCO2: 37.2 MMHG (ref 35–45)
PDW BLD-RTO: 14.6 FL (ref 11.5–15)
PH BLOOD GAS: 7.48 (ref 7.35–7.45)
PLATELET # BLD: 85 E9/L (ref 130–450)
PLATELET CONFIRMATION: NORMAL
PMV BLD AUTO: 11 FL (ref 7–12)
PO2: 69.5 MMHG (ref 75–100)
POTASSIUM SERPL-SCNC: 4.6 MMOL/L (ref 3.5–5)
POTASSIUM SERPL-SCNC: 5.7 MMOL/L (ref 3.5–5)
RBC # BLD: 3.27 E12/L (ref 3.8–5.8)
SODIUM BLD-SCNC: 139 MMOL/L (ref 132–146)
SOURCE, BLOOD GAS: ABNORMAL
THB: 11.1 G/DL (ref 11.5–16.5)
TIME ANALYZED: 505
TOTAL PROTEIN: 5.4 G/DL (ref 6.4–8.3)
WBC # BLD: 18.5 E9/L (ref 4.5–11.5)

## 2022-05-27 PROCEDURE — 6370000000 HC RX 637 (ALT 250 FOR IP): Performed by: NURSE PRACTITIONER

## 2022-05-27 PROCEDURE — 97166 OT EVAL MOD COMPLEX 45 MIN: CPT

## 2022-05-27 PROCEDURE — 6360000002 HC RX W HCPCS: Performed by: NURSE PRACTITIONER

## 2022-05-27 PROCEDURE — 83605 ASSAY OF LACTIC ACID: CPT

## 2022-05-27 PROCEDURE — 71045 X-RAY EXAM CHEST 1 VIEW: CPT

## 2022-05-27 PROCEDURE — 99233 SBSQ HOSP IP/OBS HIGH 50: CPT | Performed by: NURSE PRACTITIONER

## 2022-05-27 PROCEDURE — 92610 EVALUATE SWALLOWING FUNCTION: CPT

## 2022-05-27 PROCEDURE — 6370000000 HC RX 637 (ALT 250 FOR IP): Performed by: THORACIC SURGERY (CARDIOTHORACIC VASCULAR SURGERY)

## 2022-05-27 PROCEDURE — 2140000000 HC CCU INTERMEDIATE R&B

## 2022-05-27 PROCEDURE — 2580000003 HC RX 258: Performed by: NURSE PRACTITIONER

## 2022-05-27 PROCEDURE — 2700000000 HC OXYGEN THERAPY PER DAY

## 2022-05-27 PROCEDURE — 82330 ASSAY OF CALCIUM: CPT

## 2022-05-27 PROCEDURE — 82962 GLUCOSE BLOOD TEST: CPT

## 2022-05-27 PROCEDURE — 36415 COLL VENOUS BLD VENIPUNCTURE: CPT

## 2022-05-27 PROCEDURE — 94640 AIRWAY INHALATION TREATMENT: CPT

## 2022-05-27 PROCEDURE — 97530 THERAPEUTIC ACTIVITIES: CPT

## 2022-05-27 PROCEDURE — 80053 COMPREHEN METABOLIC PANEL: CPT

## 2022-05-27 PROCEDURE — 82805 BLOOD GASES W/O2 SATURATION: CPT

## 2022-05-27 PROCEDURE — 85027 COMPLETE CBC AUTOMATED: CPT

## 2022-05-27 PROCEDURE — 84132 ASSAY OF SERUM POTASSIUM: CPT

## 2022-05-27 PROCEDURE — 97162 PT EVAL MOD COMPLEX 30 MIN: CPT

## 2022-05-27 PROCEDURE — 6360000002 HC RX W HCPCS: Performed by: THORACIC SURGERY (CARDIOTHORACIC VASCULAR SURGERY)

## 2022-05-27 RX ORDER — DEXTROSE MONOHYDRATE 50 MG/ML
100 INJECTION, SOLUTION INTRAVENOUS PRN
Status: DISCONTINUED | OUTPATIENT
Start: 2022-05-27 | End: 2022-06-02 | Stop reason: HOSPADM

## 2022-05-27 RX ORDER — BISACODYL 10 MG
10 SUPPOSITORY, RECTAL RECTAL DAILY PRN
Status: DISCONTINUED | OUTPATIENT
Start: 2022-05-27 | End: 2022-06-02 | Stop reason: HOSPADM

## 2022-05-27 RX ORDER — AMIODARONE HYDROCHLORIDE 200 MG/1
200 TABLET ORAL 2 TIMES DAILY
Status: DISCONTINUED | OUTPATIENT
Start: 2022-05-27 | End: 2022-06-02 | Stop reason: HOSPADM

## 2022-05-27 RX ORDER — DIPHENHYDRAMINE HCL 25 MG
25 TABLET ORAL EVERY 8 HOURS PRN
Status: DISCONTINUED | OUTPATIENT
Start: 2022-05-27 | End: 2022-06-02 | Stop reason: HOSPADM

## 2022-05-27 RX ORDER — DEXTROSE MONOHYDRATE 25 G/50ML
12.5 INJECTION, SOLUTION INTRAVENOUS PRN
Status: DISCONTINUED | OUTPATIENT
Start: 2022-05-27 | End: 2022-06-02 | Stop reason: HOSPADM

## 2022-05-27 RX ORDER — FOLIC ACID 1 MG/1
1 TABLET ORAL DAILY
Status: DISCONTINUED | OUTPATIENT
Start: 2022-05-27 | End: 2022-06-02 | Stop reason: HOSPADM

## 2022-05-27 RX ORDER — SENNA AND DOCUSATE SODIUM 50; 8.6 MG/1; MG/1
1 TABLET, FILM COATED ORAL 2 TIMES DAILY
Status: DISCONTINUED | OUTPATIENT
Start: 2022-05-27 | End: 2022-06-02 | Stop reason: HOSPADM

## 2022-05-27 RX ORDER — INSULIN LISPRO 100 [IU]/ML
0-3 INJECTION, SOLUTION INTRAVENOUS; SUBCUTANEOUS NIGHTLY
Status: DISCONTINUED | OUTPATIENT
Start: 2022-05-27 | End: 2022-05-28

## 2022-05-27 RX ORDER — POTASSIUM CHLORIDE 20 MEQ/1
20 TABLET, EXTENDED RELEASE ORAL PRN
Status: DISCONTINUED | OUTPATIENT
Start: 2022-05-27 | End: 2022-06-02 | Stop reason: HOSPADM

## 2022-05-27 RX ORDER — ACETAMINOPHEN 325 MG/1
650 TABLET ORAL EVERY 4 HOURS PRN
Status: DISCONTINUED | OUTPATIENT
Start: 2022-05-27 | End: 2022-06-02 | Stop reason: HOSPADM

## 2022-05-27 RX ORDER — OXYCODONE HYDROCHLORIDE AND ACETAMINOPHEN 5; 325 MG/1; MG/1
2 TABLET ORAL EVERY 4 HOURS PRN
Status: DISCONTINUED | OUTPATIENT
Start: 2022-05-27 | End: 2022-06-02 | Stop reason: HOSPADM

## 2022-05-27 RX ORDER — OXYCODONE HYDROCHLORIDE AND ACETAMINOPHEN 5; 325 MG/1; MG/1
1 TABLET ORAL EVERY 4 HOURS PRN
Status: DISCONTINUED | OUTPATIENT
Start: 2022-05-27 | End: 2022-06-02 | Stop reason: HOSPADM

## 2022-05-27 RX ORDER — INSULIN LISPRO 100 [IU]/ML
0-12 INJECTION, SOLUTION INTRAVENOUS; SUBCUTANEOUS
Status: DISCONTINUED | OUTPATIENT
Start: 2022-05-27 | End: 2022-05-27

## 2022-05-27 RX ORDER — ASCORBIC ACID 500 MG
500 TABLET ORAL 2 TIMES DAILY
Status: DISCONTINUED | OUTPATIENT
Start: 2022-05-27 | End: 2022-06-02 | Stop reason: HOSPADM

## 2022-05-27 RX ORDER — MORPHINE SULFATE 2 MG/ML
2 INJECTION, SOLUTION INTRAMUSCULAR; INTRAVENOUS EVERY 4 HOURS PRN
Status: DISCONTINUED | OUTPATIENT
Start: 2022-05-27 | End: 2022-06-02 | Stop reason: HOSPADM

## 2022-05-27 RX ORDER — ALPRAZOLAM 0.25 MG/1
0.25 TABLET ORAL 2 TIMES DAILY PRN
Status: DISCONTINUED | OUTPATIENT
Start: 2022-05-27 | End: 2022-06-02 | Stop reason: HOSPADM

## 2022-05-27 RX ORDER — INSULIN LISPRO 100 [IU]/ML
0-6 INJECTION, SOLUTION INTRAVENOUS; SUBCUTANEOUS
Status: DISCONTINUED | OUTPATIENT
Start: 2022-05-27 | End: 2022-05-28

## 2022-05-27 RX ORDER — FERROUS SULFATE 325(65) MG
325 TABLET ORAL 2 TIMES DAILY WITH MEALS
Status: DISCONTINUED | OUTPATIENT
Start: 2022-05-27 | End: 2022-06-02 | Stop reason: HOSPADM

## 2022-05-27 RX ORDER — ASPIRIN 81 MG/1
81 TABLET ORAL DAILY
Status: DISCONTINUED | OUTPATIENT
Start: 2022-05-28 | End: 2022-06-02 | Stop reason: HOSPADM

## 2022-05-27 RX ORDER — MAGNESIUM SULFATE IN WATER 40 MG/ML
2000 INJECTION, SOLUTION INTRAVENOUS PRN
Status: DISCONTINUED | OUTPATIENT
Start: 2022-05-27 | End: 2022-06-02 | Stop reason: HOSPADM

## 2022-05-27 RX ORDER — FUROSEMIDE 10 MG/ML
30 INJECTION INTRAMUSCULAR; INTRAVENOUS ONCE
Status: COMPLETED | OUTPATIENT
Start: 2022-05-27 | End: 2022-05-27

## 2022-05-27 RX ADMIN — MUPIROCIN: 20 OINTMENT TOPICAL at 20:16

## 2022-05-27 RX ADMIN — Medication 400 MG: at 09:53

## 2022-05-27 RX ADMIN — IPRATROPIUM BROMIDE AND ALBUTEROL SULFATE 1 AMPULE: .5; 2.5 SOLUTION RESPIRATORY (INHALATION) at 10:31

## 2022-05-27 RX ADMIN — OXYCODONE AND ACETAMINOPHEN 1 TABLET: 5; 325 TABLET ORAL at 13:06

## 2022-05-27 RX ADMIN — FOLIC ACID 1 MG: 1 TABLET ORAL at 13:07

## 2022-05-27 RX ADMIN — FENTANYL CITRATE 50 MCG: 50 INJECTION, SOLUTION INTRAMUSCULAR; INTRAVENOUS at 05:52

## 2022-05-27 RX ADMIN — IPRATROPIUM BROMIDE AND ALBUTEROL SULFATE 1 AMPULE: .5; 2.5 SOLUTION RESPIRATORY (INHALATION) at 19:34

## 2022-05-27 RX ADMIN — FENTANYL CITRATE 25 MCG: 50 INJECTION, SOLUTION INTRAMUSCULAR; INTRAVENOUS at 08:32

## 2022-05-27 RX ADMIN — ATORVASTATIN CALCIUM 40 MG: 40 TABLET, FILM COATED ORAL at 20:14

## 2022-05-27 RX ADMIN — AMIODARONE HYDROCHLORIDE 200 MG: 200 TABLET ORAL at 09:53

## 2022-05-27 RX ADMIN — OXYCODONE HYDROCHLORIDE AND ACETAMINOPHEN 500 MG: 500 TABLET ORAL at 13:06

## 2022-05-27 RX ADMIN — SODIUM CHLORIDE, PRESERVATIVE FREE 10 ML: 5 INJECTION INTRAVENOUS at 08:33

## 2022-05-27 RX ADMIN — PANTOPRAZOLE SODIUM 40 MG: 40 TABLET, DELAYED RELEASE ORAL at 06:03

## 2022-05-27 RX ADMIN — ALPRAZOLAM 0.25 MG: 0.25 TABLET ORAL at 22:06

## 2022-05-27 RX ADMIN — ASPIRIN 81 MG 81 MG: 81 TABLET ORAL at 09:53

## 2022-05-27 RX ADMIN — IPRATROPIUM BROMIDE AND ALBUTEROL SULFATE 1 AMPULE: .5; 2.5 SOLUTION RESPIRATORY (INHALATION) at 16:23

## 2022-05-27 RX ADMIN — FUROSEMIDE 30 MG: 10 INJECTION, SOLUTION INTRAMUSCULAR; INTRAVENOUS at 08:32

## 2022-05-27 RX ADMIN — SENNOSIDES AND DOCUSATE SODIUM 1 TABLET: 50; 8.6 TABLET ORAL at 09:53

## 2022-05-27 RX ADMIN — SENNOSIDES AND DOCUSATE SODIUM 1 TABLET: 50; 8.6 TABLET ORAL at 20:14

## 2022-05-27 RX ADMIN — AMIODARONE HYDROCHLORIDE 200 MG: 200 TABLET ORAL at 20:14

## 2022-05-27 RX ADMIN — IPRATROPIUM BROMIDE AND ALBUTEROL SULFATE 1 AMPULE: .5; 2.5 SOLUTION RESPIRATORY (INHALATION) at 14:15

## 2022-05-27 RX ADMIN — OXYCODONE HYDROCHLORIDE AND ACETAMINOPHEN 500 MG: 500 TABLET ORAL at 20:14

## 2022-05-27 RX ADMIN — SODIUM CHLORIDE, PRESERVATIVE FREE 10 ML: 5 INJECTION INTRAVENOUS at 20:14

## 2022-05-27 RX ADMIN — DIPHENHYDRAMINE HYDROCHLORIDE 25 MG: 25 TABLET ORAL at 22:09

## 2022-05-27 RX ADMIN — FERROUS SULFATE TAB 325 MG (65 MG ELEMENTAL FE) 325 MG: 325 (65 FE) TAB at 16:52

## 2022-05-27 RX ADMIN — MUPIROCIN: 20 OINTMENT TOPICAL at 09:53

## 2022-05-27 RX ADMIN — OXYCODONE AND ACETAMINOPHEN 1 TABLET: 5; 325 TABLET ORAL at 20:14

## 2022-05-27 ASSESSMENT — PAIN SCALES - GENERAL
PAINLEVEL_OUTOF10: 5
PAINLEVEL_OUTOF10: 7
PAINLEVEL_OUTOF10: 2
PAINLEVEL_OUTOF10: 4
PAINLEVEL_OUTOF10: 4
PAINLEVEL_OUTOF10: 0
PAINLEVEL_OUTOF10: 9
PAINLEVEL_OUTOF10: 5
PAINLEVEL_OUTOF10: 0
PAINLEVEL_OUTOF10: 0
PAINLEVEL_OUTOF10: 6
PAINLEVEL_OUTOF10: 6
PAINLEVEL_OUTOF10: 0
PAINLEVEL_OUTOF10: 2
PAINLEVEL_OUTOF10: 7
PAINLEVEL_OUTOF10: 0

## 2022-05-27 ASSESSMENT — PAIN SCALES - WONG BAKER
WONGBAKER_NUMERICALRESPONSE: 0

## 2022-05-27 ASSESSMENT — PAIN DESCRIPTION - LOCATION
LOCATION: CHEST

## 2022-05-27 ASSESSMENT — PAIN DESCRIPTION - DESCRIPTORS
DESCRIPTORS: SHOOTING
DESCRIPTORS: SHARP
DESCRIPTORS: SHOOTING
DESCRIPTORS: DULL;ACHING;DISCOMFORT
DESCRIPTORS: SHOOTING
DESCRIPTORS: DULL;ACHING;DISCOMFORT
DESCRIPTORS: ACHING;DISCOMFORT

## 2022-05-27 ASSESSMENT — PAIN DESCRIPTION - ORIENTATION
ORIENTATION: MID

## 2022-05-27 ASSESSMENT — PAIN DESCRIPTION - PAIN TYPE
TYPE: SURGICAL PAIN

## 2022-05-27 ASSESSMENT — PAIN - FUNCTIONAL ASSESSMENT: PAIN_FUNCTIONAL_ASSESSMENT: PREVENTS OR INTERFERES SOME ACTIVE ACTIVITIES AND ADLS

## 2022-05-27 NOTE — PROGRESS NOTES
[address]  [address] [place] [address]     Date:2022                                                               Patient Name: Lisa Lee  MRN: [id number]  : 1945  Room: [address] [place] [address]    Evaluating OT: Loni Mohs, OTR/L [id number]    Referring Provider: SHANIQUE Van CNP   Specific Provider Orders/Date: OT eval and treat (22)      Diagnosis: AI/MS/MR/TR/CAD/SVT     Surgery/Procedures:   AV repair, MVR, TV repair, CABG x 3     Pertinent Medical History: Anxiety, DM,CAD,hiatal hernia, HTN, lung nodule, MS, mild drop foot on L, COPD     *Precautions:  Fall Risk, sternal, O2, MS, mild drop foot L LE    Assessment of current deficits   [x]Functional mobility  [x]ADLs [x]Strength  []Cognition  [x]Functional transfers  [x]IADLs [x]Safety Awareness  [x]Endurance  []Fine Motor Coordination  [x]Balance       []Vision/perception  []Sensation    []Gross Motor Coordination [x]ROM  []Delirium                  [] Motor Control     []Communication     OT PLAN OF CARE   OT POC based on physician orders, patient diagnosis and results of clinical assessment.        Frequency/Duration: 1-3 days/wk for 1-2 weeks PRN     Specific OT Treatment to include:   ADL retraining/adapted techniques and AE recommendations to increase functional independence within precautions                    Energy conservation techniques to improve tolerance for selfcare routine   Functional transfer/mobility training/DME recommendations for increased independence, safety and fall prevention         Patient/family education to increase safety and functional independence within precautions             Environmental modifications for safe mobility and completion of ADLs                             Therapeutic activity to improve functional performance during ADLs                                         Therapeutic exercise to improve tolerance and functional strength for ADLs   Balance retraining exercises/tasks for facilitation of postural control with dynamic challenges during ADLs . Recommended Adaptive Equipment:  LB dressing AE, 3in1 commode    Home Living: Pt lives with domestic partner per chart  in a 1 floor condo with 0 step(s) to enter and 0 rail(s)  Bathroom setup: walk in tub with seat and rails; standard height commode  Equipment owned: walker, cane, shower seat    Prior Level of Function: IND with ADLs;  IND with IADLs. WW or cane PRN for ambulation. Driving: yes  Occupation: retired    Pain Level: pt c/o 10/10 chest pain  this session    Cognition: A&O: 3-4/4    Follows 1-2 step commands appropriately.    Memory: Fair   Comprehension: Fair   Problem solving: Fair   Judgement/safety: Fair               Communication skills: WFL           Vision: WFL               Glasses:no                                               Hearing: WFL     RASS: 0  CAM-ICU: (NT) Delirium     UE Assessment:  Hand Dominance: Right [x]  Left []     ROM Strength STM goal: PRN   RUE  Grossly WFL within precautions Not formally tested; grossly WFL              WNL for ADLS     LUE Grossly WFL within precautions Not formally tested; grossly WFL              WNL for ADLS       Sensation: No c/o numbness or tingling in extremities   Tone: WNL   Edema: WFL     Functional Assessment: AM-PAC Daily Activity Raw Score: 7/24   Initial Eval Status  Date: 5/27/22 Treatment Status  Date: STG=LTG  Time Frame: 5-7days   Feeding NT                       Ishaan  while seated up in chair to increase activity tolerance        Grooming Max A                       S; set up      UB dressing/bathing Dep                       Min A   demonstrating G knowledge of precautions during tasks     LB dressing/bathing Dep                           Min A  using AE as needed for safe reach/ energy conservation       Toileting NT                       Min A     Bed Mobility  Supine to sit: Max A+2    Sit to supine:   NT                       Min A  in prep of ADL tasks & transfers   Functional Transfers Sit to stand: Mod A  from higher bed surface;    NT from lower chair surface    Stand to sit: Max A                       Min A  sit<>stand/functional bathroom transfers using AD/DME as needed for balance and safety   Functional Mobility Mod A  no device                        Min A   functional/bathroom mobility using AD as needed & demonstrating G safety     Balance Sitting:     Static:  Min A    Dynamic:Min A  Standing: Mod A  S dynamic sitting balance; Min A dynamic standing balance  during ADL tasks & transfers   Endurance/Activity Tolerance   F tolerance with light activity. G   tolerance with moderate activity/self care routine   Visual/  Perceptual               WFL                          Vitals:   HR at rest: 73 bpm HR at end of session: 72 bpm   Spo2 at rest:92% Spo2 at end of session 95%   BP at rest:155/53 mmHg BP at end of session 153/55 mmHg       Treatment: OT intervention provided this date includes:  Functional mobility: Instruction on sternal precautions to facilitate safe bed mobility & functional transfers. Pt required 2 person assist for safe mobility due to complexity of medical condition, medical lines and deconditioning. HOB elevated to assist; min cues to maintain precautions. Pt/Family Education: Instruction on energy conservation and sternal precautions during functional activities for safe return home. Review of breathing techniques throughout session. Line management and environmental modifications made prior to and end of session to ensure patient safety and to increase efficiency of session. Skilled monitoring of HR, O2 saturation, blood pressure and patient's response to activity performed throughout session. Comments: OK from RN to see patient.  Upon arrival, patient supine in bed, agreeable to session; son present to encourage pt.  At end of session, patient left seated in chair to increase activity tolerance. Nurse aware. Son present. Call light within reach, all lines and tubes intact. Pt instructed on use of call light for assistance and fall prevention. Patient presents with decreased activity tolerance, dynamic balance, functional mobility and SOB limiting completion of ADLs and safety. Pt can benefit from continued skilled OT to increase safety, functional independence and quality of life. Rehab Potential: good for established goals    Patient / Family Goal: return to PLOF    Patient and/or family were instructed/educated on diagnosis, prognosis/goals and plan of care. Pt demonstrated fair- understanding. [] Malnutrition indicators have been identified and nursing has been notified to ensure a dietitian consult is ordered. Evaluation Complexity: Moderate    · History: Expanded chart review of consults, imaging, and psychosocial history related to current functional performance. · Exam: 5+ performance deficits identified limiting functional independence and safe return home   · Assistance/Modification: Min/mod assistance or modifications required to perform tasks. May have comorbidities that affect occupational performance. Time In:0930              Time Out: 0950                 Min Units   OT Eval Low 02300     OT Eval Medium 95596 X    OT Eval High [id number]     OT Re-Eval [id number]     Therapeutic Ex [id number]     Therapeutic Activities 64486     ADL/Self Care 29696     Orthotic Management 13125     Neuro Re-Ed 67616     Non-Billable Time       Evaluation time includes thorough review of current medical information, gathering information on past medical history/social history and prior level of function, completion of standardized testing/informal observation of tasks, assessment of data and development of POC/Goals.      Mayco Phillips, OTR/L [id number]

## 2022-05-27 NOTE — PROGRESS NOTES
Physical Therapy  Physical Therapy Initial Assessment     Name: Sonu Vasquez  : 1945  MRN: [id number]      Date of Service: 2022    Evaluating PT:  Tio Elliott PT, DPT [id number]    Room #:  0177/7992-W  Diagnosis:  CAD in native artery [I25.10]  PMHx/PSHx:  See chart  Procedure/Surgery:   AV repair, MVR, TV repair, CABG x 3  Precautions:  Falls, Sternal, O2  Equipment Needs:  TBD    SUBJECTIVE:    Pt lives with domestic partner in a 1st floor condo with level entry. Pt ambulated with [employer] or cane PRN and was independent PTA. This is pt's 3rd surgery this year. OBJECTIVE:   Initial Evaluation  Date: 22 Treatment Short Term/ Long Term   Goals   AM-PAC 6 Clicks      Was pt agreeable to Eval/treatment? Yes     Does pt have pain? 10/10 surgical pain - RN aware and already medicated     Bed Mobility  Rolling: NT  Supine to sit: MaxA x 2 with HOB elevated  Sit to supine: NT  Scooting: MaxA  Marcelo   Transfers Sit to stand: ModA elevated bed  Stand to sit: MaxA  Stand pivot: ModA no device  Mod Independent with [employer]   Ambulation   A few steps to chair with ModA no device  >400 feet Mod Independent with [employer]   Stair negotiation: ascended and descended NT  >4 steps with 1 rail Mod Independent   ROM BUE:  Defer to OT note  BLE:  WFL     Strength BUE:  Defer to OT note  BLE:  4/5  Increase by 1/3 MMT grade   Balance Sitting EOB:  Marcelo  Dynamic Standing:  ModA no device  Sitting EOB:  Independent  Dynamic Standing:   Mod Independent with [employer]     Pt is A & O x 4  CAM-ICU: NT  RASS: +1 due to pain  Sensation:  No reported paresthesias  Edema:  None    Vitals:  Heart Rate at rest 72 bpm Heart Rate post session 72 bpm   SpO2 at rest 94% SpO2 post session 94%   Blood Pressure at rest 150/55 mmHg Blood Pressure post session 160/58 mmHg       Functional Status Score-Intensive Care Unit (FSS-ICU)   Rolling -/   Supine to sit transfer    Unsupported sitting     Sit to stand transfers 2/   Ambulation  Total  8/35     Therapeutic Exercises:  NA    Patient education  Pt educated on safety    Patient response to education:   Pt verbalized understanding Pt demonstrated skill Pt requires further education in this area   x x x     ASSESSMENT:    Conditions Requiring Skilled Therapeutic Intervention:    [x]Decreased strength     []Decreased ROM  [x]Decreased functional mobility  [x]Decreased balance   [x]Decreased endurance   [x]Decreased posture  []Decreased sensation  []Decreased coordination   []Decreased vision  []Decreased safety awareness   [x]Increased pain       Comments:  NP reported pt was medically stable. Pt was in bed upon arrival, agreeable to initial evaluation. Pt was educated on sternal precautions and PLB prior to activity. Increased assistance provided for bed mobility due to deconditioning and precautions. Flexed posture noted with all upright activity. On initial stand from bed, pt reported need for bowel movement and requested to return to bed. Bedpan was placed in chair. On second attempt, pt was able to completed shuffled steps to chair with assistance to weight shift. Pain and fatigue limited further activity. Pt was left in chair with all needs met and call light in reach. All lines remained intact. Pt's son was present for session. Notified RN of assessment and pt on bedpan in chair. Treatment:  Patient practiced and was instructed in the following treatment:     Bed mobility training - pt given verbal and tactile cues to facilitate proper sequencing and safety during supine>sit as well as provided with physical assistance.  Sitting EOB for >8 minutes for upright tolerance, postural awareness and BLE ROM   Transfer training - pt was given verbal and tactile cues to facilitate proper hand placement, technique and safety during sit to stand and stand to sit as well as provided with physical assistance.    Gait training- pt was given verbal and tactile cues to facilitate safety, balance and posture during ambulation as well as provided with physical assistance. Pt's/ family goals   1. Return home    Prognosis is good for reaching above PT goals. Patient and or family understand(s) diagnosis, prognosis, and plan of care. yes    PHYSICAL THERAPY PLAN OF CARE:    PT POC is established based on physician order and patient diagnosis     Referring provider/PT Order:  SHANIQUE Ortiz - CNP/05/26/22 0600 PT eval and treat  Diagnosis:  CAD in native artery [I25.10]  Specific instructions for next treatment:  Progress activity    Current Treatment Recommendations:     [x] Strengthening to improve independence with functional mobility   [] ROM to improve independence with functional mobility   [x] Balance Training to improve static/dynamic balance and to reduce fall risk  [x] Endurance Training to improve activity tolerance during functional mobility   [x] Transfer Training to improve safety and independence with all functional transfers   [x] Gait Training to improve gait mechanics, endurance and asses need for appropriate assistive device  [x] Stair Training in preparation for safe discharge home and/or into the community   [] Positioning to prevent skin breakdown and contractures  [x] Safety and Education Training   [x] Patient/Caregiver Education   [] HEP  [] Other     PT long term treatment goals are located in above grid    Frequency of treatments: 2-5x/week x 1-2 weeks. Time in  0915  Time out  0940    Total Treatment Time  10 minutes     Evaluation Time includes thorough review of current medical information, gathering information on past medical history/social history and prior level of function, completion of standardized testing/informal observation of tasks, assessment of data and education on plan of care and goals.     CPT codes:  [] Low Complexity PT evaluation 89492  [x] Moderate Complexity PT evaluation 49604  [] High Complexity PT evaluation 82927  [] PT Re-evaluation [id number]  [] Gait training 42790 - minutes  [] Manual therapy 47617 - minutes  [x] Therapeutic activities 80266 10 minutes  [] Therapeutic exercises 73290 - minutes  [] Neuromuscular reeducation 63234 - minutes     Tio Elliott PT, DPT  [id number]

## 2022-05-27 NOTE — PROGRESS NOTES
[facility] Progress Note    Name: Sonu Vasquez  MRN: [id number]  CC: Postoperative Critical Care Management      Indication for Surgery/Procedure: AI/MS/MR/TR/CAD/SVT  LVEF:  60%    RVF:  NML     Important/Relevant PMH/PSH: Empyema s/p right thoracotomy and decortication 1/2022, multiple sclerosis, left foot drop, radiculopathy on gabapentin, Washburn's esophagus, severe COPD, Bladder CA, BPH s/p TURP, pulmonary hypertension, HTN, DMII, paroxysmal supraventricular tachycardia, endocarditis, nephrolithiasis       Procedure/Surgeries: 5/25/2022  Urgent sternotomy/Urgent Aortic valve repair by leaflet debridement/Complex mitral valve replacement with annular debridement and a 33mm Medtronic Mosaic bioprosthetic/Tricuspid valve repair by bicuspidization/CABG x 3 (LIMA-LAD, SVG-Diag, SVG-PDA)/WALLACE exclusion with 40mm AtriClip/EVH RLE/Rigid internal fixation of the sternum with [place] plates x 4/97 modifier     Pacing wires: Atrial and Ventricular        Intake/Output Summary (Last 24 hours) at 5/27/2022 0830  Last data filed at 5/27/2022 0800  Gross per 24 hour   Intake 1825.52 ml   Output 2040 ml   Net -214.48 ml       Recent Labs     05/26/22  0405 05/26/22  0407 05/26/22  1050 05/26/22  1740 05/27/22  0510   WBC 25.8*  --   --  27.3* 18.5*   HGB 10.1*  --   --  10.2* 10.1*   HCT 31.0*   < > 29.0* 30.9* 31.1*   *  --   --  121* 85*    < > = values in this interval not displayed.       Lab Results   Component Value Date     05/27/2022    K 4.6 05/27/2022    K 4.2 04/09/2022     05/27/2022    CO2 22 05/27/2022    BUN 31 05/27/2022    CREATININE 0.8 05/27/2022    GLUCOSE 102 05/27/2022    CALCIUM 8.4 05/27/2022         Physical Exam:    BP (!) 138/55   Pulse 69   Temp 98.3 °F (36.8 °C) (Oral)   Resp 27   Ht 5' 10\" (1.778 m)   Wt 199 lb 8 oz (90.5 kg)   SpO2 95%   BMI 28.63 kg/m²       CXR Findings:     FINDINGS:   Endotracheal and nasogastric extubation.  Central venous catheter on the   right terminates in the SVC.  The PA catheter is no longer present.  Stable   chest tubes.       Infiltrate and or atelectasis bilaterally is not appreciably changed.         - CXR personally viewed and interpreted by ICU Nurse Practitioner, agree with above findings       General: Awake, alert. Anxiety overnight. C/o hip, back, and incisional pain. Eyes: PERRL, anicteric   Pulmonary: Diminished bibasilar. No wheezes, no accessory muscle use noted   Cardiovascular:  RRR, no heaves or thrills on palpation  Tele: SR   Abdomen: Soft, nontender, +BS, +BM 5/26  Extremities: Palpable pulses all extremities, trace edema   Neurologic/Psych: A&Ox3, OTERO to command   Skin: Warm and dry  Incisions: MSI GHANSHYAM intact, SVG sites well approximated, Left groin stitch       Assessment/Plan: POD #2    1. VHD/CAD S/p Urgent Aortic valve repair by leaflet debridement/Complex mitral valve replacement with annular debridement and a 33mm Medtronic Mosaic bioprosthetic/Tricuspid valve repair by bicuspidization, CABG x 3 (LIMA-LAD, SVG-Diag, SVG-PDA)/WALLACE exclusion with 40mm AtriClip  - ASA, Lipitor   - PO Amio for afib prophylaxis   - MS chest tubes removed without difficulty, pleural drains placed to bulb suction and holding suction well. Pt tolerated.     2. Acute Pulmonary Insufficiency Following Surgery  - Expected 2/2 surgery, Hx of severe copd     -Extubated POD1; currently on 4L O2 NC, sats 93%   - Continue EZPAP q 4 hours, encourage IS, instructed to C&DB, OOBTC with PT/OT, increase activity as tolerated, wean O2 as able for sats >92%     3. Postoperative hypotension   -Normal biventricular fxn, LA cleared post op  -Weaned off vasopressin 5/26; Epinephrine slowly weaned off overnight  - hemodynamics stable, start BB when able      4. JAMIE   - Scr 1.3-->0. 9 preop   - Scr down to 0.8 Nephrology following      5. DMII   - SSI q 4hrs      6.  Acute Post Operative Pain   - Pain control suboptimal, encourage use of PO PRN narcotics for optimal relief (last dose yesterday at 2030), PRN IV morphine for breakthrough pain      7. Urinary retention   -Hx of BPH s/p TURP   -evaluated by urology preop   - Continue hernandez to GD on transfer until more mobile for void trial      8. UTI  -Completed Daptomycin and meropenem course per ID      9. Hx of Multiple sclerosis      10. Anxiety  - Increase PRN xanax to BID PRN     11. Thrombocytopenia  - Plts 85K, downtrending from 121  - Monitor, start lovenox for VTE prophylaxis when able     12. At risk for aspiration  - Per nursing, some coughing with thin liquids this morning, speech therapy consulted for swallow evaluation       VTE Prophylaxis: Pharmacologic/Mechanical: Yes, SCDs   Line infection prevention: Can CVC or arterial line be removed:  remove  Continued need for urinary catheter: Yes - clinical indication: Patient post major surgery requiring fluid balance and input and output measurement.     Dispo: Transfer to [facility]     Electronically signed by SHANIQUE Castano CNP on 5/27/2022 at 8:30 AM

## 2022-05-27 NOTE — PROGRESS NOTES
Infectious Disease  Progress Note  [facility]    Chief Complaint:  Looks remarkable    Not really many complaints except a little trouble breathing    Talked to his son    Subjective:  AVR   Scheduled Meds:   amiodarone  200 mg Oral BID    insulin lispro  0-12 Units SubCUTAneous 4x Daily AC & HS    aspirin  81 mg Oral Daily    sodium chloride flush  5-40 mL IntraVENous 2 times per day    magnesium oxide  400 mg Oral Daily    mupirocin   Nasal BID    sennosides-docusate sodium  1 tablet Oral BID    pantoprazole  40 mg Oral Daily    ipratropium-albuterol  1 ampule Inhalation Q4H WA    atorvastatin  40 mg Oral Nightly     Continuous Infusions:   dextrose 5 % and 0.9 % NaCl 60 mL/hr at 05/27/22 0600    sodium chloride Stopped (05/26/22 2056)    sodium chloride 25 mL/hr at 05/25/22 1628    sodium chloride      norepinephrine      insulin Stopped (05/26/22 0407)    dextrose       PRN Meds:ALPRAZolam, sodium chloride flush, sodium chloride, ondansetron **OR** ondansetron, acetaminophen, oxyCODONE **OR** oxyCODONE, fentanNYL **OR** fentanNYL, magnesium hydroxide, bisacodyl, potassium chloride, magnesium sulfate, calcium chloride IVPB **OR** calcium chloride IVPB, albumin human, sodium chloride, norepinephrine, glucose, dextrose **OR** dextrose, glucagon (rDNA), dextrose    Patient Vitals for the past 24 hrs:   BP Temp Temp src Pulse Resp SpO2 Weight   05/27/22 1000 (!) 143/52   72 22 94 %    05/27/22 0900 (!) 136/51   71 29 94 %    05/27/22 0800 (!) 138/55 98.3 °F (36.8 °C) Oral 69 27 95 %    05/27/22 0700    67 13 99 %    05/27/22 0622     22     05/27/22 0600  98.4 °F (36.9 °C) Temporal 68 25 100 %    05/27/22 0552     30     05/27/22 0538       199 lb 8 oz (90.5 kg)   05/27/22 0500    69 23 96 %    05/27/22 0400    68 22 99 %    05/27/22 0300    69 20 100 %    05/27/22 0200    70 19 100 %    05/27/22 0100    75 19 98 %    05/27/22 0000  99 °F (37.2 °C) Oral 80 19 97 %    05/26/22 2300    82 15 98 %    05/26/22 2200    87 24 98 %    05/26/22 2121     17     05/26/22 2100    85 26 98 %    05/26/22 2059     17     05/26/22 2051     27     05/26/22 2029     30     05/26/22 2000  98.8 °F (37.1 °C) Oral 92 28 94 %    05/26/22 1901     21 96 %    05/26/22 1900    90 20 95 %    05/26/22 1846     22     05/26/22 1830    90 26 95 %    05/26/22 1800  97.8 °F (36.6 °C) Temporal 91 29 96 %    05/26/22 1740    98 29 93 %    05/26/22 1700    88 (!) 31 94 %    05/26/22 1630    87 27 94 %    05/26/22 1600  98.7 °F (37.1 °C) Temporal 86 22 98 %    05/26/22 1559     22 98 %    05/26/22 1558     18 99 %    05/26/22 1530    88 26 94 %    05/26/22 1500    86 22 94 %    05/26/22 1430    89 26 93 %    05/26/22 1400  98.3 °F (36.8 °C) Temporal 89 28 94 %    05/26/22 1330    88 28 93 %    05/26/22 1300    90 23 96 %    05/26/22 1230    89 22 97 %    05/26/22 1200  97.9 °F (36.6 °C) Temporal 88 23 100 %    05/26/22 1151     29 95 %    05/26/22 1130    88 26 95 %        CBC with Differential:    Lab Results   Component Value Date    WBC 18.5 05/27/2022    RBC 3.27 05/27/2022    HGB 10.1 05/27/2022    HCT 31.1 05/27/2022    HCT 29.0 05/26/2022    PLT 85 05/27/2022    MCV 95.1 05/27/2022    MCH 30.9 05/27/2022    MCHC 32.5 05/27/2022    RDW 14.6 05/27/2022    NRBC 0.0 01/17/2022    SEGSPCT 70 06/08/2011    LYMPHOPCT 7.3 04/20/2022    MONOPCT 9.5 04/20/2022    MYELOPCT 2.6 01/04/2022    BASOPCT 0.5 04/20/2022    MONOSABS 0.72 04/20/2022    LYMPHSABS 0.55 04/20/2022    EOSABS 0.17 04/20/2022    BASOSABS 0.04 04/20/2022     CMP:    Lab Results   Component Value Date     05/27/2022    K 4.6 05/27/2022    K 4.2 04/09/2022     05/27/2022    CO2 22 05/27/2022    BUN 31 05/27/2022    CREATININE 0.8 05/27/2022    GFRAA >60 05/27/2022    LABGLOM >60 05/27/2022    GLUCOSE 102 05/27/2022    PROT 5.4 05/27/2022    LABALBU 3.3 05/27/2022    CALCIUM 8.4 05/27/2022    BILITOT 0.8 05/27/2022    ALKPHOS 68 05/27/2022    AST 37 05/27/2022    ALT 12 05/27/2022       BP (!) 143/52   Pulse 72   Temp 98.3 °F (36.8 °C) (Oral)   Resp 22   Ht 5' 10\" (1.778 m)   Wt 199 lb 8 oz (90.5 kg)   SpO2 94%   BMI 28.63 kg/m²     Physical Exam  Const/Neuro- unchanged, no signs of acute distress, Alert  ENMT- Within Normal Limits, Normocephalic, mucous membranes pink/moist, No thrush  Neck: Neck supple  Heart- Regular, Rate, Rhythm- no murmur appreciated. Lungs- clear to ascultation. Respirations even and nonlabored. Abdomen- Soft, bowel sounds positive, non tender  Musculo/Extremities-  Equal and symmetrical, no edema. No tenderness. Skin:  Warm and dry, free from rashes. Cultures reviewed    Radiology reviewed  XR CHEST PORTABLE   Final Result   Unchanged bilateral infiltrate and or atelectasis. Endotracheal nasogastric   extubation. See above. XR CHEST PORTABLE   Final Result   Postoperative changes with no significant change of the past 18 hours. XR CHEST PORTABLE   Final Result   Small bilateral pleural effusions with compressive atelectasis. Moderate pulmonary edema. Endotracheal tube terminates 4 cm from vidal. XR CHEST PORTABLE    (Results Pending)       Assessment  Wednesday surgery- MVR, AVR, TVRp, CABG to LAD.  Diag and PDA with WALLACE   Hx of endocarditis   Hx of prostate surgery TURP   GERD   Hx kidney stones   Mitral regurgitation   Tricuspid valve regurgitation   Aortic valve disease   Washburn's esophagus   WBC 25,000  Post op   Mod AI, mod MS, severe MR, severe TR  Severe multivessel CAD  Right sided empyema with thoractomy  Urgent sternotomy, urgent AV repair with billeaflet debridment, complex MV replacement with annular debridemtn and a 33 mm [employer] mosaic bioprosthesis ,  Tricuspid valve repair by bicuspidization, CABG x 3    Left atrial appendage exclusion with a 40mm Atriclip  I read the extensive op note        Plan  I agree with Dr Carolin Lennox that this is a huge surgery and one would want a [place] environment for those prostheses etc   Completed dapto and merrem  WBC 18,500  At this point will sign off   Please reconsult if needed          Electronically signed by Alvaro Bender MD on 5/27/2022 at 11:01 AM

## 2022-05-27 NOTE — PROGRESS NOTES
SPEECH/LANGUAGE PATHOLOGY  CLINICAL ASSESSMENT OF SWALLOWING FUNCTION   and PLAN OF CARE    PATIENT NAME:  Sonu Garcia  (male)     MRN:  [id number]    :  1945  (76 y.o.)  STATUS:  Inpatient: Room 3818/3818-A    TODAY'S DATE:  2022  REFERRING PROVIDER:   MATT RoweCNP  SPECIFIC PROVIDER ORDER: SLP swallowing-dysphagia evaluation and treatment Date of order:  22  REASON FOR REFERRAL: to assess oropharyngeal function  EVALUATING THERAPIST: ROBINSON Leger                 RESULTS:    DYSPHAGIA DIAGNOSIS:   Clinical indicators of minimal-mild oropharyngeal phase dysphagia       DIET RECOMMENDATIONS:  Minced and moist consistency solids (IDDSI level 5) with  thin liquids (IDDSI level 0) - NO STRAWS     FEEDING RECOMMENDATIONS:     Assistance level:  Supervision is needed during all oral intake      Compensatory strategies recommended: Small bites/sips, Alternate solids and liquids and No straw      Discussed recommendations with nursing and/or faxed report to referring provider: Yes    SPEECH THERAPY  PLAN OF CARE   The dysphagia POC is established based on physician order, dysphagia diagnosis and results of clinical assessment     Meal time assessment for 1-2 sessions to provide diet modification and compensatory strategy implementation due to staff report of dysphagia symptoms during meals    Conditions Requiring Skilled Therapeutic Intervention for dysphagia:    Patient is performing below functional baseline d/t  current acute condition, Multiple diagnoses, multiple medications, and increased dependency upon caregivers.   Reduced bolus formation and/or manipulation    Specific dysphagia interventions to include:     Compensatory strategy training   Trials of upgraded diet/liquid   Meal time assessment for 1-2 sessions to provide diet modification and compensatory strategy implementation due to staff report of dysphagia symptoms during meals    Specific instructions for next treatment:  ongoing PO analysis to upgrade diet and evaluate tolerance of current PO recommendation and initiate instruction of compensatory strategies  Patient Treatment Goals:    Short Term Goals:  Pt will implement identified compensatory swallowing strategies on 90% of opportunities or greater to improve airway protection and swallow function. Pt will participate in ongoing mealtime assessment to provide diet modification and compensatory strategy implementation to minimize risk of aspiration associated with PO intake  Pt will complete PO trials of upgraded diet textures with SLP only to determine the least restrictive PO diet to maintain adequate nutrition/hydration with no more than 1 overt s/s of pen/asp. Long Term Goals:   Pt will maintain adequate nutrition/hydration via PO intake of the least restrictive oral diet with implementation of safe swallow/ compensatory strategies and decrease signs/symptoms of aspiration to less than 1 x/day. Pt will improve oropharyngeal swallow function to ensure airway protection during PO intake to maintain adequate nutrition/hydration and decrease signs/symptoms of aspiration to less than 1 x/day. Patient/family Goal:    Did not state. Will further assess during treatment.     Plan of care discussed with Patient and Family   The Patient and Family understand(s) the diagnosis, prognosis and plan of care     Rehabilitation Potential/Prognosis: good                    ADMITTING DIAGNOSIS: CAD in native artery [I25.10]    VISIT DIAGNOSIS:      PATIENT REPORT/COMPLAINT: denies difficulty swallowing  RN cleared patient for participation in assessment     yes     PRIOR LEVEL OF SWALLOW FUNCTION:    PAST HISTORY OF DYSPHAGIA?: None reported    Home diet: Regular consistency solids (IDDSI level 7) with  thin liquids (IDDSI level 0)  Current Diet Order:  No diet orders on file    PROCEDURE:  Consistencies Administered During the Evaluation   Liquids: thin liquid   Solids: pureed foods, soft solid foods and solid foods      Method of Intake:   cup, straw, spoon  Self fed, Fed by clinician      Position:   Seated, upright    CLINICAL ASSESSMENT:  Oral Stage:       Decreased mastication w/ regular solids only. Patient stated \" it is too dry\"     Pharyngeal Stage:    Throat clearing present after presentation of thin liquid via straw x1 only. No overt s/s of pen/aspiration were noted w/ consecutive cup sips of thin. Cognition:   Within functional limits for this exam    Oral Peripheral Examination   Adequate lingual/labial strength     Current Respiratory Status    4 liters nasal cannula     Parameters of Speech Production  Respiration:  Adequate for speech production  Quality:   Within functional limits  Intensity: Within functional limits    Volitional Swallow: present     Volitional Cough:   present     Pain: No pain reported. EDUCATION:   The Speech Language Pathologist (SLP) completed education regarding results of evaluation and that intervention is warranted at this time. Learner: Patient and Family  Education: Reviewed results and recommendations of this evaluation  Evaluation of Education:  Lottie understanding    This plan may be re-evaluated and revised as warranted. Evaluation Time includes thorough review of current medical information, gathering information on past medical history/social history and prior level of function, completion of standardized testing/informal observation of tasks, assessment of data and education on plan of care and goals. [x]The admitting diagnosis and active problem list, have been reviewed prior to initiation of this evaluation.         ACTIVE PROBLEM LIST:   Patient Active Problem List   Diagnosis    Abnormal PSA    Mitral valve prolapse    MR (mitral regurgitation)    Tricuspid valve regurgitation    Pulmonary HTN ([address]    HTN (hypertension)    DM (diabetes mellitus) ([address]    Multiple sclerosis ([address]    Hiatal hernia    GERD (gastroesophageal reflux disease)    History of kidney stones    Benign prostatic hyperplasia    S/P TURP    Urothelial carcinoma (HCC)    Asbestosis [facility]    Erectile dysfunction    Panic attacks    History of esophageal stricture    History of esophageal dilatation    Washburn's esophagus determined by biopsy    Anxiety    Claustrophobia    History of PSVT (paroxysmal supraventricular tachycardia)    H/O endocarditis    Nonrheumatic aortic valve insufficiency    Nonrheumatic pulmonary valve insufficiency    Empyema (HCC)    Pleural effusion    JAMIE (acute kidney injury) ([facility]    Hyperkalemia    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 [id number]

## 2022-05-27 NOTE — PROGRESS NOTES
Department of Internal Medicine  Nephrology Attending Consult Note    Events reviewed. SUBJECTIVE: We are following MrJaime Barreto for JAMIE. Reports no complaints. PHYSICAL EXAM:      Vitals:    VITALS:  BP (!) 136/51   Pulse 71   Temp 98.3 °F (36.8 °C) (Oral)   Resp 29   Ht 5' 10\" (1.778 m)   Wt 199 lb 8 oz (90.5 kg)   SpO2 94%   BMI 28.63 kg/m²   24HR INTAKE/OUTPUT:      Intake/Output Summary (Last 24 hours) at 5/27/2022 0931  Last data filed at 5/27/2022 0900  Gross per 24 hour   Intake 1825.52 ml   Output 2470 ml   Net -644.48 ml       Constitutional: Patient is awake alert in no distress. HEENT: Pupils are equal reactive  Respiratory: Lungs are clear  Cardiovascular/Edema: Heart sounds are regular  Gastrointestinal: Abdomen soft, hypoactive bowel sounds  Neurologic: chemical sedation  Skin: s/p CABG  Other: No edema    Scheduled Meds:   amiodarone  200 mg Oral BID    insulin lispro  0-12 Units SubCUTAneous 4x Daily AC & HS    aspirin  81 mg Oral Daily    sodium chloride flush  5-40 mL IntraVENous 2 times per day    magnesium oxide  400 mg Oral Daily    mupirocin   Nasal BID    sennosides-docusate sodium  1 tablet Oral BID    pantoprazole  40 mg Oral Daily    ipratropium-albuterol  1 ampule Inhalation Q4H WA    insulin glargine  0.15 Units/kg SubCUTAneous Nightly    atorvastatin  40 mg Oral Nightly     Continuous Infusions:   dextrose 5 % and 0.9 % NaCl 60 mL/hr at 05/27/22 0600    sodium chloride Stopped (05/26/22 2056)    sodium chloride 25 mL/hr at 05/25/22 1628    sodium chloride      norepinephrine      insulin Stopped (05/26/22 0407)    dextrose       PRN Meds:. ALPRAZolam, sodium chloride flush, sodium chloride, ondansetron **OR** ondansetron, acetaminophen, oxyCODONE **OR** oxyCODONE, fentanNYL **OR** fentanNYL, magnesium hydroxide, bisacodyl, potassium chloride, magnesium sulfate, calcium chloride IVPB **OR** calcium chloride IVPB, albumin human, sodium chloride, norepinephrine, glucose, dextrose **OR** dextrose, glucagon (rDNA), dextrose      DATA:    CBC with Differential:    Lab Results   Component Value Date    WBC 18.5 05/27/2022    RBC 3.27 05/27/2022    HGB 10.1 05/27/2022    HCT 31.1 05/27/2022    HCT 29.0 05/26/2022    PLT 85 05/27/2022    MCV 95.1 05/27/2022    MCH 30.9 05/27/2022    MCHC 32.5 05/27/2022    RDW 14.6 05/27/2022    NRBC 0.0 01/17/2022    SEGSPCT 70 06/08/2011    LYMPHOPCT 7.3 04/20/2022    MONOPCT 9.5 04/20/2022    MYELOPCT 2.6 01/04/2022    BASOPCT 0.5 04/20/2022    MONOSABS 0.72 04/20/2022    LYMPHSABS 0.55 04/20/2022    EOSABS 0.17 04/20/2022    BASOSABS 0.04 04/20/2022     CMP:    Lab Results   Component Value Date     05/27/2022    K 4.6 05/27/2022    K 4.2 04/09/2022     05/27/2022    CO2 22 05/27/2022    BUN 31 05/27/2022    CREATININE 0.8 05/27/2022    GFRAA >60 05/27/2022    LABGLOM >60 05/27/2022    GLUCOSE 102 05/27/2022    PROT 5.4 05/27/2022    LABALBU 3.3 05/27/2022    CALCIUM 8.4 05/27/2022    BILITOT 0.8 05/27/2022    ALKPHOS 68 05/27/2022    AST 37 05/27/2022    ALT 12 05/27/2022     Magnesium:    Lab Results   Component Value Date    MG 2.0 05/25/2022     Phosphorus:    Lab Results   Component Value Date    PHOS 3.3 11/14/2016       BRIEF SUMMARY OF INITIAL CONSULT:    Briefly Mr. Elizabeth Mcconnell is a 70-year-old man with history of HTN, type II DM, nephrolithiasis s/p lithotripsy, CAD, MVP with significant MR, MV endocarditis, paroxysmal SVT,multiple sclerosis, asbestosis, erectile dysfunction status post penile implant, BPH status post TURP, bladder cancer, Washburn's esophagus, recent admission for pneumonia complicated with right parapneumonic effusion s/p right thoracotomy with decortication who presented to the hospital today 5/23/22 for CABG and valve replacements with Dr. James Campos. However, the surgery was cancelled as Cardiothoracic surgery would like patient optimized before proceeding with procedure.  To note, patient did have lab work drawn on 5/18/22 which demonstrated an elevated creatinine of 1.3mg/dL. Repeat Lab work today revealed creatinine 1.1mg/dL, ProBNP 1,002. Renal was consulted for optimization before surgery. His baseline creatinine is 0.8-0.9 mg/dL     Problems resolved:    · JAMIE, stage I,  2/2 obstructive uropathy--, hernandez catheter placed and urology consulted-- renal function improved over the last 24 hrs, creatinine 0.9 mg/dl today. · Hemodynamic shock, following CABG on vaso and epi to maintain MAP >65  · Lactic acidosis, 2/2 #2, resolved    IMPRESSION/RECOMMENDATIONS:        1. Recurrent JAMIE, resolved, creatinine down to 0.8 with excellent urine output. 2. HFpEF, LVEF 60-65% on 4/7/22 echo. ProBnp 1,002  3. History of Nephrolithiasis  4. Obstructive Uropathy--hernandez catheter placed, urology evaluating  -----------------------------------------------------------  5. CAD s/p 3V CABG with AVR, TVR, MVR on 5/25/2022  6. PAF, on amiodarone and Metoprolol  7. History of lung decortication due to empyema   8. UTI, ID consulted  9. Multiple Sclerosis   10. Anemia s/p surgery  11.  Nutrition, still n.p.o.    Plan:    · Discontinue iv fluids  · Continue to monitor renal function         Electronically signed by Smiley Browning MD on 5/27/2022 at 9:31 AM

## 2022-05-27 NOTE — PLAN OF CARE
Problem: Discharge Planning  Goal: Discharge to home or other facility with appropriate resources  5/26/2022 1054 by Jayjay Grace RN  Outcome: Progressing  Flowsheets (Taken 5/26/2022 0800)  Discharge to home or other facility with appropriate resources:   Identify barriers to discharge with patient and caregiver   Arrange for needed discharge resources and transportation as appropriate     Problem: Chronic Conditions and Co-morbidities  Goal: Patient's chronic conditions and co-morbidity symptoms are monitored and maintained or improved  5/26/2022 2152 by Amanda Guillen RN  Outcome: Progressing  5/26/2022 1054 by Jayjay Grace RN  Outcome: Progressing  Flowsheets (Taken 5/26/2022 0800)  Care Plan - Patient's Chronic Conditions and Co-Morbidity Symptoms are Monitored and Maintained or Improved:   Monitor and assess patient's chronic conditions and comorbid symptoms for stability, deterioration, or improvement   Collaborate with multidisciplinary team to address chronic and comorbid conditions and prevent exacerbation or deterioration     Problem: Skin/Tissue Integrity  Goal: Absence of new skin breakdown  Description: 1. Monitor for areas of redness and/or skin breakdown  2. Assess vascular access sites hourly  3. Every 4-6 hours minimum:  Change oxygen saturation probe site  4. Every 4-6 hours:  If on nasal continuous positive airway pressure, respiratory therapy assess nares and determine need for appliance change or resting period.   5/26/2022 2152 by Amanda Guillen RN  Outcome: Progressing  5/26/2022 1054 by Jayjay Grace RN  Outcome: Progressing     Problem: Safety - Adult  Goal: Free from fall injury  5/26/2022 2152 by Amanda Guillen RN  Outcome: Progressing  5/26/2022 1054 by Jayjay Grace RN  Outcome: Progressing     Problem: Cardiovascular - Adult  Goal: Maintains optimal cardiac output and hemodynamic stability  5/26/2022 2152 by Amanda Guillen RN  Outcome: Progressing  5/26/2022 1054 by Angeles Samayoa Reggie Motta RN  Outcome: Progressing  Flowsheets (Taken 5/26/2022 0800)  Maintains optimal cardiac output and hemodynamic stability:   Monitor blood pressure and heart rate   Monitor urine output and notify Licensed Independent Practitioner for values outside of normal range   Assess for signs of decreased cardiac output   Administer fluid and/or volume expanders as ordered   Administer vasoactive medications as ordered  Goal: Absence of cardiac dysrhythmias or at baseline  5/26/2022 2152 by Jaqui Enriquez RN  Outcome: Progressing  5/26/2022 1054 by Haile Romero RN  Outcome: Progressing  Flowsheets (Taken 5/26/2022 0800)  Absence of cardiac dysrhythmias or at baseline:   Monitor cardiac rate and rhythm   Assess for signs of decreased cardiac output   Administer antiarrhythmia medication and electrolyte replacement as ordered     Problem: Metabolic/Fluid and Electrolytes - Adult  Goal: Hemodynamic stability and optimal renal function maintained  5/26/2022 2152 by Jaqui Enriquez RN  Outcome: Progressing  5/26/2022 1054 by Haile Romero RN  Outcome: Progressing  Flowsheets (Taken 5/26/2022 0800)  Hemodynamic stability and optimal renal function maintained:   Monitor labs and assess for signs and symptoms of volume excess or deficit   Monitor intake, output and patient weight   Monitor urine specific gravity, serum osmolarity and serum sodium as indicated or ordered  Goal: Electrolytes maintained within normal limits  5/26/2022 2152 by Jaqui Enriquez RN  Outcome: Progressing  5/26/2022 1054 by Haile Romero RN  Outcome: Progressing  Flowsheets (Taken 5/26/2022 0800)  Electrolytes maintained within normal limits:   Monitor labs and assess patient for signs and symptoms of electrolyte imbalances   Administer electrolyte replacement as ordered   Monitor response to electrolyte replacements, including repeat lab results as appropriate  Goal: Glucose maintained within prescribed range  5/26/2022 2152 by Jaqui Enriquez RN  Outcome: Progressing  5/26/2022 1054 by Chanel Reilly RN  Outcome: Progressing  Flowsheets (Taken 5/26/2022 0800)  Glucose maintained within prescribed range:   Monitor blood glucose as ordered   Assess for signs and symptoms of hyperglycemia and hypoglycemia   Administer ordered medications to maintain glucose within target range   Assess barriers to adequate nutritional intake and initiate nutrition consult as needed   Instruct patient on self management of diabetes and initiate consult as needed     Problem: Pain  Goal: Verbalizes/displays adequate comfort level or baseline comfort level  5/26/2022 2152 by Ric Smallwood RN  Outcome: Progressing  5/26/2022 1054 by Chanel Reilly RN  Outcome: Progressing  Flowsheets (Taken 5/26/2022 0800)  Verbalizes/displays adequate comfort level or baseline comfort level:   Encourage patient to monitor pain and request assistance   Assess pain using appropriate pain scale   Administer analgesics based on type and severity of pain and evaluate response   Implement non-pharmacological measures as appropriate and evaluate response   Consider cultural and social influences on pain and pain management     Problem: Respiratory - Adult  Goal: Achieves optimal ventilation and oxygenation  5/26/2022 2152 by Ric Smallwood RN  Outcome: Progressing  5/26/2022 1054 by Chanel Reilly RN  Outcome: Progressing  Flowsheets (Taken 5/26/2022 0800)  Achieves optimal ventilation and oxygenation:   Assess for changes in respiratory status   Position to facilitate oxygenation and minimize respiratory effort   Assess for changes in mentation and behavior   Oxygen supplementation based on oxygen saturation or arterial blood gases   Assess the need for suctioning and aspirate as needed     Problem: Skin/Tissue Integrity - Adult  Goal: Skin integrity remains intact  5/26/2022 2152 by Ric Smallwood RN  Outcome: Progressing  5/26/2022 1054 by Chanel Reilly RN  Outcome: Progressing  Flowsheets function  5/26/2022 2152 by Prakash Acosta RN  Outcome: Progressing  5/26/2022 1054 by Yeni Langford RN  Outcome: Progressing  Flowsheets (Taken 5/26/2022 0800)  Maintains or returns to baseline bowel function:   Assess bowel function   Encourage oral fluids to ensure adequate hydration   Administer IV fluids as ordered to ensure adequate hydration  Goal: Maintains adequate nutritional intake  5/26/2022 2152 by Prakash Aocsta RN  Outcome: Progressing  5/26/2022 1054 by Yeni Langford RN  Outcome: Progressing  Flowsheets (Taken 5/26/2022 0800)  Maintains adequate nutritional intake: Monitor intake and output, weight and lab values     Problem: Genitourinary - Adult  Goal: Absence of urinary retention  5/26/2022 2152 by Prakash Acosta RN  Outcome: Progressing  5/26/2022 1054 by Yeni Langford RN  Outcome: Progressing  Flowsheets (Taken 5/26/2022 0800)  Absence of urinary retention:   Assess patients ability to void and empty bladder   Monitor intake/output and perform bladder scan as needed  Goal: Urinary catheter remains patent  5/26/2022 2152 by Prakash Acosta RN  Outcome: Progressing  5/26/2022 1054 by Yeni Langford RN  Outcome: Progressing  Flowsheets (Taken 5/26/2022 0800)  Urinary catheter remains patent:   Assess patency of urinary catheter   Irrigate catheter per Licensed Independent Practitioner order if indicated and notify Licensed Independent Practitioner if unable to irrigate     Problem: Infection - Adult  Goal: Absence of infection at discharge  5/26/2022 2152 by Prakash Acosta RN  Outcome: Progressing  5/26/2022 1054 by Yeni Langford RN  Outcome: Progressing  Flowsheets (Taken 5/26/2022 0800)  Absence of infection at discharge:   Assess and monitor for signs and symptoms of infection   Monitor lab/diagnostic results   Monitor all insertion sites i.e., indwelling lines, tubes and drains   Administer medications as ordered   Instruct and encourage patient and family to use good hand hygiene technique  Goal: Absence of infection during hospitalization  5/26/2022 2152 by Prakash Acosta RN  Outcome: Progressing  5/26/2022 1054 by Yeni Langford RN  Outcome: Progressing  Flowsheets (Taken 5/26/2022 0800)  Absence of infection during hospitalization:   Assess and monitor for signs and symptoms of infection   Monitor lab/diagnostic results   Monitor all insertion sites i.e., indwelling lines, tubes and drains   Administer medications as ordered   Instruct and encourage patient and family to use good hand hygiene technique     Problem: Hematologic - Adult  Goal: Maintains hematologic stability  5/26/2022 2152 by Prakash Acosta RN  Outcome: Progressing  5/26/2022 1054 by Yeni Langford RN  Outcome: Progressing  Flowsheets (Taken 5/26/2022 0800)  Maintains hematologic stability:   Assess for signs and symptoms of bleeding or hemorrhage   Monitor labs for bleeding or clotting disorders

## 2022-05-27 NOTE — CARE COORDINATION
Reviewed chart, CABG x3 5/26, [facility] following. Reviewed medicare rights with pt, signed copy given to pt, and original in soft chart.  Discharge plan is home with significant other and [place] c.Joslyn Mcmillan, MSW, LSW

## 2022-05-27 NOTE — PROGRESS NOTES
Nurse to Nurse report given to Bibiana Galeano RN on [facility]  Patient placed in for transport

## 2022-05-27 NOTE — PROGRESS NOTES
Patient extremely anxious and agitated despite education and redirection. Patient complaining he has not received pain medication, yet refusing any medication to be given to him at this time. Several RNs have attempted to redirect patient.  Will continue to monitor closely

## 2022-05-27 NOTE — PLAN OF CARE
Problem: Discharge Planning  Goal: Discharge to home or other facility with appropriate resources  Outcome: Progressing  Flowsheets (Taken 5/27/2022 0800)  Discharge to home or other facility with appropriate resources: Identify barriers to discharge with patient and caregiver     Problem: Chronic Conditions and Co-morbidities  Goal: Patient's chronic conditions and co-morbidity symptoms are monitored and maintained or improved  5/27/2022 0825 by Brandon Laird RN  Outcome: Progressing  Flowsheets (Taken 5/27/2022 0800)  Care Plan - Patient's Chronic Conditions and Co-Morbidity Symptoms are Monitored and Maintained or Improved: Monitor and assess patient's chronic conditions and comorbid symptoms for stability, deterioration, or improvement  5/26/2022 2152 by Jona Medeiros RN  Outcome: Progressing     Problem: ABCDS Injury Assessment  Goal: Absence of physical injury  Recent Flowsheet Documentation  Taken 5/27/2022 0800 by Brandon Laird RN  Absence of Physical Injury: Implement safety measures based on patient assessment     Problem: Skin/Tissue Integrity  Goal: Absence of new skin breakdown  Description: 1. Monitor for areas of redness and/or skin breakdown  2. Assess vascular access sites hourly  3. Every 4-6 hours minimum:  Change oxygen saturation probe site  4. Every 4-6 hours:  If on nasal continuous positive airway pressure, respiratory therapy assess nares and determine need for appliance change or resting period.   5/27/2022 0825 by Brandon Laird RN  Outcome: Progressing  5/26/2022 2152 by Jona Medeiros RN  Outcome: Progressing     Problem: Safety - Adult  Goal: Free from fall injury  5/27/2022 0825 by Brandon Laird RN  Outcome: Progressing  Flowsheets (Taken 5/27/2022 0800)  Free From Fall Injury: Instruct family/caregiver on patient safety  5/26/2022 2152 by Jona Medeiros RN  Outcome: Progressing     Problem: Cardiovascular - Adult  Goal: Maintains optimal cardiac output and hemodynamic stability  5/27/2022 0825 by Artie Lee RN  Outcome: Progressing  Flowsheets (Taken 5/27/2022 0800)  Maintains optimal cardiac output and hemodynamic stability:   Monitor blood pressure and heart rate   Monitor urine output and notify Licensed Independent Practitioner for values outside of normal range   Assess for signs of decreased cardiac output  5/26/2022 2152 by Renny Wall RN  Outcome: Progressing  Goal: Absence of cardiac dysrhythmias or at baseline  5/27/2022 0825 by Artie Lee RN  Outcome: Progressing  Flowsheets (Taken 5/27/2022 0800)  Absence of cardiac dysrhythmias or at baseline:   Monitor cardiac rate and rhythm   Assess for signs of decreased cardiac output  5/26/2022 2152 by Renny Wall RN  Outcome: Progressing     Problem: Metabolic/Fluid and Electrolytes - Adult  Goal: Hemodynamic stability and optimal renal function maintained  5/27/2022 0825 by Artie Lee RN  Outcome: Progressing  Flowsheets (Taken 5/27/2022 0800)  Hemodynamic stability and optimal renal function maintained:   Monitor labs and assess for signs and symptoms of volume excess or deficit   Monitor intake, output and patient weight  5/26/2022 2152 by Renny Wall RN  Outcome: Progressing  Goal: Electrolytes maintained within normal limits  5/27/2022 0825 by Artie Lee RN  Outcome: Progressing  Flowsheets (Taken 5/27/2022 0800)  Electrolytes maintained within normal limits: Monitor labs and assess patient for signs and symptoms of electrolyte imbalances  5/26/2022 2152 by Renny Wall RN  Outcome: Progressing  Goal: Glucose maintained within prescribed range  5/27/2022 0825 by Artie Lee RN  Outcome: Progressing  Flowsheets (Taken 5/27/2022 0800)  Glucose maintained within prescribed range:   Monitor blood glucose as ordered   Assess for signs and symptoms of hyperglycemia and hypoglycemia  5/26/2022 2152 by Renny Wall RN  Outcome: Progressing     Problem: Respiratory - Adult  Goal: Achieves optimal ventilation and oxygenation  5/27/2022 0825 by Madeline Vigil RN  Outcome: Progressing  Flowsheets (Taken 5/27/2022 0800)  Achieves optimal ventilation and oxygenation:   Assess for changes in respiratory status   Assess for changes in mentation and behavior   Position to facilitate oxygenation and minimize respiratory effort   Oxygen supplementation based on oxygen saturation or arterial blood gases  5/26/2022 2152 by Lolita Fatima RN  Outcome: Progressing     Problem: Skin/Tissue Integrity - Adult  Goal: Skin integrity remains intact  5/27/2022 0825 by Madeline Vigil RN  Outcome: Progressing  Flowsheets (Taken 5/27/2022 0800)  Skin Integrity Remains Intact:   Monitor for areas of redness and/or skin breakdown   Assess vascular access sites hourly  5/26/2022 2152 by Lolita Fatima RN  Outcome: Progressing  Goal: Incisions, wounds, or drain sites healing without S/S of infection  5/27/2022 0825 by Madeline Vigil RN  Outcome: Progressing  Flowsheets (Taken 5/27/2022 0800)  Incisions, Wounds, or Drain Sites Healing Without Sign and Symptoms of Infection:   TWICE DAILY: Assess and document skin integrity   TWICE DAILY: Assess and document dressing/incision, wound bed, drain sites and surrounding tissue  5/26/2022 2152 by Lolita Fatima RN  Outcome: Progressing  Goal: Oral mucous membranes remain intact  5/27/2022 0825 by Madeline Vigil RN  Outcome: Progressing  Flowsheets (Taken 5/27/2022 0800)  Oral Mucous Membranes Remain Intact: Assess oral mucosa and hygiene practices  5/26/2022 2152 by Lolita Fatima RN  Outcome: Progressing     Problem: Musculoskeletal - Adult  Goal: Return mobility to safest level of function  5/27/2022 0825 by Madeline Vigil RN  Outcome: Progressing  Flowsheets (Taken 5/27/2022 0800)  Return Mobility to Safest Level of Function:   Assess patient stability and activity tolerance for standing, transferring and ambulating with or without assistive devices   Assist with transfers and ambulation using safe patient handling equipment as needed   Ensure adequate protection for wounds/incisions during mobilization  5/26/2022 2152 by Liban Garrett RN  Outcome: Progressing     Problem: Gastrointestinal - Adult  Goal: Minimal or absence of nausea and vomiting  5/27/2022 0825 by Seble Holguin RN  Outcome: Progressing  Flowsheets (Taken 5/27/2022 0800)  Minimal or absence of nausea and vomiting:   Administer IV fluids as ordered to ensure adequate hydration   Administer ordered antiemetic medications as needed  5/26/2022 2152 by Liban Garrett RN  Outcome: Progressing  Goal: Maintains or returns to baseline bowel function  5/27/2022 0825 by Seble Holguin RN  Outcome: Progressing  Flowsheets (Taken 5/27/2022 0800)  Maintains or returns to baseline bowel function:   Assess bowel function   Encourage oral fluids to ensure adequate hydration   Administer IV fluids as ordered to ensure adequate hydration  5/26/2022 2152 by Liban Garrett RN  Outcome: Progressing  Goal: Maintains adequate nutritional intake  5/27/2022 0825 by Seble Holguin RN  Outcome: Progressing  Flowsheets (Taken 5/27/2022 0800)  Maintains adequate nutritional intake:   Monitor percentage of each meal consumed   Identify factors contributing to decreased intake, treat as appropriate  5/26/2022 2152 by Liban Garrett RN  Outcome: Progressing     Problem: Genitourinary - Adult  Goal: Absence of urinary retention  5/27/2022 0825 by Seble Holguin RN  Outcome: Progressing  Flowsheets (Taken 5/27/2022 0800)  Absence of urinary retention:   Assess patients ability to void and empty bladder   Monitor intake/output and perform bladder scan as needed  5/26/2022 2152 by Liban Garrtet RN  Outcome: Progressing  Goal: Urinary catheter remains patent  5/27/2022 0825 by Seble Holguin RN  Outcome: Progressing  Flowsheets (Taken 5/27/2022 0800)  Urinary catheter remains patent: Assess patency of urinary catheter  5/26/2022 2152 by Liban Garrett RN  Outcome: Progressing     Problem: Infection - Adult  Goal: Absence of infection at discharge  5/27/2022 0825 by Radu Mcgregor RN  Outcome: Progressing  Flowsheets (Taken 5/27/2022 0800)  Absence of infection at discharge:   Assess and monitor for signs and symptoms of infection   Monitor lab/diagnostic results   Monitor all insertion sites i.e., indwelling lines, tubes and drains  5/26/2022 2152 by Yusra Nazario RN  Outcome: Progressing  Goal: Absence of infection during hospitalization  5/27/2022 0825 by Radu Mcgregor RN  Outcome: Progressing  Flowsheets (Taken 5/27/2022 0800)  Absence of infection during hospitalization:   Assess and monitor for signs and symptoms of infection   Monitor lab/diagnostic results   Monitor all insertion sites i.e., indwelling lines, tubes and drains  5/26/2022 2152 by Yusra Nazario RN  Outcome: Progressing     Problem: Hematologic - Adult  Goal: Maintains hematologic stability  5/27/2022 0825 by Radu Mcgregor RN  Outcome: Progressing  Flowsheets (Taken 5/27/2022 0800)  Maintains hematologic stability:   Assess for signs and symptoms of bleeding or hemorrhage   Monitor labs for bleeding or clotting disorders  5/26/2022 2152 by Yusra Nazario RN  Outcome: Progressing     Problem: Pain  Goal: Verbalizes/displays adequate comfort level or baseline comfort level  5/27/2022 0825 by Radu Mcgregor RN  Outcome: Progressing  Flowsheets (Taken 5/27/2022 0800)  Verbalizes/displays adequate comfort level or baseline comfort level:   Encourage patient to monitor pain and request assistance   Assess pain using appropriate pain scale  5/26/2022 2152 by Yusra Nazario RN  Outcome: Progressing

## 2022-05-28 ENCOUNTER — APPOINTMENT (OUTPATIENT)
Dept: GENERAL RADIOLOGY | Age: 77
DRG: 219 | End: 2022-05-28
Attending: THORACIC SURGERY (CARDIOTHORACIC VASCULAR SURGERY)
Payer: MEDICARE

## 2022-05-28 LAB
ANION GAP SERPL CALCULATED.3IONS-SCNC: 11 MMOL/L (ref 7–16)
BLOOD BANK DISPENSE STATUS: NORMAL
BLOOD BANK PRODUCT CODE: NORMAL
BPU ID: NORMAL
BUN BLDV-MCNC: 41 MG/DL (ref 6–23)
CALCIUM SERPL-MCNC: 8.4 MG/DL (ref 8.6–10.2)
CHLORIDE BLD-SCNC: 110 MMOL/L (ref 98–107)
CO2: 21 MMOL/L (ref 22–29)
CREAT SERPL-MCNC: 0.9 MG/DL (ref 0.7–1.2)
DESCRIPTION BLOOD BANK: NORMAL
GFR AFRICAN AMERICAN: >60
GFR NON-AFRICAN AMERICAN: >60 ML/MIN/1.73
GLUCOSE BLD-MCNC: 79 MG/DL (ref 74–99)
HCT VFR BLD CALC: 33.4 % (ref 37–54)
HEMOGLOBIN: 10.6 G/DL (ref 12.5–16.5)
MAGNESIUM: 2.5 MG/DL (ref 1.6–2.6)
MCH RBC QN AUTO: 31.3 PG (ref 26–35)
MCHC RBC AUTO-ENTMCNC: 31.7 % (ref 32–34.5)
MCV RBC AUTO: 98.5 FL (ref 80–99.9)
METER GLUCOSE: 82 MG/DL (ref 74–99)
METER GLUCOSE: 83 MG/DL (ref 74–99)
METER GLUCOSE: 86 MG/DL (ref 74–99)
PDW BLD-RTO: 14.7 FL (ref 11.5–15)
PLATELET # BLD: 87 E9/L (ref 130–450)
PLATELET CONFIRMATION: NORMAL
PMV BLD AUTO: 11.7 FL (ref 7–12)
POTASSIUM SERPL-SCNC: 4.9 MMOL/L (ref 3.5–5)
RBC # BLD: 3.39 E12/L (ref 3.8–5.8)
SODIUM BLD-SCNC: 142 MMOL/L (ref 132–146)
WBC # BLD: 16.1 E9/L (ref 4.5–11.5)

## 2022-05-28 PROCEDURE — 6370000000 HC RX 637 (ALT 250 FOR IP): Performed by: NURSE PRACTITIONER

## 2022-05-28 PROCEDURE — 6370000000 HC RX 637 (ALT 250 FOR IP)

## 2022-05-28 PROCEDURE — 82962 GLUCOSE BLOOD TEST: CPT

## 2022-05-28 PROCEDURE — 6360000002 HC RX W HCPCS

## 2022-05-28 PROCEDURE — 36415 COLL VENOUS BLD VENIPUNCTURE: CPT

## 2022-05-28 PROCEDURE — 2580000003 HC RX 258: Performed by: NURSE PRACTITIONER

## 2022-05-28 PROCEDURE — 85027 COMPLETE CBC AUTOMATED: CPT

## 2022-05-28 PROCEDURE — 94640 AIRWAY INHALATION TREATMENT: CPT

## 2022-05-28 PROCEDURE — 2140000000 HC CCU INTERMEDIATE R&B

## 2022-05-28 PROCEDURE — 80048 BASIC METABOLIC PNL TOTAL CA: CPT

## 2022-05-28 PROCEDURE — 2500000003 HC RX 250 WO HCPCS: Performed by: INTERNAL MEDICINE

## 2022-05-28 PROCEDURE — 71045 X-RAY EXAM CHEST 1 VIEW: CPT

## 2022-05-28 PROCEDURE — 2700000000 HC OXYGEN THERAPY PER DAY

## 2022-05-28 PROCEDURE — 83735 ASSAY OF MAGNESIUM: CPT

## 2022-05-28 PROCEDURE — 93798 PHYS/QHP OP CAR RHAB W/ECG: CPT

## 2022-05-28 RX ORDER — BUMETANIDE 0.25 MG/ML
1 INJECTION, SOLUTION INTRAMUSCULAR; INTRAVENOUS ONCE
Status: COMPLETED | OUTPATIENT
Start: 2022-05-28 | End: 2022-05-28

## 2022-05-28 RX ORDER — ENOXAPARIN SODIUM 100 MG/ML
30 INJECTION SUBCUTANEOUS DAILY
Status: DISCONTINUED | OUTPATIENT
Start: 2022-05-28 | End: 2022-05-28 | Stop reason: DRUGHIGH

## 2022-05-28 RX ORDER — ENOXAPARIN SODIUM 100 MG/ML
40 INJECTION SUBCUTANEOUS DAILY
Status: DISCONTINUED | OUTPATIENT
Start: 2022-05-28 | End: 2022-06-01

## 2022-05-28 RX ADMIN — OXYCODONE HYDROCHLORIDE AND ACETAMINOPHEN 500 MG: 500 TABLET ORAL at 09:17

## 2022-05-28 RX ADMIN — OXYCODONE HYDROCHLORIDE AND ACETAMINOPHEN 500 MG: 500 TABLET ORAL at 20:05

## 2022-05-28 RX ADMIN — OXYCODONE AND ACETAMINOPHEN 1 TABLET: 5; 325 TABLET ORAL at 09:21

## 2022-05-28 RX ADMIN — IPRATROPIUM BROMIDE AND ALBUTEROL SULFATE 1 AMPULE: .5; 2.5 SOLUTION RESPIRATORY (INHALATION) at 20:27

## 2022-05-28 RX ADMIN — SENNOSIDES AND DOCUSATE SODIUM 1 TABLET: 50; 8.6 TABLET ORAL at 09:17

## 2022-05-28 RX ADMIN — METOPROLOL TARTRATE 12.5 MG: 25 TABLET, FILM COATED ORAL at 09:17

## 2022-05-28 RX ADMIN — OXYCODONE AND ACETAMINOPHEN 1 TABLET: 5; 325 TABLET ORAL at 20:05

## 2022-05-28 RX ADMIN — SENNOSIDES AND DOCUSATE SODIUM 1 TABLET: 50; 8.6 TABLET ORAL at 20:05

## 2022-05-28 RX ADMIN — SODIUM CHLORIDE, PRESERVATIVE FREE 10 ML: 5 INJECTION INTRAVENOUS at 20:05

## 2022-05-28 RX ADMIN — METOPROLOL TARTRATE 12.5 MG: 25 TABLET, FILM COATED ORAL at 20:05

## 2022-05-28 RX ADMIN — Medication 400 MG: at 09:16

## 2022-05-28 RX ADMIN — FOLIC ACID 1 MG: 1 TABLET ORAL at 09:17

## 2022-05-28 RX ADMIN — PANTOPRAZOLE SODIUM 40 MG: 40 TABLET, DELAYED RELEASE ORAL at 06:48

## 2022-05-28 RX ADMIN — ASPIRIN 81 MG: 81 TABLET, COATED ORAL at 09:16

## 2022-05-28 RX ADMIN — FERROUS SULFATE TAB 325 MG (65 MG ELEMENTAL FE) 325 MG: 325 (65 FE) TAB at 17:19

## 2022-05-28 RX ADMIN — IPRATROPIUM BROMIDE AND ALBUTEROL SULFATE 1 AMPULE: .5; 2.5 SOLUTION RESPIRATORY (INHALATION) at 13:04

## 2022-05-28 RX ADMIN — MUPIROCIN: 20 OINTMENT TOPICAL at 14:57

## 2022-05-28 RX ADMIN — IPRATROPIUM BROMIDE AND ALBUTEROL SULFATE 1 AMPULE: .5; 2.5 SOLUTION RESPIRATORY (INHALATION) at 16:24

## 2022-05-28 RX ADMIN — BUMETANIDE 1 MG: 0.25 INJECTION INTRAMUSCULAR; INTRAVENOUS at 15:53

## 2022-05-28 RX ADMIN — BISACODYL 5 MG: 5 TABLET, COATED ORAL at 09:16

## 2022-05-28 RX ADMIN — FERROUS SULFATE TAB 325 MG (65 MG ELEMENTAL FE) 325 MG: 325 (65 FE) TAB at 09:17

## 2022-05-28 RX ADMIN — SODIUM CHLORIDE, PRESERVATIVE FREE 10 ML: 5 INJECTION INTRAVENOUS at 09:16

## 2022-05-28 RX ADMIN — MUPIROCIN: 20 OINTMENT TOPICAL at 20:06

## 2022-05-28 RX ADMIN — IPRATROPIUM BROMIDE AND ALBUTEROL SULFATE 1 AMPULE: .5; 2.5 SOLUTION RESPIRATORY (INHALATION) at 08:30

## 2022-05-28 RX ADMIN — AMIODARONE HYDROCHLORIDE 200 MG: 200 TABLET ORAL at 09:17

## 2022-05-28 RX ADMIN — ENOXAPARIN SODIUM 40 MG: 100 INJECTION SUBCUTANEOUS at 11:34

## 2022-05-28 RX ADMIN — AMIODARONE HYDROCHLORIDE 200 MG: 200 TABLET ORAL at 20:05

## 2022-05-28 RX ADMIN — ATORVASTATIN CALCIUM 40 MG: 40 TABLET, FILM COATED ORAL at 20:05

## 2022-05-28 ASSESSMENT — PAIN SCALES - GENERAL
PAINLEVEL_OUTOF10: 0
PAINLEVEL_OUTOF10: 5
PAINLEVEL_OUTOF10: 4
PAINLEVEL_OUTOF10: 5

## 2022-05-28 NOTE — PROGRESS NOTES
Nutrition Education    · Educated on Heart Healthy Diet  · Learners: Patient  · Readiness: Eager  · Method: Explanation and Handout  · Response: Verbalizes Understanding  · Contact name and number provided.     Delmis Coats RD  Contact Number: ext [phone]

## 2022-05-28 NOTE — PROGRESS NOTES
Messaged nephrology regarding if they want to diurese patient today. Waiting for new orders. Bumex 1mg IV once ordered.

## 2022-05-28 NOTE — PROGRESS NOTES
This patient is on medication that requires renal, weight, and/or indication dose adjustment. Date Body Weight IBW  Adjusted BW SCr  CrCl Dialysis status   5/28/2022 [unfilled] Ideal body weight: 73 kg (160 lb 15 oz)  Adjusted ideal body weight: 76.7 kg (169 lb 3.2 oz) Serum creatinine: 0.9 mg/dL 05/28/22 0659  Estimated creatinine clearance: 69 mL/min N/a       Pharmacy has dose-adjusted the following medication(s):    Date Previous Order Adjusted Order   5/28/2022 Enoxaparin 30 mg SubQ Daily Enoxaparin 40 mg SubQ Daily       These changes were made per protocol according to the [address] [facility] Pharmac[facility]. *Please note this dose may need readjusted if patient's condition changes. Please contact pharmacy with any questions regarding these changes.     Stephanie Daly, [facility]  5/28/2022  10:59 AM

## 2022-05-28 NOTE — PLAN OF CARE
Problem: Discharge Planning  Goal: Discharge to home or other facility with appropriate resources  5/28/2022 1015 by Marjorie Mckeon RN  Outcome: Progressing     Problem: Chronic Conditions and Co-morbidities  Goal: Patient's chronic conditions and co-morbidity symptoms are monitored and maintained or improved  Outcome: Progressing     Problem: Cardiovascular - Adult  Goal: Maintains optimal cardiac output and hemodynamic stability  Outcome: Progressing     Problem: Cardiovascular - Adult  Goal: Absence of cardiac dysrhythmias or at baseline  Outcome: Progressing     Problem: Respiratory - Adult  Goal: Achieves optimal ventilation and oxygenation  Outcome: Progressing     Problem: Skin/Tissue Integrity - Adult  Goal: Oral mucous membranes remain intact  Outcome: Progressing     Problem: Musculoskeletal - Adult  Goal: Return mobility to safest level of function  Outcome: Progressing

## 2022-05-28 NOTE — PROGRESS NOTES
Department of Internal Medicine  Nephrology Attending Consult Note    Events reviewed. SUBJECTIVE: We are following MrJaime Barreto for JAMIE. Reports no complaints. PHYSICAL EXAM:      Vitals:    VITALS:  /66   Pulse 73   Temp 97 °F (36.1 °C) (Temporal)   Resp 20   Ht 5' 10\" (1.778 m)   Wt 181 lb 9.6 oz (82.4 kg)   SpO2 93%   BMI 26.06 kg/m²   24HR INTAKE/OUTPUT:      Intake/Output Summary (Last 24 hours) at 5/28/2022 1019  Last data filed at 5/28/2022 0432  Gross per 24 hour   Intake 300 ml   Output 2520 ml   Net -2220 ml       Constitutional: Patient is awake alert in no distress. HEENT: Pupils are equal reactive  Respiratory: Lungs are clear  Cardiovascular/Edema: Heart sounds are regular  Gastrointestinal: Abdomen soft, hypoactive bowel sounds  Neurologic: chemical sedation  Skin: s/p CABG  Other: No edema    Scheduled Meds:   metoprolol tartrate  12.5 mg Oral BID    amiodarone  200 mg Oral BID    aspirin  81 mg Oral Daily    bisacodyl  5 mg Oral Daily    sennosides-docusate sodium  1 tablet Oral BID    ferrous sulfate  325 mg Oral BID WC    vitamin C  500 mg Oral BID    folic acid  1 mg Oral Daily    insulin lispro  0-6 Units SubCUTAneous TID WC    insulin lispro  0-3 Units SubCUTAneous Nightly    sodium chloride flush  5-40 mL IntraVENous 2 times per day    magnesium oxide  400 mg Oral Daily    mupirocin   Nasal BID    pantoprazole  40 mg Oral Daily    ipratropium-albuterol  1 ampule Inhalation Q4H WA    atorvastatin  40 mg Oral Nightly     Continuous Infusions:   dextrose       PRN Meds:. ALPRAZolam, acetaminophen, oxyCODONE-acetaminophen **OR** oxyCODONE-acetaminophen, diphenhydrAMINE, potassium chloride, bisacodyl, amiodarone bolus, magnesium sulfate, morphine, sodium chloride, trimethobenzamide, glucose, glucagon (rDNA), dextrose, dextrose, sodium chloride flush, ondansetron **OR** ondansetron      DATA:    CBC with Differential:    Lab Results   Component Value Date    WBC 16.1 05/28/2022    RBC 3.39 05/28/2022    HGB 10.6 05/28/2022    HCT 33.4 05/28/2022    HCT 29.0 05/26/2022    PLT 87 05/28/2022    MCV 98.5 05/28/2022    MCH 31.3 05/28/2022    MCHC 31.7 05/28/2022    RDW 14.7 05/28/2022    NRBC 0.0 01/17/2022    SEGSPCT 70 06/08/2011    LYMPHOPCT 7.3 04/20/2022    MONOPCT 9.5 04/20/2022    MYELOPCT 2.6 01/04/2022    BASOPCT 0.5 04/20/2022    MONOSABS 0.72 04/20/2022    LYMPHSABS 0.55 04/20/2022    EOSABS 0.17 04/20/2022    BASOSABS 0.04 04/20/2022     CMP:    Lab Results   Component Value Date     05/28/2022    K 4.9 05/28/2022    K 4.2 04/09/2022     05/28/2022    CO2 21 05/28/2022    BUN 41 05/28/2022    CREATININE 0.9 05/28/2022    GFRAA >60 05/28/2022    LABGLOM >60 05/28/2022    GLUCOSE 79 05/28/2022    PROT 5.4 05/27/2022    LABALBU 3.3 05/27/2022    CALCIUM 8.4 05/28/2022    BILITOT 0.8 05/27/2022    ALKPHOS 68 05/27/2022    AST 37 05/27/2022    ALT 12 05/27/2022     Magnesium:    Lab Results   Component Value Date    MG 2.5 05/28/2022     Phosphorus:    Lab Results   Component Value Date    PHOS 3.3 11/14/2016       BRIEF SUMMARY OF INITIAL CONSULT:    Briefly Mr. Iman Shields is a 18-year-old man with history of HTN, type II DM, nephrolithiasis s/p lithotripsy, CAD, MVP with significant MR, MV endocarditis, paroxysmal SVT,multiple sclerosis, asbestosis, erectile dysfunction status post penile implant, BPH status post TURP, bladder cancer, Washburn's esophagus, recent admission for pneumonia complicated with right parapneumonic effusion s/p right thoracotomy with decortication who presented to the hospital today 5/23/22 for CABG and valve replacements with Dr. Kimberly Tompkins. However, the surgery was cancelled as Cardiothoracic surgery would like patient optimized before proceeding with procedure. To note, patient did have lab work drawn on 5/18/22 which demonstrated an elevated creatinine of 1.3mg/dL. Repeat Lab work today revealed creatinine 1.1mg/dL, ProBNP 1,002.  Renal was consulted for optimization before surgery. His baseline creatinine is 0.8-0.9 mg/dL     Problems resolved:    · JAMIE, stage I,  2/2 obstructive uropathy--, hernandez catheter placed and urology consulted-- renal function improved over the last 24 hrs, creatinine 0.9 mg/dl today. · Hemodynamic shock, following CABG on vaso and epi to maintain MAP >65  · Lactic acidosis, 2/2 #2, resolved    IMPRESSION/RECOMMENDATIONS:        1. Recurrent JAMIE, resolved, creatinine down to 0.8 with excellent urine output. 2. HFpEF, LVEF 60-65% on 4/7/22 echo. ProBnp 1,002  3. History of Nephrolithiasis  4. Obstructive Uropathy--hernandez catheter placed, urology evaluating  -----------------------------------------------------------  5. CAD s/p 3V CABG with AVR, TVR, MVR on 5/25/2022  6. PAF, on amiodarone and Metoprolol  7. History of lung decortication due to empyema   8. UTI, ID consulted  9. Multiple Sclerosis   10. Anemia s/p surgery  11.  Nutrition, Renal diet, low potassium     Plan:    · Continue to monitor renal function  · Discharge planning         Electronically signed by SHANIQUE Guadarrama CNP on 5/28/2022 at 10:19 AM

## 2022-05-28 NOTE — PLAN OF CARE
Problem: Discharge Planning  Goal: Discharge to home or other facility with appropriate resources  Outcome: Progressing     Problem: Skin/Tissue Integrity  Goal: Absence of new skin breakdown  Description: 1. Monitor for areas of redness and/or skin breakdown  2. Assess vascular access sites hourly  3. Every 4-6 hours minimum:  Change oxygen saturation probe site  4. Every 4-6 hours:  If on nasal continuous positive airway pressure, respiratory therapy assess nares and determine need for appliance change or resting period.   Outcome: Progressing     Problem: Safety - Adult  Goal: Free from fall injury  Outcome: Progressing     Problem: Skin/Tissue Integrity - Adult  Goal: Skin integrity remains intact  Outcome: Progressing     Problem: Skin/Tissue Integrity - Adult  Goal: Incisions, wounds, or drain sites healing without S/S of infection  Outcome: Progressing

## 2022-05-28 NOTE — PLAN OF CARE
Problem: Discharge Planning  Goal: Discharge to home or other facility with appropriate resources  5/28/2022 1015 by Irma Rivera RN  Outcome: Progressing     Problem: Chronic Conditions and Co-morbidities  Goal: Patient's chronic conditions and co-morbidity symptoms are monitored and maintained or improved  Outcome: Progressing     Problem: Skin/Tissue Integrity  Goal: Absence of new skin breakdown  Description: 1. Monitor for areas of redness and/or skin breakdown  2. Assess vascular access sites hourly  3. Every 4-6 hours minimum:  Change oxygen saturation probe site  4. Every 4-6 hours:  If on nasal continuous positive airway pressure, respiratory therapy assess nares and determine need for appliance change or resting period.   5/28/2022 1015 by Irma Rivera RN  Outcome: Progressing     Problem: Cardiovascular - Adult  Goal: Maintains optimal cardiac output and hemodynamic stability  Outcome: Progressing     Problem: Cardiovascular - Adult  Goal: Absence of cardiac dysrhythmias or at baseline  Outcome: Progressing

## 2022-05-28 NOTE — PROGRESS NOTES
POD#3 Awake, alert. No complaints. Denies CP, palpitations, SOB at rest, dizziness/lightheadedness. Vitals:    05/28/22 0746 05/28/22 0748 05/28/22 0830 05/28/22 0951   BP:  116/66     Pulse:  73     Resp:  20  18   Temp:  97 °F (36.1 °C)     TempSrc:  Temporal     SpO2: 96% 100% 93%    Weight:       Height:         O2: 2L/NC 93%      Intake/Output Summary (Last 24 hours) at 5/28/2022 1027  Last data filed at 5/28/2022 0432  Gross per 24 hour   Intake 300 ml   Output 2520 ml   Net -2220 ml         +BM on 5/28    UO: 750mL/8hr   CT output:  Pleural 1: 75mL/8hr (180mL/24hrs)  Pleural: 10mL/8hr (40mL/24hrs)      Recent Labs     05/26/22  1740 05/27/22  0510 05/28/22  0659   WBC 27.3* 18.5* 16.1*   HGB 10.2* 10.1* 10.6*   HCT 30.9* 31.1* 33.4*   * 85* 87*      Recent Labs     05/26/22  1740 05/27/22  0510 05/28/22  0659   BUN 30* 31* 41*   CREATININE 1.0 0.8 0.9         Telemetry: SR      PE  Cardiac: RRR  Lungs: decreased bases  Chest incision with intact GHANSHYAM DSD. Sternum stable. Prior chest tube site incisions C/D/I, no erythema with intact sutures. Chest tubes x 2 and Epicardial pacing wires present and secure. Abd: Soft, nontender, +BS  Ext: Incisions C/D/I, approximated, no erythema, + edema. Left groin suture in place            A/P: POD#3    1.  VHD/CAD  --Stable s/p Urgent sternotomy/Urgent Aortic valve repair by leaflet debridement/Complex mitral valve replacement with annular debridement and a 33mm Medtronic Mosaic bioprosthetic/Tricuspid valve repair by bicuspidization/CABG x 3 (LIMA-LAD, SVG-Diag, SVG-PDA)/WALLACE exclusion with 40mm AtriClip/EVH RLE/Rigid internal fixation of the sternum with Juan Carlos plates x 9/64 modifier on 5/25/2022  --Post op JOEL reveals 60% EF on inotropic support  --Will order TTE prior to discharge to establish baseline and evaluate valves, ordered on 5/28/2022  --Scr stable  --ASA/statin - start low dose BB today   --reinforce sternal precautions  --continue epicardial pacing wires--Connected to pacer with backup of 40  --chest tubes without significant output and without airleaks. Chest tubes removed without difficulty. Patient tolerated well       2. Expected acute blood loss anemia secondary to open heart surgery  --stable - 10.6      3. NSR  --continue BB with hold parameters - started 12.5 mg BID metoprolol today   --continue oral amiodarone for afib prophylaxis--with plans to taper on dc -- on 200 mg BID         4. Expected acute pulmonary insufficiency in the setting following surgery  --Hx of severe COPD   --wean oxygen to keep SpO2 greater than or equal to 92%  --continue duonebs with ezpap  --encourage C&DB, SMI  --currently on 2L O2/NC  --Will check with renal regarding diuresis       5. JAMIE   --Pre-op 1.3 -- surgery cancelled for renal optimization   --Scr stable today - 0.9 today   --Nephrology following      6. T2DM   --pre-op HgA1c 5.1%  --continue SSI       7. Urinary retention  --Hx of BPH s/p TURP  --Urology seen pre-op recommends continuing hernandez until more ambulatory -- will try voiding trial prior to discharge        8. UTI   --pre-op urine culture (+) ,000 bacteria   --ID following   --completed 3 days of daptomycin and merrem      9. Thrombocytopenia   --Plts 87K today, down slightly from 88K  --Continue to monitor       10. Leukocytosis   --afebrile, likely reactive  --improving, WBC 16.1 today from 18.5  --continue to monitor       11. Constipation--expected delayed return of bowel function  --secondary to anesthesia, narcotics, decreased oral intake, and decreased physical activity   --  (+) BM  --Continue daily MOM/oral bisacodyl until +BM and senna-s as scheduled. --Will give daily suppository until +BM starting POD 3 if no result by then   --Encouraged continued increase in oral intake and activity.         12. Expected deconditioning in the setting following surgery  --Increase activity as tolerated  --PT/OT  --currently ambulating 10ft 13. At risk for aspiration   --witnessed coughing with thin liquids by nursing staff in [address]   --Speech consulted for evaluation -- recommend minced and moist consistency solids with thin liquids           DVT prophylaxis:  --continue bilateral knee high MYKE hose  --continue PCDs  --continue progressive ambulation  --Add lovenox for dvt prophylaxis and continue knee high MYKE hose/pcds/progressive ambulation      Dispo: home vs. Rehab pending progression in activity level        This patient's case and care plan was discussed with the attending surgeon

## 2022-05-29 LAB
ANION GAP SERPL CALCULATED.3IONS-SCNC: 10 MMOL/L (ref 7–16)
BUN BLDV-MCNC: 43 MG/DL (ref 6–23)
CALCIUM SERPL-MCNC: 7.9 MG/DL (ref 8.6–10.2)
CHLORIDE BLD-SCNC: 104 MMOL/L (ref 98–107)
CO2: 24 MMOL/L (ref 22–29)
CREAT SERPL-MCNC: 1 MG/DL (ref 0.7–1.2)
GFR AFRICAN AMERICAN: >60
GFR NON-AFRICAN AMERICAN: >60 ML/MIN/1.73
GLUCOSE BLD-MCNC: 92 MG/DL (ref 74–99)
HCT VFR BLD CALC: 29.9 % (ref 37–54)
HEMOGLOBIN: 9.7 G/DL (ref 12.5–16.5)
MAGNESIUM: 2.1 MG/DL (ref 1.6–2.6)
MCH RBC QN AUTO: 30.6 PG (ref 26–35)
MCHC RBC AUTO-ENTMCNC: 32.4 % (ref 32–34.5)
MCV RBC AUTO: 94.3 FL (ref 80–99.9)
PDW BLD-RTO: 14.1 FL (ref 11.5–15)
PLATELET # BLD: 133 E9/L (ref 130–450)
PMV BLD AUTO: 11 FL (ref 7–12)
POTASSIUM SERPL-SCNC: 4.1 MMOL/L (ref 3.5–5)
RBC # BLD: 3.17 E12/L (ref 3.8–5.8)
SODIUM BLD-SCNC: 138 MMOL/L (ref 132–146)
WBC # BLD: 15.4 E9/L (ref 4.5–11.5)

## 2022-05-29 PROCEDURE — 6370000000 HC RX 637 (ALT 250 FOR IP): Performed by: NURSE PRACTITIONER

## 2022-05-29 PROCEDURE — 2140000000 HC CCU INTERMEDIATE R&B

## 2022-05-29 PROCEDURE — 2580000003 HC RX 258: Performed by: NURSE PRACTITIONER

## 2022-05-29 PROCEDURE — 36415 COLL VENOUS BLD VENIPUNCTURE: CPT

## 2022-05-29 PROCEDURE — 80048 BASIC METABOLIC PNL TOTAL CA: CPT

## 2022-05-29 PROCEDURE — 6370000000 HC RX 637 (ALT 250 FOR IP)

## 2022-05-29 PROCEDURE — 94640 AIRWAY INHALATION TREATMENT: CPT

## 2022-05-29 PROCEDURE — 6360000002 HC RX W HCPCS

## 2022-05-29 PROCEDURE — 85027 COMPLETE CBC AUTOMATED: CPT

## 2022-05-29 PROCEDURE — 83735 ASSAY OF MAGNESIUM: CPT

## 2022-05-29 PROCEDURE — 2700000000 HC OXYGEN THERAPY PER DAY

## 2022-05-29 PROCEDURE — 93798 PHYS/QHP OP CAR RHAB W/ECG: CPT

## 2022-05-29 RX ORDER — POLYETHYLENE GLYCOL 3350 17 G/17G
17 POWDER, FOR SOLUTION ORAL DAILY
Status: DISCONTINUED | OUTPATIENT
Start: 2022-05-29 | End: 2022-06-02 | Stop reason: HOSPADM

## 2022-05-29 RX ORDER — BISACODYL 10 MG
10 SUPPOSITORY, RECTAL RECTAL DAILY
Status: DISCONTINUED | OUTPATIENT
Start: 2022-05-29 | End: 2022-06-02 | Stop reason: HOSPADM

## 2022-05-29 RX ADMIN — FERROUS SULFATE TAB 325 MG (65 MG ELEMENTAL FE) 325 MG: 325 (65 FE) TAB at 17:04

## 2022-05-29 RX ADMIN — OXYCODONE HYDROCHLORIDE AND ACETAMINOPHEN 500 MG: 500 TABLET ORAL at 21:21

## 2022-05-29 RX ADMIN — IPRATROPIUM BROMIDE AND ALBUTEROL SULFATE 1 AMPULE: .5; 2.5 SOLUTION RESPIRATORY (INHALATION) at 11:39

## 2022-05-29 RX ADMIN — OXYCODONE AND ACETAMINOPHEN 1 TABLET: 5; 325 TABLET ORAL at 21:21

## 2022-05-29 RX ADMIN — AMIODARONE HYDROCHLORIDE 200 MG: 200 TABLET ORAL at 08:52

## 2022-05-29 RX ADMIN — OXYCODONE HYDROCHLORIDE AND ACETAMINOPHEN 500 MG: 500 TABLET ORAL at 08:52

## 2022-05-29 RX ADMIN — IPRATROPIUM BROMIDE AND ALBUTEROL SULFATE 1 AMPULE: .5; 2.5 SOLUTION RESPIRATORY (INHALATION) at 16:44

## 2022-05-29 RX ADMIN — METOPROLOL TARTRATE 12.5 MG: 25 TABLET, FILM COATED ORAL at 08:52

## 2022-05-29 RX ADMIN — ENOXAPARIN SODIUM 40 MG: 100 INJECTION SUBCUTANEOUS at 08:51

## 2022-05-29 RX ADMIN — SODIUM CHLORIDE, PRESERVATIVE FREE 10 ML: 5 INJECTION INTRAVENOUS at 08:51

## 2022-05-29 RX ADMIN — BISACODYL 5 MG: 5 TABLET, COATED ORAL at 08:52

## 2022-05-29 RX ADMIN — METOPROLOL TARTRATE 12.5 MG: 25 TABLET, FILM COATED ORAL at 21:21

## 2022-05-29 RX ADMIN — Medication 400 MG: at 08:52

## 2022-05-29 RX ADMIN — AMIODARONE HYDROCHLORIDE 200 MG: 200 TABLET ORAL at 21:21

## 2022-05-29 RX ADMIN — OXYCODONE AND ACETAMINOPHEN 1 TABLET: 5; 325 TABLET ORAL at 05:20

## 2022-05-29 RX ADMIN — SODIUM CHLORIDE, PRESERVATIVE FREE 10 ML: 5 INJECTION INTRAVENOUS at 21:21

## 2022-05-29 RX ADMIN — BISACODYL 10 MG: 10 SUPPOSITORY RECTAL at 13:07

## 2022-05-29 RX ADMIN — IPRATROPIUM BROMIDE AND ALBUTEROL SULFATE 1 AMPULE: .5; 2.5 SOLUTION RESPIRATORY (INHALATION) at 21:51

## 2022-05-29 RX ADMIN — MUPIROCIN: 20 OINTMENT TOPICAL at 08:52

## 2022-05-29 RX ADMIN — MUPIROCIN: 20 OINTMENT TOPICAL at 21:21

## 2022-05-29 RX ADMIN — OXYCODONE AND ACETAMINOPHEN 1 TABLET: 5; 325 TABLET ORAL at 09:56

## 2022-05-29 RX ADMIN — IPRATROPIUM BROMIDE AND ALBUTEROL SULFATE 1 AMPULE: .5; 2.5 SOLUTION RESPIRATORY (INHALATION) at 07:48

## 2022-05-29 RX ADMIN — POLYETHYLENE GLYCOL 3350 17 G: 17 POWDER, FOR SOLUTION ORAL at 13:07

## 2022-05-29 RX ADMIN — FERROUS SULFATE TAB 325 MG (65 MG ELEMENTAL FE) 325 MG: 325 (65 FE) TAB at 08:52

## 2022-05-29 RX ADMIN — OXYCODONE AND ACETAMINOPHEN 1 TABLET: 5; 325 TABLET ORAL at 17:06

## 2022-05-29 RX ADMIN — SENNOSIDES AND DOCUSATE SODIUM 1 TABLET: 50; 8.6 TABLET ORAL at 08:52

## 2022-05-29 RX ADMIN — ATORVASTATIN CALCIUM 40 MG: 40 TABLET, FILM COATED ORAL at 21:21

## 2022-05-29 RX ADMIN — PANTOPRAZOLE SODIUM 40 MG: 40 TABLET, DELAYED RELEASE ORAL at 05:23

## 2022-05-29 RX ADMIN — ASPIRIN 81 MG: 81 TABLET, COATED ORAL at 08:52

## 2022-05-29 RX ADMIN — SENNOSIDES AND DOCUSATE SODIUM 1 TABLET: 50; 8.6 TABLET ORAL at 21:21

## 2022-05-29 RX ADMIN — FOLIC ACID 1 MG: 1 TABLET ORAL at 08:52

## 2022-05-29 ASSESSMENT — PAIN DESCRIPTION - ONSET: ONSET: ON-GOING

## 2022-05-29 ASSESSMENT — PAIN - FUNCTIONAL ASSESSMENT
PAIN_FUNCTIONAL_ASSESSMENT: PREVENTS OR INTERFERES SOME ACTIVE ACTIVITIES AND ADLS
PAIN_FUNCTIONAL_ASSESSMENT: ACTIVITIES ARE NOT PREVENTED

## 2022-05-29 ASSESSMENT — PAIN DESCRIPTION - LOCATION
LOCATION: CHEST
LOCATION: CHEST

## 2022-05-29 ASSESSMENT — PAIN DESCRIPTION - FREQUENCY: FREQUENCY: INTERMITTENT

## 2022-05-29 ASSESSMENT — PAIN SCALES - GENERAL
PAINLEVEL_OUTOF10: 0
PAINLEVEL_OUTOF10: 4
PAINLEVEL_OUTOF10: 0

## 2022-05-29 ASSESSMENT — PAIN DESCRIPTION - DESCRIPTORS
DESCRIPTORS: DULL;ACHING;DISCOMFORT
DESCRIPTORS: DULL;ACHING;DISCOMFORT

## 2022-05-29 ASSESSMENT — PAIN DESCRIPTION - PAIN TYPE: TYPE: SURGICAL PAIN

## 2022-05-29 ASSESSMENT — PAIN DESCRIPTION - ORIENTATION
ORIENTATION: MID
ORIENTATION: LEFT

## 2022-05-29 NOTE — PROGRESS NOTES
Department of Internal Medicine  Nephrology Attending Progress Note    Events reviewed. SUBJECTIVE: We are following MrJaime Casas for JAMIE. Reports no complaints. PHYSICAL EXAM:      Vitals:    VITALS:  /62   Pulse 71   Temp 98.5 °F (36.9 °C) (Oral)   Resp 18   Ht 5' 10\" (1.778 m)   Wt 185 lb 11.2 oz (84.2 kg)   SpO2 93%   BMI 26.65 kg/m²   24HR INTAKE/OUTPUT:      Intake/Output Summary (Last 24 hours) at 5/29/2022 0900  Last data filed at 5/29/2022 0518  Gross per 24 hour   Intake 600 ml   Output 3350 ml   Net -2750 ml       Constitutional: Patient is awake alert in no distress.   HEENT: Pupils are equal reactive  Respiratory: Lungs are clear  Cardiovascular/Edema: Heart sounds are regular  Gastrointestinal: Abdomen soft, hypoactive bowel sounds  Neurologic: chemical sedation  Skin: s/p CABG  Other: No edema    Scheduled Meds:   metoprolol tartrate  12.5 mg Oral BID    enoxaparin  40 mg SubCUTAneous Daily    amiodarone  200 mg Oral BID    aspirin  81 mg Oral Daily    bisacodyl  5 mg Oral Daily    sennosides-docusate sodium  1 tablet Oral BID    ferrous sulfate  325 mg Oral BID WC    vitamin C  500 mg Oral BID    folic acid  1 mg Oral Daily    sodium chloride flush  5-40 mL IntraVENous 2 times per day    magnesium oxide  400 mg Oral Daily    mupirocin   Nasal BID    pantoprazole  40 mg Oral Daily    ipratropium-albuterol  1 ampule Inhalation Q4H WA    atorvastatin  40 mg Oral Nightly     Continuous Infusions:   dextrose       PRN Meds:.perflutren lipid microspheres, ALPRAZolam, acetaminophen, oxyCODONE-acetaminophen **OR** oxyCODONE-acetaminophen, diphenhydrAMINE, potassium chloride, bisacodyl, amiodarone bolus, magnesium sulfate, morphine, sodium chloride, trimethobenzamide, glucose, glucagon (rDNA), dextrose, dextrose, sodium chloride flush, ondansetron **OR** ondansetron      DATA:    CBC with Differential:    Lab Results   Component Value Date    WBC 15.4 05/29/2022    RBC 3.17 05/29/2022    HGB 9.7 05/29/2022    HCT 29.9 05/29/2022    HCT 29.0 05/26/2022     05/29/2022    MCV 94.3 05/29/2022    MCH 30.6 05/29/2022    MCHC 32.4 05/29/2022    RDW 14.1 05/29/2022    NRBC 0.0 01/17/2022    SEGSPCT 70 06/08/2011    LYMPHOPCT 7.3 04/20/2022    MONOPCT 9.5 04/20/2022    MYELOPCT 2.6 01/04/2022    BASOPCT 0.5 04/20/2022    MONOSABS 0.72 04/20/2022    LYMPHSABS 0.55 04/20/2022    EOSABS 0.17 04/20/2022    BASOSABS 0.04 04/20/2022     CMP:    Lab Results   Component Value Date     05/29/2022    K 4.1 05/29/2022    K 4.2 04/09/2022     05/29/2022    CO2 24 05/29/2022    BUN 43 05/29/2022    CREATININE 1.0 05/29/2022    GFRAA >60 05/29/2022    LABGLOM >60 05/29/2022    GLUCOSE 92 05/29/2022    PROT 5.4 05/27/2022    LABALBU 3.3 05/27/2022    CALCIUM 7.9 05/29/2022    BILITOT 0.8 05/27/2022    ALKPHOS 68 05/27/2022    AST 37 05/27/2022    ALT 12 05/27/2022     Magnesium:    Lab Results   Component Value Date    MG 2.1 05/29/2022     Phosphorus:    Lab Results   Component Value Date    PHOS 3.3 11/14/2016       BRIEF SUMMARY OF INITIAL CONSULT:    Briefly Mr. Mehreen Herrera is a 42-year-old man with history of HTN, type II DM, nephrolithiasis s/p lithotripsy, CAD, MVP with significant MR, MV endocarditis, paroxysmal SVT,multiple sclerosis, asbestosis, erectile dysfunction status post penile implant, BPH status post TURP, bladder cancer, Washburn's esophagus, recent admission for pneumonia complicated with right parapneumonic effusion s/p right thoracotomy with decortication who presented to the hospital today 5/23/22 for CABG and valve replacements with Dr. Kirt Christensen. However, the surgery was cancelled as Cardiothoracic surgery would like patient optimized before proceeding with procedure. To note, patient did have lab work drawn on 5/18/22 which demonstrated an elevated creatinine of 1.3mg/dL. Repeat Lab work today revealed creatinine 1.1mg/dL, ProBNP 1,002.  Renal was consulted for optimization before surgery. His baseline creatinine is 0.8-0.9 mg/dL     Problems resolved:    · JAMIE, stage I,  2/2 obstructive uropathy--, hernandez catheter placed and urology consulted-- renal function improved over the last 24 hrs, creatinine 0.9 mg/dl today. · Hemodynamic shock, following CABG on vaso and epi to maintain MAP >65  · Lactic acidosis, 2/2 #2, resolved    IMPRESSION/RECOMMENDATIONS:        1. Recurrent JAMIE, resolved, creatinine down to 0.8 with excellent urine output. 2. HFpEF, LVEF 60-65% on 4/7/22 echo. ProBnp 1,002  3. History of Nephrolithiasis  4. Obstructive Uropathy--hernandez catheter placed, urology evaluating  5. Hypocalcemia, likely secondary to vitamin D deficiency. To obtain Vitamin D level tomorrow  -----------------------------------------------------------  6. CAD s/p 3V CABG with AVR, TVR, MVR on 5/25/2022  7. PAF, on amiodarone and Metoprolol  8. History of lung decortication due to empyema   9. UTI, ID consulted  10. Multiple Sclerosis   11. Anemia s/p surgery  12. Nutrition, Renal diet, low potassium     Plan:    · Continue to monitor renal function  · Obtain Vitamin D 25 Hydroxy in am  · Discharge planning per CTS  · We will follow peripherally.          Electronically signed by Deanna Hodge, APRN - CNP on 5/29/2022 at 9:00 AM  Agree as annotated  Nicole Wheeler MD

## 2022-05-29 NOTE — PLAN OF CARE
Problem: Skin/Tissue Integrity  Goal: Absence of new skin breakdown  Description: 1. Monitor for areas of redness and/or skin breakdown  2. Assess vascular access sites hourly  3. Every 4-6 hours minimum:  Change oxygen saturation probe site  4. Every 4-6 hours:  If on nasal continuous positive airway pressure, respiratory therapy assess nares and determine need for appliance change or resting period.   Outcome: Progressing     Problem: Safety - Adult  Goal: Free from fall injury  Outcome: Progressing     Problem: Respiratory - Adult  Goal: Achieves optimal ventilation and oxygenation  Outcome: Progressing     Problem: Skin/Tissue Integrity - Adult  Goal: Skin integrity remains intact  Outcome: Progressing

## 2022-05-29 NOTE — PROGRESS NOTES
POD#4 Awake, alert. No complaints. Denies CP, palpitations, SOB at rest, dizziness/lightheadedness. Vitals:    05/29/22 0925 05/29/22 0956 05/29/22 1026 05/29/22 1100   BP:    (!) 111/54   Pulse:    66   Resp:  18 18 18   Temp:    98.1 °F (36.7 °C)   TempSrc:    Temporal   SpO2: 95%   97%   Weight:       Height:         O2: 2L/NC      Intake/Output Summary (Last 24 hours) at 5/29/2022 1213  Last data filed at 5/29/2022 0931  Gross per 24 hour   Intake 540 ml   Output 3350 ml   Net -2810 ml         +BM on 5/27    UO: 1000mL/8hr       Recent Labs     05/27/22  0510 05/28/22  0659 05/29/22  0733   WBC 18.5* 16.1* 15.4*   HGB 10.1* 10.6* 9.7*   HCT 31.1* 33.4* 29.9*   PLT 85* 87* 133      Recent Labs     05/27/22  0510 05/28/22  0659 05/29/22  0733   BUN 31* 41* 43*   CREATININE 0.8 0.9 1.0         Telemetry: SR      PE  Cardiac: RRR  Lungs: decreased bases  Chest incision with intact GHANSHYAM DSD. Sternum stable. Prior chest tube site incisions C/D/I, no erythema with intact sutures. Epicardial pacing wires present and secure. Abd: Softly distended, nontender, +BS  Ext: Incisions C/D/I, approximated, no erythema, + edema. Left groin suture in place          A/P: POD#4  1.  VHD/CAD  --Stable s/p Urgent sternotomy/Urgent Aortic valve repair by leaflet debridement/Complex mitral valve replacement with annular debridement and a 33mm Medtronic Mosaic bioprosthetic/Tricuspid valve repair by bicuspidization/CABG x 3 (LIMA-LAD, SVG-Diag, SVG-PDA)/WALLACE exclusion with 40mm AtriClip/EVH RLE/Rigid internal fixation of the sternum with Juan Carlos plates x 2/94 modifier on 5/25/2022  --Post op JOEL reveals 60% EF on inotropic support  --Will order TTE prior to discharge to establish baseline and evaluate valves, ordered on 5/28/2022  --Scr stable  --ASA/statin - low dose BB started 5/28  --reinforce sternal precautions  --continue epicardial pacing wires--Connected to pacer with backup of 40  --left groin suture removed without difficulty. Patient tolerated well        2. Expected acute blood loss anemia secondary to open heart surgery  --stable - 9.7        3. NSR  --continue BB with hold parameters - started 12.5 mg BID metoprolol 5/28  --continue oral amiodarone for afib prophylaxis--with plans to taper on dc to home dose -- on 200 mg BID   --hx of SVT on oral amiodarone 200mg QD at home        4. Expected acute pulmonary insufficiency in the setting following surgery  --Hx of severe COPD   --wean oxygen to keep SpO2 greater than or equal to 92%  --continue duonebs with ezpap  --encourage C&DB, SMI  --currently on 2L O2/NC       5. JAMIE   --Pre-op 1.3 -- surgery cancelled for renal optimization   --Scr stable  --Nephrology following  --received bumex 1mg IV yesterday per nephrology, agree, Scr 1.0 today        6. T2DM   --pre-op HgA1c 5.1%  --continue SSI         7. Urinary retention  --Hx of BPH s/p TURP  --Urology seen pre-op recommends continuing hernandez until more ambulatory -- will try voiding trial prior to discharge           8. UTI   --pre-op urine culture (+) ,000 bacteria   --ID following   --completed 3 days of daptomycin and merrem        9. Thrombocytopenia   --Plts 133K today, yesterday 87K  --no s/s active bleeding  --slowly improving  --Continue to monitor         10. Leukocytosis   --afebrile, likely reactive  --improving, WBC 15.4 today from 16.1 yesterday  --continue to monitor         11. Constipation--expected delayed return of bowel function  --secondary to anesthesia, narcotics, decreased oral intake, and decreased physical activity   --  (+) BM 5/27--abd softly distended--initiate daily suppository and glycolax until another +BM  --Continue daily MOM/oral bisacodyl until +BM and senna-s as scheduled. --Encouraged continued increase in oral intake and activity.         12.  Expected deconditioning in the setting following surgery  --Increase activity as tolerated  --PT/OT  --currently ambulating 50ft        13.  At risk for aspiration   --witnessed coughing with thin liquids by nursing staff in [address]   --Speech consulted for evaluation -- recommend minced and moist consistency solids with thin liquids            DVT prophylaxis:  --continue bilateral knee high MYKE hose  --continue PCDs  --continue progressive ambulation  --continue lovenox for dvt prophylaxis and continue knee high MYKE hose/pcds/progressive ambulation        Dispo: plan is for rehab on discharge, possibly as soon as Tuesday       This patient's case and care plan was discussed with the attending surgeon

## 2022-05-29 NOTE — PLAN OF CARE
Problem: Chronic Conditions and Co-morbidities  Goal: Patient's chronic conditions and co-morbidity symptoms are monitored and maintained or improved  Outcome: Progressing     Problem: Skin/Tissue Integrity  Goal: Absence of new skin breakdown  Description: 1. Monitor for areas of redness and/or skin breakdown  2. Assess vascular access sites hourly  3. Every 4-6 hours minimum:  Change oxygen saturation probe site  4. Every 4-6 hours:  If on nasal continuous positive airway pressure, respiratory therapy assess nares and determine need for appliance change or resting period.   5/29/2022 1354 by Yanni Agrawal RN  Outcome: Progressing     Problem: Safety - Adult  Goal: Free from fall injury  5/29/2022 1354 by Yanni Agrawal RN  Outcome: Progressing     Problem: Cardiovascular - Adult  Goal: Absence of cardiac dysrhythmias or at baseline  Outcome: Progressing

## 2022-05-29 NOTE — PROGRESS NOTES
Comprehensive Nutrition Assessment    Type and Reason for Visit:  Initial,RD Nutrition Re-Screen/LOS    Nutrition Recommendations/Plan:   1. Continue current diet order. Recommend and start Glucerna TID and Catalino BID to help meet increased nutrient needs for post op healing. Malnutrition Assessment:  Malnutrition Status:  No malnutrition (05/29/22 1547)    Context:  Acute Illness     Findings of the 6 clinical characteristics of malnutrition:  Energy Intake:  No significant decrease in energy intake  Weight Loss:  No significant weight loss     Body Fat Loss:  No significant body fat loss     Muscle Mass Loss:  No significant muscle mass loss    Fluid Accumulation:  No significant fluid accumulation     Strength:  Not Performed    Nutrition Assessment:    Pt. admit d/t CAD/surgical eval. Now s/p 3V CABG with AVR, TVR, MVR on 5/25/2022. PMHx of DM, HTN, MS,Washburn's esophagus, severe COPD, Bladder CA, BPH s/p TURP, pulmonary HTN, Noted JAMIE (improved). Pt. intake appears stable consuming % of most meals. Pt. is w/increased nutrient needs for post op healing. Will start ONS and monitor. Nutrition Related Findings:    A&Ox4, -I/O, +BS, Trace edema. Pt. reports great appetitie. Wound Type: Multiple,Surgical Incision       Current Nutrition Intake & Therapies:    Average Meal Intake: % (most meals)  Average Supplements Intake: None Ordered  ADULT DIET; Regular; 4 carb choices (60 gm/meal); Low Fat/Low Chol/High Fiber/PONCHO; Low Potassium (Less than 3000 mg/day)    Anthropometric Measures:  Height: 5' 10\" (177.8 cm)  Ideal Body Weight (IBW): 166 lbs (75 kg)    Admission Body Weight: 199 lb 8 oz (90.5 kg) (5/26 BS)  Current Body Weight: 185 lb 11.2 oz (84.2 kg) (5/29  Standing scale), 111.9 % IBW.  Weight Source: Standing Scale  Current BMI (kg/m2): 26.6  Usual Body Weight: 188 lb 13 oz (85.6 kg) (1/2/21)  % Weight Change (Calculated): -1.6  Weight Adjustment For: No Adjustment                 BMI Categories: Overweight (BMI 25.0-29. 9)    Estimated Daily Nutrient Needs:  Energy Requirements Based On: Formula  Weight Used for Energy Requirements: Current  Energy (kcal/day): MSJ 1564 x 1.3 SF = 2000-2100kcal  Weight Used for Protein Requirements: Ideal  Protein (g/day): 112-135g (1.5-1.8g/kgIBW)  Method Used for Fluid Requirements: 1 ml/kcal  Fluid (ml/day): 2000-2100ml    Nutrition Diagnosis:   · Increased nutrient needs related to increase demand for energy/nutrients as evidenced by wounds      Nutrition Interventions:   Food and/or Nutrient Delivery: Continue Current Diet,Start Oral Nutrition Supplement (Glucerna TID , Catalino BID (s/p CABG))  Nutrition Education/Counseling: Education completed          Goals:     Goals: PO intake 75% or greater,by next RD assessment (to continue)       Nutrition Monitoring and Evaluation:   Behavioral-Environmental Outcomes: None Identified  Food/Nutrient Intake Outcomes: Food and Nutrient Intake,Supplement Intake  Physical Signs/Symptoms Outcomes: Biochemical Data,GI Status,Nutrition Focused Physical Findings,Skin,Weight    Discharge Planning:     Too soon to determine     Orville Pappas RD  Contact: ext [phone]

## 2022-05-29 NOTE — CARE COORDINATION
Care Coordination   Received a call from [facility]. wanting this am to choice the patient for rehab.  Steve was notified that the  or  will work on that Tuesday May 31st.

## 2022-05-30 LAB
ANION GAP SERPL CALCULATED.3IONS-SCNC: 10 MMOL/L (ref 7–16)
BUN BLDV-MCNC: 37 MG/DL (ref 6–23)
CALCIUM SERPL-MCNC: 7.8 MG/DL (ref 8.6–10.2)
CHLORIDE BLD-SCNC: 106 MMOL/L (ref 98–107)
CO2: 24 MMOL/L (ref 22–29)
CREAT SERPL-MCNC: 0.9 MG/DL (ref 0.7–1.2)
GFR AFRICAN AMERICAN: >60
GFR NON-AFRICAN AMERICAN: >60 ML/MIN/1.73
GLUCOSE BLD-MCNC: 89 MG/DL (ref 74–99)
HCT VFR BLD CALC: 27.5 % (ref 37–54)
HCT VFR BLD CALC: 31 % (ref 37–54)
HEMOGLOBIN: 8.9 G/DL (ref 12.5–16.5)
HEMOGLOBIN: 9.9 G/DL (ref 12.5–16.5)
MAGNESIUM: 2.1 MG/DL (ref 1.6–2.6)
MCH RBC QN AUTO: 30.4 PG (ref 26–35)
MCHC RBC AUTO-ENTMCNC: 32.4 % (ref 32–34.5)
MCV RBC AUTO: 93.9 FL (ref 80–99.9)
METER GLUCOSE: 107 MG/DL (ref 74–99)
PDW BLD-RTO: 14 FL (ref 11.5–15)
PLATELET # BLD: 132 E9/L (ref 130–450)
PMV BLD AUTO: 11.6 FL (ref 7–12)
POTASSIUM SERPL-SCNC: 4.1 MMOL/L (ref 3.5–5)
RBC # BLD: 2.93 E12/L (ref 3.8–5.8)
SODIUM BLD-SCNC: 140 MMOL/L (ref 132–146)
VITAMIN D 25-HYDROXY: 20 NG/ML (ref 30–100)
WBC # BLD: 10.8 E9/L (ref 4.5–11.5)

## 2022-05-30 PROCEDURE — 6370000000 HC RX 637 (ALT 250 FOR IP): Performed by: INTERNAL MEDICINE

## 2022-05-30 PROCEDURE — 6370000000 HC RX 637 (ALT 250 FOR IP): Performed by: NURSE PRACTITIONER

## 2022-05-30 PROCEDURE — 85027 COMPLETE CBC AUTOMATED: CPT

## 2022-05-30 PROCEDURE — 83735 ASSAY OF MAGNESIUM: CPT

## 2022-05-30 PROCEDURE — 36415 COLL VENOUS BLD VENIPUNCTURE: CPT

## 2022-05-30 PROCEDURE — 2580000003 HC RX 258: Performed by: NURSE PRACTITIONER

## 2022-05-30 PROCEDURE — 82306 VITAMIN D 25 HYDROXY: CPT

## 2022-05-30 PROCEDURE — 6370000000 HC RX 637 (ALT 250 FOR IP)

## 2022-05-30 PROCEDURE — 6360000002 HC RX W HCPCS

## 2022-05-30 PROCEDURE — 85018 HEMOGLOBIN: CPT

## 2022-05-30 PROCEDURE — 2140000000 HC CCU INTERMEDIATE R&B

## 2022-05-30 PROCEDURE — 85014 HEMATOCRIT: CPT

## 2022-05-30 PROCEDURE — 80048 BASIC METABOLIC PNL TOTAL CA: CPT

## 2022-05-30 PROCEDURE — 82962 GLUCOSE BLOOD TEST: CPT

## 2022-05-30 PROCEDURE — 94640 AIRWAY INHALATION TREATMENT: CPT

## 2022-05-30 PROCEDURE — 2500000003 HC RX 250 WO HCPCS: Performed by: NURSE PRACTITIONER

## 2022-05-30 RX ORDER — VITAMIN B COMPLEX
1000 TABLET ORAL DAILY
Status: DISCONTINUED | OUTPATIENT
Start: 2022-05-30 | End: 2022-06-02 | Stop reason: HOSPADM

## 2022-05-30 RX ORDER — BUMETANIDE 0.25 MG/ML
1 INJECTION, SOLUTION INTRAMUSCULAR; INTRAVENOUS ONCE
Status: COMPLETED | OUTPATIENT
Start: 2022-05-30 | End: 2022-05-30

## 2022-05-30 RX ORDER — BUMETANIDE 1 MG/1
1 TABLET ORAL DAILY
Status: DISCONTINUED | OUTPATIENT
Start: 2022-05-31 | End: 2022-06-02 | Stop reason: HOSPADM

## 2022-05-30 RX ADMIN — IPRATROPIUM BROMIDE AND ALBUTEROL SULFATE 1 AMPULE: .5; 2.5 SOLUTION RESPIRATORY (INHALATION) at 09:59

## 2022-05-30 RX ADMIN — IPRATROPIUM BROMIDE AND ALBUTEROL SULFATE 1 AMPULE: .5; 2.5 SOLUTION RESPIRATORY (INHALATION) at 13:04

## 2022-05-30 RX ADMIN — Medication 400 MG: at 08:09

## 2022-05-30 RX ADMIN — IPRATROPIUM BROMIDE AND ALBUTEROL SULFATE 1 AMPULE: .5; 2.5 SOLUTION RESPIRATORY (INHALATION) at 16:31

## 2022-05-30 RX ADMIN — ENOXAPARIN SODIUM 40 MG: 100 INJECTION SUBCUTANEOUS at 08:08

## 2022-05-30 RX ADMIN — Medication 1000 UNITS: at 14:27

## 2022-05-30 RX ADMIN — SODIUM CHLORIDE, PRESERVATIVE FREE 10 ML: 5 INJECTION INTRAVENOUS at 08:09

## 2022-05-30 RX ADMIN — AMIODARONE HYDROCHLORIDE 200 MG: 200 TABLET ORAL at 08:08

## 2022-05-30 RX ADMIN — FERROUS SULFATE TAB 325 MG (65 MG ELEMENTAL FE) 325 MG: 325 (65 FE) TAB at 08:09

## 2022-05-30 RX ADMIN — OXYCODONE AND ACETAMINOPHEN 1 TABLET: 5; 325 TABLET ORAL at 02:01

## 2022-05-30 RX ADMIN — BUMETANIDE 1 MG: 0.25 INJECTION, SOLUTION INTRAMUSCULAR; INTRAVENOUS at 10:28

## 2022-05-30 RX ADMIN — OXYCODONE HYDROCHLORIDE AND ACETAMINOPHEN 500 MG: 500 TABLET ORAL at 21:58

## 2022-05-30 RX ADMIN — ATORVASTATIN CALCIUM 40 MG: 40 TABLET, FILM COATED ORAL at 21:59

## 2022-05-30 RX ADMIN — FERROUS SULFATE TAB 325 MG (65 MG ELEMENTAL FE) 325 MG: 325 (65 FE) TAB at 17:00

## 2022-05-30 RX ADMIN — IPRATROPIUM BROMIDE AND ALBUTEROL SULFATE 1 AMPULE: .5; 2.5 SOLUTION RESPIRATORY (INHALATION) at 21:24

## 2022-05-30 RX ADMIN — OXYCODONE AND ACETAMINOPHEN 1 TABLET: 5; 325 TABLET ORAL at 10:26

## 2022-05-30 RX ADMIN — OXYCODONE AND ACETAMINOPHEN 1 TABLET: 5; 325 TABLET ORAL at 14:29

## 2022-05-30 RX ADMIN — METOPROLOL TARTRATE 12.5 MG: 25 TABLET, FILM COATED ORAL at 08:08

## 2022-05-30 RX ADMIN — FOLIC ACID 1 MG: 1 TABLET ORAL at 08:09

## 2022-05-30 RX ADMIN — POLYETHYLENE GLYCOL 3350 17 G: 17 POWDER, FOR SOLUTION ORAL at 08:08

## 2022-05-30 RX ADMIN — SENNOSIDES AND DOCUSATE SODIUM 1 TABLET: 50; 8.6 TABLET ORAL at 08:09

## 2022-05-30 RX ADMIN — SODIUM CHLORIDE, PRESERVATIVE FREE 10 ML: 5 INJECTION INTRAVENOUS at 21:58

## 2022-05-30 RX ADMIN — METOPROLOL TARTRATE 12.5 MG: 25 TABLET, FILM COATED ORAL at 21:58

## 2022-05-30 RX ADMIN — AMIODARONE HYDROCHLORIDE 200 MG: 200 TABLET ORAL at 21:58

## 2022-05-30 RX ADMIN — ASPIRIN 81 MG: 81 TABLET, COATED ORAL at 08:09

## 2022-05-30 RX ADMIN — SENNOSIDES AND DOCUSATE SODIUM 1 TABLET: 50; 8.6 TABLET ORAL at 21:58

## 2022-05-30 RX ADMIN — OXYCODONE HYDROCHLORIDE AND ACETAMINOPHEN 500 MG: 500 TABLET ORAL at 08:09

## 2022-05-30 RX ADMIN — PANTOPRAZOLE SODIUM 40 MG: 40 TABLET, DELAYED RELEASE ORAL at 06:51

## 2022-05-30 RX ADMIN — BISACODYL 5 MG: 5 TABLET, COATED ORAL at 08:11

## 2022-05-30 ASSESSMENT — PAIN DESCRIPTION - LOCATION
LOCATION: CHEST

## 2022-05-30 ASSESSMENT — PAIN DESCRIPTION - FREQUENCY
FREQUENCY: INTERMITTENT

## 2022-05-30 ASSESSMENT — PAIN DESCRIPTION - ORIENTATION
ORIENTATION: MID

## 2022-05-30 ASSESSMENT — PAIN DESCRIPTION - PAIN TYPE
TYPE: SURGICAL PAIN

## 2022-05-30 ASSESSMENT — PAIN - FUNCTIONAL ASSESSMENT
PAIN_FUNCTIONAL_ASSESSMENT: ACTIVITIES ARE NOT PREVENTED

## 2022-05-30 ASSESSMENT — PAIN DESCRIPTION - ONSET
ONSET: ON-GOING

## 2022-05-30 ASSESSMENT — PAIN DESCRIPTION - DESCRIPTORS
DESCRIPTORS: DULL;ACHING;DISCOMFORT
DESCRIPTORS: DULL;ACHING;DISCOMFORT
DESCRIPTORS: DISCOMFORT;SORE;TENDER
DESCRIPTORS: DULL;ACHING;DISCOMFORT

## 2022-05-30 ASSESSMENT — PAIN SCALES - GENERAL
PAINLEVEL_OUTOF10: 4
PAINLEVEL_OUTOF10: 0
PAINLEVEL_OUTOF10: 3

## 2022-05-30 ASSESSMENT — PAIN SCALES - WONG BAKER: WONGBAKER_NUMERICALRESPONSE: 0

## 2022-05-30 NOTE — PLAN OF CARE
Problem: Metabolic/Fluid and Electrolytes - Adult  Goal: Hemodynamic stability and optimal renal function maintained  Outcome: Progressing     Problem: Metabolic/Fluid and Electrolytes - Adult  Goal: Electrolytes maintained within normal limits  Outcome: Progressing     Problem: Metabolic/Fluid and Electrolytes - Adult  Goal: Glucose maintained within prescribed range  Outcome: Progressing     Problem: Respiratory - Adult  Goal: Achieves optimal ventilation and oxygenation  5/30/2022 0820 by Bull Ramos RN  Outcome: Progressing

## 2022-05-30 NOTE — PLAN OF CARE
Problem: Skin/Tissue Integrity  Goal: Absence of new skin breakdown  Description: 1. Monitor for areas of redness and/or skin breakdown  2. Assess vascular access sites hourly  3. Every 4-6 hours minimum:  Change oxygen saturation probe site  4. Every 4-6 hours:  If on nasal continuous positive airway pressure, respiratory therapy assess nares and determine need for appliance change or resting period.   Outcome: Progressing     Problem: Safety - Adult  Goal: Free from fall injury  Outcome: Progressing     Problem: Respiratory - Adult  Goal: Achieves optimal ventilation and oxygenation  Outcome: Progressing     Problem: Pain  Goal: Verbalizes/displays adequate comfort level or baseline comfort level  Outcome: Progressing

## 2022-05-30 NOTE — PATIENT CARE CONFERENCE
[facility] Quality Flow/Interdisciplinary Rounds Progress Note        Quality Flow Rounds held on May 30, 2022    Disciplines Attending:  Bedside Nurse, ,  and Nursing Unit Leadership    Sonu Vasquez was admitted on 5/23/2022  5:09 AM    Anticipated Discharge Date:  Expected Discharge Date: 05/27/22    Disposition:    Enzo Score:  Enzo Scale Score: 17    Readmission Risk              Risk of Unplanned Readmission:  42           Discussed patient goal for the day, patient clinical progression, and barriers to discharge.   The following Goal(s) of the Day/Commitment(s) have been identified:  Diagnostics - Report Results      Jonna Patel RN  May 30, 2022

## 2022-05-30 NOTE — PROGRESS NOTES
Department of Internal Medicine  Nephrology Attending Progress Note    Events reviewed. SUBJECTIVE: We are following Mr. Damion Cross for JAMIE. Reports no complaints. PHYSICAL EXAM:      Vitals:    VITALS:  BP (!) 118/58   Pulse 76   Temp 97.7 °F (36.5 °C) (Temporal)   Resp 16   Ht 5' 10\" (1.778 m)   Wt 185 lb 9.6 oz (84.2 kg)   SpO2 96%   BMI 26.63 kg/m²   24HR INTAKE/OUTPUT:      Intake/Output Summary (Last 24 hours) at 5/30/2022 1025  Last data filed at 5/30/2022 0859  Gross per 24 hour   Intake 540 ml   Output 2450 ml   Net -1910 ml       Constitutional: Patient is awake alert in no distress.   HEENT: Pupils are equal reactive  Respiratory: Lungs are clear  Cardiovascular/Edema: Heart sounds are regular  Gastrointestinal: Abdomen soft, hypoactive bowel sounds  Neurologic: chemical sedation  Skin: s/p CABG  Other: + edema    Scheduled Meds:   bumetanide  1 mg IntraVENous Once    bisacodyl  10 mg Rectal Daily    polyethylene glycol  17 g Oral Daily    metoprolol tartrate  12.5 mg Oral BID    enoxaparin  40 mg SubCUTAneous Daily    amiodarone  200 mg Oral BID    aspirin  81 mg Oral Daily    bisacodyl  5 mg Oral Daily    sennosides-docusate sodium  1 tablet Oral BID    ferrous sulfate  325 mg Oral BID WC    vitamin C  500 mg Oral BID    folic acid  1 mg Oral Daily    sodium chloride flush  5-40 mL IntraVENous 2 times per day    magnesium oxide  400 mg Oral Daily    pantoprazole  40 mg Oral Daily    ipratropium-albuterol  1 ampule Inhalation Q4H WA    atorvastatin  40 mg Oral Nightly     Continuous Infusions:   dextrose       PRN Meds:.perflutren lipid microspheres, ALPRAZolam, acetaminophen, oxyCODONE-acetaminophen **OR** oxyCODONE-acetaminophen, diphenhydrAMINE, potassium chloride, bisacodyl, amiodarone bolus, magnesium sulfate, morphine, sodium chloride, trimethobenzamide, glucose, glucagon (rDNA), dextrose, dextrose, sodium chloride flush, ondansetron **OR** ondansetron      DATA:    CBC with Differential:    Lab Results   Component Value Date    WBC 10.8 05/30/2022    RBC 2.93 05/30/2022    HGB 8.9 05/30/2022    HCT 27.5 05/30/2022    HCT 29.0 05/26/2022     05/30/2022    MCV 93.9 05/30/2022    MCH 30.4 05/30/2022    MCHC 32.4 05/30/2022    RDW 14.0 05/30/2022    NRBC 0.0 01/17/2022    SEGSPCT 70 06/08/2011    LYMPHOPCT 7.3 04/20/2022    MONOPCT 9.5 04/20/2022    MYELOPCT 2.6 01/04/2022    BASOPCT 0.5 04/20/2022    MONOSABS 0.72 04/20/2022    LYMPHSABS 0.55 04/20/2022    EOSABS 0.17 04/20/2022    BASOSABS 0.04 04/20/2022     CMP:    Lab Results   Component Value Date     05/30/2022    K 4.1 05/30/2022    K 4.2 04/09/2022     05/30/2022    CO2 24 05/30/2022    BUN 37 05/30/2022    CREATININE 0.9 05/30/2022    GFRAA >60 05/30/2022    LABGLOM >60 05/30/2022    GLUCOSE 89 05/30/2022    PROT 5.4 05/27/2022    LABALBU 3.3 05/27/2022    CALCIUM 7.8 05/30/2022    BILITOT 0.8 05/27/2022    ALKPHOS 68 05/27/2022    AST 37 05/27/2022    ALT 12 05/27/2022     Magnesium:    Lab Results   Component Value Date    MG 2.1 05/30/2022     Phosphorus:    Lab Results   Component Value Date    PHOS 3.3 11/14/2016       BRIEF SUMMARY OF INITIAL CONSULT:    Briefly Mr. Anuradha Mcgowan is a 53-year-old man with history of HTN, type II DM, nephrolithiasis s/p lithotripsy, CAD, MVP with significant MR, MV endocarditis, paroxysmal SVT,multiple sclerosis, asbestosis, erectile dysfunction status post penile implant, BPH status post TURP, bladder cancer, Washburn's esophagus, recent admission for pneumonia complicated with right parapneumonic effusion s/p right thoracotomy with decortication who presented to the hospital today 5/23/22 for CABG and valve replacements with Dr. Angie Beyer. However, the surgery was cancelled as Cardiothoracic surgery would like patient optimized before proceeding with procedure. To note, patient did have lab work drawn on 5/18/22 which demonstrated an elevated creatinine of 1.3mg/dL.  Repeat Lab work today revealed creatinine 1.1mg/dL, ProBNP 1,002. Renal was consulted for optimization before surgery. His baseline creatinine is 0.8-0.9 mg/dL     Problems resolved:    · JAMIE, stage I,  2/2 obstructive uropathy--, hernandez catheter placed and urology consulted-- renal function improved over the last 24 hrs, creatinine 0.9 mg/dl today. · Hemodynamic shock, following CABG on vaso and epi to maintain MAP >65  · Lactic acidosis, 2/2 #2, resolved  · Recurrent JAMIE, resolved, creatinine down to 0.8 with excellent urine output. · UTI, ID consulted    IMPRESSION/RECOMMENDATIONS:        1. HFpEF, LVEF 60-65% on 4/7/22 echo. ProBnp 1,002, has developed significant bilateral thigh edema. To start Bumex 1 mg p.o. daily  2. History of Nephrolithiasis  3. Obstructive Uropathy--hernandez catheter placed, urology evaluating  4. Hypocalcemia, secondary to vitamin D deficiency. 5. Vitamin D deficiency, vitamin D 25 level 20,  -----------------------------------------------------------  6. CAD s/p 3V CABG with AVR, TVR, MVR on 5/25/2022, metoprolol  7. PAF, on amiodarone and Metoprolol  8. History of lung decortication due to empyema   9. Multiple Sclerosis   10. Anemia s/p surgery, on p.o. iron    Plan:    · Bumex 1 mg p.o. daily  · Continue to monitor renal function  · Repeat proBNP in a.m.   · Cholecalciferol 1000 units p.o. daily        Electronically signed by Jonatan Enriquez MD on 5/30/2022 at 10:25 AM

## 2022-05-31 LAB
ANION GAP SERPL CALCULATED.3IONS-SCNC: 13 MMOL/L (ref 7–16)
BUN BLDV-MCNC: 32 MG/DL (ref 6–23)
CALCIUM IONIZED: 1.2 MMOL/L (ref 1.15–1.33)
CALCIUM SERPL-MCNC: 8.2 MG/DL (ref 8.6–10.2)
CHLORIDE BLD-SCNC: 104 MMOL/L (ref 98–107)
CO2: 24 MMOL/L (ref 22–29)
CREAT SERPL-MCNC: 0.9 MG/DL (ref 0.7–1.2)
GFR AFRICAN AMERICAN: >60
GFR NON-AFRICAN AMERICAN: >60 ML/MIN/1.73
GLUCOSE BLD-MCNC: 81 MG/DL (ref 74–99)
HCT VFR BLD CALC: 30.1 % (ref 37–54)
HEMOGLOBIN: 9.8 G/DL (ref 12.5–16.5)
LV EF: 50 %
LVEF MODALITY: NORMAL
MAGNESIUM: 2 MG/DL (ref 1.6–2.6)
MCH RBC QN AUTO: 30.7 PG (ref 26–35)
MCHC RBC AUTO-ENTMCNC: 32.6 % (ref 32–34.5)
MCV RBC AUTO: 94.4 FL (ref 80–99.9)
METER GLUCOSE: 121 MG/DL (ref 74–99)
PDW BLD-RTO: 13.9 FL (ref 11.5–15)
PLATELET # BLD: 176 E9/L (ref 130–450)
PMV BLD AUTO: 12.1 FL (ref 7–12)
POTASSIUM SERPL-SCNC: 4.3 MMOL/L (ref 3.5–5)
PRO-BNP: 2470 PG/ML (ref 0–450)
RBC # BLD: 3.19 E12/L (ref 3.8–5.8)
SODIUM BLD-SCNC: 141 MMOL/L (ref 132–146)
WBC # BLD: 12.2 E9/L (ref 4.5–11.5)

## 2022-05-31 PROCEDURE — 2580000003 HC RX 258: Performed by: NURSE PRACTITIONER

## 2022-05-31 PROCEDURE — 6370000000 HC RX 637 (ALT 250 FOR IP): Performed by: NURSE PRACTITIONER

## 2022-05-31 PROCEDURE — 80048 BASIC METABOLIC PNL TOTAL CA: CPT

## 2022-05-31 PROCEDURE — 93306 TTE W/DOPPLER COMPLETE: CPT

## 2022-05-31 PROCEDURE — 36415 COLL VENOUS BLD VENIPUNCTURE: CPT

## 2022-05-31 PROCEDURE — 97530 THERAPEUTIC ACTIVITIES: CPT

## 2022-05-31 PROCEDURE — 6370000000 HC RX 637 (ALT 250 FOR IP)

## 2022-05-31 PROCEDURE — 97535 SELF CARE MNGMENT TRAINING: CPT

## 2022-05-31 PROCEDURE — 82962 GLUCOSE BLOOD TEST: CPT

## 2022-05-31 PROCEDURE — 83735 ASSAY OF MAGNESIUM: CPT

## 2022-05-31 PROCEDURE — 82330 ASSAY OF CALCIUM: CPT

## 2022-05-31 PROCEDURE — 94640 AIRWAY INHALATION TREATMENT: CPT

## 2022-05-31 PROCEDURE — 93798 PHYS/QHP OP CAR RHAB W/ECG: CPT

## 2022-05-31 PROCEDURE — 6360000002 HC RX W HCPCS

## 2022-05-31 PROCEDURE — 6360000002 HC RX W HCPCS: Performed by: THORACIC SURGERY (CARDIOTHORACIC VASCULAR SURGERY)

## 2022-05-31 PROCEDURE — 2140000000 HC CCU INTERMEDIATE R&B

## 2022-05-31 PROCEDURE — 85027 COMPLETE CBC AUTOMATED: CPT

## 2022-05-31 PROCEDURE — 83880 ASSAY OF NATRIURETIC PEPTIDE: CPT

## 2022-05-31 PROCEDURE — 6370000000 HC RX 637 (ALT 250 FOR IP): Performed by: INTERNAL MEDICINE

## 2022-05-31 RX ORDER — FUROSEMIDE 10 MG/ML
30 INJECTION INTRAMUSCULAR; INTRAVENOUS ONCE
Status: COMPLETED | OUTPATIENT
Start: 2022-05-31 | End: 2022-05-31

## 2022-05-31 RX ADMIN — OXYCODONE HYDROCHLORIDE AND ACETAMINOPHEN 500 MG: 500 TABLET ORAL at 20:26

## 2022-05-31 RX ADMIN — FUROSEMIDE 30 MG: 10 INJECTION, SOLUTION INTRAMUSCULAR; INTRAVENOUS at 10:37

## 2022-05-31 RX ADMIN — FERROUS SULFATE TAB 325 MG (65 MG ELEMENTAL FE) 325 MG: 325 (65 FE) TAB at 17:39

## 2022-05-31 RX ADMIN — SENNOSIDES AND DOCUSATE SODIUM 1 TABLET: 50; 8.6 TABLET ORAL at 09:05

## 2022-05-31 RX ADMIN — FERROUS SULFATE TAB 325 MG (65 MG ELEMENTAL FE) 325 MG: 325 (65 FE) TAB at 09:04

## 2022-05-31 RX ADMIN — PANTOPRAZOLE SODIUM 40 MG: 40 TABLET, DELAYED RELEASE ORAL at 06:54

## 2022-05-31 RX ADMIN — METOPROLOL TARTRATE 12.5 MG: 25 TABLET, FILM COATED ORAL at 20:26

## 2022-05-31 RX ADMIN — Medication 400 MG: at 09:04

## 2022-05-31 RX ADMIN — ENOXAPARIN SODIUM 40 MG: 100 INJECTION SUBCUTANEOUS at 09:04

## 2022-05-31 RX ADMIN — ALPRAZOLAM 0.25 MG: 0.25 TABLET ORAL at 20:26

## 2022-05-31 RX ADMIN — BUMETANIDE 1 MG: 1 TABLET ORAL at 09:11

## 2022-05-31 RX ADMIN — ATORVASTATIN CALCIUM 40 MG: 40 TABLET, FILM COATED ORAL at 20:26

## 2022-05-31 RX ADMIN — IPRATROPIUM BROMIDE AND ALBUTEROL SULFATE 1 AMPULE: .5; 2.5 SOLUTION RESPIRATORY (INHALATION) at 15:58

## 2022-05-31 RX ADMIN — AMIODARONE HYDROCHLORIDE 200 MG: 200 TABLET ORAL at 20:26

## 2022-05-31 RX ADMIN — FOLIC ACID 1 MG: 1 TABLET ORAL at 09:04

## 2022-05-31 RX ADMIN — BISACODYL 5 MG: 5 TABLET, COATED ORAL at 09:04

## 2022-05-31 RX ADMIN — OXYCODONE HYDROCHLORIDE AND ACETAMINOPHEN 500 MG: 500 TABLET ORAL at 09:04

## 2022-05-31 RX ADMIN — IPRATROPIUM BROMIDE AND ALBUTEROL SULFATE 1 AMPULE: .5; 2.5 SOLUTION RESPIRATORY (INHALATION) at 18:49

## 2022-05-31 RX ADMIN — Medication 1000 UNITS: at 09:04

## 2022-05-31 RX ADMIN — SENNOSIDES AND DOCUSATE SODIUM 1 TABLET: 50; 8.6 TABLET ORAL at 20:26

## 2022-05-31 RX ADMIN — SODIUM CHLORIDE, PRESERVATIVE FREE 10 ML: 5 INJECTION INTRAVENOUS at 09:05

## 2022-05-31 RX ADMIN — IPRATROPIUM BROMIDE AND ALBUTEROL SULFATE 1 AMPULE: .5; 2.5 SOLUTION RESPIRATORY (INHALATION) at 08:22

## 2022-05-31 RX ADMIN — METOPROLOL TARTRATE 12.5 MG: 25 TABLET, FILM COATED ORAL at 09:04

## 2022-05-31 RX ADMIN — ASPIRIN 81 MG: 81 TABLET, COATED ORAL at 09:04

## 2022-05-31 RX ADMIN — SODIUM CHLORIDE, PRESERVATIVE FREE 10 ML: 5 INJECTION INTRAVENOUS at 20:27

## 2022-05-31 RX ADMIN — AMIODARONE HYDROCHLORIDE 200 MG: 200 TABLET ORAL at 09:04

## 2022-05-31 RX ADMIN — IPRATROPIUM BROMIDE AND ALBUTEROL SULFATE 1 AMPULE: .5; 2.5 SOLUTION RESPIRATORY (INHALATION) at 12:36

## 2022-05-31 RX ADMIN — POLYETHYLENE GLYCOL 3350 17 G: 17 POWDER, FOR SOLUTION ORAL at 09:04

## 2022-05-31 ASSESSMENT — PAIN SCALES - GENERAL
PAINLEVEL_OUTOF10: 0
PAINLEVEL_OUTOF10: 4
PAINLEVEL_OUTOF10: 0

## 2022-05-31 ASSESSMENT — PAIN SCALES - WONG BAKER: WONGBAKER_NUMERICALRESPONSE: 0

## 2022-05-31 NOTE — PROGRESS NOTES
POD#6 Awake, alert. No complaints. Up in chair. Denies CP, palpitations, SOB at rest, dizziness/lightheadedness. Vitals:    05/31/22 0418 05/31/22 0704 05/31/22 0758 05/31/22 0855   BP: (!) 110/55  (!) 143/64    Pulse: 76  82    Resp: 18  20    Temp: 98.8 °F (37.1 °C)  98.1 °F (36.7 °C)    TempSrc: Temporal  Temporal    SpO2: 91%  93% 90%   Weight:  185 lb 6.4 oz (84.1 kg)     Height:         O2: none      Intake/Output Summary (Last 24 hours) at 5/31/2022 1018  Last data filed at 5/31/2022 0949  Gross per 24 hour   Intake 900 ml   Output 3725 ml   Net -2825 ml         +BM on 5/29    UO: 2000mL/8hr       Recent Labs     05/29/22  0733 05/29/22  0733 05/30/22  0542 05/30/22  1348 05/31/22  0631   WBC 15.4*  --  10.8  --  12.2*   HGB 9.7*   < > 8.9* 9.9* 9.8*   HCT 29.9*   < > 27.5* 31.0* 30.1*     --  132  --  176    < > = values in this interval not displayed. Recent Labs     05/29/22  0733 05/30/22  0542 05/31/22  0631   BUN 43* 37* 32*   CREATININE 1.0 0.9 0.9       Telemetry: SR        PE  Cardiac: RRR  Lungs: decreased bases  Chest incision with intact GHANSHYAM DSD.  Sternum stable. Prior chest tube site incisions C/D/I, no erythema with intact sutures. Epicardial pacing wires present and secure.    Abd: Softly distended-improved, nontender, +BS  Ext: Incisions C/D/I, approximated, no erythema, + edema.              A/P: POD#6  1. VHD/CAD  --Stable s/p Urgent sternotomy/Urgent Aortic valve repair by leaflet debridement/Complex mitral valve replacement with annular debridement and a 33mm [employer]onic Mosaic bioprosthetic/Tricuspid valve repair by bicuspidization/CABG x 3 (LIMA-LAD, SVG-Diag, SVG-PDA)/WALLACE exclusion with 40mm AtriClip/EVH RLE/Rigid internal fixation of the sternum with Juan Carlos plates x 4/66 modifier on 5/25/2022  --Post op JOEL reveals 60% EF on inotropic support  --Will order TTE prior to discharge to establish baseline and evaluate valves, ordered on 5/28/2022  --Scr stable  --ASA/statin - low dose BB started 5/28  --reinforce sternal precautions  --continue epicardial pacing wires--Connected to pacer, pacer box off        2. Expected acute blood loss anemia secondary to open heart surgery  --stable - 9.8        3. NSR  --continue BB with hold parameters - started 12.5 mg BID metoprolol 5/28  --continue oral amiodarone for afib prophylaxis--with plans to taper on dc to home dose -- on 200 mg BID   --hx of SVT on oral amiodarone 200mg QD at home        4. Expected acute pulmonary insufficiency in the setting following surgery  --Hx of severe COPD   --wean oxygen to keep SpO2 greater than or equal to 92%  --continue duonebs with ezpap  --encourage C&DB, SMI  --currently on RA        5. JAMIE   --Pre-op 1.3 -- surgery cancelled for renal optimization   --Scr stable  --Nephrology following  --received bumex 1mg IV 5/28 per nephrology, Scr 0.9 again today, on daily PO bumex 1mg per nephrology         6. T2DM   --pre-op HgA1c 5.1%  --continue SSI         7. Urinary retention  --Hx of BPH s/p TURP  --Urology seen pre-op recommends continuing hernandez until more ambulatory -- will try voiding trial prior to discharge if ambulation improves        8. UTI   --pre-op urine culture (+) ,000 bacteria   --ID following   --completed 3 days of daptomycin and merrem        9. Thrombocytopenia   --resolved  --Plts 176K today, yesterday 132K  --no s/s active bleeding  --slowly improving  --Continue to monitor         10. Leukocytosis   --afebrile, likely reactive  --improving, WBC 12.2 today from 10.8 yesterday  --continue to monitor         11. Constipation--expected delayed return of bowel function  --secondary to anesthesia, narcotics, decreased oral intake, and decreased physical activity   -- (+) BM 5/29  --Continue daily MOM/oral bisacodyl until +BM and senna-s as scheduled. --Encouraged continued increase in oral intake and activity.         12.  Expected deconditioning in the setting following surgery  --Increase activity as tolerated  --PT/OT  --currently ambulating 60ft        13.  At risk for aspiration   --witnessed coughing with thin liquids by nursing staff in [facility]   --Speech consulted for evaluation -- recommend minced and moist consistency solids with thin liquids            DVT prophylaxis:  --continue bilateral knee high MYKE hose  --continue PCDs  --continue progressive ambulation  --continue lovenox for dvt prophylaxis and continue knee high MYKE hose/pcds/progressive ambulation        Dispo: plan is for rehab on discharge--case management unable to initiate plans for rehab until 5/31 due to the holiday--dc to rehab process initiated today, hopefully dc to rehab as soon as tomorrow         This patient's case and care plan was discussed with the attending surgeon

## 2022-05-31 NOTE — PROGRESS NOTES
Department of Internal Medicine  Nephrology Attending Progress Note    Events reviewed. SUBJECTIVE: We are following MrJaime Herrera for JAMIE. Reports no complaints. PHYSICAL EXAM:      Vitals:    VITALS:  BP (!) 143/64   Pulse 82   Temp 98.1 °F (36.7 °C) (Temporal)   Resp 20   Ht 5' 10\" (1.778 m)   Wt 185 lb 6.4 oz (84.1 kg)   SpO2 93%   BMI 26.60 kg/m²   24HR INTAKE/OUTPUT:      Intake/Output Summary (Last 24 hours) at 5/31/2022 0936  Last data filed at 5/31/2022 0654  Gross per 24 hour   Intake 300 ml   Output 3725 ml   Net -3425 ml       Constitutional: Patient is awake alert in no distress.   HEENT: Pupils are equal reactive  Respiratory: Lungs are clear  Cardiovascular/Edema: Heart sounds are regular  Gastrointestinal: Abdomen soft, hypoactive bowel sounds  Neurologic: chemical sedation  Skin: s/p CABG  Other: + edema    Scheduled Meds:   Vitamin D  1,000 Units Oral Daily    bumetanide  1 mg Oral Daily    bisacodyl  10 mg Rectal Daily    polyethylene glycol  17 g Oral Daily    metoprolol tartrate  12.5 mg Oral BID    enoxaparin  40 mg SubCUTAneous Daily    amiodarone  200 mg Oral BID    aspirin  81 mg Oral Daily    bisacodyl  5 mg Oral Daily    sennosides-docusate sodium  1 tablet Oral BID    ferrous sulfate  325 mg Oral BID WC    vitamin C  500 mg Oral BID    folic acid  1 mg Oral Daily    sodium chloride flush  5-40 mL IntraVENous 2 times per day    magnesium oxide  400 mg Oral Daily    pantoprazole  40 mg Oral Daily    ipratropium-albuterol  1 ampule Inhalation Q4H WA    atorvastatin  40 mg Oral Nightly     Continuous Infusions:   dextrose       PRN Meds:.perflutren lipid microspheres, ALPRAZolam, acetaminophen, oxyCODONE-acetaminophen **OR** oxyCODONE-acetaminophen, diphenhydrAMINE, potassium chloride, bisacodyl, amiodarone bolus, magnesium sulfate, morphine, sodium chloride, trimethobenzamide, glucose, glucagon (rDNA), dextrose, dextrose, sodium chloride flush, ondansetron **OR** ondansetron      DATA:    CBC with Differential:    Lab Results   Component Value Date    WBC 12.2 05/31/2022    RBC 3.19 05/31/2022    HGB 9.8 05/31/2022    HCT 30.1 05/31/2022    HCT 29.0 05/26/2022     05/31/2022    MCV 94.4 05/31/2022    MCH 30.7 05/31/2022    MCHC 32.6 05/31/2022    RDW 13.9 05/31/2022    NRBC 0.0 01/17/2022    SEGSPCT 70 06/08/2011    LYMPHOPCT 7.3 04/20/2022    MONOPCT 9.5 04/20/2022    MYELOPCT 2.6 01/04/2022    BASOPCT 0.5 04/20/2022    MONOSABS 0.72 04/20/2022    LYMPHSABS 0.55 04/20/2022    EOSABS 0.17 04/20/2022    BASOSABS 0.04 04/20/2022     CMP:    Lab Results   Component Value Date     05/31/2022    K 4.3 05/31/2022    K 4.2 04/09/2022     05/31/2022    CO2 24 05/31/2022    BUN 32 05/31/2022    CREATININE 0.9 05/31/2022    GFRAA >60 05/31/2022    LABGLOM >60 05/31/2022    GLUCOSE 81 05/31/2022    PROT 5.4 05/27/2022    LABALBU 3.3 05/27/2022    CALCIUM 8.2 05/31/2022    BILITOT 0.8 05/27/2022    ALKPHOS 68 05/27/2022    AST 37 05/27/2022    ALT 12 05/27/2022     Magnesium:    Lab Results   Component Value Date    MG 2.0 05/31/2022     Phosphorus:    Lab Results   Component Value Date    PHOS 3.3 11/14/2016       BRIEF SUMMARY OF INITIAL CONSULT:    Briefly Mr. Jemma Marie is a 41-year-old man with history of HTN, type II DM, nephrolithiasis s/p lithotripsy, CAD, MVP with significant MR, MV endocarditis, paroxysmal SVT,multiple sclerosis, asbestosis, erectile dysfunction status post penile implant, BPH status post TURP, bladder cancer, Washburn's esophagus, recent admission for pneumonia complicated with right parapneumonic effusion s/p right thoracotomy with decortication who presented to the hospital today 5/23/22 for CABG and valve replacements with Dr. Williams Novoa. However, the surgery was cancelled as Cardiothoracic surgery would like patient optimized before proceeding with procedure.  To note, patient did have lab work drawn on 5/18/22 which demonstrated an elevated creatinine of 1.3mg/dL. Repeat Lab work today revealed creatinine 1.1mg/dL, ProBNP 1,002. Renal was consulted for optimization before surgery. His baseline creatinine is 0.8-0.9 mg/dL     Problems resolved:    · JAMIE, stage I,  2/2 obstructive uropathy--, hernandez catheter placed and urology consulted-- renal function improved over the last 24 hrs, creatinine 0.9 mg/dl today. · Hemodynamic shock, following CABG on vaso and epi to maintain MAP >65  · Lactic acidosis, 2/2 #2, resolved  · Recurrent JAMIE, resolved, creatinine down to 0.8 with excellent urine output. · UTI, ID consulted  · Hypocalcemia, secondary to vitamin D deficiency. IMPRESSION/RECOMMENDATIONS:        1. HFpEF, LVEF 60-65% on 4/7/22 echo. proBNP 1,002> 2470, had excellent diuresis yesterday after Bumex. To continue Bumex 1 mg p.o. daily. 2. History of Nephrolithiasis  3. Obstructive Uropathy--hernandez catheter placed   4. Vitamin D deficiency, vitamin D 25 level 20,  -----------------------------------------------------------  5. CAD s/p 3V CABG with AVR, TVR, MVR on 5/25/2022, metoprolol  6. PAF, on amiodarone and Metoprolol  7. History of lung decortication due to empyema   8. Multiple Sclerosis   9.  Anemia s/p surgery, on p.o. iron    Plan:    · Bumex 1 mg p.o. daily  · Continue to monitor renal function  · Continue cholecalciferol 1000 units p.o. daily        Electronically signed by Jonatan Enriquez MD on 5/31/2022 at 9:36 AM

## 2022-05-31 NOTE — PROGRESS NOTES
Occupational Therapy  OT BEDSIDE TREATMENT NOTE   [address] [facility] [address]  [address] [place] [address]  Patient Name: Trace Spencer  MRN: [id number]  : 1945  Room: [address] [place] [address]     Evaluating OT: Donelda Meckel, OTR/L [id number]     Referring Provider: SHANIQUE Gudino CNP   Specific Provider Orders/Date: OT eval and treat (22)        Diagnosis: AI/MS/MR/TR/CAD/SVT      Surgery/Procedures:   AV repair, MVR, TV repair, CABG x 3      Pertinent Medical History: Anxiety, DM,CAD,hiatal hernia, HTN, lung nodule, MS, mild drop foot on L, COPD      *Precautions:  Fall Risk, sternal, O2, MS, mild drop foot L LE     Assessment of current deficits   [x]? Functional mobility            [x]?ADLs           [x]? Strength                  []?Cognition  [x]? Functional transfers          [x]? IADLs          [x]? Safety Awareness   [x]? Endurance  []? Fine Motor Coordination   [x]? Balance       []? Vision/perception    []? Sensation      []? Gross Motor Coordination [x]? ROM           []? Delirium                  []? Motor Control     []? Communication      OT PLAN OF CARE   OT POC based on physician orders, patient diagnosis and results of clinical assessment.        Frequency/Duration: 1-3 days/wk for 1-2 weeks PRN     Specific OT Treatment to include:   ADL retraining/adapted techniques and AE recommendations to increase functional independence within precautions                    Energy conservation techniques to improve tolerance for selfcare routine   Functional transfer/mobility training/DME recommendations for increased independence, safety and fall prevention         Patient/family education to increase safety and functional independence within precautions             Environmental modifications for safe mobility and completion of ADLs                             Therapeutic activity to improve functional performance during ADLs                                         Therapeutic exercise to improve tolerance and functional strength for ADLs   Balance retraining exercises/tasks for facilitation of postural control with dynamic challenges during ADLs .       Recommended Adaptive Equipment:  LB dressing AE, 3in1 commode     Home Living: Pt lives with domestic partner per chart  in a 1 floor condo with 0 step(s) to enter and 0 rail(s)  Bathroom setup: walk in tub with seat and rails; standard height commode  Equipment owned: walker, cane, shower seat     Prior Level of Function: IND with ADLs;  IND with IADLs. WW or cane PRN for ambulation. Driving: yes  Occupation: retired     Pain Level: pt c/o 2/10 chest pain  this session     Cognition: A&O: 3-4/4    Follows 1-2 step commands appropriately. Memory: Fair              Comprehension: Fair              Problem solving: Fair              Judgement/safety: Fair                 Communication skills: WFL              Vision: WFL                     Glasses:no                                                    Hearing: WFL                 Functional Assessment: AM-PAC Daily Activity Raw Score: 14/24    Initial Eval Status  Date: 5/27/22 Treatment Status  Date: 5/31/22 STG=LTG  Time Frame: 5-7days   Feeding NT Set up                       Ishaan  while seated up in chair to increase activity tolerance         Grooming Max A  Min A  To wash hands standing at sink                      S; set up      UB dressing/bathing Dep  per last tx                      Min A   demonstrating G knowledge of precautions during tasks      LB dressing/bathing Dep       Mod A  Pt educated on reacher use. Donned underwear seated in bedside chair and standing to pull over hips                      Min A  using AE as needed for safe reach/ energy conservation        Toileting NT  Mod A- clothing management                      Min A      Bed Mobility  Supine to sit:    Max A+2     Sit to supine:   NT Per last tx                       Min A  in prep of ADL tasks & transfers   Functional Transfers Sit to stand: Mod A  from higher bed surface;     NT from lower chair surface     Stand to sit: Max A Max A- sit<->stand  Max A- toilet transfer   Cuing for body mechanics                      Min A  sit<>stand/functional bathroom transfers using AD/DME as needed for balance and safety   Functional Mobility Mod A  no device Min A  Home distance no AD                        Min A   functional/bathroom mobility using AD as needed & demonstrating G safety      Balance Sitting:     Static:  Min A    Dynamic:Min A  Standing: Mod A Sitting:     Static:  SBA    Dynamic:Min A  Standing: Min A  S dynamic sitting balance; Min A dynamic standing balance  during ADL tasks & transfers   Endurance/Activity Tolerance    F tolerance with light activity. Fair  G   tolerance with moderate activity/self care routine   Visual/  Perceptual               WFL                                        Comments: Upon arrival pt seated in bedside chair. Educated pt on sternal precautions, demonstrates fair understanding. Pt educated on adaptive techniques to increase independence and safety during ADL's, and functional transfers while maintaining precautions. At end of session pt left seated in bedside chair, call light within reach. · Pt has made fair progress towards set goals.      · Continue with current plan of care    Treatment Time In: 10:36            Treatment Time Out: 11:00             Treatment Charges: Mins Units   Ther Ex  [id number]     Manual Therapy [phone]     Thera Activities 48754 10 1   ADL/Home Mgt 15980 14 1   Neuro Re-ed [id number]     Group Therapy      Orthotic manage/training  [id number]     Non-Billable Time     Total Timed Treatment 24 2     Bob Schmitt 86

## 2022-05-31 NOTE — PATIENT CARE CONFERENCE
P Quality Flow/Interdisciplinary Rounds Progress Note        Quality Flow Rounds held on May 31, 2022    Disciplines Attending:  Bedside Nurse, ,  and Nursing Unit Leadership    Sonu Vasquez was admitted on 5/23/2022  5:09 AM    Anticipated Discharge Date:  Expected Discharge Date: 05/27/22    Disposition:    Enzo Score:  Enzo Scale Score: 19    Readmission Risk              Risk of Unplanned Readmission:  43           Discussed patient goal for the day, patient clinical progression, and barriers to discharge.   The following Goal(s) of the Day/Commitment(s) have been identified:  Diagnostics - Report Results      Ellis Burgos RN  May 31, 2022

## 2022-05-31 NOTE — PLAN OF CARE
Problem: Chronic Conditions and Co-morbidities  Goal: Patient's chronic conditions and co-morbidity symptoms are monitored and maintained or improved  Outcome: Progressing     Problem: Skin/Tissue Integrity  Goal: Absence of new skin breakdown  Description: 1. Monitor for areas of redness and/or skin breakdown  2. Assess vascular access sites hourly  3. Every 4-6 hours minimum:  Change oxygen saturation probe site  4. Every 4-6 hours:  If on nasal continuous positive airway pressure, respiratory therapy assess nares and determine need for appliance change or resting period.   Outcome: Progressing     Problem: Cardiovascular - Adult  Goal: Maintains optimal cardiac output and hemodynamic stability  Outcome: Progressing     Problem: Respiratory - Adult  Goal: Achieves optimal ventilation and oxygenation  5/30/2022 2307 by Haleigh Reed RN  Outcome: Progressing     Problem: Skin/Tissue Integrity - Adult  Goal: Skin integrity remains intact  5/30/2022 2307 by Haleigh Reed RN  Outcome: Progressing

## 2022-05-31 NOTE — PROGRESS NOTES
Update regarding denied CT scan of chest on preop testing:  After spending an over hour on the phone with multiple insurance representatives from both [employer] and [employer] I was finally informed by [employer] representative by the name of Kayce that since the CT scan of the chest was already completed they do not provide Ret[employer] Authorization of tests and this issue can go no further. I explained to her that the preop CT scan of the chest was completely indicated and if I could please file any further appeals I am willing to do so. Kayce stated that was not an option. Her rep number was [phone].

## 2022-06-01 LAB
ANION GAP SERPL CALCULATED.3IONS-SCNC: 10 MMOL/L (ref 7–16)
BUN BLDV-MCNC: 39 MG/DL (ref 6–23)
CALCIUM SERPL-MCNC: 8.1 MG/DL (ref 8.6–10.2)
CHLORIDE BLD-SCNC: 100 MMOL/L (ref 98–107)
CO2: 27 MMOL/L (ref 22–29)
CREAT SERPL-MCNC: 1 MG/DL (ref 0.7–1.2)
GFR AFRICAN AMERICAN: >60
GFR NON-AFRICAN AMERICAN: >60 ML/MIN/1.73
GLUCOSE BLD-MCNC: 83 MG/DL (ref 74–99)
HCT VFR BLD CALC: 28.6 % (ref 37–54)
HEMOGLOBIN: 9.5 G/DL (ref 12.5–16.5)
MAGNESIUM: 2.1 MG/DL (ref 1.6–2.6)
MCH RBC QN AUTO: 30.5 PG (ref 26–35)
MCHC RBC AUTO-ENTMCNC: 33.2 % (ref 32–34.5)
MCV RBC AUTO: 92 FL (ref 80–99.9)
METER GLUCOSE: 100 MG/DL (ref 74–99)
METER GLUCOSE: 78 MG/DL (ref 74–99)
PDW BLD-RTO: 14.1 FL (ref 11.5–15)
PLATELET # BLD: 234 E9/L (ref 130–450)
PMV BLD AUTO: 11 FL (ref 7–12)
POTASSIUM SERPL-SCNC: 3.7 MMOL/L (ref 3.5–5)
PRO-BNP: 2389 PG/ML (ref 0–450)
RBC # BLD: 3.11 E12/L (ref 3.8–5.8)
SODIUM BLD-SCNC: 137 MMOL/L (ref 132–146)
WBC # BLD: 10.9 E9/L (ref 4.5–11.5)

## 2022-06-01 PROCEDURE — 6370000000 HC RX 637 (ALT 250 FOR IP): Performed by: NURSE PRACTITIONER

## 2022-06-01 PROCEDURE — 93798 PHYS/QHP OP CAR RHAB W/ECG: CPT

## 2022-06-01 PROCEDURE — 94640 AIRWAY INHALATION TREATMENT: CPT

## 2022-06-01 PROCEDURE — 97530 THERAPEUTIC ACTIVITIES: CPT

## 2022-06-01 PROCEDURE — 2140000000 HC CCU INTERMEDIATE R&B

## 2022-06-01 PROCEDURE — 83735 ASSAY OF MAGNESIUM: CPT

## 2022-06-01 PROCEDURE — 36415 COLL VENOUS BLD VENIPUNCTURE: CPT

## 2022-06-01 PROCEDURE — 2580000003 HC RX 258: Performed by: NURSE PRACTITIONER

## 2022-06-01 PROCEDURE — 6370000000 HC RX 637 (ALT 250 FOR IP)

## 2022-06-01 PROCEDURE — 83880 ASSAY OF NATRIURETIC PEPTIDE: CPT

## 2022-06-01 PROCEDURE — 85027 COMPLETE CBC AUTOMATED: CPT

## 2022-06-01 PROCEDURE — 82962 GLUCOSE BLOOD TEST: CPT

## 2022-06-01 PROCEDURE — 6360000002 HC RX W HCPCS

## 2022-06-01 PROCEDURE — 80048 BASIC METABOLIC PNL TOTAL CA: CPT

## 2022-06-01 PROCEDURE — 51798 US URINE CAPACITY MEASURE: CPT

## 2022-06-01 PROCEDURE — 6370000000 HC RX 637 (ALT 250 FOR IP): Performed by: INTERNAL MEDICINE

## 2022-06-01 RX ORDER — PANTOPRAZOLE SODIUM 40 MG/1
40 TABLET, DELAYED RELEASE ORAL DAILY
Qty: 30 TABLET | Refills: 0
Start: 2022-06-02 | End: 2022-06-02

## 2022-06-01 RX ORDER — ATORVASTATIN CALCIUM 40 MG/1
40 TABLET, FILM COATED ORAL NIGHTLY
Qty: 30 TABLET | Refills: 3
Start: 2022-06-01 | End: 2022-06-02

## 2022-06-01 RX ORDER — OXYCODONE HYDROCHLORIDE AND ACETAMINOPHEN 5; 325 MG/1; MG/1
1 TABLET ORAL EVERY 6 HOURS PRN
Qty: 28 TABLET | Refills: 0 | Status: SHIPPED | OUTPATIENT
Start: 2022-06-01 | End: 2022-06-03

## 2022-06-01 RX ORDER — LANOLIN ALCOHOL/MO/W.PET/CERES
400 CREAM (GRAM) TOPICAL DAILY
Qty: 14 TABLET | Refills: 0
Start: 2022-06-02 | End: 2022-06-02

## 2022-06-01 RX ORDER — AMIODARONE HYDROCHLORIDE 200 MG/1
200 TABLET ORAL DAILY
Qty: 30 TABLET | Refills: 2
Start: 2022-06-01 | End: 2022-06-02 | Stop reason: SDUPTHER

## 2022-06-01 RX ORDER — ENOXAPARIN SODIUM 100 MG/ML
40 INJECTION SUBCUTANEOUS DAILY
Qty: 12 ML | Refills: 0 | Status: CANCELLED
Start: 2022-06-02 | End: 2022-07-02

## 2022-06-01 RX ORDER — ASPIRIN 81 MG/1
81 TABLET ORAL DAILY
Qty: 30 TABLET | Refills: 5
Start: 2022-06-02 | End: 2022-06-02

## 2022-06-01 RX ORDER — FERROUS SULFATE 325(65) MG
325 TABLET ORAL 2 TIMES DAILY WITH MEALS
Qty: 60 TABLET | Refills: 0
Start: 2022-06-01 | End: 2022-06-02

## 2022-06-01 RX ORDER — FOLIC ACID 1 MG/1
1 TABLET ORAL DAILY
Qty: 30 TABLET | Refills: 0
Start: 2022-06-02 | End: 2022-06-02

## 2022-06-01 RX ORDER — VITAMIN B COMPLEX
1000 TABLET ORAL DAILY
Qty: 30 TABLET | Refills: 1
Start: 2022-06-02 | End: 2022-06-02

## 2022-06-01 RX ORDER — DOCUSATE SODIUM 100 MG/1
100 CAPSULE, LIQUID FILLED ORAL 2 TIMES DAILY PRN
Qty: 40 CAPSULE | Refills: 0
Start: 2022-06-01 | End: 2022-06-02 | Stop reason: SDUPTHER

## 2022-06-01 RX ORDER — ASCORBIC ACID 500 MG
500 TABLET ORAL 2 TIMES DAILY
Qty: 60 TABLET | Refills: 0
Start: 2022-06-01 | End: 2022-06-02

## 2022-06-01 RX ADMIN — OXYCODONE HYDROCHLORIDE AND ACETAMINOPHEN 500 MG: 500 TABLET ORAL at 20:21

## 2022-06-01 RX ADMIN — IPRATROPIUM BROMIDE AND ALBUTEROL SULFATE 1 AMPULE: .5; 2.5 SOLUTION RESPIRATORY (INHALATION) at 19:50

## 2022-06-01 RX ADMIN — POLYETHYLENE GLYCOL 3350 17 G: 17 POWDER, FOR SOLUTION ORAL at 08:45

## 2022-06-01 RX ADMIN — ATORVASTATIN CALCIUM 40 MG: 40 TABLET, FILM COATED ORAL at 20:21

## 2022-06-01 RX ADMIN — SENNOSIDES AND DOCUSATE SODIUM 1 TABLET: 50; 8.6 TABLET ORAL at 20:21

## 2022-06-01 RX ADMIN — METOPROLOL TARTRATE 12.5 MG: 25 TABLET, FILM COATED ORAL at 08:09

## 2022-06-01 RX ADMIN — OXYCODONE HYDROCHLORIDE AND ACETAMINOPHEN 2 TABLET: 5; 325 TABLET ORAL at 18:32

## 2022-06-01 RX ADMIN — SENNOSIDES AND DOCUSATE SODIUM 1 TABLET: 50; 8.6 TABLET ORAL at 08:09

## 2022-06-01 RX ADMIN — OXYCODONE HYDROCHLORIDE AND ACETAMINOPHEN 2 TABLET: 5; 325 TABLET ORAL at 01:24

## 2022-06-01 RX ADMIN — Medication 1000 UNITS: at 08:07

## 2022-06-01 RX ADMIN — FERROUS SULFATE TAB 325 MG (65 MG ELEMENTAL FE) 325 MG: 325 (65 FE) TAB at 16:45

## 2022-06-01 RX ADMIN — Medication 400 MG: at 08:10

## 2022-06-01 RX ADMIN — PANTOPRAZOLE SODIUM 40 MG: 40 TABLET, DELAYED RELEASE ORAL at 05:57

## 2022-06-01 RX ADMIN — POTASSIUM CHLORIDE 40 MEQ: 20 TABLET, EXTENDED RELEASE ORAL at 08:14

## 2022-06-01 RX ADMIN — IPRATROPIUM BROMIDE AND ALBUTEROL SULFATE 1 AMPULE: .5; 2.5 SOLUTION RESPIRATORY (INHALATION) at 16:06

## 2022-06-01 RX ADMIN — METOPROLOL TARTRATE 12.5 MG: 25 TABLET, FILM COATED ORAL at 20:21

## 2022-06-01 RX ADMIN — IPRATROPIUM BROMIDE AND ALBUTEROL SULFATE 1 AMPULE: .5; 2.5 SOLUTION RESPIRATORY (INHALATION) at 09:55

## 2022-06-01 RX ADMIN — BISACODYL 5 MG: 5 TABLET, COATED ORAL at 08:08

## 2022-06-01 RX ADMIN — ENOXAPARIN SODIUM 40 MG: 100 INJECTION SUBCUTANEOUS at 08:09

## 2022-06-01 RX ADMIN — BISACODYL 10 MG: 10 SUPPOSITORY RECTAL at 08:08

## 2022-06-01 RX ADMIN — OXYCODONE HYDROCHLORIDE AND ACETAMINOPHEN 500 MG: 500 TABLET ORAL at 08:09

## 2022-06-01 RX ADMIN — FOLIC ACID 1 MG: 1 TABLET ORAL at 08:09

## 2022-06-01 RX ADMIN — OXYCODONE AND ACETAMINOPHEN 1 TABLET: 5; 325 TABLET ORAL at 11:01

## 2022-06-01 RX ADMIN — FERROUS SULFATE TAB 325 MG (65 MG ELEMENTAL FE) 325 MG: 325 (65 FE) TAB at 08:09

## 2022-06-01 RX ADMIN — SODIUM CHLORIDE, PRESERVATIVE FREE 10 ML: 5 INJECTION INTRAVENOUS at 08:10

## 2022-06-01 RX ADMIN — IPRATROPIUM BROMIDE AND ALBUTEROL SULFATE 1 AMPULE: .5; 2.5 SOLUTION RESPIRATORY (INHALATION) at 13:26

## 2022-06-01 RX ADMIN — ASPIRIN 81 MG: 81 TABLET, COATED ORAL at 08:09

## 2022-06-01 RX ADMIN — AMIODARONE HYDROCHLORIDE 200 MG: 200 TABLET ORAL at 20:21

## 2022-06-01 RX ADMIN — SODIUM CHLORIDE, PRESERVATIVE FREE 10 ML: 5 INJECTION INTRAVENOUS at 20:21

## 2022-06-01 RX ADMIN — APIXABAN 5 MG: 5 TABLET, FILM COATED ORAL at 20:21

## 2022-06-01 RX ADMIN — BUMETANIDE 1 MG: 1 TABLET ORAL at 08:09

## 2022-06-01 RX ADMIN — AMIODARONE HYDROCHLORIDE 200 MG: 200 TABLET ORAL at 08:09

## 2022-06-01 ASSESSMENT — PAIN DESCRIPTION - DESCRIPTORS
DESCRIPTORS: DISCOMFORT;SORE;TENDER
DESCRIPTORS: DISCOMFORT;SORE;ACHING
DESCRIPTORS: DISCOMFORT;SORE

## 2022-06-01 ASSESSMENT — PAIN DESCRIPTION - ORIENTATION
ORIENTATION: MID
ORIENTATION: MID

## 2022-06-01 ASSESSMENT — PAIN DESCRIPTION - LOCATION
LOCATION: CHEST
LOCATION: CHEST

## 2022-06-01 ASSESSMENT — PAIN SCALES - GENERAL
PAINLEVEL_OUTOF10: 0
PAINLEVEL_OUTOF10: 4
PAINLEVEL_OUTOF10: 0
PAINLEVEL_OUTOF10: 8
PAINLEVEL_OUTOF10: 7

## 2022-06-01 NOTE — CARE COORDINATION
PT updates are in, Vikki Conklin started precert, [employer] portal is down this morning, they will try later today. Updated pt, that insurance might not approved EPIFANIO stay, called daughter Sammy Nesbitt and updated her and she is worried that pt won't have someone with him to care for him, pt's significant other is 80 and has dementia. Reviewed that precert was started, they may or may not approve, and that we are letting her know so she can start getting things together for possible discharge home. Per Sammy Nesbitt, pt has had private pay caregivers in the past, met with pt who is agreeable to pay for  private pay caregivers if insurance denies EPIFANIO. Envelope and ambullete form in soft chart, cardiac surgery rehab protocol in envelope/soft chart. 3pm received message from [facility], they started precert. Sara Castellano, MSW, LSW

## 2022-06-01 NOTE — PROGRESS NOTES
POD#7 Awake, alert. No complaints. Up in chair. States that he feels good. Denies CP, palpitations, SOB at rest, dizziness/lightheadedness. Vitals:    06/01/22 0457 06/01/22 0648 06/01/22 0740 06/01/22 0956   BP: (!) 105/56  (!) 145/64    Pulse: 70  76    Resp: 18  20    Temp: 97.5 °F (36.4 °C)  97.2 °F (36.2 °C)    TempSrc: Temporal  Temporal    SpO2: 95%  94% 95%   Weight:  180 lb 12.8 oz (82 kg)     Height:         O2: none      Intake/Output Summary (Last 24 hours) at 6/1/2022 1003  Last data filed at 6/1/2022 0914  Gross per 24 hour   Intake 360 ml   Output 5855 ml   Net -5495 ml         +BM on 5/30    UO: 1150mL/8hr         Recent Labs     05/30/22  0542 05/30/22  0542 05/30/22  1348 05/31/22  0631 06/01/22  0538   WBC 10.8  --   --  12.2* 10.9   HGB 8.9*   < > 9.9* 9.8* 9.5*   HCT 27.5*   < > 31.0* 30.1* 28.6*     --   --  176 234    < > = values in this interval not displayed. Recent Labs     05/30/22  0542 05/31/22  0631 06/01/22  0538   BUN 37* 32* 39*   CREATININE 0.9 0.9 1.0         Telemetry: SR      PE  Cardiac: RRR  Lungs: decreased bases  Chest incision C/D/I, approximated, no erythema. Sternum stable. Prior chest tube site incisions C/D/I, no erythema with intact sutures. Epicardial pacing wires present and secure.    Abd: Soft, nontender, +BS  Ext: Incisions C/D/I, approximated, no erythema, + edema (improving edema)           A/P: POD#7  1. VHD/CAD  --Stable s/p Urgent sternotomy/Urgent Aortic valve repair by leaflet debridement/Complex mitral valve replacement with annular debridement and a 33mm Medtronic Mosaic bioprosthetic/Tricuspid valve repair by bicuspidization/CABG x 3 (LIMA-LAD, SVG-Diag, SVG-PDA)/WALLACE exclusion with 40mm AtriClip/EVH RLE/Rigid internal fixation of the sternum with Juan Carlos plates x 6/58 modifier on 5/25/2022  --Post op JOEL reveals 60% EF on inotropic support  --Will order TTE prior to discharge to establish baseline and evaluate valves, ordered on 5/28/2022 and completed 5/31: LVEF ~50%, see report for remaining read  --Scr stable  --ASA/statin - low dose BB started 5/28--will initiate eliquis today and to continue for 3 months post-operatively--discussed with Dr. Misti Silva and patient  --reinforce sternal precautions  --Epicardial pacing wires cut without difficulty. Patient tolerated well          2. Expected acute blood loss anemia secondary to open heart surgery  --stable - 9.5        3. NSR  --continue BB with hold parameters - started 12.5 mg BID metoprolol 5/28  --continue oral amiodarone for afib prophylaxis--with plans to taper on dc to home dose -- on 200 mg BID   --hx of SVT on oral amiodarone 200mg QD at home        4. Expected acute pulmonary insufficiency in the setting following surgery  --Hx of severe COPD   --wean oxygen to keep SpO2 greater than or equal to 92%  --continue duonebs with ezpap  --encourage C&DB, SMI  --currently on RA        5. JAMIE   --Pre-op 1.3 -- surgery cancelled for renal optimization   --Scr stable  --Nephrology following  --received bumex 1mg IV 5/28 per nephrology, Scr 1.0 today, on daily PO bumex 1mg per nephrology         6. T2DM   --pre-op HgA1c 5.1%  --continue SSI         7. Urinary retention  --Hx of BPH s/p TURP  --Urology seen pre-op recommends continuing hernandez until more ambulatory -- will try voiding trial prior to discharge if ambulation improves  --hernandez catheter removed without difficulty. Patient tolerated well. DTV 1655--if unable to void by then, will have urethral catheter replaced with f/u with urology as an outpatient        8. UTI   --pre-op urine culture (+) ,000 bacteria   --ID following   --completed 3 days of daptomycin and merrem        9. Thrombocytopenia   --resolved  --ZANN 461U today, yesterday 176K       10. Leukocytosis   --resolved  -- WBC 10.9 today         11.  Constipation--expected delayed return of bowel function  --secondary to anesthesia, narcotics, decreased oral intake, and decreased physical activity   -- (+) BM 5/30  --Continue daily MOM/oral bisacodyl until +BM and senna-s as scheduled. --Encouraged continued increase in oral intake and activity.         12. Expected deconditioning in the setting following surgery  --Increase activity as tolerated  --PT/OT  --currently ambulating 200ft        13. At risk for aspiration   --witnessed coughing with thin liquids by nursing staff in [facility]   --Speech consulted for evaluation -- recommend minced and moist consistency solids with thin liquids            DVT prophylaxis:  --continue bilateral knee high MYKE hose  --continue PCDs  --continue progressive ambulation  --stop lovenox with initiation of eliquis today       Dispo: plan is for rehab on discharge--case management unable to initiate plans for rehab until 5/31 due to the holiday--dc to rehab process initiated 5/31.  DC to rehab as soon as precert obtained        This patient's case and care plan was discussed with the attending surgeon

## 2022-06-01 NOTE — PATIENT CARE CONFERENCE
[facility] Quality Flow/Interdisciplinary Rounds Progress Note        Quality Flow Rounds held on June 1, 2022    Disciplines Attending:  Bedside Nurse, ,  and Nursing Unit Leadership    August VICTOR MANUEL Vasquez was admitted on 5/23/2022  5:09 AM    Anticipated Discharge Date:  Expected Discharge Date: 06/02/22    Disposition:    Enzo Score:  Enzo Scale Score: 19    Readmission Risk              Risk of Unplanned Readmission:  43           Discussed patient goal for the day, patient clinical progression, and barriers to discharge.   The following Goal(s) of the Day/Commitment(s) have been identified:  Diagnostics - Report Results      Jonatan Quintero RN  June 1, 2022
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
## 2022-06-01 NOTE — PROGRESS NOTES
Physical Therapy  Treatment Note    Name: Sonu Vasquez  : 1945  MRN: [id number]      Date of Service: 2022    Evaluating PT:  Isa Diaz, PT, DPT [id number]    Room #:  9525/8175-G  Diagnosis:  CAD in native artery [I25.10]  PMHx/PSHx:  See chart  Procedure/Surgery:   AV repair, MVR, TV repair, CABG x 3  Precautions:  Falls, Sternal, O2  Equipment Needs:  TBD    SUBJECTIVE:    Pt lives with domestic partner in a 1st floor condo with level entry. Pt ambulated with [employer] or cane PRN and was independent PTA. This is pt's 3rd surgery this year. OBJECTIVE:   Initial Evaluation  Date: 22 Treatment  Date: 22 Short Term/ Long Term   Goals   AM-PAC 6 Clicks  67/32    Was pt agreeable to Eval/treatment? Yes yes    Does pt have pain? 10/10 surgical pain - RN aware and already medicated 1/10 buttocks    Bed Mobility  Rolling: NT  Supine to sit: MaxA x 2 with HOB elevated  Sit to supine: NT  Scooting: MaxA Rolling: NT  Supine to sit: NT  Sit to supine: NT  Scooting: NT Marcelo   Transfers Sit to stand: ModA elevated bed  Stand to sit: MaxA  Stand pivot: ModA no device Sit to stand: mod A  Stand to sit: min A  Stand pivot: min A with no AD Mod Independent with [employer]   Ambulation   A few steps to chair with ModA no device 50'x4 with no AD min A >400 feet Mod Independent with [employer]   Stair negotiation: ascended and descended NT NT >4 steps with 1 rail Mod Independent   ROM BUE:  Defer to OT note  BLE:  WFL     Strength BUE:  Defer to OT note  BLE:  4/5  Increase by 1/3 MMT grade   Balance Sitting EOB:  Marcelo  Dynamic Standing:  ModA no device Sitting EOB: SBA  Dynamic Standing:  Min A with no AD Sitting EOB:  Independent  Dynamic Standing:   Mod Independent with [employer]     Pt is A & O x 4  Sensation:  No reported paresthesias  Edema:  None    Patient education  Pt educated on safety with functional mobility    Patient response to education:   Pt verbalized understanding Pt demonstrated skill Pt requires further education in this area   yes yes reinforce     ASSESSMENT:    Comments:    Pt sitting in bedside chair upon entering, pt agreeable to participate. Pt required increased assistance to transfer from bedside chair. Pt required increased time to achieve good static standing balance, minor LOB posteriorly. Once in good standing position, pt was instructed to ambulate to tolerance. Pt demonstrating fair tianna with short step length and flexed trunk. Pt requiring intermittent standing rest breaks during bout. Pt was assisted back to bedside chair at the end of session. Pt cued for positioning and eccentric control to assure safe transfer to chair. Pt remained in bedside chair with all needs met and call bell in reach prior to exiting. Treatment:  Patient practiced and was instructed in the following treatment:    · STS and pivot transfer training - pt educated on proper hand and foot placement, safety and sequencing, and use of verbal and tactile cues to safely complete sit<>stand and pivot transfers with physical assistance to complete task safely   · Gait training- pt was given verbal and tactile cues to facilitate pt safety with no AD use during ambulation as well as provided with physical assistance. PLAN:    Patient is making good progress towards established goals. Will continue with current POC.       Time in  0935  Time out  0945    Total Treatment Time  10 minutes     CPT codes:  [] Gait training 39298 -- minutes  [] Manual therapy [id number] -- minutes  [x] Therapeutic activities 06992 10 minutes  [] Therapeutic exercises 66730 -- minutes  [] Neuromuscular reeducation 08731 -- minutes    Marcela Paget, PT, DPT  [id number]
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
## 2022-06-02 VITALS
WEIGHT: 186 LBS | BODY MASS INDEX: 26.63 KG/M2 | OXYGEN SATURATION: 98 % | TEMPERATURE: 97.7 F | HEART RATE: 81 BPM | DIASTOLIC BLOOD PRESSURE: 55 MMHG | HEIGHT: 70 IN | RESPIRATION RATE: 18 BRPM | SYSTOLIC BLOOD PRESSURE: 111 MMHG

## 2022-06-02 LAB
ANION GAP SERPL CALCULATED.3IONS-SCNC: 11 MMOL/L (ref 7–16)
BUN BLDV-MCNC: 36 MG/DL (ref 6–23)
CALCIUM SERPL-MCNC: 8.3 MG/DL (ref 8.6–10.2)
CHLORIDE BLD-SCNC: 99 MMOL/L (ref 98–107)
CO2: 25 MMOL/L (ref 22–29)
CREAT SERPL-MCNC: 0.9 MG/DL (ref 0.7–1.2)
GFR AFRICAN AMERICAN: >60
GFR NON-AFRICAN AMERICAN: >60 ML/MIN/1.73
GLUCOSE BLD-MCNC: 85 MG/DL (ref 74–99)
HCT VFR BLD CALC: 30.7 % (ref 37–54)
HEMOGLOBIN: 10.1 G/DL (ref 12.5–16.5)
MAGNESIUM: 2.2 MG/DL (ref 1.6–2.6)
MCH RBC QN AUTO: 30.5 PG (ref 26–35)
MCHC RBC AUTO-ENTMCNC: 32.9 % (ref 32–34.5)
MCV RBC AUTO: 92.7 FL (ref 80–99.9)
PDW BLD-RTO: 14.1 FL (ref 11.5–15)
PLATELET # BLD: 293 E9/L (ref 130–450)
PMV BLD AUTO: 10.8 FL (ref 7–12)
POTASSIUM SERPL-SCNC: 4.4 MMOL/L (ref 3.5–5)
RBC # BLD: 3.31 E12/L (ref 3.8–5.8)
SARS-COV-2, NAAT: NOT DETECTED
SODIUM BLD-SCNC: 135 MMOL/L (ref 132–146)
WBC # BLD: 10.6 E9/L (ref 4.5–11.5)

## 2022-06-02 PROCEDURE — 6370000000 HC RX 637 (ALT 250 FOR IP): Performed by: NURSE PRACTITIONER

## 2022-06-02 PROCEDURE — 83735 ASSAY OF MAGNESIUM: CPT

## 2022-06-02 PROCEDURE — 93798 PHYS/QHP OP CAR RHAB W/ECG: CPT

## 2022-06-02 PROCEDURE — 6370000000 HC RX 637 (ALT 250 FOR IP)

## 2022-06-02 PROCEDURE — 87635 SARS-COV-2 COVID-19 AMP PRB: CPT

## 2022-06-02 PROCEDURE — 6370000000 HC RX 637 (ALT 250 FOR IP): Performed by: INTERNAL MEDICINE

## 2022-06-02 PROCEDURE — 36415 COLL VENOUS BLD VENIPUNCTURE: CPT

## 2022-06-02 PROCEDURE — 2580000003 HC RX 258: Performed by: NURSE PRACTITIONER

## 2022-06-02 PROCEDURE — 94640 AIRWAY INHALATION TREATMENT: CPT

## 2022-06-02 PROCEDURE — 85027 COMPLETE CBC AUTOMATED: CPT

## 2022-06-02 PROCEDURE — 80048 BASIC METABOLIC PNL TOTAL CA: CPT

## 2022-06-02 RX ORDER — VITAMIN B COMPLEX
1000 TABLET ORAL DAILY
Qty: 30 TABLET | Refills: 1 | Status: SHIPPED | OUTPATIENT
Start: 2022-06-02

## 2022-06-02 RX ORDER — ASCORBIC ACID 500 MG
500 TABLET ORAL 2 TIMES DAILY
Qty: 60 TABLET | Refills: 0 | Status: SHIPPED | OUTPATIENT
Start: 2022-06-02 | End: 2022-07-29

## 2022-06-02 RX ORDER — AMIODARONE HYDROCHLORIDE 200 MG/1
200 TABLET ORAL DAILY
Qty: 30 TABLET | Refills: 2 | Status: ON HOLD | OUTPATIENT
Start: 2022-06-02 | End: 2022-06-06 | Stop reason: SDUPTHER

## 2022-06-02 RX ORDER — DOCUSATE SODIUM 100 MG/1
100 CAPSULE, LIQUID FILLED ORAL 2 TIMES DAILY PRN
Qty: 40 CAPSULE | Refills: 0 | Status: SHIPPED | OUTPATIENT
Start: 2022-06-02 | End: 2022-07-02

## 2022-06-02 RX ORDER — LANOLIN ALCOHOL/MO/W.PET/CERES
400 CREAM (GRAM) TOPICAL DAILY
Qty: 14 TABLET | Refills: 0 | Status: ON HOLD | OUTPATIENT
Start: 2022-06-02 | End: 2022-06-06 | Stop reason: SDUPTHER

## 2022-06-02 RX ORDER — BUMETANIDE 1 MG/1
1 TABLET ORAL DAILY
Qty: 30 TABLET | Refills: 1 | Status: SHIPPED | OUTPATIENT
Start: 2022-06-03 | End: 2022-06-03

## 2022-06-02 RX ORDER — ASPIRIN 81 MG/1
81 TABLET ORAL DAILY
Qty: 30 TABLET | Refills: 5 | Status: SHIPPED | OUTPATIENT
Start: 2022-06-02

## 2022-06-02 RX ORDER — FERROUS SULFATE 325(65) MG
325 TABLET ORAL 2 TIMES DAILY WITH MEALS
Qty: 60 TABLET | Refills: 0 | Status: ON HOLD | OUTPATIENT
Start: 2022-06-02 | End: 2022-06-06 | Stop reason: SDUPTHER

## 2022-06-02 RX ORDER — ATORVASTATIN CALCIUM 40 MG/1
40 TABLET, FILM COATED ORAL NIGHTLY
Qty: 30 TABLET | Refills: 3 | Status: SHIPPED | OUTPATIENT
Start: 2022-06-02 | End: 2022-06-03

## 2022-06-02 RX ORDER — PANTOPRAZOLE SODIUM 40 MG/1
40 TABLET, DELAYED RELEASE ORAL DAILY
Qty: 30 TABLET | Refills: 0 | Status: SHIPPED | OUTPATIENT
Start: 2022-06-02 | End: 2022-06-03

## 2022-06-02 RX ORDER — FOLIC ACID 1 MG/1
1 TABLET ORAL DAILY
Qty: 30 TABLET | Refills: 0 | Status: ON HOLD | OUTPATIENT
Start: 2022-06-02 | End: 2022-06-06 | Stop reason: SDUPTHER

## 2022-06-02 RX ADMIN — PANTOPRAZOLE SODIUM 40 MG: 40 TABLET, DELAYED RELEASE ORAL at 06:00

## 2022-06-02 RX ADMIN — IPRATROPIUM BROMIDE AND ALBUTEROL SULFATE 1 AMPULE: .5; 2.5 SOLUTION RESPIRATORY (INHALATION) at 09:49

## 2022-06-02 RX ADMIN — Medication 400 MG: at 07:58

## 2022-06-02 RX ADMIN — METOPROLOL TARTRATE 12.5 MG: 25 TABLET, FILM COATED ORAL at 07:58

## 2022-06-02 RX ADMIN — BISACODYL 5 MG: 5 TABLET, COATED ORAL at 08:01

## 2022-06-02 RX ADMIN — Medication 1000 UNITS: at 07:58

## 2022-06-02 RX ADMIN — AMIODARONE HYDROCHLORIDE 200 MG: 200 TABLET ORAL at 07:58

## 2022-06-02 RX ADMIN — ASPIRIN 81 MG: 81 TABLET, COATED ORAL at 07:58

## 2022-06-02 RX ADMIN — FOLIC ACID 1 MG: 1 TABLET ORAL at 07:58

## 2022-06-02 RX ADMIN — IPRATROPIUM BROMIDE AND ALBUTEROL SULFATE 1 AMPULE: .5; 2.5 SOLUTION RESPIRATORY (INHALATION) at 13:22

## 2022-06-02 RX ADMIN — SODIUM CHLORIDE, PRESERVATIVE FREE 10 ML: 5 INJECTION INTRAVENOUS at 07:59

## 2022-06-02 RX ADMIN — BUMETANIDE 1 MG: 1 TABLET ORAL at 07:58

## 2022-06-02 RX ADMIN — SENNOSIDES AND DOCUSATE SODIUM 1 TABLET: 50; 8.6 TABLET ORAL at 07:58

## 2022-06-02 RX ADMIN — OXYCODONE HYDROCHLORIDE AND ACETAMINOPHEN 500 MG: 500 TABLET ORAL at 07:58

## 2022-06-02 RX ADMIN — APIXABAN 5 MG: 5 TABLET, FILM COATED ORAL at 07:58

## 2022-06-02 RX ADMIN — IPRATROPIUM BROMIDE AND ALBUTEROL SULFATE 1 AMPULE: .5; 2.5 SOLUTION RESPIRATORY (INHALATION) at 15:46

## 2022-06-02 RX ADMIN — POLYETHYLENE GLYCOL 3350 17 G: 17 POWDER, FOR SOLUTION ORAL at 07:58

## 2022-06-02 RX ADMIN — FERROUS SULFATE TAB 325 MG (65 MG ELEMENTAL FE) 325 MG: 325 (65 FE) TAB at 07:58

## 2022-06-02 ASSESSMENT — PAIN SCALES - GENERAL
PAINLEVEL_OUTOF10: 0

## 2022-06-02 ASSESSMENT — PAIN SCALES - WONG BAKER: WONGBAKER_NUMERICALRESPONSE: 0

## 2022-06-02 NOTE — PROGRESS NOTES
RN spoke with Modesto Dawson of [facility] regarding patient discharge planning. RN to message Dr. Charles Serrano regarding bumex orders. Dr. Charles Serrano replied to message stating that he wants to continue the patient on Bumex at this time. RN to update [facility].

## 2022-06-02 NOTE — PLAN OF CARE
Problem: Discharge Planning  Goal: Discharge to home or other facility with appropriate resources  Outcome: Progressing  Flowsheets (Taken 6/1/2022 1940)  Discharge to home or other facility with appropriate resources: Identify barriers to discharge with patient and caregiver     Problem: Chronic Conditions and Co-morbidities  Goal: Patient's chronic conditions and co-morbidity symptoms are monitored and maintained or improved  Outcome: Progressing  Flowsheets (Taken 6/1/2022 1940)  Care Plan - Patient's Chronic Conditions and Co-Morbidity Symptoms are Monitored and Maintained or Improved: Monitor and assess patient's chronic conditions and comorbid symptoms for stability, deterioration, or improvement     Problem: ABCDS Injury Assessment  Goal: Absence of physical injury  Recent Flowsheet Documentation  Taken 6/1/2022 0939 by Blu Barahona RN  Absence of Physical Injury: Implement safety measures based on patient assessment     Problem: Cardiovascular - Adult  Goal: Maintains optimal cardiac output and hemodynamic stability  Outcome: Progressing  Flowsheets (Taken 6/1/2022 1940)  Maintains optimal cardiac output and hemodynamic stability: Monitor blood pressure and heart rate  Goal: Absence of cardiac dysrhythmias or at baseline  Outcome: Progressing  Flowsheets (Taken 6/1/2022 1940)  Absence of cardiac dysrhythmias or at baseline: Monitor cardiac rate and rhythm     Problem: Safety - Adult  Goal: Free from fall injury  Outcome: Progressing  Flowsheets (Taken 6/1/2022 0939 by Blu Barahona RN)  Free From Fall Injury: Instruct family/caregiver on patient safety

## 2022-06-02 NOTE — PROGRESS NOTES
POD#8 Awake, alert. No complaints. Up in chair finishing getting washed up. Denies CP, palpitations, SOB at rest, dizziness/lightheadedness. Vitals:    06/01/22 2310 06/02/22 0300 06/02/22 0608 06/02/22 0744   BP: 133/61 135/62  (!) 114/57   Pulse: 76 75  80   Resp: 16 16  18   Temp: 97.2 °F (36.2 °C) 97.2 °F (36.2 °C)  97.3 °F (36.3 °C)   TempSrc: Temporal Temporal  Temporal   SpO2: 96% 95%  98%   Weight:   186 lb (84.4 kg)    Height:         O2: none      Intake/Output Summary (Last 24 hours) at 6/2/2022 0834  Last data filed at 6/2/2022 5294  Gross per 24 hour   Intake 1580 ml   Output 1280 ml   Net 300 ml         +BM on 6/1    UO: 275+ Voids without difficulty        Recent Labs     05/31/22  0631 06/01/22  0538 06/02/22  0550   WBC 12.2* 10.9 10.6   HGB 9.8* 9.5* 10.1*   HCT 30.1* 28.6* 30.7*    234 293      Recent Labs     05/31/22  0631 06/01/22  0538 06/02/22  0550   BUN 32* 39* 36*   CREATININE 0.9 1.0 0.9         Telemetry: SR    PE  Cardiac: RRR  Lungs: decreased bases  Chest incision C/D/I, approximated, no erythema. Sternum stable. Prior chest tube site incisions C/D/I, no erythema with intact sutures.    Abd: Soft, nontender, +BS  Ext: Incisions C/D/I, approximated, no erythema, + edema (improving edema)               A/P: POD#8  1. VHD/CAD  --Stable s/p Urgent sternotomy/Urgent Aortic valve repair by leaflet debridement/Complex mitral valve replacement with annular debridement and a 33mm Med[employer]onic Mosaic bioprosthetic/Tricuspid valve repair by bicuspidization/CABG x 3 (LIMA-LAD, SVG-Diag, SVG-PDA)/WALLACE exclusion with 40mm AtriClip/EVH RLE/Rigid internal fixation of the sternum with Flint plates x 6/64 modifier on 5/25/2022  --Post op JOEL reveals 60% EF on inotropic support  --Will order TTE prior to discharge to establish baseline and evaluate valves, ordered on 5/28/2022 and completed 5/31: LVEF ~50%, see report for remaining read  --Scr stable  --ASA/statin - low dose BB started 5/28--will initiate eliquis today and to continue for 3 months post-operatively--discussed with Dr. Michael Dye and patient  --reinforce sternal precautions        2. Expected acute blood loss anemia secondary to open heart surgery  --stable - 10.1        3. NSR  --continue BB with hold parameters - started 12.5 mg BID metoprolol 5/28  --continue oral amiodarone for afib prophylaxis--with plans to taper on dc to home dose -- on 200 mg BID   --hx of SVT on oral amiodarone 200mg QD at home        4. Expected acute pulmonary insufficiency in the setting following surgery  --Hx of severe COPD   --wean oxygen to keep SpO2 greater than or equal to 92%  --continue duonebs with ezpap  --encourage C&DB, SMI  --currently on RA        5. JAMIE   --Pre-op 1.3 -- surgery cancelled for renal optimization   --Scr stable  --Nephrology following  --received bumex 1mg IV 5/28 per nephrology, Scr 0.9 today, on daily PO bumex 1mg per nephrology         6. T2DM   --pre-op HgA1c 5.1%  --continue SSI         7. Urinary retention  --Hx of BPH s/p TURP  --Urology seen pre-op recommends continuing hernandez until more ambulatory -- will try voiding trial prior to discharge if ambulation improves  --hernandez catheter removed without difficulty 6/1, patient is voiding without difficulty.         8. UTI   --pre-op urine culture (+) ,000 bacteria   --ID following   --completed 3 days of daptomycin and merrem        9. Thrombocytopenia   --resolved  --Plts 293K today        10. Leukocytosis   --resolved  -- WBC 10.6 today         11. Constipation--expected delayed return of bowel function  --secondary to anesthesia, narcotics, decreased oral intake, and decreased physical activity   -- (+) BM 6/1  --Continue daily MOM/oral bisacodyl until +BM and senna-s as scheduled. --Encouraged continued increase in oral intake and activity.         12. Expected deconditioning in the setting following surgery  --Increase activity as tolerated  --PT/OT        13.  At risk for aspiration   --witnessed coughing with thin liquids by nursing staff in [facility]   --Speech consulted for evaluation -- recommend minced and moist consistency solids with thin liquids            DVT prophylaxis:  --continue bilateral knee high MYKE hose  --continue PCDs  --continue progressive ambulation  --eliquis        Dispo: plan is for rehab on discharge--case management unable to initiate plans for rehab until 5/31 due to the holiday--dc to rehab process initiated 5/31.  DC to rehab as soon as precert obtained         This patient's case and care plan was discussed with the attending surgeon
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
## 2022-06-02 NOTE — PLAN OF CARE
Problem: Chronic Conditions and Co-morbidities  Goal: Patient's chronic conditions and co-morbidity symptoms are monitored and maintained or improved  Outcome: Progressing     Problem: Skin/Tissue Integrity  Goal: Absence of new skin breakdown  Description: 1. Monitor for areas of redness and/or skin breakdown  2. Assess vascular access sites hourly  3. Every 4-6 hours minimum:  Change oxygen saturation probe site  4. Every 4-6 hours:  If on nasal continuous positive airway pressure, respiratory therapy assess nares and determine need for appliance change or resting period.   Outcome: Progressing     Problem: Cardiovascular - Adult  Goal: Maintains optimal cardiac output and hemodynamic stability  Outcome: Progressing     Problem: Metabolic/Fluid and Electrolytes - Adult  Goal: Electrolytes maintained within normal limits  Outcome: Progressing     Problem: Pain  Goal: Verbalizes/displays adequate comfort level or baseline comfort level  Outcome: Progressing

## 2022-06-03 ENCOUNTER — APPOINTMENT (OUTPATIENT)
Dept: CT IMAGING | Age: 77
End: 2022-06-03
Payer: MEDICARE

## 2022-06-03 ENCOUNTER — HOSPITAL ENCOUNTER (OUTPATIENT)
Age: 77
Setting detail: OBSERVATION
Discharge: HOME | End: 2022-06-06
Attending: STUDENT IN AN ORGANIZED HEALTH CARE EDUCATION/TRAINING PROGRAM | Admitting: INTERNAL MEDICINE
Payer: MEDICARE

## 2022-06-03 ENCOUNTER — APPOINTMENT (OUTPATIENT)
Dept: GENERAL RADIOLOGY | Age: 77
End: 2022-06-03
Payer: MEDICARE

## 2022-06-03 DIAGNOSIS — J18.9 PNEUMONIA DUE TO INFECTIOUS ORGANISM, UNSPECIFIED LATERALITY, UNSPECIFIED PART OF LUNG: ICD-10-CM

## 2022-06-03 DIAGNOSIS — R07.9 CHEST PAIN, UNSPECIFIED TYPE: Primary | ICD-10-CM

## 2022-06-03 DIAGNOSIS — J90 PLEURAL EFFUSION: ICD-10-CM

## 2022-06-03 PROBLEM — R33.8 ACUTE URINARY RETENTION: Status: ACTIVE | Noted: 2022-06-03

## 2022-06-03 LAB
ANION GAP SERPL CALCULATED.3IONS-SCNC: 10 MMOL/L (ref 7–16)
ANISOCYTOSIS: ABNORMAL
BASOPHILS ABSOLUTE: 0 E9/L (ref 0–0.2)
BASOPHILS RELATIVE PERCENT: 0.3 % (ref 0–2)
BUN BLDV-MCNC: 36 MG/DL (ref 6–23)
BURR CELLS: ABNORMAL
CALCIUM SERPL-MCNC: 9 MG/DL (ref 8.6–10.2)
CHLORIDE BLD-SCNC: 98 MMOL/L (ref 98–107)
CO2: 28 MMOL/L (ref 22–29)
CREAT SERPL-MCNC: 1 MG/DL (ref 0.7–1.2)
D DIMER: 1429 NG/ML DDU
EKG ATRIAL RATE: 89 BPM
EKG P AXIS: 71 DEGREES
EKG P-R INTERVAL: 174 MS
EKG Q-T INTERVAL: 446 MS
EKG QRS DURATION: 124 MS
EKG QTC CALCULATION (BAZETT): 542 MS
EKG R AXIS: -29 DEGREES
EKG T AXIS: 90 DEGREES
EKG VENTRICULAR RATE: 89 BPM
EOSINOPHILS ABSOLUTE: 0.38 E9/L (ref 0.05–0.5)
EOSINOPHILS RELATIVE PERCENT: 2.6 % (ref 0–6)
GFR AFRICAN AMERICAN: >60
GFR NON-AFRICAN AMERICAN: >60 ML/MIN/1.73
GLUCOSE BLD-MCNC: 96 MG/DL (ref 74–99)
HCT VFR BLD CALC: 36.7 % (ref 37–54)
HEMOGLOBIN: 12.1 G/DL (ref 12.5–16.5)
LYMPHOCYTES ABSOLUTE: 1.18 E9/L (ref 1.5–4)
LYMPHOCYTES RELATIVE PERCENT: 7.8 % (ref 20–42)
MCH RBC QN AUTO: 30.3 PG (ref 26–35)
MCHC RBC AUTO-ENTMCNC: 33 % (ref 32–34.5)
MCV RBC AUTO: 92 FL (ref 80–99.9)
MONOCYTES ABSOLUTE: 1.32 E9/L (ref 0.1–0.95)
MONOCYTES RELATIVE PERCENT: 8.7 % (ref 2–12)
NEUTROPHILS ABSOLUTE: 11.91 E9/L (ref 1.8–7.3)
NEUTROPHILS RELATIVE PERCENT: 80.9 % (ref 43–80)
OVALOCYTES: ABNORMAL
PDW BLD-RTO: 14.3 FL (ref 11.5–15)
PLATELET # BLD: 404 E9/L (ref 130–450)
PMV BLD AUTO: 10.3 FL (ref 7–12)
POIKILOCYTES: ABNORMAL
POLYCHROMASIA: ABNORMAL
POTASSIUM REFLEX MAGNESIUM: 4.1 MMOL/L (ref 3.5–5)
PRO-BNP: 1886 PG/ML (ref 0–450)
RBC # BLD: 3.99 E12/L (ref 3.8–5.8)
SARS-COV-2, NAAT: NOT DETECTED
SODIUM BLD-SCNC: 136 MMOL/L (ref 132–146)
TROPONIN, HIGH SENSITIVITY: 353 NG/L (ref 0–11)
TROPONIN, HIGH SENSITIVITY: 377 NG/L (ref 0–11)
WBC # BLD: 14.7 E9/L (ref 4.5–11.5)

## 2022-06-03 PROCEDURE — G0378 HOSPITAL OBSERVATION PER HR: HCPCS

## 2022-06-03 PROCEDURE — 96367 TX/PROPH/DG ADDL SEQ IV INF: CPT

## 2022-06-03 PROCEDURE — 99285 EMERGENCY DEPT VISIT HI MDM: CPT

## 2022-06-03 PROCEDURE — 96365 THER/PROPH/DIAG IV INF INIT: CPT

## 2022-06-03 PROCEDURE — 84484 ASSAY OF TROPONIN QUANT: CPT

## 2022-06-03 PROCEDURE — 74176 CT ABD & PELVIS W/O CONTRAST: CPT

## 2022-06-03 PROCEDURE — 85378 FIBRIN DEGRADE SEMIQUANT: CPT

## 2022-06-03 PROCEDURE — 6370000000 HC RX 637 (ALT 250 FOR IP): Performed by: NURSE PRACTITIONER

## 2022-06-03 PROCEDURE — 96375 TX/PRO/DX INJ NEW DRUG ADDON: CPT

## 2022-06-03 PROCEDURE — 51702 INSERT TEMP BLADDER CATH: CPT

## 2022-06-03 PROCEDURE — 71250 CT THORAX DX C-: CPT

## 2022-06-03 PROCEDURE — 6360000002 HC RX W HCPCS: Performed by: NURSE PRACTITIONER

## 2022-06-03 PROCEDURE — 80048 BASIC METABOLIC PNL TOTAL CA: CPT

## 2022-06-03 PROCEDURE — 71045 X-RAY EXAM CHEST 1 VIEW: CPT

## 2022-06-03 PROCEDURE — 2580000003 HC RX 258: Performed by: INTERNAL MEDICINE

## 2022-06-03 PROCEDURE — 73030 X-RAY EXAM OF SHOULDER: CPT

## 2022-06-03 PROCEDURE — 6370000000 HC RX 637 (ALT 250 FOR IP): Performed by: INTERNAL MEDICINE

## 2022-06-03 PROCEDURE — 6360000002 HC RX W HCPCS: Performed by: STUDENT IN AN ORGANIZED HEALTH CARE EDUCATION/TRAINING PROGRAM

## 2022-06-03 PROCEDURE — 87635 SARS-COV-2 COVID-19 AMP PRB: CPT

## 2022-06-03 PROCEDURE — 93005 ELECTROCARDIOGRAM TRACING: CPT | Performed by: STUDENT IN AN ORGANIZED HEALTH CARE EDUCATION/TRAINING PROGRAM

## 2022-06-03 PROCEDURE — 2500000003 HC RX 250 WO HCPCS: Performed by: NURSE PRACTITIONER

## 2022-06-03 PROCEDURE — 96366 THER/PROPH/DIAG IV INF ADDON: CPT

## 2022-06-03 PROCEDURE — 6370000000 HC RX 637 (ALT 250 FOR IP): Performed by: EMERGENCY MEDICINE

## 2022-06-03 PROCEDURE — 36415 COLL VENOUS BLD VENIPUNCTURE: CPT

## 2022-06-03 PROCEDURE — 85025 COMPLETE CBC W/AUTO DIFF WBC: CPT

## 2022-06-03 PROCEDURE — 93308 TTE F-UP OR LMTD: CPT

## 2022-06-03 PROCEDURE — 83880 ASSAY OF NATRIURETIC PEPTIDE: CPT

## 2022-06-03 PROCEDURE — 2580000003 HC RX 258: Performed by: STUDENT IN AN ORGANIZED HEALTH CARE EDUCATION/TRAINING PROGRAM

## 2022-06-03 PROCEDURE — 2580000003 HC RX 258: Performed by: NURSE PRACTITIONER

## 2022-06-03 RX ORDER — BUMETANIDE 1 MG/1
1 TABLET ORAL DAILY
Status: DISCONTINUED | OUTPATIENT
Start: 2022-06-04 | End: 2022-06-06 | Stop reason: HOSPADM

## 2022-06-03 RX ORDER — POLYETHYLENE GLYCOL 3350 17 G/17G
17 POWDER, FOR SOLUTION ORAL DAILY PRN
Status: DISCONTINUED | OUTPATIENT
Start: 2022-06-03 | End: 2022-06-06 | Stop reason: HOSPADM

## 2022-06-03 RX ORDER — ALBUTEROL SULFATE 90 UG/1
2 AEROSOL, METERED RESPIRATORY (INHALATION) EVERY 6 HOURS PRN
Status: DISCONTINUED | OUTPATIENT
Start: 2022-06-03 | End: 2022-06-03

## 2022-06-03 RX ORDER — SODIUM CHLORIDE 0.9 % (FLUSH) 0.9 %
5-40 SYRINGE (ML) INJECTION PRN
Status: DISCONTINUED | OUTPATIENT
Start: 2022-06-03 | End: 2022-06-06 | Stop reason: HOSPADM

## 2022-06-03 RX ORDER — LANOLIN ALCOHOL/MO/W.PET/CERES
400 CREAM (GRAM) TOPICAL DAILY
Status: DISCONTINUED | OUTPATIENT
Start: 2022-06-03 | End: 2022-06-06 | Stop reason: HOSPADM

## 2022-06-03 RX ORDER — DOCUSATE SODIUM 100 MG/1
100 CAPSULE, LIQUID FILLED ORAL 2 TIMES DAILY
Status: DISCONTINUED | OUTPATIENT
Start: 2022-06-03 | End: 2022-06-06 | Stop reason: HOSPADM

## 2022-06-03 RX ORDER — ACETAMINOPHEN 650 MG/1
650 SUPPOSITORY RECTAL EVERY 6 HOURS PRN
Status: DISCONTINUED | OUTPATIENT
Start: 2022-06-03 | End: 2022-06-06 | Stop reason: HOSPADM

## 2022-06-03 RX ORDER — ATORVASTATIN CALCIUM 40 MG/1
40 TABLET, FILM COATED ORAL NIGHTLY
Status: DISCONTINUED | OUTPATIENT
Start: 2022-06-03 | End: 2022-06-06 | Stop reason: HOSPADM

## 2022-06-03 RX ORDER — AZELASTINE 1 MG/ML
1 SPRAY, METERED NASAL 2 TIMES DAILY
COMMUNITY
End: 2022-08-16

## 2022-06-03 RX ORDER — TADALAFIL 5 MG/1
5 TABLET ORAL PRN
Status: ON HOLD | COMMUNITY
End: 2022-06-06 | Stop reason: HOSPADM

## 2022-06-03 RX ORDER — METHYLPREDNISOLONE SODIUM SUCCINATE 125 MG/2ML
125 INJECTION, POWDER, LYOPHILIZED, FOR SOLUTION INTRAMUSCULAR; INTRAVENOUS ONCE
Status: DISCONTINUED | OUTPATIENT
Start: 2022-06-03 | End: 2022-06-03

## 2022-06-03 RX ORDER — ALPRAZOLAM 0.25 MG/1
0.25 TABLET ORAL NIGHTLY PRN
Status: DISCONTINUED | OUTPATIENT
Start: 2022-06-03 | End: 2022-06-06 | Stop reason: HOSPADM

## 2022-06-03 RX ORDER — ALPRAZOLAM 0.25 MG/1
0.25 TABLET ORAL ONCE
Status: COMPLETED | OUTPATIENT
Start: 2022-06-03 | End: 2022-06-03

## 2022-06-03 RX ORDER — AMIODARONE HYDROCHLORIDE 200 MG/1
200 TABLET ORAL DAILY
Status: DISCONTINUED | OUTPATIENT
Start: 2022-06-03 | End: 2022-06-06 | Stop reason: HOSPADM

## 2022-06-03 RX ORDER — SODIUM CHLORIDE 0.9 % (FLUSH) 0.9 %
5-40 SYRINGE (ML) INJECTION EVERY 12 HOURS SCHEDULED
Status: DISCONTINUED | OUTPATIENT
Start: 2022-06-03 | End: 2022-06-06 | Stop reason: HOSPADM

## 2022-06-03 RX ORDER — ASCORBIC ACID 500 MG
500 TABLET ORAL 2 TIMES DAILY
Status: DISCONTINUED | OUTPATIENT
Start: 2022-06-03 | End: 2022-06-06 | Stop reason: HOSPADM

## 2022-06-03 RX ORDER — SODIUM CHLORIDE 9 MG/ML
INJECTION, SOLUTION INTRAVENOUS PRN
Status: DISCONTINUED | OUTPATIENT
Start: 2022-06-03 | End: 2022-06-06 | Stop reason: HOSPADM

## 2022-06-03 RX ORDER — VITAMIN B COMPLEX
1000 TABLET ORAL DAILY
Status: DISCONTINUED | OUTPATIENT
Start: 2022-06-03 | End: 2022-06-06 | Stop reason: HOSPADM

## 2022-06-03 RX ORDER — ALBUTEROL SULFATE 2.5 MG/3ML
2.5 SOLUTION RESPIRATORY (INHALATION) EVERY 6 HOURS PRN
Status: DISCONTINUED | OUTPATIENT
Start: 2022-06-03 | End: 2022-06-06 | Stop reason: HOSPADM

## 2022-06-03 RX ORDER — ASPIRIN 81 MG/1
81 TABLET ORAL DAILY
Status: DISCONTINUED | OUTPATIENT
Start: 2022-06-03 | End: 2022-06-06 | Stop reason: HOSPADM

## 2022-06-03 RX ORDER — BUMETANIDE 0.25 MG/ML
1 INJECTION, SOLUTION INTRAMUSCULAR; INTRAVENOUS ONCE
Status: COMPLETED | OUTPATIENT
Start: 2022-06-03 | End: 2022-06-03

## 2022-06-03 RX ORDER — METOPROLOL SUCCINATE 50 MG/1
50 TABLET, EXTENDED RELEASE ORAL DAILY
Status: ON HOLD | COMMUNITY
End: 2022-06-06 | Stop reason: SDUPTHER

## 2022-06-03 RX ORDER — DIPHENHYDRAMINE HYDROCHLORIDE 50 MG/ML
25 INJECTION INTRAMUSCULAR; INTRAVENOUS ONCE
Status: DISCONTINUED | OUTPATIENT
Start: 2022-06-03 | End: 2022-06-03

## 2022-06-03 RX ORDER — METOPROLOL SUCCINATE 50 MG/1
50 TABLET, EXTENDED RELEASE ORAL DAILY
Status: DISCONTINUED | OUTPATIENT
Start: 2022-06-03 | End: 2022-06-06 | Stop reason: HOSPADM

## 2022-06-03 RX ORDER — ACETAMINOPHEN 325 MG/1
650 TABLET ORAL EVERY 6 HOURS PRN
Status: DISCONTINUED | OUTPATIENT
Start: 2022-06-03 | End: 2022-06-06 | Stop reason: HOSPADM

## 2022-06-03 RX ORDER — AMOXICILLIN AND CLAVULANATE POTASSIUM 875; 125 MG/1; MG/1
1 TABLET, FILM COATED ORAL EVERY 12 HOURS SCHEDULED
Status: DISCONTINUED | OUTPATIENT
Start: 2022-06-03 | End: 2022-06-03

## 2022-06-03 RX ADMIN — Medication 400 MG: at 18:45

## 2022-06-03 RX ADMIN — AZITHROMYCIN MONOHYDRATE 500 MG: 500 INJECTION, POWDER, LYOPHILIZED, FOR SOLUTION INTRAVENOUS at 03:42

## 2022-06-03 RX ADMIN — ALPRAZOLAM 0.25 MG: 0.25 TABLET ORAL at 06:50

## 2022-06-03 RX ADMIN — BISACODYL 10 MG: 5 TABLET, COATED ORAL at 18:45

## 2022-06-03 RX ADMIN — APIXABAN 5 MG: 5 TABLET, FILM COATED ORAL at 20:56

## 2022-06-03 RX ADMIN — BUMETANIDE 1 MG: 0.25 INJECTION, SOLUTION INTRAMUSCULAR; INTRAVENOUS at 11:46

## 2022-06-03 RX ADMIN — OXYCODONE HYDROCHLORIDE AND ACETAMINOPHEN 500 MG: 500 TABLET ORAL at 20:56

## 2022-06-03 RX ADMIN — AMIODARONE HYDROCHLORIDE 200 MG: 200 TABLET ORAL at 18:45

## 2022-06-03 RX ADMIN — DOCUSATE SODIUM 100 MG: 100 CAPSULE, LIQUID FILLED ORAL at 20:56

## 2022-06-03 RX ADMIN — CEFTRIAXONE 1000 MG: 1 INJECTION, POWDER, FOR SOLUTION INTRAMUSCULAR; INTRAVENOUS at 03:48

## 2022-06-03 RX ADMIN — PIPERACILLIN AND TAZOBACTAM 4500 MG: 4; .5 INJECTION, POWDER, LYOPHILIZED, FOR SOLUTION INTRAVENOUS at 11:55

## 2022-06-03 RX ADMIN — ATORVASTATIN CALCIUM 40 MG: 40 TABLET, FILM COATED ORAL at 20:56

## 2022-06-03 RX ADMIN — ASPIRIN 81 MG: 81 TABLET, COATED ORAL at 18:45

## 2022-06-03 RX ADMIN — Medication 1000 UNITS: at 18:48

## 2022-06-03 RX ADMIN — SODIUM CHLORIDE, PRESERVATIVE FREE 10 ML: 5 INJECTION INTRAVENOUS at 20:56

## 2022-06-03 RX ADMIN — METOPROLOL SUCCINATE 50 MG: 50 TABLET, EXTENDED RELEASE ORAL at 18:45

## 2022-06-03 ASSESSMENT — PAIN SCALES - GENERAL
PAINLEVEL_OUTOF10: 0
PAINLEVEL_OUTOF10: 0

## 2022-06-03 ASSESSMENT — PAIN SCALES - WONG BAKER: WONGBAKER_NUMERICALRESPONSE: 0
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
## 2022-06-03 NOTE — ED PROVIDER NOTES
ED  Provider Note  Admit Date/RoomTime: 6/3/2022 12:36 AM  ED Room: 6505/6505-A      History of Present Illness:  6/3/22, Time: 12:38 AM EDT  Chief Complaint   Patient presents with    Chest Pain     open heart on the 23rd. no whaving CP and burning near incision site         Sonu Alonzo is a 76 y.o. male presenting to the ED for burning CP, starting around 9 PM, no arm jaw back pain. Still present, improving, mild. Worse with movement. SOB mild since surgery. Abd pain, LLQ since tonight, no dysuria or hematuria. No f/c. Discharged to rehab facility on 6/2. CABG 5/23. Patient has no personal history of DVT/PE, no unilateral leg swelling or edema/erythema, no active cancer. He is on anticoagulation. Denies c/c/r. No n/v/d/c. Review of Systems:   A complete review of systems was performed and pertinent positives and negatives are stated within HPI, all other systems reviewed and are negative.        --------------------------------------------- PATIENT HISTORY--------------------------------------------  Past Medical History:  has a past medical history of Abnormal PSA, Anxiety, Bronchitis, Claustrophobia, Diabetes mellitus ([address] Endocarditis, Enlarged LA (left atrium), Erectile dysfunction, GERD (gastroesophageal reflux disease), H/O complete arterial study of extremity, Hiatal hernia, History of EMG, Hypertension, Lung nodule, Mitral valve prolapse, Moderate tricuspid regurgitation, Multiple sclerosis ([address] Prostatitis, Renal calculi, Sinus problem, Supraventricular arrhythmia, Tachycardia, and Tilt table evaluation.     Past Surgical History:  has a past surgical history that includes transthoracic echocardiogram (9/28/2011  Echo showed normal left ventricular size with borderline concentric left ventricular hypertrophy with normal left ventricular systolic function with stage I  LV diastolic dysfunction, normal, normal right ventricular size and function, mildly dilated left atrium, borderline dilated right atrium mildly thickened anterior mitral leaflet with bowing of the mitral leaflets without felipe prolapse with mild mitral annular calcification, mild-to-moderate eccentrically-directed jet of mitral regurgitation.); Diagnostic Cardiac Cath Lab Procedure; cardiovascular stress test (10/19/2011  Adenosine nuclear stress test (4 minute walking protocol) showed a minimally reversible minor, nontransmural defect involving the apical anterolateral wall, more consistent with soft tissue attenuation and motion artifact); Skin graft; Inguinal hernia repair; Nose surgery; Colonoscopy; Prostate Biopsy (july 2013); Prostate surgery (Aug,13); Bladder surgery; Dental surgery; Lithotripsy (12/6/17 @ [facility] [id number]); thoracotomy (Right, 1/4/2022); picc line insertion nurse (1/6/2022); transesophageal echocardiogram (04/21/2022); eye surgery (10/13); eye surgery (11/13); and Coronary artery bypass graft (N/A, 5/25/2022). Family History: family history includes Diabetes in his mother; Other (age of onset: 48) in his father. . Unless otherwise noted, family history is non contributory    Social History:  reports that he quit smoking about 39 years ago. His smoking use included cigarettes. He has a 50.00 pack-year smoking history. He has never used smokeless tobacco. He reports current alcohol use. He reports that he does not use drugs. The patients home medications have been reviewed.     Allergies: Aleve [naproxen], Baclofen, Dye [iodides], and Paxil [paroxetine]    I have reviewed the past medical history, past surgical history, social history, and family history    ---------------------------------------------------PHYSICAL EXAM--------------------------------------    Constitutional/General: Alert and oriented x3  Head: Normocephalic and atraumatic  Eyes: PERRL, EOMI, sclera non icteric  ENT: Oropharynx clear, handling secretions, no trismus  Neck: Supple, full ROM, no stridor, no meningismus  Respiratory: lctab  Cardiovascular: RRR, no R/G/M, 2+ peripheral pulses  Chest: No chest wall tenderness, equal chest rise, midsternal surgical incision site clean dry and not dehiscent, no drainage, no fluctuance or crepitus of site   Gastrointestinal:  Soft, TTP diffusely no rebound or guarding   Musculoskeletal: Extremities warm and well perfused, moving all extremities  Skin: skin warm and dry. No rashes. Neurologic: No focal deficits, strength and sensation grossly intact   Psychiatric: Normal Affect, behavior normal           -------------------------------------------------- RESULTS -------------------------------------------------  I have personally reviewed all laboratory and imaging results for this patient. Results are listed below.      LABS: (Lab results interpreted by me)  Results for orders placed or performed during the hospital encounter of 06/03/22   COVID-19, Rapid    Specimen: Nasopharyngeal Swab   Result Value Ref Range    SARS-CoV-2, NAAT Not Detected Not Detected   CBC with Auto Differential   Result Value Ref Range    WBC 14.7 (H) 4.5 - 11.5 E9/L    RBC 3.99 3.80 - 5.80 E12/L    Hemoglobin 12.1 (L) 12.5 - 16.5 g/dL    Hematocrit 36.7 (L) 37.0 - 54.0 %    MCV 92.0 80.0 - 99.9 fL    MCH 30.3 26.0 - 35.0 pg    MCHC 33.0 32.0 - 34.5 %    RDW 14.3 11.5 - 15.0 fL    Platelets 401 599 - 818 E9/L    MPV 10.3 7.0 - 12.0 fL    Neutrophils % 80.9 (H) 43.0 - 80.0 %    Lymphocytes % 7.8 (L) 20.0 - 42.0 %    Monocytes % 8.7 2.0 - 12.0 %    Eosinophils % 2.6 0.0 - 6.0 %    Basophils % 0.3 0.0 - 2.0 %    Neutrophils Absolute 11.91 (H) 1.80 - 7.30 E9/L    Lymphocytes Absolute 1.18 (L) 1.50 - 4.00 E9/L    Monocytes Absolute 1.32 (H) 0.10 - 0.95 E9/L    Eosinophils Absolute 0.38 0.05 - 0.50 E9/L    Basophils Absolute 0.00 0.00 - 0.20 E9/L    Anisocytosis 1+     Polychromasia 1+     Poikilocytes 1+     Bj Cells 1+     Ovalocytes 1+    Basic Metabolic Panel w/ Reflex to MG   Result Value Ref Range    Sodium 136 132 - 146 mmol/L    Potassium reflex Magnesium 4.1 3.5 - 5.0 mmol/L    Chloride 98 98 - 107 mmol/L    CO2 28 22 - 29 mmol/L    Anion Gap 10 7 - 16 mmol/L    Glucose 96 74 - 99 mg/dL    BUN 36 (H) 6 - 23 mg/dL    CREATININE 1.0 0.7 - 1.2 mg/dL    GFR Non-African American >60 >=60 mL/min/1.73    GFR African American >60     Calcium 9.0 8.6 - 10.2 mg/dL   Troponin   Result Value Ref Range    Troponin, High Sensitivity 377 (H) 0 - 11 ng/L   Brain Natriuretic Peptide   Result Value Ref Range    Pro-BNP 1,886 (H) 0 - 450 pg/mL   D-Dimer, Quantitative   Result Value Ref Range    D-Dimer, Quant 1429 ng/mL DDU   Troponin   Result Value Ref Range    Troponin, High Sensitivity 353 (H) 0 - 11 ng/L   EKG 12 Lead   Result Value Ref Range    Ventricular Rate 89 BPM    Atrial Rate 89 BPM    P-R Interval 174 ms    QRS Duration 124 ms    Q-T Interval 446 ms    QTc Calculation (Bazett) 542 ms    P Axis 71 degrees    R Axis -29 degrees    T Axis 90 degrees   ,       RADIOLOGY:  Imaging interpreted by Radiologist unless otherwise specified  CT CHEST WO CONTRAST   Final Result   Left basilar disease, representing a combination of pleural effusion and   associated parenchymal consolidation within the left lower lobe. A pneumonia   with a parapneumonic effusion is suspected. CT ABDOMEN PELVIS WO CONTRAST Additional Contrast? None   Final Result   Moderate to marked bladder distension, as well as dilatation of the prostatic   urethra. Nodular soft tissue is noted at the level of the prostate gland. Given history of prior prostate surgery, findings may represent residual   normal prostatic tissue. However, if there is a clinical history of prostate   cancer, the possibility of recurrent or residual neoplastic disease cannot be   excluded. Given dilatation of the prostatic urethra, as well as an enlarged,   fluid-filled bladder and bilateral hydroureteronephrosis, the possibility of   obstructive uropathy cannot be excluded.       Moderate stool within the colon, please correlate for constipation. No   definitive evidence of bowel obstruction. .         XR SHOULDER RIGHT (MIN 2 VIEWS)   Final Result   No acute findings. XR CHEST PORTABLE   Final Result   Left basilar disease, likely representing a combination of parenchymal   consolidation and pleural effusion. This is stable compared to the previous   exam.      Remainder of the study is unchanged. EKG: NSR, LVH, normal axis, normal intervals, QTC prolongation, no TAMMY/STD no TW abnormalities      The cardiac monitor revealed nsr with a heart rate in the 80s as interpreted by me. The cardiac monitor was ordered secondary to patients clinical status and to monitor the patient for dysrhythmia(s), tachycardia/bradycardia and/or changes in rhythm.     ------------------------- NURSING NOTES AND VITALS REVIEWED ---------------------------  The nursing notes within the ED encounter and vital signs as below have been reviewed by myself  BP (!) 115/55   Pulse 78   Temp 97.5 °F (36.4 °C) (Temporal)   Resp 16   Ht 5' 10\" (1.778 m)   SpO2 95%   BMI 26.69 kg/m²      The patients available past medical records and past encounters were reviewed.         ------------------------------ ED COURSE/MEDICAL DECISION MAKING----------------------  Medications   bumetanide (BUMEX) tablet 1 mg (has no administration in time range)   Vitamin D (CHOLECALCIFEROL) tablet 1,000 Units (1,000 Units Oral Given 6/3/22 1848)   ALPRAZolam Tami Leyland) tablet 0.25 mg (has no administration in time range)   amiodarone (CORDARONE) tablet 200 mg (200 mg Oral Given 6/3/22 1845)   apixaban (ELIQUIS) tablet 5 mg (has no administration in time range)   ascorbic acid (VITAMIN C) tablet 500 mg (has no administration in time range)   aspirin EC tablet 81 mg (81 mg Oral Given 6/3/22 1845)   docusate sodium (COLACE) capsule 100 mg (has no administration in time range)   magnesium oxide (MAG-OX) tablet 400 mg (400 mg Oral Given 6/3/22 3393) atorvastatin (LIPITOR) tablet 40 mg (has no administration in time range)   metoprolol succinate (TOPROL XL) extended release tablet 50 mg (50 mg Oral Given 6/3/22 1845)   sodium chloride flush 0.9 % injection 5-40 mL (has no administration in time range)   sodium chloride flush 0.9 % injection 5-40 mL (has no administration in time range)   0.9 % sodium chloride infusion (has no administration in time range)   polyethylene glycol (GLYCOLAX) packet 17 g (has no administration in time range)   acetaminophen (TYLENOL) tablet 650 mg (has no administration in time range)     Or   acetaminophen (TYLENOL) suppository 650 mg (has no administration in time range)   albuterol (PROVENTIL) nebulizer solution 2.5 mg (has no administration in time range)   cefTRIAXone (ROCEPHIN) 1,000 mg in sterile water 10 mL IV syringe (1,000 mg IntraVENous Given 6/3/22 0348)   azithromycin (ZITHROMAX) 500 mg in dextrose 5 % 250 mL IVPB ([id number]) (0 mg IntraVENous Stopped 6/3/22 0442)   ALPRAZolam (XANAX) tablet 0.25 mg (0.25 mg Oral Given 6/3/22 0650)   bumetanide (BUMEX) injection 1 mg (1 mg IntraVENous Given 6/3/22 1146)   piperacillin-tazobactam (ZOSYN) 4,500 mg in dextrose 5 % 100 mL IVPB ([id number]) (0 mg IntraVENous Stopped 6/3/22 1423)   bisacodyl (DULCOLAX) EC tablet 10 mg (10 mg Oral Given 6/3/22 1845)       I, Dr. Onofre Green, am the primary provider of record    Medical Decision Making:   CP and mild SOB after CABG. Documented anaphylaxis to dye, so will start with CT non-contrast. Dimer. No distress. Dimer positive. Troponins elevated but some elevation to be expected, and downtrending sharply. Pneumonia with possible parapneumonic effusion on CT. ABX. CTA pending as daughter of patient arrives and states that the patient is not anaphylactic to dye.     Due to patient's recent CABG, though his symptoms overall are mild, with at least a pneumonia with parapneumonic effusion I believe the patient will require admission to telemetry at the very least.  However, if he does have a pulmonary embolus he would likely need ICU placement. ED Counseling: This emergency provider has spoken with the patient and any family present to discuss clinical status, results, plan of care, diagnosis and prognosis as able to be determined at this time. Any questions were answered and patient and/or family/POA are agreeable with the plan.       --------------------------------- IMPRESSION AND DISPOSITION ---------------------------------    IMPRESSION  1. Chest pain, unspecified type    2. Pneumonia due to infectious organism, unspecified laterality, unspecified part of lung    3. Pleural effusion        DISPOSITION  Disposition: Admit to telemetry  Patient condition is stable      This report was transcribed using voice recognition software. Every effort was made to ensure accuracy; however, transcription errors may be present.          Vicki Harkins MD  06/03/22 9714

## 2022-06-03 NOTE — CARE COORDINATION
Received message from step daughter to call, called Clinton Tran and updated her on pt's status. Plan is for pt to return to [facility] at discharge. Pt is observation, called and spoke with Kate Arciniega, pt will need therapy evals and a new precert since his original will  . Left sticky note for physician to write order for therapy. Pt is not on floor, envelope will need to be completed. Cali Hurd, MSW, LSW

## 2022-06-03 NOTE — Clinical Note
Discharge Plan[de-identified] Other/[facility])   Telemetry/Cardiac Monitoring Required?: Yes   Bed request comments: [facility]

## 2022-06-03 NOTE — PLAN OF CARE
Problem: Chronic Conditions and Co-morbidities  Goal: Patient's chronic conditions and co-morbidity symptoms are monitored and maintained or improved  Outcome: Progressing     Problem: Discharge Planning  Goal: Discharge to home or other facility with appropriate resources  Outcome: Progressing     Problem: Pain  Goal: Verbalizes/displays adequate comfort level or baseline comfort level  Outcome: Progressing  Flowsheets (Taken 6/3/2022 1800)  Verbalizes/displays adequate comfort level or baseline comfort level: Encourage patient to monitor pain and request assistance

## 2022-06-03 NOTE — PROGRESS NOTES
Department of Internal Medicine  Nephrology Attending Progress Note    Events reviewed. Patient re-admitted within 24 hrs, no need for full consultation. Patient presented to ER overnight with complaint of chest pain and burning at incision site. SUBJECTIVE: We are following Mr. Damion Cross for JAMIE. Reports no complaints. PHYSICAL EXAM:      Vitals:    VITALS:  /64   Pulse 83   Temp 97.5 °F (36.4 °C)   Resp 16   SpO2 94%   24HR INTAKE/OUTPUT:    No intake or output data in the 24 hours ending 06/03/22 1234    Constitutional: Patient is awake alert in no distress.   HEENT: Pupils are equal reactive  Respiratory: Lungs are clear  Cardiovascular/Edema: Heart sounds are regular  Gastrointestinal: Abdomen soft, hypoactive bowel sounds  Neurologic: chemical sedation  Skin: s/p CABG with surgical incision  Other: trace edema    Scheduled Meds:   piperacillin-tazobactam  4,500 mg IntraVENous Q8H     Continuous Infusions:    PRN Meds:.perflutren lipid microspheres      DATA:    CBC with Differential:    Lab Results   Component Value Date    WBC 14.7 06/03/2022    RBC 3.99 06/03/2022    HGB 12.1 06/03/2022    HCT 36.7 06/03/2022    HCT 29.0 05/26/2022     06/03/2022    MCV 92.0 06/03/2022    MCH 30.3 06/03/2022    MCHC 33.0 06/03/2022    RDW 14.3 06/03/2022    NRBC 0.0 01/17/2022    SEGSPCT 70 06/08/2011    LYMPHOPCT 7.8 06/03/2022    MONOPCT 8.7 06/03/2022    MYELOPCT 2.6 01/04/2022    BASOPCT 0.3 06/03/2022    MONOSABS 1.32 06/03/2022    LYMPHSABS 1.18 06/03/2022    EOSABS 0.38 06/03/2022    BASOSABS 0.00 06/03/2022     CMP:    Lab Results   Component Value Date     06/03/2022    K 4.1 06/03/2022    CL 98 06/03/2022    CO2 28 06/03/2022    BUN 36 06/03/2022    CREATININE 1.0 06/03/2022    GFRAA >60 06/03/2022    LABGLOM >60 06/03/2022    GLUCOSE 96 06/03/2022    PROT 5.4 05/27/2022    LABALBU 3.3 05/27/2022    CALCIUM 9.0 06/03/2022    BILITOT 0.8 05/27/2022    ALKPHOS 68 05/27/2022    AST 37 05/27/2022    ALT 12 05/27/2022     Magnesium:    Lab Results   Component Value Date    MG 2.2 06/02/2022     Phosphorus:    Lab Results   Component Value Date    PHOS 3.3 11/14/2016     RADIOLOGY REVIEWED. CT CHEST WO Contrast [place] 3, 2022   Left basilar disease, representing a combination of pleural effusion and   associated parenchymal consolidation within the left lower lobe.  A pneumonia   with a parapneumonic effusion is suspected.         CT Abdomen pelvis [place] 3, 2022   Moderate to marked bladder distension, as well as dilatation of the prostatic   urethra.  Nodular soft tissue is noted at the level of the prostate gland. Given history of prior prostate surgery, findings may represent residual   normal prostatic tissue.  However, if there is a clinical history of prostate   cancer, the possibility of recurrent or residual neoplastic disease cannot be   excluded.  Given dilatation of the prostatic urethra, as well as an enlarged,   fluid-filled bladder and bilateral hydroureteronephrosis, the possibility of   obstructive uropathy cannot be excluded.       Moderate stool within the colon, please correlate for constipation.  No   definitive evidence of bowel obstruction. .                 BRIEF SUMMARY OF INITIAL CONSULT:    Briefly Mr. Sia Ingram is a 64-year-old man with history of HTN, type II DM, nephrolithiasis s/p lithotripsy, CAD, MVP with significant MR, MV endocarditis, paroxysmal SVT,multiple sclerosis, asbestosis, erectile dysfunction status post penile implant, BPH status post TURP, bladder cancer, Washburn's esophagus, recent admission for pneumonia complicated with right parapneumonic effusion s/p right thoracotomy with decortication who presented to the hospital today 5/23/22 for CABG and valve replacements with Dr. Terrance Murdock. However, the surgery was cancelled as Cardiothoracic surgery would like patient optimized before proceeding with procedure.  To note, patient did have lab work drawn on 5/18/22

## 2022-06-03 NOTE — ED NOTES
[facility]   Department of Emergency Medicine ED  Provider Note  ED Room: [facility]    Radiology Procedure Waiver   Name: Lisa Lee  : 1945  MRN: [id number]   Date:  6/3/22    Time: 6:57 AM EDT    Benefits of immediately proceeding with Radiology exam(s) without pre-testing outweigh the risks or are not indicated as specified below and therefore the following is/are being waived:    [] Pregnancy test:   [] Patients LMP on-time and regular.   [] Patient had Tubal Ligation or has other Contraception Device. [] Patient  is Menopausal or Premenarcheal.    [] Patient had Full or Partial Hysterectomy. [x] Protocol for Iodine allergy (patient tolerates per family and CTA on 21    [] MRI Questionnaire    [] BUN/Creatinine   [] Patient age w/no hx of renal dysfunction. [] Patient on Dialysis. [] Recent Normal Labs.     Electronically signed by Raj Amaya MD    6/3/22    6:57 AM EDT             Sandra Pérez MD  [phone]

## 2022-06-03 NOTE — H&P
[address]                  [address] [facility],  [facility]                              HISTORY AND PHYSICAL    PATIENT NAME: Diana Fonseca                    :        1945  MED REC NO:   [id number]                            ROOM:       [facility]  ACCOUNT NO:   [de-identified]                           ADMIT DATE: 2022  PROVIDER:     Frantz Salmon DO    CHIEF COMPLAINT:  Abdominal pain. HISTORY OF PRESENT ILLNESS:  The patient is a 78-year-old  male  who was just discharged from the hospital after undergoing major cardiac  surgery. He was at [facility]. He presented to the emergency  room with a chest, shoulder, and abdominal pain; inability to urinate. He was seen in the emergency room. He had significant acute urinary  retention with bilateral hydroureteronephrosis and moderate stool on CT  scan. A urinary catheter was inserted. He was admitted for further  evaluation and treatment. CT of the chest revealed left basilar disease  consistent with pleural effusion/consolidation left lower lobe. The  patient was seen by Cardiothoracic Surgery in the emergency room, whose  impression was postop changes. MEDICATIONS PRIOR TO ADMISSION:  Astelin nasal spray, Toprol-XL, Cialis,  aspirin, Cordarone, Eliquis, iron, folic acid, Colace, Mag-Ox, vitamin  C, vitamin D, meclizine, Flonase, Senokot. PRIMARY CARE PROVIDER:  Laina Kumar MD    PAST MEDICAL HISTORY:  MS, pulmonary hypertension, valvular heart  disease, asbestosis, BPH, GERD. PAST SURGICAL HISTORY:  Aortic valve repair, complex mitral valve  replacement, tricuspid valve repair, CABG x3, TURP, prostate biopsy,  bilateral eye cataract surgery, lithotripsy, thoracotomy, nose surgery. SOCIAL HISTORY:  The patient quit tobacco.  Admits to alcohol, wine and  scotch.     REVIEW OF SYSTEMS:  Remarkable for above-stated chief complaint plus  allergy to ALEVE, BACLOFEN, DYE and PAXIL. PHYSICAL EXAMINATION:  GENERAL APPEARANCE:  Physical exam reveals a 68-year-old  male  who is alert, cooperative and a fair historian. VITAL SIGNS:  On admission, temperature 97.5, pulse 89, respirations 16,  blood pressure 129/66. HEENT:  Head:  Normocephalic, atraumatic. Eyes:  Pupils equal and  reactive to light. Extraocular muscles intact. Fundi not well  visualized. Nose:  No obstruction, polyp or discharge noted. Mouth  mucosa without lesion. Pharynx noninjected without exudate. NECK:  Supple. No JVD. No thyromegaly. No carotid bruits. HEART:  Regular rate and rhythm with grade 1/6 systolic murmur. LUNGS:  Clear to auscultation bilaterally. ABDOMEN:  Positive bowel sounds, soft, nontender. No rebound or  guarding. No hepatosplenomegaly. No masses. BACK:  With increased thoracic kyphosis. EXTREMITIES:  With bilateral lower extremity edema. LYMPH NODES:  No adenopathy noted. SKIN:  Without rash or lesion. IMPRESSION:  Acute urinary retention; bilateral hydroureteronephrosis;  status post aortic valve repair; mitral valve replacement; tricuspid  valve repair; CABG x3; MS; BPH, status post TURP; GERD; asbestosis. PLAN:  Assign the patient observation status. Urinary catheter was  inserted in the emergency room. Cardiothoracic Surgery as well as  Urology was consulted in the emergency room. Maintain urinary catheter  until the patient more ambulatory. Discharge plan back to rehab when  okay with all.         Kwaku Mera DO    D: 06/03/2022 13:02:29       T: 06/03/2022 13:04:53     [email or web address]  Job#: [id number]     Doc#: [id number]    CC:

## 2022-06-03 NOTE — PROGRESS NOTES
No formal consult needed, as he was discharged less than 24 hrs ago. CC: chest and shoulder pain, abdominal discomfort, urinary retention and LE edema      Brief HPI:  This is a 77 YO male whom underwent urgent sternotomy/Urgent Aortic valve repair by leaflet debridement/Complex mitral valve replacement with annular debridement and a 33mm Medtronic Mosaic bioprosthetic/Tricuspid valve repair by bicuspidization/CABG x 3 (LIMA-LAD, SVG-Diag, SVG-PDA)/WALLACE exclusion with 40mm AtriClip/EVH RLE/Rigid internal fixation of the sternum with Juan Carlos plates x 9/87 modifier on 5/25/2022. Post-operatively he experienced JAMIE and was followed by nephrology and placed on daily bumex, he also experienced urinary retention, but was able to void without difficulty prior to discharge. He was treated for a pre-op UTI by ID. He was evaluated by speech with recommendations for a diet of minced and moist consistency solids with thin liquids. On POD 8 (6/2/22), he was stable, had no complaints, and was discharged to rehab. He reports that at rehab, he attempted to call for help to use the bathroom, and had to wait well over an hour. He stated that once help arrived, he tried to explain his sternal precautions, but was told to do everything without assistance; therefore, he did state that he used his arms to help himself up, and when sitting on the toilet, missed the seat straight on and may have hurt his shoulders at that time. He denied falling. Eventually, he did state that the staff read that he was to have sternal precautions, and began to help him. He reports going to sleep and awakening shortly after with c/o burning in his chest from \"nipple to nipple\" and just feeling overall \"awful. \" He returned to the ER at that time, and currently reports abdominal discomfort and feeling as if he has a \"full bladder. \" He also states his chest and shoulders feel \"sore\" and that his legs and feet are \"very swollen. \" He also is complaining of nausea. Vitals:    06/03/22 0230 06/03/22 0330 06/03/22 0430 06/03/22 0730   BP: 122/74 120/78 124/68 125/64   Pulse: 78  82 83   Resp: 16 16 16 16   Temp:       SpO2: 99% 97% 99% 94%     O2: none    No intake or output data in the 24 hours ending 06/03/22 0922        Recent Labs     06/01/22  0538 06/02/22  0550 06/03/22  0053   WBC 10.9 10.6 14.7*   HGB 9.5* 10.1* 12.1*   HCT 28.6* 30.7* 36.7*    293 404      Recent Labs     06/01/22  0538 06/02/22  0550 06/03/22  0053   BUN 39* 36* 36*   CREATININE 1.0 0.9 1.0       Telemetry: SR     PE  Cardiac: RRR  Lungs: decreased bases  Chest incision C/D/I, approximated, no erythema. Sternum stable. Prior chest tube site incisions C/D/I, no erythema with intact sutures. Abd: Softly distended, nontender, +BS  Ext: Incisions C/D/I, approximated, no erythema, BLE with 2+ pitting edema               A/P: POD#9  1. VHD/CAD  --Stable s/p Urgent sternotomy/Urgent Aortic valve repair by leaflet debridement/Complex mitral valve replacement with annular debridement and a 33mm Medtronic Mosaic bioprosthetic/Tricuspid valve repair by bicuspidization/CABG x 3 (LIMA-LAD, SVG-Diag, SVG-PDA)/WALLACE exclusion with 40mm AtriClip/EVH RLE/Rigid internal fixation of the sternum with Juan Carlos plates x 1/93 modifier on 5/25/2022  --ASA/statin - low dose BB started 5/28--eliquis--to continue for 3 months post-operatively  --reinforce sternal precautions  --will check stat limited echo to r/o effusion considering he is on eliquis       2. NSR  --continue BB   --continue oral amiodarone at home dose of 200 mg QD  --hx of SVT on oral amiodarone 200mg QD at home        3. Expected acute pulmonary insufficiency in the setting following surgery  --Hx of severe COPD   --continue duonebs with ezpap  --encourage C&DB, SMI  --currently on RA  --ct chest 6/3 reveals suspected pneumonia with a parapneumonic effusion is suspected. --received zithromax and rocephin in the ER--will initiate zosyn      4. Leukocytosis  --WBC 14.7 today  --afebrile  --considering his extensive valve surgery--will order zosyn      5. JAMIE   --Pre-op 1.3 -- surgery cancelled for renal optimization   --Scr stable  --Nephrology was following as an inpatient--will hold off on consulting at this point  --currently on daily PO bumex 1mg--LE worsening, Scr stable at 1.0 currently, will give extra dose of bumex        6. Urinary retention  --Hx of BPH s/p TURP  --Urology seen pre-op--will consult         7.  Nausea/abdominal distention  --urinary retention likely contributing factor           DVT prophylaxis:  --continue bilateral knee high MYKE hose  --continue PCDs  --continue progressive ambulation  --eliquis       --CTS to follow along, low threshold for readmittance--please only send to [facility]        This patient's case and care plan was discussed with the attending surgeon

## 2022-06-03 NOTE — PROGRESS NOTES
Pt marked ready on ED track board. RN made aware by this tech that pt is allergic to iodine and that pt would need premedicated and or waiver placed prior to CTA exam being done.

## 2022-06-04 LAB
ANION GAP SERPL CALCULATED.3IONS-SCNC: 10 MMOL/L (ref 7–16)
BUN BLDV-MCNC: 32 MG/DL (ref 6–23)
CALCIUM SERPL-MCNC: 8.1 MG/DL (ref 8.6–10.2)
CHLORIDE BLD-SCNC: 106 MMOL/L (ref 98–107)
CO2: 23 MMOL/L (ref 22–29)
CREAT SERPL-MCNC: 1 MG/DL (ref 0.7–1.2)
GFR AFRICAN AMERICAN: >60
GFR NON-AFRICAN AMERICAN: >60 ML/MIN/1.73
GLUCOSE BLD-MCNC: 96 MG/DL (ref 74–99)
HCT VFR BLD CALC: 34.3 % (ref 37–54)
HEMOGLOBIN: 10.4 G/DL (ref 12.5–16.5)
MCH RBC QN AUTO: 30.3 PG (ref 26–35)
MCHC RBC AUTO-ENTMCNC: 30.3 % (ref 32–34.5)
MCV RBC AUTO: 100 FL (ref 80–99.9)
PDW BLD-RTO: 14.7 FL (ref 11.5–15)
PLATELET # BLD: 331 E9/L (ref 130–450)
PMV BLD AUTO: 11.2 FL (ref 7–12)
POTASSIUM SERPL-SCNC: 4.4 MMOL/L (ref 3.5–5)
RBC # BLD: 3.43 E12/L (ref 3.8–5.8)
SODIUM BLD-SCNC: 139 MMOL/L (ref 132–146)
WBC # BLD: 18.3 E9/L (ref 4.5–11.5)

## 2022-06-04 PROCEDURE — 6360000002 HC RX W HCPCS: Performed by: PHYSICIAN ASSISTANT

## 2022-06-04 PROCEDURE — 85027 COMPLETE CBC AUTOMATED: CPT

## 2022-06-04 PROCEDURE — G0378 HOSPITAL OBSERVATION PER HR: HCPCS

## 2022-06-04 PROCEDURE — 80048 BASIC METABOLIC PNL TOTAL CA: CPT

## 2022-06-04 PROCEDURE — 6370000000 HC RX 637 (ALT 250 FOR IP): Performed by: NURSE PRACTITIONER

## 2022-06-04 PROCEDURE — 93798 PHYS/QHP OP CAR RHAB W/ECG: CPT

## 2022-06-04 PROCEDURE — 96366 THER/PROPH/DIAG IV INF ADDON: CPT

## 2022-06-04 PROCEDURE — 6370000000 HC RX 637 (ALT 250 FOR IP): Performed by: INTERNAL MEDICINE

## 2022-06-04 PROCEDURE — 36415 COLL VENOUS BLD VENIPUNCTURE: CPT

## 2022-06-04 PROCEDURE — 2580000003 HC RX 258: Performed by: INTERNAL MEDICINE

## 2022-06-04 PROCEDURE — 2580000003 HC RX 258: Performed by: PHYSICIAN ASSISTANT

## 2022-06-04 RX ADMIN — ALPRAZOLAM 0.25 MG: 0.25 TABLET ORAL at 02:40

## 2022-06-04 RX ADMIN — BUMETANIDE 1 MG: 1 TABLET ORAL at 08:15

## 2022-06-04 RX ADMIN — Medication 1000 UNITS: at 08:15

## 2022-06-04 RX ADMIN — Medication 400 MG: at 08:15

## 2022-06-04 RX ADMIN — PIPERACILLIN AND TAZOBACTAM 3375 MG: 3; .375 INJECTION, POWDER, LYOPHILIZED, FOR SOLUTION INTRAVENOUS at 10:00

## 2022-06-04 RX ADMIN — OXYCODONE HYDROCHLORIDE AND ACETAMINOPHEN 500 MG: 500 TABLET ORAL at 08:15

## 2022-06-04 RX ADMIN — METOPROLOL SUCCINATE 50 MG: 50 TABLET, EXTENDED RELEASE ORAL at 08:15

## 2022-06-04 RX ADMIN — SODIUM CHLORIDE, PRESERVATIVE FREE 10 ML: 5 INJECTION INTRAVENOUS at 21:45

## 2022-06-04 RX ADMIN — SODIUM CHLORIDE, PRESERVATIVE FREE 10 ML: 5 INJECTION INTRAVENOUS at 08:15

## 2022-06-04 RX ADMIN — PIPERACILLIN AND TAZOBACTAM 3375 MG: 3; .375 INJECTION, POWDER, LYOPHILIZED, FOR SOLUTION INTRAVENOUS at 17:07

## 2022-06-04 RX ADMIN — ASPIRIN 81 MG: 81 TABLET, COATED ORAL at 08:15

## 2022-06-04 RX ADMIN — ALPRAZOLAM 0.25 MG: 0.25 TABLET ORAL at 23:29

## 2022-06-04 RX ADMIN — APIXABAN 5 MG: 5 TABLET, FILM COATED ORAL at 08:15

## 2022-06-04 RX ADMIN — DOCUSATE SODIUM 100 MG: 100 CAPSULE, LIQUID FILLED ORAL at 08:17

## 2022-06-04 RX ADMIN — ATORVASTATIN CALCIUM 40 MG: 40 TABLET, FILM COATED ORAL at 21:45

## 2022-06-04 RX ADMIN — APIXABAN 5 MG: 5 TABLET, FILM COATED ORAL at 21:45

## 2022-06-04 RX ADMIN — AMIODARONE HYDROCHLORIDE 200 MG: 200 TABLET ORAL at 08:15

## 2022-06-04 RX ADMIN — OXYCODONE HYDROCHLORIDE AND ACETAMINOPHEN 500 MG: 500 TABLET ORAL at 21:44

## 2022-06-04 RX ADMIN — DOCUSATE SODIUM 100 MG: 100 CAPSULE, LIQUID FILLED ORAL at 21:45

## 2022-06-04 ASSESSMENT — PAIN SCALES - GENERAL
PAINLEVEL_OUTOF10: 0
PAINLEVEL_OUTOF10: 0

## 2022-06-04 ASSESSMENT — PAIN SCALES - WONG BAKER: WONGBAKER_NUMERICALRESPONSE: 0
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
## 2022-06-04 NOTE — PROGRESS NOTES
P Quality Flow/Interdisciplinary Rounds Progress Note        Quality Flow Rounds held on June 4, 2022    Disciplines Attending:  Bedside Nurse, ,  and Nursing Unit Leadership    August VICTOR MANUEL Vasquez was admitted on 6/3/2022 12:36 AM    Anticipated Discharge Date:       Disposition:    Enzo Score:  Enzo Scale Score: 20    Readmission Risk              Risk of Unplanned Readmission:  0           Discussed patient goal for the day, patient clinical progression, and barriers to discharge.   The following Goal(s) of the Day/Commitment(s) have been identified:  Report labs/diagnositics       Kimberly Alfaro RN  June 4, 2022

## 2022-06-04 NOTE — PROGRESS NOTES
Subjective:    Chief complaint:    Feeling significantly improved since Davidson cath was placed  He has no other complaints    Objective:    BP (!) 118/58   Pulse 68   Temp 97.3 °F (36.3 °C)   Resp 18   Ht 5' 10\" (1.778 m)   Wt 174 lb (78.9 kg)   SpO2 98%   BMI 24.97 kg/m²   General : Awake ,alert,no distress. Heart:  RRR, no murmurs, gallops, or rubs. Lungs:  CTA bilaterally, no wheeze, rales or rhonchi  Abd: bowel sounds present, nontender, nondistended, no masses  Extrem:  No clubbing, cyanosis, or edema  Indwelling Davidson catheter noted    CBC:   Lab Results   Component Value Date    WBC 18.3 06/04/2022    RBC 3.43 06/04/2022    HGB 10.4 06/04/2022    HCT 34.3 06/04/2022    HCT 29.0 05/26/2022    .0 06/04/2022    MCH 30.3 06/04/2022    MCHC 30.3 06/04/2022    RDW 14.7 06/04/2022     06/04/2022    MPV 11.2 06/04/2022     BMP:    Lab Results   Component Value Date     06/04/2022    K 4.4 06/04/2022    K 4.1 06/03/2022     06/04/2022    CO2 23 06/04/2022    BUN 32 06/04/2022    LABALBU 3.3 05/27/2022    CREATININE 1.0 06/04/2022    CALCIUM 8.1 06/04/2022    GFRAA >60 06/04/2022    LABGLOM >60 06/04/2022    GLUCOSE 96 06/04/2022     PT/INR:    Lab Results   Component Value Date    PROTIME 17.3 05/25/2022    INR 1.5 05/25/2022     Troponin:    Lab Results   Component Value Date    TROPONINI <0.01 12/25/2016       No results for input(s): LABURIN in the last 72 hours. No results for input(s): BC in the last 72 hours. No results for input(s): Murali Rasmussen in the last 72 hours.       Current Facility-Administered Medications:     bumetanide (BUMEX) tablet 1 mg, 1 mg, Oral, Daily, SHANIQUE Damon - CNP    Vitamin D (CHOLECALCIFEROL) tablet 1,000 Units, 1,000 Units, Oral, Daily, SHANIQUE Damon CNP, 1,000 Units at 06/03/22 1848    ALPRAZolam Roland Duffy) tablet 0.25 mg, 0.25 mg, Oral, Nightly PRN, Ambrose Farnsworth DO, 0.25 mg at 06/04/22 0240    amiodarone (CORDARONE) tablet 200 mg, 200 mg, Oral, Daily, Ami Roche Malmer, DO, 200 mg at 06/03/22 1845    apixaban (ELIQUIS) tablet 5 mg, 5 mg, Oral, BID, Ami Roche Malmer, DO, 5 mg at 06/03/22 2056    ascorbic acid (VITAMIN C) tablet 500 mg, 500 mg, Oral, BID, Ami Roche Malmer, DO, 500 mg at 06/03/22 2056    aspirin EC tablet 81 mg, 81 mg, Oral, Daily, Ami Roche Malmer, DO, 81 mg at 06/03/22 1845    docusate sodium (COLACE) capsule 100 mg, 100 mg, Oral, BID, Muncie Maxcy, DO, 100 mg at 06/03/22 2056    magnesium oxide (MAG-OX) tablet 400 mg, 400 mg, Oral, Daily, Ami Roche Malmer, DO, 400 mg at 06/03/22 1845    atorvastatin (LIPITOR) tablet 40 mg, 40 mg, Oral, Nightly, Ami Roche Malmer, DO, 40 mg at 06/03/22 2056    metoprolol succinate (TOPROL XL) extended release tablet 50 mg, 50 mg, Oral, Daily, Ami Roche Malmer, DO, 50 mg at 06/03/22 1845    sodium chloride flush 0.9 % injection 5-40 mL, 5-40 mL, IntraVENous, 2 times per day, [place]ie Maxcy, DO, 10 mL at 06/03/22 2056    sodium chloride flush 0.9 % injection 5-40 mL, 5-40 mL, IntraVENous, PRN, Ami Roche Malmer, DO    0.9 % sodium chloride infusion, , IntraVENous, PRN, Ami Roche Malmer, DO    polyethylene glycol (GLYCOLAX) packet 17 g, 17 g, Oral, Daily PRN, Ami Roche Malmer, DO    acetaminophen (TYLENOL) tablet 650 mg, 650 mg, Oral, Q6H PRN **OR** acetaminophen (TYLENOL) suppository 650 mg, 650 mg, Rectal, Q6H PRN, Ami Roche Malmer, DO    albuterol (PROVENTIL) nebulizer solution 2.5 mg, 2.5 mg, Nebulization, Q6H PRN, Ami Roche Malmer, DO    ADULT DIET; Regular; Low Fat/Low Chol/High Fiber/PONCHO    CT CHEST WO CONTRAST   Final Result   Left basilar disease, representing a combination of pleural effusion and   associated parenchymal consolidation within the left lower lobe. A pneumonia   with a parapneumonic effusion is suspected.          CT ABDOMEN PELVIS WO CONTRAST Additional Contrast? None   Final Result   Moderate to marked bladder distension, as well as dilatation of the prostatic   urethra. Nodular soft tissue is noted at the level of the prostate gland. Given history of prior prostate surgery, findings may represent residual   normal prostatic tissue. However, if there is a clinical history of prostate   cancer, the possibility of recurrent or residual neoplastic disease cannot be   excluded. Given dilatation of the prostatic urethra, as well as an enlarged,   fluid-filled bladder and bilateral hydroureteronephrosis, the possibility of   obstructive uropathy cannot be excluded. Moderate stool within the colon, please correlate for constipation. No   definitive evidence of bowel obstruction. .         XR SHOULDER RIGHT (MIN 2 VIEWS)   Final Result   No acute findings. XR CHEST PORTABLE   Final Result   Left basilar disease, likely representing a combination of parenchymal   consolidation and pleural effusion. This is stable compared to the previous   exam.      Remainder of the study is unchanged. Assessment:    Principal Problem:    Acute urinary retention  Resolved Problems:    * No resolved hospital problems. *  Recent CABG for coronary artery disease  Recent aortic valve repair and mitral valve replacement as well as tricuspid valve repair  Underlying history of BPH  GERD  Asbestosis underlying      Plan:    Apparently patient needs another precertification before he can go to subacute rehab  Continue Davidson catheter until he is more active  He may be discharged to subacute rehab with Davidson catheter          Jim Ochoa MD  8:07 AM  6/4/2022    NOTE: This report was transcribed using voice recognition software.  Every effort was made to ensure accuracy; however, inadvertent transcription errors may be present

## 2022-06-04 NOTE — PLAN OF CARE
Problem: Chronic Conditions and Co-morbidities  Goal: Patient's chronic conditions and co-morbidity symptoms are monitored and maintained or improved  6/3/2022 2322 by Shyanne Vasquez RN  Outcome: Progressing  Flowsheets (Taken 6/3/2022 1940)  Care Plan - Patient's Chronic Conditions and Co-Morbidity Symptoms are Monitored and Maintained or Improved: Monitor and assess patient's chronic conditions and comorbid symptoms for stability, deterioration, or improvement  6/3/2022 1835 by Marquis Merritt RN  Outcome: Progressing     Problem: Discharge Planning  Goal: Discharge to home or other facility with appropriate resources  6/3/2022 2322 by Shyanne Vasquez RN  Outcome: Progressing  Flowsheets (Taken 6/3/2022 1940)  Discharge to home or other facility with appropriate resources: Identify barriers to discharge with patient and caregiver  6/3/2022 1835 by Marquis Merritt RN  Outcome: Progressing     Problem: Pain  Goal: Verbalizes/displays adequate comfort level or baseline comfort level  6/3/2022 2322 by Shyanne Vasquez RN  Outcome: Progressing  Flowsheets (Taken 6/3/2022 1940)  Verbalizes/displays adequate comfort level or baseline comfort level: Encourage patient to monitor pain and request assistance  6/3/2022 1835 by Marquis Merritt RN  Outcome: Progressing  Flowsheets (Taken 6/3/2022 1800)  Verbalizes/displays adequate comfort level or baseline comfort level: Encourage patient to monitor pain and request assistance     Problem: ABCDS Injury Assessment  Goal: Absence of physical injury  Outcome: Progressing

## 2022-06-04 NOTE — PROGRESS NOTES
[facility]  ([facility])      PROGRESS NOTE         PATIENT NAME: Sonu Vasquez   YOB: 1945  ADMISSION DATE: 6/3/2022 12:36 AM   TODAY'S DATE: 6/4/2022        Subjective       Now having BM. Sitting up in the chair. Comfortable. No issues. Objective     VS:   BP (!) 118/58   Pulse 68   Temp 97.3 °F (36.3 °C)   Resp 18   Ht 5' 10\" (1.778 m)   Wt 174 lb (78.9 kg)   SpO2 98%   BMI 24.97 kg/m²     I & O - 24hr:    Intake/Output Summary (Last 24 hours) at 6/4/2022 0847  Last data filed at 6/4/2022 0650  Gross per 24 hour   Intake 300 ml   Output 2000 ml   Net -1700 ml         Physical Exam:  General:  Neck:  Resp:  Abdomen:   No acute distress  Supple  Normal effort  Soft, non-tender, nondistended   :  Davidson yellow   Skin: Skin color, texture, turgor normal, no rashes or lesions       Labs and Imaging Studies   Labs:    CBC:   Recent Labs     06/02/22  0550 06/03/22  0053 06/04/22  0637   WBC 10.6 14.7* 18.3*   HGB 10.1* 12.1* 10.4*   HCT 30.7* 36.7* 34.3*   MCV 92.7 92.0 100.0*    404 331     BMP:  Recent Labs     06/02/22  0550 06/03/22  0053 06/04/22  0637    136 139   K 4.4 4.1 4.4   CL 99 98 106   CO2 25 28 23   BUN 36* 36* 32*   CREATININE 0.9 1.0 1.0       Magnesium:   Lab Results   Component Value Date    MG 2.2 06/02/2022      Phosphate:   Lab Results   Component Value Date    PHOS 3.3 11/14/2016     PT/INR: No results for input(s): PROTIME, INR in the last 72 hours.     U/A:   Lab Results   Component Value Date    LEUKOCYTESUR SMALL 05/23/2022    PHUR 6.0 05/23/2022    WBCUA 10-20 05/23/2022    RBCUA 5-10 05/23/2022    BACTERIA RARE 05/23/2022    SPECGRAV 1.015 05/23/2022    BLOODU MODERATE 05/23/2022    GLUCOSEU Negative 05/23/2022       Urine Culture:       Component Value Date/Time    LABURIN Growth not present 05/23/2022 1750        Blood Culture:     Imaging Studies:          Assessment and Plan       Assessment/plan:  Bilateral hydronephrosis

## 2022-06-05 LAB
ANION GAP SERPL CALCULATED.3IONS-SCNC: 12 MMOL/L (ref 7–16)
BUN BLDV-MCNC: 27 MG/DL (ref 6–23)
CALCIUM SERPL-MCNC: 8.2 MG/DL (ref 8.6–10.2)
CHLORIDE BLD-SCNC: 104 MMOL/L (ref 98–107)
CO2: 24 MMOL/L (ref 22–29)
CREAT SERPL-MCNC: 1.1 MG/DL (ref 0.7–1.2)
GFR AFRICAN AMERICAN: >60
GFR NON-AFRICAN AMERICAN: >60 ML/MIN/1.73
GLUCOSE BLD-MCNC: 88 MG/DL (ref 74–99)
HCT VFR BLD CALC: 30.3 % (ref 37–54)
HEMOGLOBIN: 9.6 G/DL (ref 12.5–16.5)
MCH RBC QN AUTO: 29.5 PG (ref 26–35)
MCHC RBC AUTO-ENTMCNC: 31.7 % (ref 32–34.5)
MCV RBC AUTO: 93.2 FL (ref 80–99.9)
PDW BLD-RTO: 14.5 FL (ref 11.5–15)
PLATELET # BLD: 454 E9/L (ref 130–450)
PMV BLD AUTO: 9.9 FL (ref 7–12)
POTASSIUM SERPL-SCNC: 4.1 MMOL/L (ref 3.5–5)
RBC # BLD: 3.25 E12/L (ref 3.8–5.8)
SODIUM BLD-SCNC: 140 MMOL/L (ref 132–146)
WBC # BLD: 17.4 E9/L (ref 4.5–11.5)

## 2022-06-05 PROCEDURE — 6370000000 HC RX 637 (ALT 250 FOR IP): Performed by: NURSE PRACTITIONER

## 2022-06-05 PROCEDURE — 6360000002 HC RX W HCPCS: Performed by: PHYSICIAN ASSISTANT

## 2022-06-05 PROCEDURE — 85027 COMPLETE CBC AUTOMATED: CPT

## 2022-06-05 PROCEDURE — 80048 BASIC METABOLIC PNL TOTAL CA: CPT

## 2022-06-05 PROCEDURE — 93798 PHYS/QHP OP CAR RHAB W/ECG: CPT

## 2022-06-05 PROCEDURE — 36415 COLL VENOUS BLD VENIPUNCTURE: CPT

## 2022-06-05 PROCEDURE — 2580000003 HC RX 258: Performed by: INTERNAL MEDICINE

## 2022-06-05 PROCEDURE — G0378 HOSPITAL OBSERVATION PER HR: HCPCS

## 2022-06-05 PROCEDURE — 6370000000 HC RX 637 (ALT 250 FOR IP): Performed by: INTERNAL MEDICINE

## 2022-06-05 PROCEDURE — 2580000003 HC RX 258: Performed by: PHYSICIAN ASSISTANT

## 2022-06-05 PROCEDURE — 6370000000 HC RX 637 (ALT 250 FOR IP): Performed by: PHYSICIAN ASSISTANT

## 2022-06-05 PROCEDURE — 96366 THER/PROPH/DIAG IV INF ADDON: CPT

## 2022-06-05 RX ORDER — GABAPENTIN 100 MG/1
100 CAPSULE ORAL 2 TIMES DAILY
Status: DISCONTINUED | OUTPATIENT
Start: 2022-06-05 | End: 2022-06-06 | Stop reason: HOSPADM

## 2022-06-05 RX ADMIN — GABAPENTIN 100 MG: 100 CAPSULE ORAL at 09:29

## 2022-06-05 RX ADMIN — ACETAMINOPHEN 650 MG: 325 TABLET ORAL at 09:29

## 2022-06-05 RX ADMIN — ASPIRIN 81 MG: 81 TABLET, COATED ORAL at 08:08

## 2022-06-05 RX ADMIN — ATORVASTATIN CALCIUM 40 MG: 40 TABLET, FILM COATED ORAL at 21:38

## 2022-06-05 RX ADMIN — DOCUSATE SODIUM 100 MG: 100 CAPSULE, LIQUID FILLED ORAL at 08:08

## 2022-06-05 RX ADMIN — METOPROLOL SUCCINATE 50 MG: 50 TABLET, EXTENDED RELEASE ORAL at 08:08

## 2022-06-05 RX ADMIN — OXYCODONE HYDROCHLORIDE AND ACETAMINOPHEN 500 MG: 500 TABLET ORAL at 08:08

## 2022-06-05 RX ADMIN — GABAPENTIN 100 MG: 100 CAPSULE ORAL at 21:38

## 2022-06-05 RX ADMIN — AMIODARONE HYDROCHLORIDE 200 MG: 200 TABLET ORAL at 08:08

## 2022-06-05 RX ADMIN — SODIUM CHLORIDE, PRESERVATIVE FREE 10 ML: 5 INJECTION INTRAVENOUS at 08:08

## 2022-06-05 RX ADMIN — OXYCODONE HYDROCHLORIDE AND ACETAMINOPHEN 500 MG: 500 TABLET ORAL at 21:38

## 2022-06-05 RX ADMIN — Medication 400 MG: at 08:08

## 2022-06-05 RX ADMIN — PIPERACILLIN AND TAZOBACTAM 3375 MG: 3; .375 INJECTION, POWDER, LYOPHILIZED, FOR SOLUTION INTRAVENOUS at 01:20

## 2022-06-05 RX ADMIN — Medication 1000 UNITS: at 08:08

## 2022-06-05 RX ADMIN — BUMETANIDE 1 MG: 1 TABLET ORAL at 08:08

## 2022-06-05 RX ADMIN — PIPERACILLIN AND TAZOBACTAM 3375 MG: 3; .375 INJECTION, POWDER, LYOPHILIZED, FOR SOLUTION INTRAVENOUS at 16:36

## 2022-06-05 RX ADMIN — DOCUSATE SODIUM 100 MG: 100 CAPSULE, LIQUID FILLED ORAL at 21:38

## 2022-06-05 RX ADMIN — SODIUM CHLORIDE, PRESERVATIVE FREE 10 ML: 5 INJECTION INTRAVENOUS at 21:38

## 2022-06-05 RX ADMIN — ALPRAZOLAM 0.25 MG: 0.25 TABLET ORAL at 23:35

## 2022-06-05 RX ADMIN — APIXABAN 5 MG: 5 TABLET, FILM COATED ORAL at 21:37

## 2022-06-05 RX ADMIN — PIPERACILLIN AND TAZOBACTAM 3375 MG: 3; .375 INJECTION, POWDER, LYOPHILIZED, FOR SOLUTION INTRAVENOUS at 08:10

## 2022-06-05 RX ADMIN — APIXABAN 5 MG: 5 TABLET, FILM COATED ORAL at 08:08

## 2022-06-05 ASSESSMENT — PAIN SCALES - GENERAL
PAINLEVEL_OUTOF10: 0
PAINLEVEL_OUTOF10: 0

## 2022-06-05 NOTE — PROGRESS NOTES
Subjective:    Chief complaint:      Denies new issues  Breathing is okay  Objective:    /78   Pulse 70   Temp 97.3 °F (36.3 °C) (Temporal)   Resp 20   Ht 5' 10\" (1.778 m)   Wt 174 lb (78.9 kg)   SpO2 97% Comment: before ambulation  BMI 24.97 kg/m²   General : Awake ,alert,no distress. Heart:  RRR, no murmurs, gallops, or rubs. Lungs:  CTA bilaterally, no wheeze, rales or rhonchi  Abd: bowel sounds present, nontender, nondistended, no masses  Extrem:  No clubbing, cyanosis, or edema  Indwelling Davidson catheter noted    CBC:   Lab Results   Component Value Date    WBC 17.4 06/05/2022    RBC 3.25 06/05/2022    HGB 9.6 06/05/2022    HCT 30.3 06/05/2022    HCT 29.0 05/26/2022    MCV 93.2 06/05/2022    MCH 29.5 06/05/2022    MCHC 31.7 06/05/2022    RDW 14.5 06/05/2022     06/05/2022    MPV 9.9 06/05/2022     BMP:    Lab Results   Component Value Date     06/05/2022    K 4.1 06/05/2022    K 4.1 06/03/2022     06/05/2022    CO2 24 06/05/2022    BUN 27 06/05/2022    LABALBU 3.3 05/27/2022    CREATININE 1.1 06/05/2022    CALCIUM 8.2 06/05/2022    GFRAA >60 06/05/2022    LABGLOM >60 06/05/2022    GLUCOSE 88 06/05/2022     PT/INR:    Lab Results   Component Value Date    PROTIME 17.3 05/25/2022    INR 1.5 05/25/2022     Troponin:    Lab Results   Component Value Date    TROPONINI <0.01 12/25/2016       No results for input(s): LABURIN in the last 72 hours. No results for input(s): BC in the last 72 hours. No results for input(s): Jacob Jackson in the last 72 hours.       Current Facility-Administered Medications:     gabapentin (NEURONTIN) capsule 100 mg, 100 mg, Oral, BID, DERRELL Leyva, 100 mg at 06/05/22 0929    piperacillin-tazobactam (ZOSYN) 3,375 mg in dextrose 5 % 100 mL IVPB extended infusion (mini-bag), 3,375 mg, IntraVENous, Q8H, DERRELL Leyva, Stopped at 06/05/22 1215    bumetanide (BUMEX) tablet 1 mg, 1 mg, Oral, Daily, SHANIQUE Gonzalez - CNP, 1 mg at 06/05/22 6965    Vitamin D (CHOLECALCIFEROL) tablet 1,000 Units, 1,000 Units, Oral, Daily, Susan Pedroza, APRN - CNP, 1,000 Units at 06/05/22 1626    ALPRAZolam Evaline Beams) tablet 0.25 mg, 0.25 mg, Oral, Nightly PRN, Readlyn Yumoncho Farnsworth, DO, 0.25 mg at 06/04/22 2329    amiodarone (CORDARONE) tablet 200 mg, 200 mg, Oral, Daily, [facility], DO, 200 mg at 06/05/22 1433    apixaban (ELIQUIS) tablet 5 mg, 5 mg, Oral, BID, Eusebio Yu Malindio, DO, 5 mg at 06/05/22 0808    ascorbic acid (VITAMIN C) tablet 500 mg, 500 mg, Oral, BID, [facility] Daja, DO, 500 mg at 06/05/22 0808    aspirin EC tablet 81 mg, 81 mg, Oral, Daily, [facility], DO, 81 mg at 06/05/22 0808    docusate sodium (COLACE) capsule 100 mg, 100 mg, Oral, BID, [facility], DO, 100 mg at 06/05/22 0808    magnesium oxide (MAG-OX) tablet 400 mg, 400 mg, Oral, Daily, [facility], DO, 400 mg at 06/05/22 0808    atorvastatin (LIPITOR) tablet 40 mg, 40 mg, Oral, Nightly, [facility], 40 mg at 06/04/22 2145    metoprolol succinate (TOPROL XL) extended release tablet 50 mg, 50 mg, Oral, Daily, Readlyn Yu Daja, DO, 50 mg at 06/05/22 0808    sodium chloride flush 0.9 % injection 5-40 mL, 5-40 mL, IntraVENous, 2 times per day, Manny Vega, DO, 10 mL at 06/05/22 0808    sodium chloride flush 0.9 % injection 5-40 mL, 5-40 mL, IntraVENous, PRN, Eusebio Farnsworth, DO    0.9 % sodium chloride infusion, , IntraVENous, PRN, Readlyn Yu DO Daja    polyethylene glycol (GLYCOLAX) packet 17 g, 17 g, Oral, Daily PRN, Eusebio Farnsworth, DO    acetaminophen (TYLENOL) tablet 650 mg, 650 mg, Oral, Q6H PRN, 650 mg at 06/05/22 0929 **OR** acetaminophen (TYLENOL) suppository 650 mg, 650 mg, Rectal, Q6H PRN, Eusebio Farnsworth, DO    albuterol (PROVENTIL) nebulizer solution 2.5 mg, 2.5 mg, Nebulization, Q6H PRN, Eusebio Farnsworth, DO    ADULT DIET;  Regular; Low Fat/Low Chol/High Fiber/PONCHO    CT CHEST WO CONTRAST   Final Result   Left basilar disease, representing a combination of pleural effusion and   associated parenchymal consolidation within the left lower lobe. A pneumonia   with a parapneumonic effusion is suspected. CT ABDOMEN PELVIS WO CONTRAST Additional Contrast? None   Final Result   Moderate to marked bladder distension, as well as dilatation of the prostatic   urethra. Nodular soft tissue is noted at the level of the prostate gland. Given history of prior prostate surgery, findings may represent residual   normal prostatic tissue. However, if there is a clinical history of prostate   cancer, the possibility of recurrent or residual neoplastic disease cannot be   excluded. Given dilatation of the prostatic urethra, as well as an enlarged,   fluid-filled bladder and bilateral hydroureteronephrosis, the possibility of   obstructive uropathy cannot be excluded. Moderate stool within the colon, please correlate for constipation. No   definitive evidence of bowel obstruction. .         XR SHOULDER RIGHT (MIN 2 VIEWS)   Final Result   No acute findings. XR CHEST PORTABLE   Final Result   Left basilar disease, likely representing a combination of parenchymal   consolidation and pleural effusion. This is stable compared to the previous   exam.      Remainder of the study is unchanged. Assessment:    Principal Problem:    Acute urinary retention  Resolved Problems:    * No resolved hospital problems. *  Recent CABG for coronary artery disease  Recent aortic valve repair and mitral valve replacement as well as tricuspid valve repair  Underlying history of BPH  GERD  Asbestosis underlying      Plan:    Apparently needs precertification to return to subacute rehab  Plan is to discharge to subacute rehab once pre-CERT available with Stuart Zavaal MD  1:10 PM  6/5/2022    NOTE: This report was transcribed using voice recognition software.  Every effort was made to ensure accuracy; however, inadvertent transcription errors may be present

## 2022-06-05 NOTE — PROGRESS NOTES
Department of Internal Medicine  Nephrology Attending Progress Note    Events reviewed. Patient seen up in a chair today. States is feeling much better. SUBJECTIVE: We are following Mr. Dena Chacon for JAMIE. Reports no complaints. PHYSICAL EXAM:      Vitals:    VITALS:  /78   Pulse 70   Temp 97.3 °F (36.3 °C) (Temporal)   Resp 20   Ht 5' 10\" (1.778 m)   Wt 174 lb (78.9 kg)   SpO2 97% Comment: before ambulation  BMI 24.97 kg/m²   24HR INTAKE/OUTPUT:      Intake/Output Summary (Last 24 hours) at 6/5/2022 1431  Last data filed at 6/5/2022 0609  Gross per 24 hour   Intake 380 ml   Output 1315 ml   Net -935 ml       Constitutional: Patient is awake alert in no distress. HEENT: Pupils are equal reactive, normocephalic, autraumatic  Respiratory: Lungs are clear  Cardiovascular/Edema: Heart sounds are regular  Gastrointestinal: Abdomen soft, hypoactive bowel sounds  Neurologic: alert and oriented x3, no focal defecits  Skin: s/p CABG with surgical incision  Other: trace edema    Scheduled Meds:   gabapentin  100 mg Oral BID    piperacillin-tazobactam  3,375 mg IntraVENous Q8H    bumetanide  1 mg Oral Daily    Vitamin D  1,000 Units Oral Daily    amiodarone  200 mg Oral Daily    apixaban  5 mg Oral BID    ascorbic acid  500 mg Oral BID    aspirin  81 mg Oral Daily    docusate sodium  100 mg Oral BID    magnesium oxide  400 mg Oral Daily    atorvastatin  40 mg Oral Nightly    metoprolol succinate  50 mg Oral Daily    sodium chloride flush  5-40 mL IntraVENous 2 times per day     Continuous Infusions:   sodium chloride       PRN Meds:. ALPRAZolam, sodium chloride flush, sodium chloride, polyethylene glycol, acetaminophen **OR** acetaminophen, albuterol      DATA:    CBC with Differential:    Lab Results   Component Value Date    WBC 17.4 06/05/2022    RBC 3.25 06/05/2022    HGB 9.6 06/05/2022    HCT 30.3 06/05/2022    HCT 29.0 05/26/2022     06/05/2022    MCV 93.2 06/05/2022    MCH 29.5 06/05/2022    MCHC 31.7 06/05/2022    RDW 14.5 06/05/2022    NRBC 0.0 01/17/2022    SEGSPCT 70 06/08/2011    LYMPHOPCT 7.8 06/03/2022    MONOPCT 8.7 06/03/2022    MYELOPCT 2.6 01/04/2022    BASOPCT 0.3 06/03/2022    MONOSABS 1.32 06/03/2022    LYMPHSABS 1.18 06/03/2022    EOSABS 0.38 06/03/2022    BASOSABS 0.00 06/03/2022     CMP:    Lab Results   Component Value Date     06/05/2022    K 4.1 06/05/2022    K 4.1 06/03/2022     06/05/2022    CO2 24 06/05/2022    BUN 27 06/05/2022    CREATININE 1.1 06/05/2022    GFRAA >60 06/05/2022    LABGLOM >60 06/05/2022    GLUCOSE 88 06/05/2022    PROT 5.4 05/27/2022    LABALBU 3.3 05/27/2022    CALCIUM 8.2 06/05/2022    BILITOT 0.8 05/27/2022    ALKPHOS 68 05/27/2022    AST 37 05/27/2022    ALT 12 05/27/2022     Magnesium:    Lab Results   Component Value Date    MG 2.2 06/02/2022     Phosphorus:    Lab Results   Component Value Date    PHOS 3.3 11/14/2016     RADIOLOGY REVIEWED. CT CHEST WO Contrast [place] 3, 2022   Left basilar disease, representing a combination of pleural effusion and   associated parenchymal consolidation within the left lower lobe.  A pneumonia   with a parapneumonic effusion is suspected.         CT Abdomen pelvis [place] 3, 2022   Moderate to marked bladder distension, as well as dilatation of the prostatic   urethra.  Nodular soft tissue is noted at the level of the prostate gland. Given history of prior prostate surgery, findings may represent residual   normal prostatic tissue.  However, if there is a clinical history of prostate   cancer, the possibility of recurrent or residual neoplastic disease cannot be   excluded.  Given dilatation of the prostatic urethra, as well as an enlarged,   fluid-filled bladder and bilateral hydroureteronephrosis, the possibility of   obstructive uropathy cannot be excluded.       Moderate stool within the colon, please correlate for constipation.  No   definitive evidence of bowel obstruction. .         BRIEF SUMMARY OF INITIAL CONSULT:    Briefly Mr. Isha Grace is a 64-year-old man with history of HTN, type II DM, nephrolithiasis s/p lithotripsy, CAD, MVP with significant MR, MV endocarditis, paroxysmal SVT,multiple sclerosis, asbestosis, erectile dysfunction status post penile implant, BPH status post TURP, bladder cancer, Washburn's esophagus, recent admission for pneumonia complicated with right parapneumonic effusion s/p right thoracotomy with decortication who presented to the hospital today 5/23/22 for CABG and valve replacements with Dr. Akua Park. However, the surgery was cancelled as Cardiothoracic surgery would like patient optimized before proceeding with procedure. To note, patient did have lab work drawn on 5/18/22 which demonstrated an elevated creatinine of 1.3mg/dL. Repeat Lab work today revealed creatinine 1.1mg/dL, ProBNP 1,002. Renal was consulted for optimization before surgery. His baseline creatinine is 0.8-0.9 mg/dL. Patient re-admitted 6/3/22 with complaint of chest pain and burning at surgical incision, IV antibiotics were initiated after CT Chest revealed probable pneumonia with para pneumonic effusion. Hernandez catheter was placed after CT Abdomen/Pelvis demonstrated dilation of the bladder with bilateral hydroureteroeronephrosis, 1500 ml urinary return after hernandez catheter insertion. Problems resolved:    · JAMIE, stage I,  2/2 obstructive uropathy--, hernandez catheter placed and urology consulted-- renal function improved over the last 24 hrs, creatinine 0.9 mg/dl today. · Hemodynamic shock, following CABG on vaso and epi to maintain MAP >65  · Lactic acidosis, 2/2 #2, resolved  · Recurrent JAMIE, resolved, creatinine down to 0.8 with excellent urine output. · UTI, ID consulted  · Hypocalcemia, secondary to vitamin D deficiency. IMPRESSION/RECOMMENDATIONS:      1. HFpEF, LVEF 60-65% on 4/7/22 echo. proBNP 1,002> 2470> [id number]. Edema improved, to continue Bumex 1 mg p.o. daily.  2. History of Nephrolithiasis  3. Obstructive Uropathy, hernandez catheter, urology consult  4. Vitamin D deficiency, vitamin D 25 level 20, on cholecalciferol  -----------------------------------------------------------  5. CAD s/p 3V CABG with AVR, TVR, MVR on 5/25/2022, metoprolol  6. Possible pneumonia with para pneumonic effusion, received Zithromax, rocephin and piperacillin   7. PAF, on amiodarone and Metoprolol  8. History of lung decortication due to empyema   9. Multiple Sclerosis   10. Anemia s/p surgery  11.  Nutrition, on low fat/low cholesterol, high fiber no added salt diet    Plan:    · Continue Bumex 1 mg p.o. daily  · Continue cholecalciferol 1000 units p.o. daily   · Strict I&Os  · Continue hernandez catheter per Urology  · Monitor renal function daily  · OK for discharge from renal POV      Electronically signed by SHANIQUE Selby CNP on 6/5/2022 at 2:31 PM  Patient seen and examined and agree with above as annotated  Huang Douglas MD

## 2022-06-05 NOTE — PROGRESS NOTES
[facility]  ([facility])      PROGRESS NOTE         PATIENT NAME: Sonu Vasquez   YOB: 1945  ADMISSION DATE: 6/3/2022 12:36 AM   TODAY'S DATE: 6/5/2022        Subjective       Now having BM. Sitting up in the chair. Comfortable. No issues. Objective     VS:   /71   Pulse 73   Temp 98.1 °F (36.7 °C) (Oral)   Resp 20   Ht 5' 10\" (1.778 m)   Wt 174 lb (78.9 kg)   SpO2 96%   BMI 24.97 kg/m²     I & O - 24hr:    Intake/Output Summary (Last 24 hours) at 6/5/2022 0555  Last data filed at 6/4/2022 2020  Gross per 24 hour   Intake 1040 ml   Output 1065 ml   Net -25 ml         Physical Exam:  General:  Neck:  Resp:  Abdomen:   No acute distress  Supple  Normal effort  Soft, non-tender, nondistended   :  Davidson yellow   Skin: Skin color, texture, turgor normal, no rashes or lesions       Labs and Imaging Studies   Labs:    CBC:   Recent Labs     06/03/22  0053 06/04/22  0637   WBC 14.7* 18.3*   HGB 12.1* 10.4*   HCT 36.7* 34.3*   MCV 92.0 100.0*    331     BMP:  Recent Labs     06/03/22  0053 06/04/22  0637    139   K 4.1 4.4   CL 98 106   CO2 28 23   BUN 36* 32*   CREATININE 1.0 1.0       Magnesium:   Lab Results   Component Value Date    MG 2.2 06/02/2022      Phosphate:   Lab Results   Component Value Date    PHOS 3.3 11/14/2016     PT/INR: No results for input(s): PROTIME, INR in the last 72 hours.     U/A:   Lab Results   Component Value Date    LEUKOCYTESUR SMALL 05/23/2022    PHUR 6.0 05/23/2022    WBCUA 10-20 05/23/2022    RBCUA 5-10 05/23/2022    BACTERIA RARE 05/23/2022    SPECGRAV 1.015 05/23/2022    BLOODU MODERATE 05/23/2022    GLUCOSEU Negative 05/23/2022       Urine Culture:       Component Value Date/Time    LABURIN Growth not present 05/23/2022 1750        Blood Culture:     Imaging Studies:          Assessment and Plan       Assessment/plan:  Bilateral hydronephrosis   Urinary retention (1500ml)  Bilateral renal calculi   ED s/p IPP   BPH s/p TURP (Vane Goel Memo)        Creatinine stable   Cont the hernandez   This should remain indwelling at this time  Suggest outpatient voiding trial once he has full recovered from his heart surgery and is fully ambulatory   Hold on any further acute  interventions at this time   Please call with additional questions or concerns   Possible void trial on Adolph Stanley MD  [facility]  6/5/2022  5:55 AM

## 2022-06-05 NOTE — PROGRESS NOTES
P Quality Flow/Interdisciplinary Rounds Progress Note        Quality Flow Rounds held on June 5, 2022    Disciplines Attending:  Bedside Nurse and Nursing Unit Leadership    Sonu Vasquez was admitted on 6/3/2022 12:36 AM    Anticipated Discharge Date:       Disposition:    Enzo Score:  Enzo Scale Score: 20    Readmission Risk              Risk of Unplanned Readmission:  0           Discussed patient goal for the day, patient clinical progression, and barriers to discharge.   The following Goal(s) of the Day/Commitment(s) have been identified:  Labs - Report Results      Cezar Rios RN  June 5, 2022

## 2022-06-06 VITALS
RESPIRATION RATE: 18 BRPM | HEART RATE: 90 BPM | BODY MASS INDEX: 24.91 KG/M2 | WEIGHT: 174 LBS | SYSTOLIC BLOOD PRESSURE: 160 MMHG | DIASTOLIC BLOOD PRESSURE: 90 MMHG | OXYGEN SATURATION: 98 % | HEIGHT: 70 IN | TEMPERATURE: 97.5 F

## 2022-06-06 LAB
ANION GAP SERPL CALCULATED.3IONS-SCNC: 11 MMOL/L (ref 7–16)
BUN BLDV-MCNC: 19 MG/DL (ref 6–23)
CALCIUM SERPL-MCNC: 8.2 MG/DL (ref 8.6–10.2)
CHLORIDE BLD-SCNC: 105 MMOL/L (ref 98–107)
CO2: 23 MMOL/L (ref 22–29)
CREAT SERPL-MCNC: 0.9 MG/DL (ref 0.7–1.2)
GFR AFRICAN AMERICAN: >60
GFR NON-AFRICAN AMERICAN: >60 ML/MIN/1.73
GLUCOSE BLD-MCNC: 74 MG/DL (ref 74–99)
HCT VFR BLD CALC: 30.1 % (ref 37–54)
HEMOGLOBIN: 9.5 G/DL (ref 12.5–16.5)
MCH RBC QN AUTO: 29.9 PG (ref 26–35)
MCHC RBC AUTO-ENTMCNC: 31.6 % (ref 32–34.5)
MCV RBC AUTO: 94.7 FL (ref 80–99.9)
PDW BLD-RTO: 14.6 FL (ref 11.5–15)
PLATELET # BLD: 533 E9/L (ref 130–450)
PMV BLD AUTO: 9.8 FL (ref 7–12)
POTASSIUM SERPL-SCNC: 3.9 MMOL/L (ref 3.5–5)
RBC # BLD: 3.18 E12/L (ref 3.8–5.8)
SODIUM BLD-SCNC: 139 MMOL/L (ref 132–146)
WBC # BLD: 16.5 E9/L (ref 4.5–11.5)

## 2022-06-06 PROCEDURE — 6370000000 HC RX 637 (ALT 250 FOR IP): Performed by: NURSE PRACTITIONER

## 2022-06-06 PROCEDURE — 6370000000 HC RX 637 (ALT 250 FOR IP): Performed by: PHYSICIAN ASSISTANT

## 2022-06-06 PROCEDURE — 96366 THER/PROPH/DIAG IV INF ADDON: CPT

## 2022-06-06 PROCEDURE — 2580000003 HC RX 258: Performed by: PHYSICIAN ASSISTANT

## 2022-06-06 PROCEDURE — 97165 OT EVAL LOW COMPLEX 30 MIN: CPT

## 2022-06-06 PROCEDURE — 80048 BASIC METABOLIC PNL TOTAL CA: CPT

## 2022-06-06 PROCEDURE — 97161 PT EVAL LOW COMPLEX 20 MIN: CPT

## 2022-06-06 PROCEDURE — 6360000002 HC RX W HCPCS: Performed by: PHYSICIAN ASSISTANT

## 2022-06-06 PROCEDURE — G0378 HOSPITAL OBSERVATION PER HR: HCPCS

## 2022-06-06 PROCEDURE — 6370000000 HC RX 637 (ALT 250 FOR IP): Performed by: INTERNAL MEDICINE

## 2022-06-06 PROCEDURE — 36415 COLL VENOUS BLD VENIPUNCTURE: CPT

## 2022-06-06 PROCEDURE — 93798 PHYS/QHP OP CAR RHAB W/ECG: CPT

## 2022-06-06 PROCEDURE — 2580000003 HC RX 258: Performed by: INTERNAL MEDICINE

## 2022-06-06 PROCEDURE — 85027 COMPLETE CBC AUTOMATED: CPT

## 2022-06-06 RX ORDER — BUMETANIDE 1 MG/1
1 TABLET ORAL DAILY
Qty: 30 TABLET | Refills: 0
Start: 2022-06-06 | End: 2022-06-06

## 2022-06-06 RX ORDER — FERROUS SULFATE 325(65) MG
325 TABLET ORAL 2 TIMES DAILY WITH MEALS
Qty: 60 TABLET | Refills: 0 | Status: SHIPPED | OUTPATIENT
Start: 2022-06-06 | End: 2022-07-29

## 2022-06-06 RX ORDER — FOLIC ACID 1 MG/1
1 TABLET ORAL DAILY
Qty: 30 TABLET | Refills: 0 | Status: SHIPPED | OUTPATIENT
Start: 2022-06-06 | End: 2022-07-29

## 2022-06-06 RX ORDER — GABAPENTIN 100 MG/1
100 CAPSULE ORAL 2 TIMES DAILY
Qty: 60 CAPSULE | Refills: 0 | Status: SHIPPED | OUTPATIENT
Start: 2022-06-06 | End: 2022-07-29

## 2022-06-06 RX ORDER — AMOXICILLIN AND CLAVULANATE POTASSIUM 875; 125 MG/1; MG/1
1 TABLET, FILM COATED ORAL 2 TIMES DAILY
Qty: 14 TABLET | Refills: 0 | Status: SHIPPED | OUTPATIENT
Start: 2022-06-06 | End: 2022-06-13

## 2022-06-06 RX ORDER — METOPROLOL SUCCINATE 50 MG/1
50 TABLET, EXTENDED RELEASE ORAL DAILY
Qty: 30 TABLET | Refills: 0 | Status: SHIPPED | OUTPATIENT
Start: 2022-06-06

## 2022-06-06 RX ORDER — BUMETANIDE 1 MG/1
1 TABLET ORAL DAILY
Qty: 30 TABLET | Refills: 0 | Status: SHIPPED | OUTPATIENT
Start: 2022-06-06

## 2022-06-06 RX ORDER — LANOLIN ALCOHOL/MO/W.PET/CERES
400 CREAM (GRAM) TOPICAL DAILY
Qty: 14 TABLET | Refills: 0 | Status: SHIPPED | OUTPATIENT
Start: 2022-06-06 | End: 2022-07-29

## 2022-06-06 RX ORDER — AMIODARONE HYDROCHLORIDE 200 MG/1
200 TABLET ORAL DAILY
Qty: 30 TABLET | Refills: 0 | Status: SHIPPED | OUTPATIENT
Start: 2022-06-06

## 2022-06-06 RX ADMIN — PIPERACILLIN AND TAZOBACTAM 3375 MG: 3; .375 INJECTION, POWDER, LYOPHILIZED, FOR SOLUTION INTRAVENOUS at 01:03

## 2022-06-06 RX ADMIN — GABAPENTIN 100 MG: 100 CAPSULE ORAL at 09:16

## 2022-06-06 RX ADMIN — Medication 1000 UNITS: at 09:16

## 2022-06-06 RX ADMIN — OXYCODONE HYDROCHLORIDE AND ACETAMINOPHEN 500 MG: 500 TABLET ORAL at 09:16

## 2022-06-06 RX ADMIN — APIXABAN 5 MG: 5 TABLET, FILM COATED ORAL at 09:16

## 2022-06-06 RX ADMIN — AMIODARONE HYDROCHLORIDE 200 MG: 200 TABLET ORAL at 09:16

## 2022-06-06 RX ADMIN — SODIUM CHLORIDE, PRESERVATIVE FREE 10 ML: 5 INJECTION INTRAVENOUS at 09:16

## 2022-06-06 RX ADMIN — METOPROLOL SUCCINATE 50 MG: 50 TABLET, EXTENDED RELEASE ORAL at 09:16

## 2022-06-06 RX ADMIN — BUMETANIDE 1 MG: 1 TABLET ORAL at 09:16

## 2022-06-06 RX ADMIN — Medication 400 MG: at 09:16

## 2022-06-06 RX ADMIN — ASPIRIN 81 MG: 81 TABLET, COATED ORAL at 09:16

## 2022-06-06 RX ADMIN — PIPERACILLIN AND TAZOBACTAM 3375 MG: 3; .375 INJECTION, POWDER, LYOPHILIZED, FOR SOLUTION INTRAVENOUS at 09:21

## 2022-06-06 RX ADMIN — DOCUSATE SODIUM 100 MG: 100 CAPSULE, LIQUID FILLED ORAL at 09:16

## 2022-06-06 ASSESSMENT — PAIN SCALES - GENERAL
PAINLEVEL_OUTOF10: 0

## 2022-06-06 NOTE — PROGRESS NOTES
Physical Therapy  Physical Therapy Initial Evaluation    Name: Sonu Vasquez  : 1945  MRN: [id number]      Date of Service: 2022    Evaluating PT:  Ty Sinclair PT [id number]      Room #:  7249/4806-J  Diagnosis:  Pneumonia [J18.9]  Pleural effusion [J90]  Acute urinary retention [R33.8]  Chest pain, unspecified type [R07.9]  Pneumonia due to infectious organism, unspecified laterality, unspecified part of lung [J18.9]  PMHx/PSHx:     has a past medical history of Abnormal PSA, Anxiety, Bronchitis, Claustrophobia, Diabetes mellitus ([address] Endocarditis, Enlarged LA (left atrium), Erectile dysfunction, GERD (gastroesophageal reflux disease), H/O complete arterial study of extremity, Hiatal hernia, History of EMG, Hypertension, Lung nodule, Mitral valve prolapse, Moderate tricuspid regurgitation, Multiple sclerosis ([address] Prostatitis, Renal calculi, Sinus problem, Supraventricular arrhythmia, Tachycardia, and Tilt table evaluation. has a past surgical history that includes transthoracic echocardiogram (2011  Echo showed normal left ventricular size with borderline concentric left ventricular hypertrophy with normal left ventricular systolic function with stage I  LV diastolic dysfunction, normal, normal right ventricular size and function, mildly dilated left atrium, borderline dilated right atrium mildly thickened anterior mitral leaflet with bowing of the mitral leaflets without felipe prolapse with mild mitral annular calcification, mild-to-moderate eccentrically-directed jet of mitral regurgitation.); Diagnostic Cardiac Cath Lab Procedure; cardiovascular stress test (10/19/2011  Adenosine nuclear stress test (4 minute walking protocol) showed a minimally reversible minor, nontransmural defect involving the apical anterolateral wall, more consistent with soft tissue attenuation and motion artifact); Skin graft; Inguinal hernia repair; Nose surgery; Colonoscopy; Prostate Biopsy (2013);  Prostate surgery (Aug,13); Bladder surgery; Dental surgery; Lithotripsy (12/6/17 @ [id number]); thoracotomy (Right, 1/4/2022); picc line insertion nurse (1/6/2022); transesophageal echocardiogram (04/21/2022); eye surgery (10/13); eye surgery (11/13); and Coronary artery bypass graft (N/A, 5/25/2022). Procedure/Surgery:  Recent 5/25 AV repair, MVR, TV repair, CABG x 3  Precautions:  Falls,  FWB (full weight bearing), sternal    Equipment Needs: To be determined,    SUBJECTIVE:    Pt lives with domestic partner in a 1st floor condo with level entry. Pt ambulated with [employer] or cane PRN and was independent PTA. This is pt's 3rd surgery this year. States Son will be here for 1 more week and daughter lives close to him. OBJECTIVE:   Initial Evaluation  Date: 6/6/22 Treatment Short Term/ Long Term   Goals   AM-PAC 6 Clicks 26/01     Was pt agreeable to Eval/treatment? yes     Does pt have pain? minimal     Bed Mobility  Rolling: NT   Supine to sit:   NT   Sit to supine: NT   Scooting: NT   Rolling: Ind  Supine to sit: Ind  Sit to supine: Ind  Scooting: Ind   Transfers Sit to stand: Mod  from commode low surface. Stand to sit: Min cues for safety. Stand pivot: Min   Sit to stand: Mod Ind    Stand to sit: Mod Ind    Stand pivot: Mod Ind     Ambulation    100 feet with no device Min  Due to decreased balance. 200 feet with Ind    Stair negotiation: ascended and descended  NT  Level entry for home. ROM BUE:  Sternal precautions. BLE:  wfl     Strength BUE: Defer to OT eval  RLE:  Grossly 4/5  LLE:  Grossly  4/5  5/5   Balance Sitting EOB:  Ind  Dynamic Standing:  Min flexed posture. Sitting EOB:  Ind  Dynamic Standing: Mod Ind     Patient is Alert & Oriented x person, place, time and situation and follows directions pleasant. Sensation:  Pt denies numbness and tingling to extremities  Edema:  none  Therapeutic Exercises:  Functional activity as stated above.  Has also been ambulating with staff    Patient education  Pt educated regarding role of PT evaluation, need for OOB activity and sternal precautions. Patient response to education:   Pt verbalized understanding Pt demonstrated skill Pt requires further education in this area   yes yes Reminders     ASSESSMENT:    Conditions Requiring Skilled Therapeutic Intervention:    [x]Decreased strength     [x]Decreased ROM  [x]Decreased functional mobility  [x]Decreased balance   [x]Decreased endurance   [x]Decreased posture  []Decreased sensation  []Decreased coordination   []Decreased vision  [x]Decreased safety awareness   []Increased pain       Comments:    RN cleared patient for participation in therapy session. Patient was seen this date for PT evaluation. . Patient was agreeable to intervention. Results of the functional assessment are noted above. Upon entering the room patient was found in bathroom. Increased assist required to arise from commode. Cued to adhere sternal precautions. Gait completed without deviceinto hallway. Increased fall risk if Ind gait. Shuffling gait pattern with flexed posture. Most likely will require assistance upon D/C to home initially. Could benefit from continuing rehab process to return to PLOF. At end of session, patient in chair with  call light and phone within reach,  all lines and tubes intact, RN present in the room. This patient can benefit from the continuation of skilled PT  to maximize functional level and return to PLOF. Treatment:  Patient practiced and was instructed in the following treatment:    · Bed mobility training -   · STS and transfer training - educated on hand/foot placement, safety, and sequencing during STS and pivot transfers using assistive device  · Gait training - verbal cues for, upright posture, and safety during 90 and 180 degree turns during gait   o Skilled repositioning in bed. Pt's/ family goals      1. Home when able. Prognosis is good  for reaching above PT goals.     Patient and or family understand(s) diagnosis, prognosis, and plan of care.  yes,     PHYSICAL THERAPY PLAN OF CARE:    PT POC is established based on physician order and patient diagnosis     Referring provider/PT Order:  PT Eval and Treat   06/03/22 1315  PT eval and treat         Nieves DO Sean       Diagnosis:  Pneumonia [J18.9]  Pleural effusion [J90]  Acute urinary retention [R33.8]  Chest pain, unspecified type [R07.9]  Pneumonia due to infectious organism, unspecified laterality, unspecified part of lung [J18.9]  Specific instructions for next treatment:  Increase ambulation distance and BLE therapeutic exercise  Current Treatment Recommendations:     [x] Strengthening to improve independence with functional mobility   [x] ROM to improve independence with functional mobility   [x] Balance Training to improve static/dynamic balance and to reduce fall risk  [x] Endurance Training to improve activity tolerance during functional mobility   [x] Transfer Training to improve safety and independence with all functional transfers   [x] Gait Training to improve gait mechanics, endurance and asses need for appropriate assistive device  [x] Stair Training in preparation for safe discharge home and/or into the community   [] Positioning to prevent skin breakdown and contractures  [x] Safety and Education Training   [] Patient/Caregiver Education   [] HEP  [] Other     PT long term treatment goals are located in above grid    Frequency of treatments: 2-5x/week x 1-2 weeks. Time in  0915  Time out  0930    Total Treatment Time  0 minutes     Evaluation Time includes thorough review of current medical information, gathering information on past medical history/social history and prior level of function, completion of standardized testing/informal observation of tasks, assessment of data and education on plan of care and goals.     CPT codes:  [x] Low Complexity PT evaluation 61909  [] Moderate Complexity PT evaluation 18062  [] High Complexity PT evaluation [id number]  [] PT Re-evaluation [id number]  [] Gait training 55696 - minutes  [] Manual therapy [id number] - minutes  [] Therapeutic activities 02801 - minutes  [] Therapeutic exercises 50711 - minutes  [] Neuromuscular reeducation 68078 - minutes     [facility] [place], [address] [facility] [place]

## 2022-06-06 NOTE — PROGRESS NOTES
Stable. Labs as expected. On RA. Pre-cert pending. Back to rehab when available with mary.   Serena Mills MD

## 2022-06-06 NOTE — PROGRESS NOTES
6/6/2022 12:06 PM  Service: Urology  Group: [facility] (Jayant/Evette/Desi)    Sonu Vasquez  [id number]    Subjective:  Up in chair  Feeling well  Son at bedside  Urine yellow    Review of Systems  Constitutional: No chills or sweats  Respiratory: negative  Cardiovascular: negative for chest pain  Gastrointestinal: negative for abdominal pain  Dermatologic: negative  Hematologic/lymphatic: negative  Musculoskeletal:negative for back pain  Neurological: negative for seizures and tremors  Endocrine: negative  Psychiatric: negative    Scheduled Meds:   gabapentin  100 mg Oral BID    piperacillin-tazobactam  3,375 mg IntraVENous Q8H    bumetanide  1 mg Oral Daily    Vitamin D  1,000 Units Oral Daily    amiodarone  200 mg Oral Daily    apixaban  5 mg Oral BID    ascorbic acid  500 mg Oral BID    aspirin  81 mg Oral Daily    docusate sodium  100 mg Oral BID    magnesium oxide  400 mg Oral Daily    atorvastatin  40 mg Oral Nightly    metoprolol succinate  50 mg Oral Daily    sodium chloride flush  5-40 mL IntraVENous 2 times per day       Objective:  Vitals:    06/06/22 0827   BP:    Pulse:    Resp:    Temp:    SpO2: 95%         Allergies: Aleve [naproxen], Baclofen, Dye [iodides], and Paxil [paroxetine]    General Appearance: alert and oriented to person, place and time and in no acute distress  Skin: no rash or erythema  Head: normocephalic and atraumatic  Pulmonary/Chest: normal air movement, no respiratory distress Abdomen: soft, non-tender, non-distended  Genitalia: hernandez catheter draining yellow urine        Labs:     Recent Labs     06/06/22  0544      K 3.9      CO2 23   BUN 19   CREATININE 0.9   GLUCOSE 74   CALCIUM 8.2*       Lab Results   Component Value Date    HGB 9.5 06/06/2022    HCT 30.1 06/06/2022    HCT 29.0 05/26/2022         Assessment/Plan:  Bilat hydronephrosis  UR (1500 cc)  Bilat renal calcED s/p IPP   BPH s/p TURP in March      Pt can be discharged with hernandez catheter and leg bag with large bag for night time use. He will follow up Monday for VT.  Discussed with pt and son      eMri Peters, APRN - KEL[place]   [facility]

## 2022-06-06 NOTE — PLAN OF CARE
Problem: Chronic Conditions and Co-morbidities  Goal: Patient's chronic conditions and co-morbidity symptoms are monitored and maintained or improved  Outcome: Progressing     Problem: Pain  Goal: Verbalizes/displays adequate comfort level or baseline comfort level  Outcome: Progressing     Problem: Skin/Tissue Integrity  Goal: Absence of new skin breakdown  Description: 1. Monitor for areas of redness and/or skin breakdown  2. Assess vascular access sites hourly  3. Every 4-6 hours minimum:  Change oxygen saturation probe site  4. Every 4-6 hours:  If on nasal continuous positive airway pressure, respiratory therapy assess nares and determine need for appliance change or resting period.   Outcome: Progressing     Problem: ABCDS Injury Assessment  Goal: Absence of physical injury  Outcome: Progressing

## 2022-06-06 NOTE — PATIENT CARE CONFERENCE
P Quality Flow/Interdisciplinary Rounds Progress Note        Quality Flow Rounds held on June 6, 2022    Disciplines Attending:  Bedside Nurse, ,  and Nursing Unit Leadership    August VICTOR MANUEL Vasquez was admitted on 6/3/2022 12:36 AM    Anticipated Discharge Date:  Expected Discharge Date: 06/06/22    Disposition:    Enzo Score:  Enzo Scale Score: 20    Readmission Risk              Risk of Unplanned Readmission:  0           Discussed patient goal for the day, patient clinical progression, and barriers to discharge.   The following Goal(s) of the Day/Commitment(s) have been identified:  [address], RN  June 6, 2022

## 2022-06-06 NOTE — PROGRESS NOTES
Department of Internal Medicine  Nephrology Attending Progress Note    Events reviewed. SUBJECTIVE: We are following Mr. Urvashi Ghotra for JAMIE. Reports no complaints. PHYSICAL EXAM:      Vitals:    VITALS:  /82   Pulse 87   Temp 97.1 °F (36.2 °C) (Temporal)   Resp 16   Ht 5' 10\" (1.778 m)   Wt 174 lb (78.9 kg)   SpO2 95% Comment: 95% walking; 97% recovery  BMI 24.97 kg/m²   24HR INTAKE/OUTPUT:      Intake/Output Summary (Last 24 hours) at 6/6/2022 1139  Last data filed at 6/6/2022 0920  Gross per 24 hour   Intake 970 ml   Output 1800 ml   Net -830 ml       Constitutional: Patient is awake alert in no distress. HEENT: Pupils are equal reactive, normocephalic, autraumatic  Respiratory: Lungs are clear  Cardiovascular/Edema: Heart sounds are regular  Gastrointestinal: Abdomen soft, hypoactive bowel sounds  Neurologic: alert and oriented x3, no focal defecits  Skin: s/p CABG with surgical incision  Other: trace edema    Scheduled Meds:   gabapentin  100 mg Oral BID    piperacillin-tazobactam  3,375 mg IntraVENous Q8H    bumetanide  1 mg Oral Daily    Vitamin D  1,000 Units Oral Daily    amiodarone  200 mg Oral Daily    apixaban  5 mg Oral BID    ascorbic acid  500 mg Oral BID    aspirin  81 mg Oral Daily    docusate sodium  100 mg Oral BID    magnesium oxide  400 mg Oral Daily    atorvastatin  40 mg Oral Nightly    metoprolol succinate  50 mg Oral Daily    sodium chloride flush  5-40 mL IntraVENous 2 times per day     Continuous Infusions:   sodium chloride       PRN Meds:. ALPRAZolam, sodium chloride flush, sodium chloride, polyethylene glycol, acetaminophen **OR** acetaminophen, albuterol      DATA:    CBC with Differential:    Lab Results   Component Value Date    WBC 16.5 06/06/2022    RBC 3.18 06/06/2022    HGB 9.5 06/06/2022    HCT 30.1 06/06/2022    HCT 29.0 05/26/2022     06/06/2022    MCV 94.7 06/06/2022    MCH 29.9 06/06/2022    MCHC 31.6 06/06/2022    RDW 14.6 06/06/2022 NRBC 0.0 01/17/2022    SEGSPCT 70 06/08/2011    LYMPHOPCT 7.8 06/03/2022    MONOPCT 8.7 06/03/2022    MYELOPCT 2.6 01/04/2022    BASOPCT 0.3 06/03/2022    MONOSABS 1.32 06/03/2022    LYMPHSABS 1.18 06/03/2022    EOSABS 0.38 06/03/2022    BASOSABS 0.00 06/03/2022     CMP:    Lab Results   Component Value Date     06/06/2022    K 3.9 06/06/2022    K 4.1 06/03/2022     06/06/2022    CO2 23 06/06/2022    BUN 19 06/06/2022    CREATININE 0.9 06/06/2022    GFRAA >60 06/06/2022    LABGLOM >60 06/06/2022    GLUCOSE 74 06/06/2022    PROT 5.4 05/27/2022    LABALBU 3.3 05/27/2022    CALCIUM 8.2 06/06/2022    BILITOT 0.8 05/27/2022    ALKPHOS 68 05/27/2022    AST 37 05/27/2022    ALT 12 05/27/2022     Magnesium:    Lab Results   Component Value Date    MG 2.2 06/02/2022     Phosphorus:    Lab Results   Component Value Date    PHOS 3.3 11/14/2016     RADIOLOGY REVIEWED. CT CHEST WO Contrast [place] 3, 2022   Left basilar disease, representing a combination of pleural effusion and   associated parenchymal consolidation within the left lower lobe.  A pneumonia   with a parapneumonic effusion is suspected.         CT Abdomen pelvis [place] 3, 2022   Moderate to marked bladder distension, as well as dilatation of the prostatic   urethra.  Nodular soft tissue is noted at the level of the prostate gland. Given history of prior prostate surgery, findings may represent residual   normal prostatic tissue.  However, if there is a clinical history of prostate   cancer, the possibility of recurrent or residual neoplastic disease cannot be   excluded.  Given dilatation of the prostatic urethra, as well as an enlarged,   fluid-filled bladder and bilateral hydroureteronephrosis, the possibility of   obstructive uropathy cannot be excluded.       Moderate stool within the colon, please correlate for constipation.  No   definitive evidence of bowel obstruction. .             BRIEF SUMMARY OF INITIAL CONSULT:    Briefly Mr. Anuradha Mcgowan is a 26-year-old man with history of HTN, type II DM, nephrolithiasis s/p lithotripsy, CAD, MVP with significant MR, MV endocarditis, paroxysmal SVT,multiple sclerosis, asbestosis, erectile dysfunction status post penile implant, BPH status post TURP, bladder cancer, Washburn's esophagus, recent admission for pneumonia complicated with right parapneumonic effusion s/p right thoracotomy with decortication who presented to the hospital today 5/23/22 for CABG and valve replacements with Dr. Pat Sullivan. However, the surgery was cancelled as Cardiothoracic surgery would like patient optimized before proceeding with procedure. To note, patient did have lab work drawn on 5/18/22 which demonstrated an elevated creatinine of 1.3mg/dL. Repeat Lab work today revealed creatinine 1.1mg/dL, ProBNP 1,002. Renal was consulted for optimization before surgery. His baseline creatinine is 0.8-0.9 mg/dL. Patient re-admitted 6/3/22 with complaint of chest pain and burning at surgical incision, IV antibiotics were initiated after CT Chest revealed probable pneumonia with para pneumonic effusion. Hernandez catheter was placed after CT Abdomen/Pelvis demonstrated dilation of the bladder with bilateral hydroureteroeronephrosis, 1500 ml urinary return after hernandez catheter insertion. Problems resolved:    · JAMIE, stage I,  2/2 obstructive uropathy--, hernandez catheter placed and urology consulted-- renal function improved over the last 24 hrs, creatinine 0.9 mg/dl today. · Hemodynamic shock, following CABG on vaso and epi to maintain MAP >65  · Lactic acidosis, 2/2 #2, resolved  · Recurrent JAMIE, resolved, creatinine down to 0.8 with excellent urine output. · UTI, ID consulted  · Hypocalcemia, secondary to vitamin D deficiency. IMPRESSION/RECOMMENDATIONS:      1. HFpEF, LVEF 60-65% on 4/7/22 echo. proBNP 1,002> 2470> [id number]. Edema improved, to continue Bumex 1 mg p.o. daily.   2. History of Nephrolithiasis, bilateral renal calculi  3. Obstructive Uropathy, bladder outlet obstruction, s/p hernandez catheter, followed by urology  4. BPH s/p TURP  5. Vitamin D deficiency, vitamin D 25 level 20, on cholecalciferol  -----------------------------------------------------------  6. CAD s/p 3V CABG with AVR, TVR, MVR on 5/25/2022, metoprolol  7. Possible pneumonia with para pneumonic effusion, on piperacillin   8. PAF, on amiodarone and metoprolol  9. History of lung decortication due to empyema   10. Multiple Sclerosis   11. Anemia s/p surgery  12.  Nutrition, on low fat/low cholesterol, high fiber no added salt diet    Plan:    · Continue Bumex 1 mg p.o. daily  · Continue cholecalciferol 1000 units p.o. daily   · Continue to monitor renal function   · OK for discharge from renal POV    Electronically signed by Wily Nolasco MD on 6/6/2022 at 11:39 AM

## 2022-06-06 NOTE — PROGRESS NOTES
Occupational Therapy  OCCUPATIONAL THERAPY INITIAL EVALUATION    [facility] [employer] [address]  [address] [place] [address]      Date:2022                                                Patient Name: Ava Subramanian  MRN: [id number]  : 1945  Room: [address] [place] [address] #[id number]    Referring Provider: Devendra Morse DO  Specific Provider Orders/Date: OT eval and treat 22    Diagnosis: Pneumonia [J18.9]  Pleural effusion [J90]  Acute urinary retention [R33.8]  Chest pain, unspecified type [R07.9]  Pneumonia due to infectious organism, unspecified laterality, unspecified part of lung [J18.9]   Pt admitted to hospital on 6/3/22 for chest pain   AV repair, MVR, TV repair, CABG x 3     Pertinent Medical History:  has a past medical history of Abnormal PSA, Anxiety, Bronchitis, Claustrophobia, Diabetes mellitus ([address] Endocarditis, Enlarged LA (left atrium), Erectile dysfunction, GERD (gastroesophageal reflux disease), H/O complete arterial study of extremity, Hiatal hernia, History of EMG, Hypertension, Lung nodule, Mitral valve prolapse, Moderate tricuspid regurgitation, Multiple sclerosis ([address] Prostatitis, Renal calculi, Sinus problem, Supraventricular arrhythmia, Tachycardia, and Tilt table evaluation.        Precautions:  Fall Risk, sternal precautions (s/p CABG on ), MS mary    Assessment of current deficits   [x] Functional mobility  [x]ADLs  [x] Strength               []Cognition    [x] Functional transfers   [x] IADLs         [x] Safety Awareness   [x]Endurance    [] Fine Coordination              [x] Balance      [] Vision/perception   []Sensation     []Gross Motor Coordination  [] ROM  [] Delirium                   [] Motor Control     OT PLAN OF CARE   OT POC based on physician orders, patient diagnosis and results of clinical assessment    Frequency/Duration 1-3 days/wk for 2 weeks PRN   Specific OT Treatment Interventions to include:   * Instruction/training on adapted ADL techniques and AE recommendations to increase functional independence within precautions       * Training on energy conservation strategies, correct breathing pattern and techniques to improve independence/tolerance for self-care routine  * Functional transfer/mobility training/DME recommendations for increased independence, safety, and fall prevention  * Patient/Family education to increase follow through with safety techniques and functional independence  * Recommendation of environmental modifications for increased safety with functional transfers/mobility and ADLs  * Therapeutic exercise to improve motor endurance, ROM, and functional strength for ADLs/functional transfers  * Therapeutic activities to facilitate/challenge dynamic balance, stand tolerance for increased safety and independence with ADLs  * Therapeutic activities to facilitate gross/fine motor skills for increased independence with ADLs  Other: sternal precautions    Recommended Adaptive Equipment: TBD     Home Living: Pt admitted from [place] [id number]. Pt lives with spouse in [facility]  [facility]   Bathroom setup: walk in shower with built in shower seat, elevated commode   Equipment owned: elevated commode  Per pt, daughter lives close by    Prior Level of Function (prior to recent CABG): independent/mod I with ADLs , independent/mod I with IADLs; ambulated w/ w/w or cane PRN   Driving:  Yes (prior to recent CABG)    Pain Level: Pt c/o no pain this session     Cognition: A&O: 4/4; Follows 1 step directions   Memory:  good    Sequencing:  good    Problem solving:  fair    Judgement/safety:  fair      Functional Assessment:  AM-PAC Daily Activity Raw Score: 15/24   Initial Eval Status  Date: 6/6/22 Treatment Status  Date: STGs = LTGs  Time frame: 10-14 days   Feeding Supervision  Independent   Grooming Minimal Assist   Modified [place]    UB Dressing Minimal Assist   Gown - simulated t-shirt  Modified [place]    LB Dressing Maximal Assist   Minimal Assist    Bathing Moderate Assist  Stand by Assist    Toileting Moderate Assist   Stand by Assist    Bed Mobility  NT  Supine to sit: Supervision   Sit to supine: Supervision    Functional Transfers Sit>stand (low commode): Moderate Assist     Stand>sit chair: Min A  Supervision    Functional Mobility Minimal Assist w/o AD  In room, bathroom  Supervision    Balance Sitting:     Static:  Supervision    Dynamic:Min A  Standing: Min A, no AD     Activity Tolerance Fair  Good   Visual/  Perceptual Glasses: readers                  Hand Dominance R   AROM (PROM) Strength Additional Info:    RUE  WFL  Within sternal precautions Deferred, sternal precations good  and wfl FMC/dexterity noted during ADL tasks       LUE WFL  Within precautions Deferred good  and wfl FMC/dexterity noted during ADL tasks       Hearing: WFL  Sensation:  No c/o numbness or tingling   Tone: WFL   Edema: B feet/ankles    Comments: Upon arrival patient seated on commode (PCA present). Therapist educated pt on role of OT. At end of session, patient seated in recliner chair with call light and phone within reach, all lines and tubes intact. Overall patient demonstrated decreased independence and safety during completion of ADL/functional transfer/mobility tasks. Pt would benefit from continued skilled OT to increase safety and independence with completion of ADL/IADL tasks for functional independence and quality of life. Treatment: OT treatment provided this date includes: Therapist educated pt on role of OT and sternal precautions. Therapist facilitated functional transfers (various surfaces; chair and toilet), standing tolerance tasks (addressing posture, balance and activity tolerance) and functional ambulation task without AD - skilled cuing on sternal precautions, use of sternal pillow, body mechanics and safety.   Therapist facilitated self-care retraining: education regarding UB/LB self-care tasks (gown, bathing task, toileting w/ education on modified techniques and limitations with precautions - pt verbalized understanding). Skilled monitoring of HR, O2 sats and pts response to treatment. Pt on room air. O2 sat=^96% post ambulation task. No c/o SOB    Rehab Potential: Good for established goals     Patient / Family Goal: not stated      Patient and/or family were instructed on functional diagnosis, prognosis/goals and OT plan of care. Demonstrated fair understanding. Eval Complexity: Low    Time In: 09:15  Time Out: 09:30      Min Units   OT Eval Low 97165  X  1   OT Eval Medium 91242      OT Eval High 47482      OT Re-Eval [id number]       Therapeutic Ex 67697       Therapeutic Activities 33483       ADL/Self Care 84722       Orthotic Management 59634       Manual 99174     Neuro Re-Ed 16279       Non-Billable Time          Evaluation Time additionally includes thorough review of current medical information, gathering information on past medical history/social history and prior level of function, interpretation of standardized testing/informal observation of tasks, assessment of data and development of plan of care and goals.         Kandice OTR/L #[id number]

## 2022-06-06 NOTE — CARE COORDINATION
Met with pt to verify discharge plan. Pt will now be going home, son to stay with pt and they will get private pay caregivers. Pt will need hhc, called and left message for [facility] to return call. Bedside RN to check to see if pt will discharge home with hernandez and will obtain hhc orders. 11:40am pt to discharge with hernandez, Donita unable to follow hernandez's. Spoke with step daughter Carlos Or, who would like pt to stay until tomorrow until they have aid services in place, explained pt is discharging and we don't hold discharges for non medical reasons. Spoke with Latha Ziegler son in room who will be staying with pt, he is fine with pt going home today, the have private pay caregivers coming into home at [employer] tomorrow. Updated Sallie miranda. Referral to [facility], will await assessment. Jerrol Spurling, MSW, LSW

## 2022-06-06 NOTE — PROGRESS NOTES
Hospital Medicine    Subjective:  Pt seen this am family member at bedside      Current Facility-Administered Medications:     gabapentin (NEURONTIN) capsule 100 mg, 100 mg, Oral, BID, DERRELL Meng, 100 mg at 06/06/22 0916    piperacillin-tazobactam (ZOSYN) 3,375 mg in dextrose 5 % 100 mL IVPB extended infusion (mini-bag), 3,375 mg, IntraVENous, Q8H, DERRELL Meng, Stopped at 06/06/22 1250    bumetanide (BUMEX) tablet 1 mg, 1 mg, Oral, Daily, [place] Cassette, APRN - CNP, 1 mg at 06/06/22 0916    Vitamin D (CHOLECALCIFEROL) tablet 1,000 Units, 1,000 Units, Oral, Daily, [place] Cassette, APRN - CNP, 1,000 Units at 06/06/22 1679    ALPRAZolam Kayla Purvi) tablet 0.25 mg, 0.25 mg, Oral, Nightly PRN, Mikey Reeves Malmer, DO, 0.25 mg at 06/05/22 2335    amiodarone (CORDARONE) tablet 200 mg, 200 mg, Oral, Daily, Mikey Reeves Malmer, DO, 200 mg at 06/06/22 9471    apixaban (ELIQUIS) tablet 5 mg, 5 mg, Oral, BID, Mikey Reeves Malmer, DO, 5 mg at 06/06/22 4298    ascorbic acid (VITAMIN C) tablet 500 mg, 500 mg, Oral, BID, Mikey Reeves Malmer, DO, 500 mg at 06/06/22 5813    aspirin EC tablet 81 mg, 81 mg, Oral, Daily, Mikey Reeves Malmer, DO, 81 mg at 06/06/22 6725    docusate sodium (COLACE) capsule 100 mg, 100 mg, Oral, BID, Mikey Reeves Malmer, DO, 100 mg at 06/06/22 5784    magnesium oxide (MAG-OX) tablet 400 mg, 400 mg, Oral, Daily, Mikey Reeves Malmer, DO, 400 mg at 06/06/22 6718    atorvastatin (LIPITOR) tablet 40 mg, 40 mg, Oral, Nightly, Mikey Reeves Malmer, DO, 40 mg at 06/05/22 2138    metoprolol succinate (TOPROL XL) extended release tablet 50 mg, 50 mg, Oral, Daily, Mikey Jairo Farnsworth DO, 50 mg at 06/06/22 2258    sodium chloride flush 0.9 % injection 5-40 mL, 5-40 mL, IntraVENous, 2 times per day, Konrad Baker DO, 10 mL at 06/06/22 7514    sodium chloride flush 0.9 % injection 5-40 mL, 5-40 mL, IntraVENous, PRN, Mikey Jairo Farnsworth DO    0.9 % sodium chloride infusion, , IntraVENous, PRN, Konrad Baker DO  [facility] polyethylene glycol (GLYCOLAX) packet 17 g, 17 g, Oral, Daily PRN, Chano Farnsworth,     acetaminophen (TYLENOL) tablet 650 mg, 650 mg, Oral, Q6H PRN, 650 mg at 06/05/22 0929 **OR** acetaminophen (TYLENOL) suppository 650 mg, 650 mg, Rectal, Q6H PRN, Chano Farnsworth,     albuterol (PROVENTIL) nebulizer solution 2.5 mg, 2.5 mg, Nebulization, Q6H PRN, Chano Farnsworth, DO    Objective:    /82   Pulse 87   Temp 97.1 °F (36.2 °C) (Temporal)   Resp 16   Ht 5' 10\" (1.778 m)   Wt 174 lb (78.9 kg)   SpO2 96% Comment: 96% walking; 99% recovery  BMI 24.97 kg/m²     Heart:  reg  Lungs:  ctab  Abd: + bs soft nontender  Extrem:  Edema legs    CBC with Differential:    Lab Results   Component Value Date    WBC 16.5 06/06/2022    RBC 3.18 06/06/2022    HGB 9.5 06/06/2022    HCT 30.1 06/06/2022    HCT 29.0 05/26/2022     06/06/2022    MCV 94.7 06/06/2022    MCH 29.9 06/06/2022    MCHC 31.6 06/06/2022    RDW 14.6 06/06/2022    NRBC 0.0 01/17/2022    SEGSPCT 70 06/08/2011    LYMPHOPCT 7.8 06/03/2022    MONOPCT 8.7 06/03/2022    MYELOPCT 2.6 01/04/2022    BASOPCT 0.3 06/03/2022    MONOSABS 1.32 06/03/2022    LYMPHSABS 1.18 06/03/2022    EOSABS 0.38 06/03/2022    BASOSABS 0.00 06/03/2022     CMP:    Lab Results   Component Value Date     06/06/2022    K 3.9 06/06/2022    K 4.1 06/03/2022     06/06/2022    CO2 23 06/06/2022    BUN 19 06/06/2022    CREATININE 0.9 06/06/2022    GFRAA >60 06/06/2022    LABGLOM >60 06/06/2022    GLUCOSE 74 06/06/2022    PROT 5.4 05/27/2022    LABALBU 3.3 05/27/2022    CALCIUM 8.2 06/06/2022    BILITOT 0.8 05/27/2022    ALKPHOS 68 05/27/2022    AST 37 05/27/2022    ALT 12 05/27/2022     Warfarin PT/INR:    Lab Results   Component Value Date    INR 1.5 05/25/2022    INR 1.3 05/24/2022    INR 1.1 05/18/2022    PROTIME 17.3 (H) 05/25/2022    PROTIME 14.0 (H) 05/24/2022    PROTIME 12.2 05/18/2022       Assessment:    Principal Problem:    Acute urinary retention  Resolved Problems:    * No resolved hospital problems.  *      Plan:  Dc home with home health        Zay Arciniega,   1:57 PM  6/6/2022

## 2022-06-15 ENCOUNTER — TELEPHONE (OUTPATIENT)
Dept: CARDIOLOGY CLINIC | Age: 77
End: 2022-06-15

## 2022-06-15 ENCOUNTER — HOSPITAL ENCOUNTER (OUTPATIENT)
Dept: CARDIAC REHAB | Age: 77
Setting detail: THERAPIES SERIES
Discharge: HOME | End: 2022-06-15

## 2022-06-15 NOTE — DISCHARGE SUMMARY
Physician Discharge Summary     Patient ID:  Sonu Ramirez  [id number]  76 y.o.  1945    Admit date: 5/23/2022    Discharge date and time: 6/2/2022  4:59 PM     Admitting Physician: Cathy Lee MD     Discharge Physician: Cathy Lee MD    Discharge Diagnoses:   POSTOPERATIVE DIAGNOSES:  Moderate aortic insufficiency, moderate mitral  stenosis, severe mitral regurgitation, severe tricuspid regurgitation,  severe multivessel coronary artery disease, history of SVT, history of  right-sided empyema with thoracotomy, anxiety, bronchitis, diabetes,  history of possible endocarditis, hiatal hernia, hypertension, multiple  sclerosis, prostatitis. OPERATION:  1. Urgent sternotomy. 2.  Urgent aortic valve repair with bileaflet debridement. 3.  Complex mitral valve replacement with annular debridement and a  33-mm Medtronic Mosaic bioprosthesis. 4.  Tricuspid valve repair by bicuspidization. 5.  Coronary artery bypass grafting x3; left internal mammary artery to  the LAD, saphenous vein graft to the diagonal branch to the LAD,  saphenous vein graft to the PDA. 6.  Left atrial appendage exclusion with a 40-mm AtriClip. 7.  Endoscopic harvesting of right lower extremity greater saphenous  vein. 8.  Rigid internal fixation of sternum with Graham plates x2.  9.  22 Modifier.     Discharged Condition: stable    Indication for Admission:   Moderate aortic insufficiency, moderate mitral stenosis,  severe mitral regurgitation, severe tricuspid regurgitation, severe  multivessel coronary artery disease, history of SVT, history of  right-sided empyema with thoracotomy, anxiety, bronchitis, diabetes,  history of possible endocarditis, hiatal hernia, hypertension, multiple  sclerosis, prostatitis    Hospital Course: Course as above, and on 5/25/2022 the patient underwent Urgent sternotomy/Urgent Aortic valve repair by leaflet debridement/Complex mitral valve replacement with annular debridement and a 33mm Medtronic Mosaic bioprosthetic/Tricuspid valve repair by bicuspidization/CABG x 3 (LIMA-LAD, SVG-Diag, SVG-PDA)/WALLACE exclusion with 40mm AtriClip/EVH RLE/Rigid internal fixation of the sternum with Juan Carlos plates x 2. Postoperatively, he was transferred to the CVICU in guarded stable condition and extubated once indications met. Once appropriate, he was transferred to the monitored stepdown unit and beta blockade was initiated. Nephrology was consulted preoperatively for JAMIE and a preop serum creatinine of 1.3. He was diuresed per nephrology. He experienced urinary retention with urology seeing him preoperatively recommending continuing his Davidson catheter until he was more ambulatory. This was removed prior to discharge and he was voiding without difficulty. He was treated for a preoperative UTI and ID was following. The mediastinal/pleural chest tubes, and epicardial pacing wires were discontinued in the usual fashion. Supplemental oxygen was weaned off, all he was deconditioned requiring continued PT/OT, and he was deemed ready for discharge. He was discharged to rehab on POD 8 (6/2/2022) in stable condition with a post-operative appointment scheduled.        Consults: cardiology, ID, nephrology and urology    Discharge Exam:  Vitals          Vitals:     06/01/22 2310 06/02/22 0300 06/02/22 0608 06/02/22 0744   BP: 133/61 135/62   (!) 114/57   Pulse: 76 75   80   Resp: 16 16   18   Temp: 97.2 °F (36.2 °C) 97.2 °F (36.2 °C)   97.3 °F (36.3 °C)   TempSrc: Temporal Temporal   Temporal   SpO2: 96% 95%   98%   Weight:     186 lb (84.4 kg)     Height:                 O2: none        Intake/Output Summary (Last 24 hours) at 6/2/2022 0834  Last data filed at 6/2/2022 0811      Gross per 24 hour   Intake 1580 ml   Output 1280 ml   Net 300 ml            +BM on 6/1     UO: 275+ Voids without difficulty                 Recent Labs     05/31/22  0631 06/01/22  0538 06/02/22  0550   WBC 12.2* 10.9 10.6   HGB 9.8* 9.5* 10.1*   HCT 30.1* 28.6* 30.7*    234 293            Recent Labs     05/31/22  0631 06/01/22  0538 06/02/22  0550   BUN 32* 39* 36*   CREATININE 0.9 1.0 0.9            Telemetry: SR     PE  Cardiac: RRR  Lungs: decreased bases  Chest incision C/D/I, approximated, no erythema. Sternum stable. Prior chest tube site incisions C/D/I, no erythema with intact sutures.    Abd: Soft, nontender, +BS  Ext: Incisions C/D/I, approximated, no erythema, + edema (improving edema)         Disposition: rehab    Patient Instructions:      Medication List      START taking these medications    ascorbic acid 500 MG tablet  Commonly known as: VITAMIN C  Take 1 tablet by mouth 2 times daily     aspirin 81 MG EC tablet  Take 1 tablet by mouth daily     docusate sodium 100 MG capsule  Commonly known as: COLACE  Take 1 capsule by mouth 2 times daily as needed for Constipation     Vitamin D 25 MCG (1000 UT) Tabs tablet  Commonly known as: CHOLECALCIFEROL  Take 1 tablet by mouth daily        CONTINUE taking these medications    albuterol sulfate  (90 Base) MCG/ACT inhaler  Commonly known as: PROVENTIL;VENTOLIN;PROAIR     ALPRAZolam 0.25 MG tablet  Commonly known as: XANAX     fluticasone 50 MCG/ACT nasal spray  Commonly known as: FLONASE  USE 1 SPRAY NASALLY DAILY     REFRESH DRY EYE THERAPY OP     senna 8.6 MG tablet  Commonly known as: SENOKOT        STOP taking these medications    amiodarone 200 MG tablet  Commonly known as: CORDARONE     baclofen 10 MG tablet  Commonly known as: LIORESAL     gabapentin 300 MG capsule  Commonly known as: NEURONTIN     meclizine 12.5 MG tablet  Commonly known as: ANTIVERT     metoprolol succinate 50 MG extended release tablet  Commonly known as: TOPROL XL     oxyCODONE-acetaminophen 5-325 MG per tablet  Commonly known as: PERCOCET           Where to Get Your Medications      You can get these medications from any pharmacy    Bring a paper prescription for each of these medications  · ascorbic acid 500 MG tablet  · aspirin 81 MG EC tablet  · docusate sodium 100 MG capsule  · Vitamin D 25 MCG (1000 UT) Tabs tablet       Activity: sternal precautions  Diet: cardiac diet  Wound Care: as directed    Follow-up with   Future Appointments   Date Time Provider Matilde Salasi   6/28/2022  9:15 AM Muna Quezada MD CARDIO SURG [facility]   7/29/2022 11:30 AM Rosangela Arciniega MD [facility] CARDIO [facility]       Smoking cessation education provided prior to discharge    Discussed with patient the benefits of participation in cardiac rehab after discharge once approved safe by the surgeon and that a referral will be made at the follow up appointment. Pt verbalized comprehension.     Signed:  Electronically signed by SHANIQUE Zhu CNP on 6/15/2022 at 12:48 PM

## 2022-06-15 NOTE — TELEPHONE ENCOUNTER
Family called in [facility] patient weighed #173 on Saturday and is now at #179 - most of the weight gain was between Sunday and Monday    He has some swelling in his legs and abdomen.   He is taking Bumex 1mg daily    [phone]

## 2022-06-20 ENCOUNTER — TELEPHONE (OUTPATIENT)
Dept: CARDIOLOGY CLINIC | Age: 77
End: 2022-06-20

## 2022-06-20 DIAGNOSIS — I10 PRIMARY HYPERTENSION: ICD-10-CM

## 2022-06-20 DIAGNOSIS — I25.10 CORONARY ARTERY DISEASE INVOLVING NATIVE HEART, UNSPECIFIED VESSEL OR LESION TYPE, UNSPECIFIED WHETHER ANGINA PRESENT: Primary | ICD-10-CM

## 2022-06-20 NOTE — TELEPHONE ENCOUNTER
MANSOOR RUSHTON CALLED FOR AUGUST. HE HAD BEEN TAKING HIS DIURETIC TID FOR 3 DAYS. SHE WANTS TO KNOW WHAT HE IS SUPPOSED TO DO NOW. PER DR PIKE  I TOLD WANDY HIDALGO THAT HE WAS ONLY SUPPOSED TO TAKE THE DIURETIC TWICE DAILY. HE IS TO CONTINUE TWICE DAILY AND GET A BMP AND BNP IN ONE WEEK.     I GAVE WANDY HIDALGO THE ABOVE INSTRUCTIONS, AND FAXED THE LAB ORDERS TO [facility] [address]

## 2022-06-23 ENCOUNTER — HOSPITAL ENCOUNTER (OUTPATIENT)
Dept: CARDIAC REHAB | Age: 77
Setting detail: THERAPIES SERIES
Discharge: HOME | End: 2022-06-23

## 2022-06-24 ENCOUNTER — HOSPITAL ENCOUNTER (OUTPATIENT)
Dept: CARDIAC REHAB | Age: 77
Setting detail: THERAPIES SERIES
Discharge: HOME | End: 2022-06-24

## 2022-06-27 NOTE — PROGRESS NOTES
CC: S/P CABG, AV debridement, MVR, TVrp post-operative follow up visit      HPI:  Sonu Cross is a 76 y.o. male whom presents to the office today for routine post-operative follow-up status post: CABG x3, AV debridement, MVR, TVrp on 5/25/22. After discharge, he was readmitted with pneumonia. He has been following with cardiology and recently increased bumex due to weight gain Currently, there are no complaints of any CP, SOB, major concerns, or incisional issues. Review of Systems:   Constitutional: Negative for fever and chills. Respiratory: Negative for cough, chest tightness and shortness of breath. Cardiovascular: Negative for chest pain, palpitations and leg swelling. Gastrointestinal: Negative for nausea, diarrhea and constipation. Musculoskeletal: Negative for back pain. Skin: Negative for color change. Neurological: Negative for dizziness, syncope and light-headedness. Objective:   Physical Exam   Constitutional: oriented to person, place, and time. No distress. Cardiovascular: Normal rate. Pulmonary/Chest: Effort normal. No respiratory distress. midsternal and chest tube incisions well healed without evidence of infection. Sternum stable. Abdominal: Soft. Bowel sounds are normal.   Musculoskeletal: Normal range of motion. Exhibits no edema. Neurological: alert and oriented to person, place, and time. Skin: Skin is warm and dry. No erythema. Vein harvest incisions intact without evidence of infection   Psychiatric: normal mood and affect. Assessment:      S/P AVR bileaflet debridement, MVR, TVrp, CABG x3      Plan:      Remove sternal precautions on 7/6/22  Cardiac rehab referral  Continue follow up with PCP, Cardiology as scheduled. Encouraged to call office with any questions, concerns. Otherwise no further follow up necessary from CTS standpoint.

## 2022-06-28 ENCOUNTER — OFFICE VISIT (OUTPATIENT)
Dept: CARDIOTHORACIC SURGERY | Age: 77
End: 2022-06-28

## 2022-06-28 VITALS
HEIGHT: 70 IN | DIASTOLIC BLOOD PRESSURE: 74 MMHG | SYSTOLIC BLOOD PRESSURE: 137 MMHG | WEIGHT: 174 LBS | BODY MASS INDEX: 24.91 KG/M2 | HEART RATE: 77 BPM

## 2022-06-28 DIAGNOSIS — Z95.1 S/P CABG (CORONARY ARTERY BYPASS GRAFT): Primary | ICD-10-CM

## 2022-06-28 PROCEDURE — 99024 POSTOP FOLLOW-UP VISIT: CPT

## 2022-06-29 ENCOUNTER — HOSPITAL ENCOUNTER (OUTPATIENT)
Dept: CARDIAC REHAB | Age: 77
Setting detail: THERAPIES SERIES
Discharge: HOME | End: 2022-06-29

## 2022-06-30 ENCOUNTER — HOSPITAL ENCOUNTER (OUTPATIENT)
Dept: CARDIAC REHAB | Age: 77
Setting detail: THERAPIES SERIES
Discharge: HOME | End: 2022-06-30

## 2022-07-01 ENCOUNTER — HOSPITAL ENCOUNTER (OUTPATIENT)
Dept: CARDIAC REHAB | Age: 77
Setting detail: THERAPIES SERIES
Discharge: HOME | End: 2022-07-01

## 2022-07-08 NOTE — DISCHARGE SUMMARY
[address]                  [address] [facility]  [facility]                               DISCHARGE SUMMARY    PATIENT NAME: Paz Todd                    :        1945  MED REC NO:   [id number]                            ROOM:       2919  ACCOUNT NO:   [de-identified]                           ADMIT DATE: 2022  PROVIDER:     Rinku Max DO                  DISCHARGE DATE:  2022    DISCHARGE DIAGNOSES:  1. Acute urinary retention. 2.  Status post recent aortic valve repair. 3.  Mitral valve replacement. 4.  Tricuspid valve repair. 5.  Coronary artery bypass graft. 6.  Multiple sclerosis. 7.  Coronary artery disease. 8.  Bilateral hydroureteronephrosis. 9.  Acute kidney injury. HOSPITAL COURSE:  The patient is a 59-year-old  male who  presented to the emergency room complaining of a chest, shoulder, and  abdominal pain; inability to urinate. He had significant acute urinary  retention with bilateral hydroureteronephrosis and moderate stool on CT  scan. Urinary catheter was inserted with resolution of pain. He was  assigned observation status. He was seen by Cardiothoracic Surgery,  Urology, and Renal.  His status stabilized and he was eventually  discharged to home in stable condition on 2022. DISCHARGE MEDICATIONS:  As per discharge med rec which include,  1. Gabapentin. 2.  Albuterol inhaler p.r.n.  3.  Xanax. 4.  Amiodarone. 5.  Eliquis. 6.  Vitamin C.  7.  Aspirin. 8.  Astelin nasal spray. 9.  Bumex. 10.  Ferrous sulfate. 11.  Flonase. 12.  Folic acid. 13.  Mag-Ox. 14.  Toprol-XL. 15.  Refresh eye drops. 16.  Senokot p.r.n.  17.  Vitamin D. 18.  Augmentin. 19.  Colace. DISCHARGE INSTRUCTIONS:  The patient was instructed to follow up with  Dr. Collette Gondola, call office for appointment.   Follow up with Cardiology,  Cardiothoracic Surgery, Urology, and Renal; call office for appointment.         Katerin Melendez DO    D: 07/07/2022 12:42:34       T: 07/07/2022 12:45:03     [id number]  Job#: [id number]     Doc#: [id number]    CC:

## 2022-07-29 ENCOUNTER — OFFICE VISIT (OUTPATIENT)
Dept: CARDIOLOGY CLINIC | Age: 77
End: 2022-07-29
Payer: MEDICARE

## 2022-07-29 VITALS
HEART RATE: 65 BPM | SYSTOLIC BLOOD PRESSURE: 100 MMHG | RESPIRATION RATE: 12 BRPM | HEIGHT: 70 IN | BODY MASS INDEX: 26.48 KG/M2 | DIASTOLIC BLOOD PRESSURE: 60 MMHG | WEIGHT: 185 LBS

## 2022-07-29 DIAGNOSIS — Z90.79 H/O TRANSURETHRAL RESECTION OF PROSTATE: ICD-10-CM

## 2022-07-29 DIAGNOSIS — I38 VHD (VALVULAR HEART DISEASE): ICD-10-CM

## 2022-07-29 DIAGNOSIS — Z85.51 HISTORY OF BLADDER CANCER: ICD-10-CM

## 2022-07-29 DIAGNOSIS — G35 MULTIPLE SCLEROSIS (HCC): ICD-10-CM

## 2022-07-29 DIAGNOSIS — Z95.2 S/P MVR (MITRAL VALVE REPLACEMENT): ICD-10-CM

## 2022-07-29 DIAGNOSIS — Z87.09 H/O PLEURAL EFFUSION: ICD-10-CM

## 2022-07-29 DIAGNOSIS — I47.1 SVT (SUPRAVENTRICULAR TACHYCARDIA) (HCC): ICD-10-CM

## 2022-07-29 DIAGNOSIS — F41.9 ANXIETY: ICD-10-CM

## 2022-07-29 DIAGNOSIS — Z98.890 S/P BALLOON DILATATION OF ESOPHAGEAL STRICTURE: ICD-10-CM

## 2022-07-29 DIAGNOSIS — I25.10 CAD IN NATIVE ARTERY: Primary | ICD-10-CM

## 2022-07-29 DIAGNOSIS — Z98.890 H/O TRANSURETHRAL RESECTION OF PROSTATE: ICD-10-CM

## 2022-07-29 DIAGNOSIS — Z98.890 S/P TVR (TRICUSPID VALVE REPAIR): ICD-10-CM

## 2022-07-29 DIAGNOSIS — Z87.19 H/O GASTRITIS: ICD-10-CM

## 2022-07-29 DIAGNOSIS — K44.9 HIATAL HERNIA WITH GASTROESOPHAGEAL REFLUX: ICD-10-CM

## 2022-07-29 DIAGNOSIS — J61 ASBESTOSIS (HCC): ICD-10-CM

## 2022-07-29 DIAGNOSIS — F40.240 CLAUSTROPHOBIA: ICD-10-CM

## 2022-07-29 DIAGNOSIS — Z87.448 H/O ACUTE RENAL FAILURE: ICD-10-CM

## 2022-07-29 DIAGNOSIS — Z86.79 H/O ENDOCARDITIS: ICD-10-CM

## 2022-07-29 DIAGNOSIS — N52.9 ERECTILE DYSFUNCTION, UNSPECIFIED ERECTILE DYSFUNCTION TYPE: ICD-10-CM

## 2022-07-29 DIAGNOSIS — E11.9 DIET-CONTROLLED DIABETES MELLITUS (HCC): ICD-10-CM

## 2022-07-29 DIAGNOSIS — F41.0 PANIC ATTACKS: ICD-10-CM

## 2022-07-29 DIAGNOSIS — Z87.01 H/O: PNEUMONIA: ICD-10-CM

## 2022-07-29 DIAGNOSIS — D62 ACUTE BLOOD LOSS ANEMIA: ICD-10-CM

## 2022-07-29 DIAGNOSIS — Z95.1 HX OF CABG: ICD-10-CM

## 2022-07-29 DIAGNOSIS — Z98.890: ICD-10-CM

## 2022-07-29 DIAGNOSIS — K22.70 BARRETT'S ESOPHAGUS WITHOUT DYSPLASIA: ICD-10-CM

## 2022-07-29 DIAGNOSIS — K21.9 HIATAL HERNIA WITH GASTROESOPHAGEAL REFLUX: ICD-10-CM

## 2022-07-29 DIAGNOSIS — Z98.890 S/P AORTIC VALVE REPAIR: ICD-10-CM

## 2022-07-29 DIAGNOSIS — I10 PRIMARY HYPERTENSION: ICD-10-CM

## 2022-07-29 PROCEDURE — 3044F HG A1C LEVEL LT 7.0%: CPT | Performed by: INTERNAL MEDICINE

## 2022-07-29 PROCEDURE — 1123F ACP DISCUSS/DSCN MKR DOCD: CPT | Performed by: INTERNAL MEDICINE

## 2022-07-29 PROCEDURE — 99215 OFFICE O/P EST HI 40 MIN: CPT | Performed by: INTERNAL MEDICINE

## 2022-07-29 PROCEDURE — 93000 ELECTROCARDIOGRAM COMPLETE: CPT | Performed by: INTERNAL MEDICINE

## 2022-07-29 RX ORDER — SODIUM CHLORIDE 5 %
1 OINTMENT (GRAM) OPHTHALMIC (EYE) NIGHTLY
COMMUNITY

## 2022-07-29 RX ORDER — SODIUM CHLORIDE 20 MG/ML
1 SOLUTION OPHTHALMIC 3 TIMES DAILY
COMMUNITY
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
## 2022-08-15 NOTE — TELEPHONE ENCOUNTER
Patient was last seen in office 1/7/21 patient would like a prescription refill for astelin. Called and left a detailed voicemail to schedule a follow up appointment.

## 2022-08-16 RX ORDER — AZELASTINE HYDROCHLORIDE 137 UG/1
SPRAY, METERED NASAL
Qty: 90 ML | Refills: 2 | Status: SHIPPED | OUTPATIENT
Start: 2022-08-16

## 2022-09-30 ENCOUNTER — TELEPHONE (OUTPATIENT)
Dept: ADMINISTRATIVE | Age: 77
End: 2022-09-30

## 2022-09-30 NOTE — TELEPHONE ENCOUNTER
Pt as last seen on 01/07/2021, He is asking to be seen soon as he is having Vertigo. No availability at this time, Pt would like a call back.

## 2022-10-03 NOTE — TELEPHONE ENCOUNTER
ABHIJIT MARTINS to schedule patient.  Can offer Vilma Tran in [facility] 10/12/22    Electronically signed by Ame Jerez on 10/3/22 at 8:17 AM EDT

## 2022-11-07 ENCOUNTER — OFFICE VISIT (OUTPATIENT)
Dept: ENT CLINIC | Age: 77
End: 2022-11-07
Payer: MEDICARE

## 2022-11-07 VITALS
HEIGHT: 70 IN | DIASTOLIC BLOOD PRESSURE: 63 MMHG | BODY MASS INDEX: 27.2 KG/M2 | HEART RATE: 70 BPM | OXYGEN SATURATION: 98 % | SYSTOLIC BLOOD PRESSURE: 129 MMHG | WEIGHT: 190 LBS | RESPIRATION RATE: 18 BRPM

## 2022-11-07 DIAGNOSIS — R42 VERTIGO: Primary | ICD-10-CM

## 2022-11-07 DIAGNOSIS — H61.23 BILATERAL IMPACTED CERUMEN: ICD-10-CM

## 2022-11-07 PROCEDURE — 69210 REMOVE IMPACTED EAR WAX UNI: CPT

## 2022-11-07 PROCEDURE — 3074F SYST BP LT 130 MM HG: CPT

## 2022-11-07 PROCEDURE — 3078F DIAST BP <80 MM HG: CPT

## 2022-11-07 PROCEDURE — 1123F ACP DISCUSS/DSCN MKR DOCD: CPT

## 2022-11-07 PROCEDURE — 99213 OFFICE O/P EST LOW 20 MIN: CPT

## 2022-11-07 ASSESSMENT — ENCOUNTER SYMPTOMS
EYE DISCHARGE: 0
RHINORRHEA: 0
VOMITING: 0
BACK PAIN: 0
SORE THROAT: 0
SHORTNESS OF BREATH: 0
SINUS PRESSURE: 0
EYE PAIN: 0
COUGH: 0
ALLERGIC/IMMUNOLOGIC NEGATIVE: 1
DIARRHEA: 0
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
## 2022-11-08 ENCOUNTER — TELEPHONE (OUTPATIENT)
Dept: AUDIOLOGY | Age: 77
End: 2022-11-08

## 2022-11-08 ENCOUNTER — TELEPHONE (OUTPATIENT)
Dept: ENT CLINIC | Age: 77
End: 2022-11-08

## 2022-11-08 NOTE — TELEPHONE ENCOUNTER
Pt called to state he does not want the VNG testing at this time, but will keep appt on 02-07-22 for routine care.

## 2022-11-08 NOTE — TELEPHONE ENCOUNTER
Referral for VNG/ENG received. Called patient and spoke to them, however they do not wish to schedule at this time. Will send this note to MATT Escalante CNP.     Electronically signed by Sammy Wiseman on 11/8/22 at 1:59 PM EST

## 2022-11-08 NOTE — TELEPHONE ENCOUNTER
Patient does not wish to have the VNG done at this time.      Electronically signed by Lan Huynh MA on 11/8/22 at 9:47 AM EST

## 2022-11-09 ENCOUNTER — TELEPHONE (OUTPATIENT)
Dept: NEUROLOGY | Age: 77
End: 2022-11-09

## 2022-11-10 NOTE — TELEPHONE ENCOUNTER
Patient had questions about his Gabapentin, informed him that he should contact the prescribing provider.

## 2022-11-30 ENCOUNTER — SCHEDULED TELEPHONE ENCOUNTER (OUTPATIENT)
Dept: NEUROLOGY | Age: 77
End: 2022-11-30
Payer: MEDICARE

## 2022-11-30 DIAGNOSIS — M54.17 L-S RADICULOPATHY: ICD-10-CM

## 2022-11-30 DIAGNOSIS — G35 MULTIPLE SCLEROSIS (HCC): Primary | ICD-10-CM

## 2022-11-30 PROCEDURE — 99448 NTRPROF PH1/NTRNET/EHR 21-30: CPT | Performed by: PSYCHIATRY & NEUROLOGY

## 2022-11-30 RX ORDER — GABAPENTIN 300 MG/1
300 CAPSULE ORAL 2 TIMES DAILY
COMMUNITY

## 2022-11-30 RX ORDER — TADALAFIL 5 MG/1
5 TABLET ORAL DAILY
COMMUNITY

## 2022-11-30 NOTE — PROGRESS NOTES
Sonu Vasquez was read the following message We want to confirm that, for purposes of billing, this is a virtual visit with your provider for which we will submit a claim for reimbursement with your insurance company. You will be responsible for any copays, coinsurance amounts or other amounts not covered by your insurance company. If you do not accept this, unfortunately we will not be able to schedule or proceed with a virtual visit with the provider. Do you accept? August responded Yes .

## 2022-11-30 NOTE — PROGRESS NOTES
How Severe Is Your Skin Lesion?: mild Sonu Vasquez was a 53-year-old right-handed man who presented for follow up of longstanding multiple sclerosis. He remained an excellent historian. This visit was by telephone only. His medications were now gabapentin, metoprolol and nasal sprays. Reported discontinuing amiodarone, apixaban, ascorbic acid, bumetanide and tadalafil. He was off dimethyl fumarate for over 4 years; his multiple sclerosis remained stable. He reported no additional weakness, clumsiness, speech difficulties, double vision or headaches. He was not tripping or falling. He was using a cane more often, citing increasing numbness in both feet. He suffered from lumbosacral radiculopathy. These discomforts were moderately controlled on gabapentin. His vertigo had not returned. He previously responded well to low-dose meclizine. Unfortunately, he suffered multiple medical issues since his last visit. He underwent three-vessel bypass, aortic valve repair bileaflet debridement and and mitral valve replacement. Complications included right lung E. coli empyema parapneumonic effusions. He subsequent underwent right thoracotomy with whole lung decortication. Bladder obstruction ensued with bilateral hydronephrosis and hydroureter producing acute kidney injury. He now noted upper respiratory symptoms. He was recuperating from his multiple surgical medical issues. He was eating and sleeping better. He received his COVID-19 vaccinations and boosters. He also received the influenza vaccine. He currently denied tachycardia, chest pain or palpitations, shortness of breath, loss of consciousness, incontinence, other pains or abdominal issues. Review of systems was otherwise unremarkable. Objective:      He was now resting comfortably, except for head pains. He was alert, cooperative and oriented. His neck was supple and nontender. He is breathing without issues.   His cardiac surgical scars were Have Your Skin Lesions Been Treated?: not been treated healing well. His abdomen was soft and nontender. His limbs displayed no abnormalities. He displayed any skin lesions. His speech was intact, without dysarthria or aphasia. He denied any cranial nerve abnormalities. He moved all limbs well. He was not clumsy. He appreciated light touch in all limbs. He walked with a minimal left foot drop, as before. Laboratory/Radiology:      Recent routine laboratory testing was unremarkable. Assessment: This individual suffered from longstanding multiple sclerosis with only minimal disease. At present, he again displays left foot drop, from his lumbosacral radiculopathies. He remain off any disease modifying therapies. His radiculopathy, aggravating his foot drop, continues controlled with gabapentin 300 mg 3 times daily. We will continue that regimen. I again recommended hightop boots and tennis shoes. His benign positional vertigo has not returned. Medically, he is stable despite his valvular heart disease and coronary artery disease. He has recuperated from multiple surgical interventions. His renal function has returned as well. Plan:      He will be maintained on gabapentin 300 mg 3 times daily. He will stay off disease modifying therapies. He will call if any problems arise in the interim. He will return in 4 months, to see my nurse practitioners. I spent 30 minutes with the patient with over 50 % spent in counseling and disease management. All patient issues were addressed and all questions were answered. Sonu Smith Res was a 67year old individual who was evaluated via telephone on 11/30/2022. Consent:  He and/or health care decision maker is aware that that he may receive a bill for this telephone service, depending on his insurance coverage, and has provided verbal consent to proceed--- yes.     Documentation:  I communicated with the patient and/or health care decision maker about his multiple sclerosis Is This A New Presentation, Or A Follow-Up?: Skin Lesion and radicular disease. Details of this discussion including any medical advice provided per telephone. I affirmed this is a Patient Initiated Episode with an Established Patient who has not had a related appointment within my department in the past 7 days or scheduled within the next 24 hours. Again I spent 30 minutes with the patient.       Horacio Bates MD Additional History: Patient here for follow-up. She would like a good comprehensive skin exam. There is one mole on her mid back that a friend of hers noticed who said she should come in and have it checked. She has no previous history of pre-cancer, skin cancer or melanoma. She does spend fairly regular time in the sun and will use sunscreen sometimes

## 2022-12-05 RX ORDER — METOPROLOL SUCCINATE 100 MG/1
TABLET, EXTENDED RELEASE ORAL
Qty: 180 TABLET | Refills: 3 | OUTPATIENT
Start: 2022-12-05

## 2023-01-06 RX ORDER — GABAPENTIN 300 MG/1
300 CAPSULE ORAL 2 TIMES DAILY
Qty: 180 CAPSULE | Refills: 1 | Status: SHIPPED | OUTPATIENT
Start: 2023-01-06 | End: 2023-04-06

## 2023-01-06 RX ORDER — METOPROLOL SUCCINATE 50 MG/1
50 TABLET, EXTENDED RELEASE ORAL DAILY
Qty: 90 TABLET | Refills: 3 | Status: SHIPPED | OUTPATIENT
Start: 2023-01-06

## 2023-01-24 RX ORDER — GABAPENTIN 300 MG/1
300 CAPSULE ORAL 2 TIMES DAILY
Qty: 180 CAPSULE | Refills: 1 | Status: SHIPPED
Start: 2023-01-24 | End: 2023-01-28 | Stop reason: SDUPTHER

## 2023-01-27 ENCOUNTER — TELEPHONE (OUTPATIENT)
Dept: NEUROLOGY | Age: 78
End: 2023-01-27

## 2023-01-27 NOTE — TELEPHONE ENCOUNTER
Patient called in to let us know that his Gabapentin was sent to the wrong pharmacy. He would like it to go to [facility]. Called it in to [facility] and called local pharmacy to cancel the order for you.

## 2023-01-28 RX ORDER — GABAPENTIN 300 MG/1
300 CAPSULE ORAL 2 TIMES DAILY
Qty: 180 CAPSULE | Refills: 1 | Status: SHIPPED | OUTPATIENT
Start: 2023-01-28 | End: 2023-04-28
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
## 2023-02-21 ENCOUNTER — TELEPHONE (OUTPATIENT)
Dept: NEUROLOGY | Age: 78
End: 2023-02-21

## 2023-02-21 NOTE — TELEPHONE ENCOUNTER
Spoke with patient, office received refill request from [facility]. Pt states he doesn't think is is currently taking Baclofen.

## 2023-06-20 ENCOUNTER — OFFICE VISIT (OUTPATIENT)
Dept: ENT CLINIC | Age: 78
End: 2023-06-20
Payer: MEDICARE

## 2023-06-20 VITALS
BODY MASS INDEX: 27.2 KG/M2 | HEART RATE: 70 BPM | SYSTOLIC BLOOD PRESSURE: 132 MMHG | DIASTOLIC BLOOD PRESSURE: 57 MMHG | OXYGEN SATURATION: 96 % | WEIGHT: 190 LBS | HEIGHT: 70 IN

## 2023-06-20 DIAGNOSIS — H61.23 BILATERAL IMPACTED CERUMEN: Primary | ICD-10-CM

## 2023-06-20 PROCEDURE — 1123F ACP DISCUSS/DSCN MKR DOCD: CPT

## 2023-06-20 PROCEDURE — 3078F DIAST BP <80 MM HG: CPT

## 2023-06-20 PROCEDURE — 99213 OFFICE O/P EST LOW 20 MIN: CPT

## 2023-06-20 PROCEDURE — 69210 REMOVE IMPACTED EAR WAX UNI: CPT

## 2023-06-20 PROCEDURE — 3074F SYST BP LT 130 MM HG: CPT

## 2023-06-20 ASSESSMENT — ENCOUNTER SYMPTOMS
SORE THROAT: 0
DIARRHEA: 0
SINUS PRESSURE: 0
EYE PAIN: 0
COUGH: 0
VOMITING: 0
BACK PAIN: 0
EYE DISCHARGE: 0
RHINORRHEA: 0
ALLERGIC/IMMUNOLOGIC NEGATIVE: 1
SHORTNESS OF BREATH: 0

## 2023-07-25 ENCOUNTER — OFFICE VISIT (OUTPATIENT)
Dept: NEUROLOGY | Age: 78
End: 2023-07-25
Payer: MEDICARE

## 2023-07-25 VITALS
BODY MASS INDEX: 27.12 KG/M2 | WEIGHT: 189 LBS | SYSTOLIC BLOOD PRESSURE: 134 MMHG | DIASTOLIC BLOOD PRESSURE: 71 MMHG | HEART RATE: 62 BPM | OXYGEN SATURATION: 94 % | TEMPERATURE: 98.6 F

## 2023-07-25 DIAGNOSIS — M54.17 L-S RADICULOPATHY: ICD-10-CM

## 2023-07-25 DIAGNOSIS — G35 MULTIPLE SCLEROSIS (HCC): Primary | ICD-10-CM

## 2023-07-25 PROBLEM — I47.10 SVT (SUPRAVENTRICULAR TACHYCARDIA): Status: RESOLVED | Noted: 2022-04-08 | Resolved: 2023-07-25

## 2023-07-25 PROBLEM — J18.9 PNEUMONIA: Status: RESOLVED | Noted: 2022-06-03 | Resolved: 2023-07-25

## 2023-07-25 PROBLEM — N17.9 AKI (ACUTE KIDNEY INJURY) (HCC): Status: RESOLVED | Noted: 2022-01-17 | Resolved: 2023-07-25

## 2023-07-25 PROBLEM — E87.5 HYPERKALEMIA: Status: RESOLVED | Noted: 2022-01-17 | Resolved: 2023-07-25

## 2023-07-25 PROBLEM — R33.8 ACUTE URINARY RETENTION: Status: RESOLVED | Noted: 2022-06-03 | Resolved: 2023-07-25

## 2023-07-25 PROBLEM — I47.1 SVT (SUPRAVENTRICULAR TACHYCARDIA) (HCC): Status: RESOLVED | Noted: 2022-04-08 | Resolved: 2023-07-25

## 2023-07-25 PROBLEM — J90 PLEURAL EFFUSION: Status: RESOLVED | Noted: 2021-12-30 | Resolved: 2023-07-25

## 2023-07-25 PROBLEM — T83.61XA INFECTION ASSOCIATED WITH IMPLANTED PENILE PROSTHESIS (HCC): Status: RESOLVED | Noted: 2022-01-17 | Resolved: 2023-07-25

## 2023-07-25 PROBLEM — J86.9 EMPYEMA (HCC): Status: RESOLVED | Noted: 2021-12-30 | Resolved: 2023-07-25

## 2023-07-25 PROCEDURE — 99215 OFFICE O/P EST HI 40 MIN: CPT | Performed by: NURSE PRACTITIONER

## 2023-07-25 PROCEDURE — 1123F ACP DISCUSS/DSCN MKR DOCD: CPT | Performed by: NURSE PRACTITIONER

## 2023-07-25 PROCEDURE — 3075F SYST BP GE 130 - 139MM HG: CPT | Performed by: NURSE PRACTITIONER

## 2023-07-25 PROCEDURE — 3078F DIAST BP <80 MM HG: CPT | Performed by: NURSE PRACTITIONER

## 2023-07-25 RX ORDER — ROSUVASTATIN CALCIUM 10 MG/1
10 TABLET, COATED ORAL DAILY
COMMUNITY
Start: 2023-07-03

## 2023-07-25 RX ORDER — POTASSIUM CHLORIDE 750 MG/1
10 CAPSULE, EXTENDED RELEASE ORAL DAILY
COMMUNITY
Start: 2022-01-28

## 2023-07-25 NOTE — PROGRESS NOTES
[address] MSN, APRN-CNP, [address] [facility] , [address] [facility], [address] [place] [address]         Office Extended Follow Up:       August Severo Click is a 78 y.o. ambidextrous male     We are following for longtanding MS--previously saw Dr. Angela Vasquez    He presents with his wife and is a good historian with an additional significant past medical history of extensive cardiac disease including CAD, endocarditis, CABG x3, L Fulton's palsy with recovery, valvular heart disease, Washburn's esophagus, anxiety, bladder cancer, kidney stones, empyema s/p thoracotomy, cervical and lumbar radiculopathy, HTN. He has one child and retired from [employer]. He quit smoking in 1982, drinks a couple glasses of wine 5 times per week, no hx of illicit drugs. He has no significant neurologic family hx. He developed bilateral leg weakness and inability to walk back in 1999. He was diagnosed with MS by Dr. Ericka Norris showed demyelinating lesions in his brain and cervical spine. He was treated with steroids. This diagnosis with confirmed by [facility]. He was not on disease mod therapy until he established with our service in 2014--he was placed on Tecfidera, but it was stopped in 2018 due to low absolute lymphocyte count. He has not had MRIs of his brain or cervical spine in many years. He has been doing fairly well since aside from transient bilateral lower extremity numbness and occasionally feeling like his left leg will give out. He uses no assistive devices to walk but sometimes will feel some dragging of his feet. He uses gabapentin twice daily, mainly for his lumbosacral radiculopathy, which works well. He denies any low back pain. No vision issues or bladder issues. His cardiac issues are currently stable. He is not currently taking any antiplatelet or anticoagulants.      No chest pain or palpitations  No SOB  No vertigo, lightheadedness or loss of consciousness  No

## 2023-08-31 NOTE — PROGRESS NOTES
[address] Charmaine Schwartz is a 74 y.o. right-handed man who presents for follow up of his multiple sclerosis.      He has been off Tecfidera for 2 years-- his multiple sclerosis has remained stable. He again denies any new weakness, clumsiness, speech difficulties, double vision or headaches.      He is not tripping or falling at home. He does not use any assistive device but has a cane if needed. He still notes occasional tingling sensations in the left leg. Previous EDX studies revealed lumbosacral radicular disease. His discomforts remain well controlled on gabapentin 600 mg 3 times daily. He continues with oxycodone on occasion as well. His vertigo has returned on 2 occasions since his last visit---lasting longer than previously. He is willing to try meclizine. He also has cervical radiculopathies and diffuse lumbosacral radiculopathies proven by EDX studies. He denies new difficulties associated with these diagnoses---but again notes tingling sensations and occasional cramps in both legs.     He continues to drive and be active socially. He is eating and sleeping well. Medically, he is stable. He denies any additional episodes of tachycardia.     He denies chest pain or palpitations, shortness of breath, vertigo, loss of consciousness, incontinence, other pains or abdominal issues.     Review of systems is otherwise unremarkable.     Objective:      /80 (Site: Left Arm, Position: Sitting)  Pulse 74  Resp 18  Ht 6' (1.829 m)  Wt 190 lb (86.2 kg)  BMI 25.77 kg/m2  Afebrile      General Appearance: alert, cooperative, in no distress              Neck: supple, symmetrical, trachea midline, no adenopathy; no carotid bruit or JVD; moderately limited ROM with moderate spasms in the cervical paraspinals and trapezius muscles              Lungs: clear to auscultation bilaterally, respirations unlabored               Heart: regular rate and rhythm, S1 and S2 normal, no rub or gallop; +2/6 SAMI Alar Island Pedicle Flap Text: The defect edges were debeveled with a #15 scalpel blade. Given the location of the defect, shape of the defect and the proximity to the alar rim an island pedicle advancement flap was deemed most appropriate. Using a sterile surgical marker, an appropriate advancement flap was drawn incorporating the defect, outlining the appropriate donor tissue and placing the expected incisions within the nasal ala running parallel to the alar rim. The area thus outlined was incised with a #15 scalpel blade. The skin margins were undermined minimally to an appropriate distance in all directions around the primary defect and laterally outward around the island pedicle utilizing iris scissors.  There was minimal undermining beneath the pedicle flap. Following this, the designed flap was carried over into the primary defect and sutured into place.

## 2023-09-05 RX ORDER — GABAPENTIN 300 MG/1
CAPSULE ORAL
Qty: 180 CAPSULE | Refills: 3 | OUTPATIENT
Start: 2023-09-05

## 2023-09-09 NOTE — PATIENT INSTRUCTIONS
Patient Education        A Healthy Heart: Care Instructions  Your Care Instructions    Heart disease occurs when a substance called plaque builds up in the vessels that supply oxygen-rich blood to your heart. This can narrow the blood vessels and reduce blood flow. A heart attack happens when blood flow is completely blocked. A high-fat diet, smoking, and other factors increase the risk of heart disease. Your doctor has found that you have a chance of having heart disease. You can do lots of things to keep your heart healthy. It may not be easy, but you can change your diet, exercise more, and quit smoking. These steps really work to lower your chance of heart disease. Follow-up care is a key part of your treatment and safety. Be sure to make and go to all appointments, and call your doctor if you are having problems. It's also a good idea to know your test results and keep a list of the medicines you take. How can you care for yourself at home? Diet    · Use less salt when you cook and eat. This helps lower your blood pressure. Taste food before salting. Add only a little salt when you think you need it. With time, your taste buds will adjust to less salt.     · Eat fewer snack items, fast foods, canned soups, and other high-salt, high-fat, processed foods.     · Read food labels and try to avoid saturated and trans fats. They increase your risk of heart disease by raising cholesterol levels.     · Limit the amount of solid fat-butter, margarine, and shortening-you eat. Use olive, peanut, or canola oil when you cook. Bake, broil, and steam foods instead of frying them.     · Eating fish can lower your risk for heart disease. Eat at least 2 servings of fish a week. [place], mackerel, herring, sardines, and chunk light tuna are very good choices. These fish contain omega-3 fatty acids.     · Eat a variety of fruit and vegetables every day.  Dark green, deep orange, red, or yellow fruits and vegetables are Prep Survey      Flowsheet Row Responses   [facility] facility patient discharged from? [place]   Is LACE score < 7 ? No   Eligibility Readm Mgmt   Discharge diagnosis C. difficile colitis   Does the patient have one of the following disease processes/diagnoses(primary or secondary)? Other   Does the patient have Home health ordered? Yes   What is the Home health agency?  [facility]   Is there a DME ordered? No   Comments regarding appointments call for apmt   Medication alerts for this patient [facility]   General alerts for this patient HX COPD, lung CA   Prep survey completed? Yes            Danya GUEVARA - Registered Nurse           especially good for you. Examples include spinach, carrots, peaches, and berries.     · Foods high in fiber can reduce your cholesterol and provide important vitamins and minerals. High-fiber foods include whole-grain cereals and breads, oatmeal, beans, brown rice, citrus fruits, and apples.     · Limit drinks and foods with added sugar. These include candy, desserts, and soda pop.    Lifestyle changes    · If your doctor recommends it, get more exercise. Walking is a good choice. Bit by bit, increase the amount you walk every day. Try for at least 30 minutes on most days of the week. You also may want to swim, bike, or do other activities.     · Do not smoke. If you need help quitting, talk to your doctor about stop-smoking programs and medicines. These can increase your chances of quitting for good. Quitting smoking may be the most important step you can take to protect your heart. It is never too late to quit. You will get health benefits right away.     · Limit alcohol to 2 drinks a day for men and 1 drink a day for women. Too much alcohol can cause health problems. Medicines    · Take your medicines exactly as prescribed. Call your doctor if you think you are having a problem with your medicine.     · If your doctor recommends aspirin, take the amount directed each day. Make sure you take aspirin and not another kind of pain reliever, such as acetaminophen (Tylenol). If you take ibuprofen (such as Advil or Motrin) for other problems, take aspirin at least 2 hours before taking ibuprofen. When should you call for help? Call 911 if you have symptoms of a heart attack.  These may include:    · Chest pain or pressure, or a strange feeling in the chest.     · Sweating.     · Shortness of breath.     · Pain, pressure, or a strange feeling in the back, neck, jaw, or upper belly or in one or both shoulders or arms.     · Lightheadedness or sudden weakness.     · A fast or irregular heartbeat.    After you call 911, the  may tell you to chew 1 adult-strength or 2 to 4 low-dose aspirin. Wait for an ambulance. Do not try to drive yourself.   Watch closely for changes in your health, and be sure to contact your doctor if you have any problems. Where can you learn more? Go to https://chpepiceweb.[employer]. org and sign in to your [employer] account. Enter N191 in the Silecs box to learn more about \"A Healthy Heart: Care Instructions. \"     If you do not have an account, please click on the \"Sign Up Now\" link. Current as of: December 6, 2017  Content Version: 11.6  © 0154-6060 [employer]. Care instructions adapted under license by [facility] ([facility]). If you have questions about a medical condition or this instruction, always ask your healthcare professional. [address] any warranty or liability for your use of this information.

## 2023-10-27 RX ORDER — METOPROLOL SUCCINATE 50 MG/1
50 TABLET, EXTENDED RELEASE ORAL DAILY
Qty: 90 TABLET | Refills: 3 | Status: SHIPPED | OUTPATIENT
Start: 2023-10-27

## 2023-11-14 RX ORDER — GABAPENTIN 300 MG/1
300 CAPSULE ORAL 2 TIMES DAILY
Qty: 180 CAPSULE | Refills: 3 | Status: SHIPPED | OUTPATIENT
Start: 2023-11-14 | End: 2024-11-08

## 2024-01-16 ENCOUNTER — OFFICE VISIT (OUTPATIENT)
Dept: CARDIOLOGY CLINIC | Age: 79
End: 2024-01-16

## 2024-01-16 VITALS
HEART RATE: 77 BPM | RESPIRATION RATE: 18 BRPM | BODY MASS INDEX: 27.75 KG/M2 | WEIGHT: 193.8 LBS | SYSTOLIC BLOOD PRESSURE: 118 MMHG | HEIGHT: 70 IN | DIASTOLIC BLOOD PRESSURE: 82 MMHG

## 2024-01-16 DIAGNOSIS — Z87.19 H/O GASTRITIS: ICD-10-CM

## 2024-01-16 DIAGNOSIS — I38 VHD (VALVULAR HEART DISEASE): ICD-10-CM

## 2024-01-16 DIAGNOSIS — Z98.890: ICD-10-CM

## 2024-01-16 DIAGNOSIS — Z95.2 S/P MVR (MITRAL VALVE REPLACEMENT): ICD-10-CM

## 2024-01-16 DIAGNOSIS — G35 MULTIPLE SCLEROSIS (HCC): ICD-10-CM

## 2024-01-16 DIAGNOSIS — K22.70 BARRETT'S ESOPHAGUS WITHOUT DYSPLASIA: ICD-10-CM

## 2024-01-16 DIAGNOSIS — I47.10 SVT (SUPRAVENTRICULAR TACHYCARDIA): ICD-10-CM

## 2024-01-16 DIAGNOSIS — Z90.79 H/O TRANSURETHRAL RESECTION OF PROSTATE: ICD-10-CM

## 2024-01-16 DIAGNOSIS — Z98.890 S/P AORTIC VALVE REPAIR: ICD-10-CM

## 2024-01-16 DIAGNOSIS — I25.10 CAD IN NATIVE ARTERY: Primary | ICD-10-CM

## 2024-01-16 DIAGNOSIS — Z98.890 S/P TVR (TRICUSPID VALVE REPAIR): ICD-10-CM

## 2024-01-16 DIAGNOSIS — J61 ASBESTOSIS (HCC): ICD-10-CM

## 2024-01-16 DIAGNOSIS — K21.9 HIATAL HERNIA WITH GASTROESOPHAGEAL REFLUX: ICD-10-CM

## 2024-01-16 DIAGNOSIS — F41.0 PANIC ATTACKS: ICD-10-CM

## 2024-01-16 DIAGNOSIS — Z85.51 HISTORY OF BLADDER CANCER: ICD-10-CM

## 2024-01-16 DIAGNOSIS — Z87.01 H/O: PNEUMONIA: ICD-10-CM

## 2024-01-16 DIAGNOSIS — K44.9 HIATAL HERNIA WITH GASTROESOPHAGEAL REFLUX: ICD-10-CM

## 2024-01-16 DIAGNOSIS — F41.9 ANXIETY: ICD-10-CM

## 2024-01-16 DIAGNOSIS — E11.9 DIET-CONTROLLED DIABETES MELLITUS (HCC): ICD-10-CM

## 2024-01-16 DIAGNOSIS — N52.9 ERECTILE DYSFUNCTION, UNSPECIFIED ERECTILE DYSFUNCTION TYPE: ICD-10-CM

## 2024-01-16 DIAGNOSIS — Z95.1 HX OF CABG: ICD-10-CM

## 2024-01-16 DIAGNOSIS — I10 PRIMARY HYPERTENSION: ICD-10-CM

## 2024-01-16 DIAGNOSIS — F40.240 CLAUSTROPHOBIA: ICD-10-CM

## 2024-01-16 DIAGNOSIS — Z98.890 H/O TRANSURETHRAL RESECTION OF PROSTATE: ICD-10-CM

## 2024-01-16 DIAGNOSIS — Z86.79 H/O ENDOCARDITIS: ICD-10-CM

## 2024-01-16 DIAGNOSIS — K43.2 INCISIONAL HERNIA, WITHOUT OBSTRUCTION OR GANGRENE: ICD-10-CM

## 2024-01-16 DIAGNOSIS — Z98.890 S/P BALLOON DILATATION OF ESOPHAGEAL STRICTURE: ICD-10-CM

## 2024-01-16 DIAGNOSIS — Z87.09 H/O PLEURAL EFFUSION: ICD-10-CM

## 2024-01-25 ENCOUNTER — TELEPHONE (OUTPATIENT)
Dept: CARDIOTHORACIC SURGERY | Age: 79
End: 2024-01-25

## 2024-01-25 NOTE — TELEPHONE ENCOUNTER
If it is not bothering patient, this is nothing to worry about. If the patient would like to have it fixed, he can be referred to Dr. Ruel Putnam for possible surgical repair. Thank you.

## 2024-01-25 NOTE — TELEPHONE ENCOUNTER
Pt has a hernia at bottom of sternal incision. It does not hurt but he is concerned it occasionally pops out the size of a tennis ball. It has been present a couple months.If Dr feels its nothing to worry about he is good with that. Please advise

## 2024-01-26 NOTE — TELEPHONE ENCOUNTER
Explained to pt that Dr porter feels it is nothing to worry about. If it makes him feel better he can discuss repair with a general surgeon. He will wait for now if he changes his mind he will let us know.

## 2024-02-01 RX ORDER — METOPROLOL SUCCINATE 50 MG/1
50 TABLET, EXTENDED RELEASE ORAL DAILY
Qty: 90 TABLET | Refills: 3 | Status: SHIPPED | OUTPATIENT
Start: 2024-02-01

## 2024-03-07 ENCOUNTER — TELEPHONE (OUTPATIENT)
Dept: CARDIOLOGY CLINIC | Age: 79
End: 2024-03-07

## 2024-03-07 NOTE — TELEPHONE ENCOUNTER
Patient need cardiac clearance for DESK for left and right eye. Date of procedure unknown. Last seen in office 1/16/2024. [phone]      Please advise   [phone]

## 2024-03-31 ENCOUNTER — APPOINTMENT (OUTPATIENT)
Dept: CT IMAGING | Age: 79
DRG: 660 | End: 2024-03-31
Payer: MEDICARE

## 2024-03-31 ENCOUNTER — HOSPITAL ENCOUNTER (INPATIENT)
Age: 79
LOS: 3 days | Discharge: HOME | DRG: 660 | End: 2024-04-03
Attending: EMERGENCY MEDICINE | Admitting: FAMILY MEDICINE
Payer: MEDICARE

## 2024-03-31 DIAGNOSIS — N39.0 COMPLICATED UTI (URINARY TRACT INFECTION): ICD-10-CM

## 2024-03-31 DIAGNOSIS — N13.2 HYDRONEPHROSIS WITH URINARY OBSTRUCTION DUE TO URETERAL CALCULUS: ICD-10-CM

## 2024-03-31 DIAGNOSIS — N17.9 AKI (ACUTE KIDNEY INJURY) (HCC): Primary | ICD-10-CM

## 2024-03-31 PROBLEM — N20.0 KIDNEY STONE: Status: ACTIVE | Noted: 2024-03-31

## 2024-03-31 PROBLEM — N13.30 HYDRONEPHROSIS, LEFT: Status: ACTIVE | Noted: 2024-03-31

## 2024-03-31 LAB
ALBUMIN SERPL-MCNC: 3.6 G/DL (ref 3.5–5.2)
ALP SERPL-CCNC: 105 U/L (ref 40–129)
ALT SERPL-CCNC: 10 U/L (ref 0–40)
ANION GAP SERPL CALCULATED.3IONS-SCNC: 10 MMOL/L (ref 7–16)
AST SERPL-CCNC: 9 U/L (ref 0–39)
BACTERIA URNS QL MICRO: ABNORMAL
BASOPHILS # BLD: 0.03 K/UL (ref 0–0.2)
BASOPHILS NFR BLD: 0 % (ref 0–2)
BILIRUB SERPL-MCNC: 0.8 MG/DL (ref 0–1.2)
BILIRUB UR QL STRIP: NEGATIVE
BUN SERPL-MCNC: 39 MG/DL (ref 6–23)
CALCIUM SERPL-MCNC: 9 MG/DL (ref 8.6–10.2)
CHLORIDE SERPL-SCNC: 102 MMOL/L (ref 98–107)
CLARITY UR: CLEAR
CO2 SERPL-SCNC: 22 MMOL/L (ref 22–29)
COLOR UR: YELLOW
CREAT SERPL-MCNC: 1.7 MG/DL (ref 0.7–1.2)
EOSINOPHIL # BLD: 0.03 K/UL (ref 0.05–0.5)
EOSINOPHILS RELATIVE PERCENT: 0 % (ref 0–6)
ERYTHROCYTE [DISTWIDTH] IN BLOOD BY AUTOMATED COUNT: 13 % (ref 11.5–15)
GFR SERPL CREATININE-BSD FRML MDRD: 41 ML/MIN/1.73M2
GLUCOSE SERPL-MCNC: 102 MG/DL (ref 74–99)
GLUCOSE UR STRIP-MCNC: NEGATIVE MG/DL
HCT VFR BLD AUTO: 44.7 % (ref 37–54)
HGB BLD-MCNC: 14.8 G/DL (ref 12.5–16.5)
HGB UR QL STRIP.AUTO: ABNORMAL
IMM GRANULOCYTES # BLD AUTO: 0.15 K/UL (ref 0–0.58)
IMM GRANULOCYTES NFR BLD: 1 % (ref 0–5)
KETONES UR STRIP-MCNC: NEGATIVE MG/DL
LACTATE BLDV-SCNC: 1.1 MMOL/L (ref 0.5–2.2)
LEUKOCYTE ESTERASE UR QL STRIP: ABNORMAL
LIPASE SERPL-CCNC: 21 U/L (ref 13–60)
LYMPHOCYTES NFR BLD: 0.48 K/UL (ref 1.5–4)
LYMPHOCYTES RELATIVE PERCENT: 3 % (ref 20–42)
MCH RBC QN AUTO: 32 PG (ref 26–35)
MCHC RBC AUTO-ENTMCNC: 33.1 G/DL (ref 32–34.5)
MCV RBC AUTO: 96.8 FL (ref 80–99.9)
MONOCYTES NFR BLD: 1.7 K/UL (ref 0.1–0.95)
MONOCYTES NFR BLD: 9 % (ref 2–12)
NEUTROPHILS NFR BLD: 87 % (ref 43–80)
NEUTS SEG NFR BLD: 16.28 K/UL (ref 1.8–7.3)
NITRITE UR QL STRIP: NEGATIVE
PH UR STRIP: 5.5 [PH] (ref 5–9)
PLATELET # BLD AUTO: 239 K/UL (ref 130–450)
PMV BLD AUTO: 9.8 FL (ref 7–12)
POTASSIUM SERPL-SCNC: 4.8 MMOL/L (ref 3.5–5)
PROT SERPL-MCNC: 6.9 G/DL (ref 6.4–8.3)
PROT UR STRIP-MCNC: NEGATIVE MG/DL
RBC # BLD AUTO: 4.62 M/UL (ref 3.8–5.8)
RBC # BLD: ABNORMAL 10*6/UL
RBC #/AREA URNS HPF: ABNORMAL /HPF
SODIUM SERPL-SCNC: 134 MMOL/L (ref 132–146)
SP GR UR STRIP: 1.01 (ref 1–1.03)
UROBILINOGEN UR STRIP-ACNC: 0.2 EU/DL (ref 0–1)
WBC #/AREA URNS HPF: ABNORMAL /HPF
WBC OTHER # BLD: 18.7 K/UL (ref 4.5–11.5)

## 2024-03-31 PROCEDURE — 6360000004 HC RX CONTRAST MEDICATION: Performed by: RADIOLOGY

## 2024-03-31 PROCEDURE — 87086 URINE CULTURE/COLONY COUNT: CPT

## 2024-03-31 PROCEDURE — 96375 TX/PRO/DX INJ NEW DRUG ADDON: CPT

## 2024-03-31 PROCEDURE — 96374 THER/PROPH/DIAG INJ IV PUSH: CPT

## 2024-03-31 PROCEDURE — 6360000002 HC RX W HCPCS: Performed by: EMERGENCY MEDICINE

## 2024-03-31 PROCEDURE — 87040 BLOOD CULTURE FOR BACTERIA: CPT

## 2024-03-31 PROCEDURE — 2580000003 HC RX 258: Performed by: EMERGENCY MEDICINE

## 2024-03-31 PROCEDURE — 93005 ELECTROCARDIOGRAM TRACING: CPT | Performed by: EMERGENCY MEDICINE

## 2024-03-31 PROCEDURE — 87154 CUL TYP ID BLD PTHGN 6+ TRGT: CPT

## 2024-03-31 PROCEDURE — BT1F1ZZ FLUOROSCOPY OF LEFT KIDNEY, URETER AND BLADDER USING LOW OSMOLAR CONTRAST: ICD-10-PCS | Performed by: UROLOGY

## 2024-03-31 PROCEDURE — 85025 COMPLETE CBC W/AUTO DIFF WBC: CPT

## 2024-03-31 PROCEDURE — 80053 COMPREHEN METABOLIC PANEL: CPT

## 2024-03-31 PROCEDURE — 81001 URINALYSIS AUTO W/SCOPE: CPT

## 2024-03-31 PROCEDURE — 83605 ASSAY OF LACTIC ACID: CPT

## 2024-03-31 PROCEDURE — 83690 ASSAY OF LIPASE: CPT

## 2024-03-31 PROCEDURE — 0T778DZ DILATION OF LEFT URETER WITH INTRALUMINAL DEVICE, VIA NATURAL OR ARTIFICIAL OPENING ENDOSCOPIC: ICD-10-PCS | Performed by: UROLOGY

## 2024-03-31 PROCEDURE — 74177 CT ABD & PELVIS W/CONTRAST: CPT

## 2024-03-31 PROCEDURE — 2060000000 HC ICU INTERMEDIATE R&B

## 2024-03-31 PROCEDURE — 6370000000 HC RX 637 (ALT 250 FOR IP): Performed by: EMERGENCY MEDICINE

## 2024-03-31 PROCEDURE — 87077 CULTURE AEROBIC IDENTIFY: CPT

## 2024-03-31 PROCEDURE — 99285 EMERGENCY DEPT VISIT HI MDM: CPT

## 2024-03-31 RX ORDER — METOPROLOL SUCCINATE 50 MG/1
50 TABLET, EXTENDED RELEASE ORAL DAILY
Status: DISCONTINUED | OUTPATIENT
Start: 2024-04-01 | End: 2024-04-03 | Stop reason: HOSPADM

## 2024-03-31 RX ORDER — PROCHLORPERAZINE EDISYLATE 5 MG/ML
5 INJECTION INTRAMUSCULAR; INTRAVENOUS EVERY 6 HOURS PRN
Status: DISCONTINUED | OUTPATIENT
Start: 2024-03-31 | End: 2024-04-03 | Stop reason: HOSPADM

## 2024-03-31 RX ORDER — BISACODYL 5 MG/1
5 TABLET, DELAYED RELEASE ORAL DAILY PRN
Status: DISCONTINUED | OUTPATIENT
Start: 2024-03-31 | End: 2024-04-03 | Stop reason: HOSPADM

## 2024-03-31 RX ORDER — SODIUM CHLORIDE 0.9 % (FLUSH) 0.9 %
5-40 SYRINGE (ML) INJECTION PRN
Status: DISCONTINUED | OUTPATIENT
Start: 2024-03-31 | End: 2024-04-01 | Stop reason: HOSPADM

## 2024-03-31 RX ORDER — ALBUTEROL SULFATE 2.5 MG/3ML
2.5 SOLUTION RESPIRATORY (INHALATION) EVERY 6 HOURS PRN
Status: DISCONTINUED | OUTPATIENT
Start: 2024-03-31 | End: 2024-04-03 | Stop reason: HOSPADM

## 2024-03-31 RX ORDER — VITAMIN B COMPLEX
1000 TABLET ORAL DAILY
Status: DISCONTINUED | OUTPATIENT
Start: 2024-04-01 | End: 2024-04-03 | Stop reason: HOSPADM

## 2024-03-31 RX ORDER — SODIUM CHLORIDE 9 MG/ML
INJECTION, SOLUTION INTRAVENOUS PRN
Status: DISCONTINUED | OUTPATIENT
Start: 2024-03-31 | End: 2024-04-03 | Stop reason: HOSPADM

## 2024-03-31 RX ORDER — SODIUM CHLORIDE 9 MG/ML
INJECTION, SOLUTION INTRAVENOUS CONTINUOUS
Status: DISCONTINUED | OUTPATIENT
Start: 2024-04-01 | End: 2024-04-03 | Stop reason: HOSPADM

## 2024-03-31 RX ORDER — GABAPENTIN 300 MG/1
300 CAPSULE ORAL 2 TIMES DAILY
Status: DISCONTINUED | OUTPATIENT
Start: 2024-04-01 | End: 2024-04-03 | Stop reason: HOSPADM

## 2024-03-31 RX ORDER — ONDANSETRON 2 MG/ML
4 INJECTION INTRAMUSCULAR; INTRAVENOUS ONCE
Status: COMPLETED | OUTPATIENT
Start: 2024-03-31 | End: 2024-03-31

## 2024-03-31 RX ORDER — MORPHINE SULFATE 4 MG/ML
4 INJECTION, SOLUTION INTRAMUSCULAR; INTRAVENOUS ONCE
Status: COMPLETED | OUTPATIENT
Start: 2024-03-31 | End: 2024-03-31

## 2024-03-31 RX ORDER — AZELASTINE HYDROCHLORIDE 137 UG/1
1 SPRAY, METERED NASAL DAILY
Status: DISCONTINUED | OUTPATIENT
Start: 2024-04-01 | End: 2024-03-31 | Stop reason: CLARIF

## 2024-03-31 RX ORDER — MORPHINE SULFATE 2 MG/ML
2 INJECTION, SOLUTION INTRAMUSCULAR; INTRAVENOUS EVERY 6 HOURS PRN
Status: DISCONTINUED | OUTPATIENT
Start: 2024-03-31 | End: 2024-04-03 | Stop reason: HOSPADM

## 2024-03-31 RX ORDER — SODIUM CHLORIDE 9 MG/ML
INJECTION, SOLUTION INTRAVENOUS PRN
Status: DISCONTINUED | OUTPATIENT
Start: 2024-03-31 | End: 2024-04-01 | Stop reason: HOSPADM

## 2024-03-31 RX ORDER — FENTANYL CITRATE 50 UG/ML
50 INJECTION, SOLUTION INTRAMUSCULAR; INTRAVENOUS ONCE
Status: COMPLETED | OUTPATIENT
Start: 2024-03-31 | End: 2024-03-31

## 2024-03-31 RX ORDER — ACETAMINOPHEN 325 MG/1
650 TABLET ORAL EVERY 6 HOURS PRN
Status: DISCONTINUED | OUTPATIENT
Start: 2024-03-31 | End: 2024-04-03 | Stop reason: HOSPADM

## 2024-03-31 RX ORDER — SODIUM CHLORIDE 0.9 % (FLUSH) 0.9 %
5-40 SYRINGE (ML) INJECTION EVERY 12 HOURS SCHEDULED
Status: DISCONTINUED | OUTPATIENT
Start: 2024-04-01 | End: 2024-04-03 | Stop reason: HOSPADM

## 2024-03-31 RX ORDER — METOPROLOL SUCCINATE 50 MG/1
50 TABLET, EXTENDED RELEASE ORAL DAILY
COMMUNITY

## 2024-03-31 RX ORDER — LANOLIN ALCOHOL/MO/W.PET/CERES
1000 CREAM (GRAM) TOPICAL DAILY
COMMUNITY

## 2024-03-31 RX ORDER — 0.9 % SODIUM CHLORIDE 0.9 %
500 INTRAVENOUS SOLUTION INTRAVENOUS ONCE
Status: COMPLETED | OUTPATIENT
Start: 2024-03-31 | End: 2024-03-31

## 2024-03-31 RX ORDER — TAMSULOSIN HYDROCHLORIDE 0.4 MG/1
0.4 CAPSULE ORAL ONCE
Status: COMPLETED | OUTPATIENT
Start: 2024-03-31 | End: 2024-03-31

## 2024-03-31 RX ORDER — ROSUVASTATIN CALCIUM 10 MG/1
10 TABLET, COATED ORAL NIGHTLY
Status: DISCONTINUED | OUTPATIENT
Start: 2024-04-01 | End: 2024-04-03 | Stop reason: HOSPADM

## 2024-03-31 RX ORDER — POTASSIUM CHLORIDE 750 MG/1
10 TABLET, EXTENDED RELEASE ORAL DAILY
Status: DISCONTINUED | OUTPATIENT
Start: 2024-04-01 | End: 2024-04-03 | Stop reason: HOSPADM

## 2024-03-31 RX ORDER — ACETAMINOPHEN 650 MG/1
650 SUPPOSITORY RECTAL EVERY 6 HOURS PRN
Status: DISCONTINUED | OUTPATIENT
Start: 2024-03-31 | End: 2024-04-03 | Stop reason: HOSPADM

## 2024-03-31 RX ORDER — SODIUM CHLORIDE 0.9 % (FLUSH) 0.9 %
5-40 SYRINGE (ML) INJECTION EVERY 12 HOURS SCHEDULED
Status: DISCONTINUED | OUTPATIENT
Start: 2024-04-01 | End: 2024-04-01 | Stop reason: HOSPADM

## 2024-03-31 RX ORDER — FLUTICASONE PROPIONATE 50 MCG
1 SPRAY, SUSPENSION (ML) NASAL DAILY
Status: DISCONTINUED | OUTPATIENT
Start: 2024-04-01 | End: 2024-04-03 | Stop reason: HOSPADM

## 2024-03-31 RX ORDER — SODIUM CHLORIDE 0.9 % (FLUSH) 0.9 %
5-40 SYRINGE (ML) INJECTION PRN
Status: DISCONTINUED | OUTPATIENT
Start: 2024-03-31 | End: 2024-04-03 | Stop reason: HOSPADM

## 2024-03-31 RX ORDER — MORPHINE SULFATE 2 MG/ML
1 INJECTION, SOLUTION INTRAMUSCULAR; INTRAVENOUS EVERY 6 HOURS PRN
Status: DISCONTINUED | OUTPATIENT
Start: 2024-03-31 | End: 2024-04-03 | Stop reason: HOSPADM

## 2024-03-31 RX ADMIN — ONDANSETRON 4 MG: 2 INJECTION INTRAMUSCULAR; INTRAVENOUS at 20:19

## 2024-03-31 RX ADMIN — MORPHINE SULFATE 4 MG: 4 INJECTION, SOLUTION INTRAMUSCULAR; INTRAVENOUS at 21:46

## 2024-03-31 RX ADMIN — TAMSULOSIN HYDROCHLORIDE 0.4 MG: 0.4 CAPSULE ORAL at 23:01

## 2024-03-31 RX ADMIN — SODIUM CHLORIDE 500 ML: 9 INJECTION, SOLUTION INTRAVENOUS at 20:18

## 2024-03-31 RX ADMIN — CEFTRIAXONE SODIUM 2000 MG: 2 INJECTION, POWDER, FOR SOLUTION INTRAMUSCULAR; INTRAVENOUS at 23:02

## 2024-03-31 RX ADMIN — IOPAMIDOL 75 ML: 755 INJECTION, SOLUTION INTRAVENOUS at 21:02

## 2024-03-31 RX ADMIN — SODIUM CHLORIDE 500 ML: 9 INJECTION, SOLUTION INTRAVENOUS at 21:45

## 2024-03-31 RX ADMIN — FENTANYL CITRATE 50 MCG: 50 INJECTION INTRAMUSCULAR; INTRAVENOUS at 20:19

## 2024-03-31 ASSESSMENT — PAIN DESCRIPTION - LOCATION: LOCATION: ABDOMEN;GROIN;BACK

## 2024-03-31 ASSESSMENT — PAIN DESCRIPTION - PAIN TYPE: TYPE: ACUTE PAIN

## 2024-03-31 ASSESSMENT — PAIN DESCRIPTION - ORIENTATION: ORIENTATION: LEFT

## 2024-03-31 ASSESSMENT — PAIN - FUNCTIONAL ASSESSMENT: PAIN_FUNCTIONAL_ASSESSMENT: PREVENTS OR INTERFERES SOME ACTIVE ACTIVITIES AND ADLS

## 2024-03-31 ASSESSMENT — PAIN DESCRIPTION - DESCRIPTORS: DESCRIPTORS: ACHING;DISCOMFORT;SHARP

## 2024-03-31 ASSESSMENT — PAIN DESCRIPTION - FREQUENCY: FREQUENCY: CONTINUOUS

## 2024-03-31 ASSESSMENT — PAIN DESCRIPTION - ONSET: ONSET: ON-GOING

## 2024-04-01 ENCOUNTER — ANESTHESIA EVENT (OUTPATIENT)
Dept: OPERATING ROOM | Age: 79
DRG: 660 | End: 2024-04-01
Payer: MEDICARE

## 2024-04-01 ENCOUNTER — ANESTHESIA (OUTPATIENT)
Dept: OPERATING ROOM | Age: 79
DRG: 660 | End: 2024-04-01
Payer: MEDICARE

## 2024-04-01 ENCOUNTER — APPOINTMENT (OUTPATIENT)
Dept: GENERAL RADIOLOGY | Age: 79
DRG: 660 | End: 2024-04-01
Payer: MEDICARE

## 2024-04-01 LAB
ANION GAP SERPL CALCULATED.3IONS-SCNC: 9 MMOL/L (ref 7–16)
BASOPHILS # BLD: 0.03 K/UL (ref 0–0.2)
BASOPHILS NFR BLD: 0 % (ref 0–2)
BUN SERPL-MCNC: 38 MG/DL (ref 6–23)
CALCIUM SERPL-MCNC: 8.2 MG/DL (ref 8.6–10.2)
CHLORIDE SERPL-SCNC: 104 MMOL/L (ref 98–107)
CO2 SERPL-SCNC: 22 MMOL/L (ref 22–29)
CREAT SERPL-MCNC: 1.7 MG/DL (ref 0.7–1.2)
CRP SERPL HS-MCNC: 149 MG/L (ref 0–5)
EKG ATRIAL RATE: 105 BPM
EKG P AXIS: 70 DEGREES
EKG P-R INTERVAL: 154 MS
EKG Q-T INTERVAL: 392 MS
EKG QRS DURATION: 134 MS
EKG QTC CALCULATION (BAZETT): 518 MS
EKG R AXIS: 21 DEGREES
EKG T AXIS: 164 DEGREES
EKG VENTRICULAR RATE: 105 BPM
EOSINOPHIL # BLD: 0.08 K/UL (ref 0.05–0.5)
EOSINOPHILS RELATIVE PERCENT: 1 % (ref 0–6)
ERYTHROCYTE [DISTWIDTH] IN BLOOD BY AUTOMATED COUNT: 13.1 % (ref 11.5–15)
ERYTHROCYTE [SEDIMENTATION RATE] IN BLOOD BY WESTERGREN METHOD: 35 MM/HR (ref 0–15)
GFR SERPL CREATININE-BSD FRML MDRD: 40 ML/MIN/1.73M2
GLUCOSE SERPL-MCNC: 87 MG/DL (ref 74–99)
HCT VFR BLD AUTO: 43.4 % (ref 37–54)
HGB BLD-MCNC: 13.9 G/DL (ref 12.5–16.5)
IMM GRANULOCYTES # BLD AUTO: 0.14 K/UL (ref 0–0.58)
IMM GRANULOCYTES NFR BLD: 1 % (ref 0–5)
LYMPHOCYTES NFR BLD: 0.49 K/UL (ref 1.5–4)
LYMPHOCYTES RELATIVE PERCENT: 3 % (ref 20–42)
MCH RBC QN AUTO: 31.7 PG (ref 26–35)
MCHC RBC AUTO-ENTMCNC: 32 G/DL (ref 32–34.5)
MCV RBC AUTO: 99.1 FL (ref 80–99.9)
MONOCYTES NFR BLD: 1.47 K/UL (ref 0.1–0.95)
MONOCYTES NFR BLD: 10 % (ref 2–12)
NEUTROPHILS NFR BLD: 86 % (ref 43–80)
NEUTS SEG NFR BLD: 13.34 K/UL (ref 1.8–7.3)
PLATELET # BLD AUTO: 198 K/UL (ref 130–450)
PMV BLD AUTO: 9.9 FL (ref 7–12)
POTASSIUM SERPL-SCNC: 4.7 MMOL/L (ref 3.5–5)
RBC # BLD AUTO: 4.38 M/UL (ref 3.8–5.8)
RBC # BLD: NORMAL 10*6/UL
SODIUM SERPL-SCNC: 135 MMOL/L (ref 132–146)
WBC OTHER # BLD: 15.6 K/UL (ref 4.5–11.5)

## 2024-04-01 PROCEDURE — 3700000000 HC ANESTHESIA ATTENDED CARE: Performed by: UROLOGY

## 2024-04-01 PROCEDURE — 2580000003 HC RX 258: Performed by: SPECIALIST

## 2024-04-01 PROCEDURE — 6370000000 HC RX 637 (ALT 250 FOR IP): Performed by: NURSE PRACTITIONER

## 2024-04-01 PROCEDURE — 6370000000 HC RX 637 (ALT 250 FOR IP): Performed by: UROLOGY

## 2024-04-01 PROCEDURE — 85652 RBC SED RATE AUTOMATED: CPT

## 2024-04-01 PROCEDURE — 80048 BASIC METABOLIC PNL TOTAL CA: CPT

## 2024-04-01 PROCEDURE — 2709999900 HC NON-CHARGEABLE SUPPLY: Performed by: UROLOGY

## 2024-04-01 PROCEDURE — 6360000002 HC RX W HCPCS: Performed by: SPECIALIST

## 2024-04-01 PROCEDURE — 7100000010 HC PHASE II RECOVERY - FIRST 15 MIN: Performed by: UROLOGY

## 2024-04-01 PROCEDURE — 86140 C-REACTIVE PROTEIN: CPT

## 2024-04-01 PROCEDURE — 87086 URINE CULTURE/COLONY COUNT: CPT

## 2024-04-01 PROCEDURE — 2060000000 HC ICU INTERMEDIATE R&B

## 2024-04-01 PROCEDURE — 3600000013 HC SURGERY LEVEL 3 ADDTL 15MIN: Performed by: UROLOGY

## 2024-04-01 PROCEDURE — 6360000002 HC RX W HCPCS: Performed by: NURSE PRACTITIONER

## 2024-04-01 PROCEDURE — 6360000002 HC RX W HCPCS: Performed by: NURSE ANESTHETIST, CERTIFIED REGISTERED

## 2024-04-01 PROCEDURE — 2580000003 HC RX 258: Performed by: UROLOGY

## 2024-04-01 PROCEDURE — 7100000011 HC PHASE II RECOVERY - ADDTL 15 MIN: Performed by: UROLOGY

## 2024-04-01 PROCEDURE — 74420 UROGRAPHY RTRGR +-KUB: CPT

## 2024-04-01 PROCEDURE — C1769 GUIDE WIRE: HCPCS | Performed by: UROLOGY

## 2024-04-01 PROCEDURE — C1758 CATHETER, URETERAL: HCPCS | Performed by: UROLOGY

## 2024-04-01 PROCEDURE — 2580000003 HC RX 258: Performed by: NURSE PRACTITIONER

## 2024-04-01 PROCEDURE — C2617 STENT, NON-COR, TEM W/O DEL: HCPCS | Performed by: UROLOGY

## 2024-04-01 PROCEDURE — 2580000003 HC RX 258: Performed by: NURSE ANESTHETIST, CERTIFIED REGISTERED

## 2024-04-01 PROCEDURE — 3600000003 HC SURGERY LEVEL 3 BASE: Performed by: UROLOGY

## 2024-04-01 PROCEDURE — 3700000001 HC ADD 15 MINUTES (ANESTHESIA): Performed by: UROLOGY

## 2024-04-01 PROCEDURE — 87077 CULTURE AEROBIC IDENTIFY: CPT

## 2024-04-01 PROCEDURE — 85025 COMPLETE CBC W/AUTO DIFF WBC: CPT

## 2024-04-01 PROCEDURE — 6360000002 HC RX W HCPCS: Performed by: UROLOGY

## 2024-04-01 PROCEDURE — 2500000003 HC RX 250 WO HCPCS: Performed by: NURSE ANESTHETIST, CERTIFIED REGISTERED

## 2024-04-01 PROCEDURE — 93010 ELECTROCARDIOGRAM REPORT: CPT | Performed by: INTERNAL MEDICINE

## 2024-04-01 DEVICE — URETERAL STENT
Type: IMPLANTABLE DEVICE | Site: URETER | Status: FUNCTIONAL
Brand: PERCUFLEX™

## 2024-04-01 RX ORDER — HYDROCODONE BITARTRATE AND ACETAMINOPHEN 5; 325 MG/1; MG/1
1 TABLET ORAL ONCE
Status: COMPLETED | OUTPATIENT
Start: 2024-04-01 | End: 2024-04-01

## 2024-04-01 RX ORDER — SODIUM CHLORIDE 0.9 % (FLUSH) 0.9 %
5-40 SYRINGE (ML) INJECTION EVERY 12 HOURS SCHEDULED
Status: DISCONTINUED | OUTPATIENT
Start: 2024-04-01 | End: 2024-04-01 | Stop reason: HOSPADM

## 2024-04-01 RX ORDER — GLUCAGON 1 MG/ML
1 KIT INJECTION PRN
Status: DISCONTINUED | OUTPATIENT
Start: 2024-04-01 | End: 2024-04-01 | Stop reason: HOSPADM

## 2024-04-01 RX ORDER — SODIUM CHLORIDE 9 MG/ML
INJECTION, SOLUTION INTRAVENOUS PRN
Status: DISCONTINUED | OUTPATIENT
Start: 2024-04-01 | End: 2024-04-01 | Stop reason: HOSPADM

## 2024-04-01 RX ORDER — NALOXONE HYDROCHLORIDE 0.4 MG/ML
INJECTION, SOLUTION INTRAMUSCULAR; INTRAVENOUS; SUBCUTANEOUS PRN
Status: DISCONTINUED | OUTPATIENT
Start: 2024-04-01 | End: 2024-04-01 | Stop reason: HOSPADM

## 2024-04-01 RX ORDER — PROPOFOL 10 MG/ML
INJECTION, EMULSION INTRAVENOUS PRN
Status: DISCONTINUED | OUTPATIENT
Start: 2024-04-01 | End: 2024-04-01 | Stop reason: SDUPTHER

## 2024-04-01 RX ORDER — ONDANSETRON 2 MG/ML
4 INJECTION INTRAMUSCULAR; INTRAVENOUS
Status: DISCONTINUED | OUTPATIENT
Start: 2024-04-01 | End: 2024-04-01

## 2024-04-01 RX ORDER — FENTANYL CITRATE 50 UG/ML
INJECTION, SOLUTION INTRAMUSCULAR; INTRAVENOUS PRN
Status: DISCONTINUED | OUTPATIENT
Start: 2024-04-01 | End: 2024-04-01 | Stop reason: SDUPTHER

## 2024-04-01 RX ORDER — PROCHLORPERAZINE EDISYLATE 5 MG/ML
5 INJECTION INTRAMUSCULAR; INTRAVENOUS
Status: DISCONTINUED | OUTPATIENT
Start: 2024-04-01 | End: 2024-04-01 | Stop reason: HOSPADM

## 2024-04-01 RX ORDER — SODIUM CHLORIDE 0.9 % (FLUSH) 0.9 %
5-40 SYRINGE (ML) INJECTION PRN
Status: DISCONTINUED | OUTPATIENT
Start: 2024-04-01 | End: 2024-04-01 | Stop reason: HOSPADM

## 2024-04-01 RX ORDER — DEXTROSE MONOHYDRATE 100 MG/ML
INJECTION, SOLUTION INTRAVENOUS CONTINUOUS PRN
Status: DISCONTINUED | OUTPATIENT
Start: 2024-04-01 | End: 2024-04-01 | Stop reason: HOSPADM

## 2024-04-01 RX ORDER — LIDOCAINE HYDROCHLORIDE 20 MG/ML
INJECTION, SOLUTION INFILTRATION; PERINEURAL PRN
Status: DISCONTINUED | OUTPATIENT
Start: 2024-04-01 | End: 2024-04-01 | Stop reason: SDUPTHER

## 2024-04-01 RX ORDER — FENTANYL CITRATE 50 UG/ML
25 INJECTION, SOLUTION INTRAMUSCULAR; INTRAVENOUS EVERY 5 MIN PRN
Status: DISCONTINUED | OUTPATIENT
Start: 2024-04-01 | End: 2024-04-01 | Stop reason: HOSPADM

## 2024-04-01 RX ORDER — DIPHENHYDRAMINE HYDROCHLORIDE 50 MG/ML
12.5 INJECTION INTRAMUSCULAR; INTRAVENOUS
Status: DISCONTINUED | OUTPATIENT
Start: 2024-04-01 | End: 2024-04-01 | Stop reason: HOSPADM

## 2024-04-01 RX ORDER — ESMOLOL HYDROCHLORIDE 10 MG/ML
INJECTION INTRAVENOUS PRN
Status: DISCONTINUED | OUTPATIENT
Start: 2024-04-01 | End: 2024-04-01 | Stop reason: SDUPTHER

## 2024-04-01 RX ORDER — SODIUM CHLORIDE 9 MG/ML
INJECTION, SOLUTION INTRAVENOUS CONTINUOUS PRN
Status: DISCONTINUED | OUTPATIENT
Start: 2024-04-01 | End: 2024-04-01 | Stop reason: SDUPTHER

## 2024-04-01 RX ORDER — IPRATROPIUM BROMIDE AND ALBUTEROL SULFATE 2.5; .5 MG/3ML; MG/3ML
1 SOLUTION RESPIRATORY (INHALATION)
Status: DISCONTINUED | OUTPATIENT
Start: 2024-04-01 | End: 2024-04-01 | Stop reason: HOSPADM

## 2024-04-01 RX ADMIN — SODIUM CHLORIDE: 9 INJECTION, SOLUTION INTRAVENOUS at 00:10

## 2024-04-01 RX ADMIN — FLUTICASONE PROPIONATE 1 SPRAY: 50 SPRAY, METERED NASAL at 08:17

## 2024-04-01 RX ADMIN — PROPOFOL 40 MG: 10 INJECTION, EMULSION INTRAVENOUS at 11:48

## 2024-04-01 RX ADMIN — SODIUM CHLORIDE: 9 INJECTION, SOLUTION INTRAVENOUS at 10:00

## 2024-04-01 RX ADMIN — FENTANYL CITRATE 25 MCG: 50 INJECTION, SOLUTION INTRAMUSCULAR; INTRAVENOUS at 11:46

## 2024-04-01 RX ADMIN — HYDROCODONE BITARTRATE AND ACETAMINOPHEN 1 TABLET: 5; 325 TABLET ORAL at 00:12

## 2024-04-01 RX ADMIN — ESMOLOL HYDROCHLORIDE 5 MG: 10 INJECTION, SOLUTION INTRAVENOUS at 11:51

## 2024-04-01 RX ADMIN — GABAPENTIN 300 MG: 300 CAPSULE ORAL at 08:11

## 2024-04-01 RX ADMIN — GABAPENTIN 300 MG: 300 CAPSULE ORAL at 21:27

## 2024-04-01 RX ADMIN — SODIUM CHLORIDE, PRESERVATIVE FREE 10 ML: 5 INJECTION INTRAVENOUS at 21:36

## 2024-04-01 RX ADMIN — PROPOFOL 150 MCG/KG/MIN: 10 INJECTION, EMULSION INTRAVENOUS at 11:49

## 2024-04-01 RX ADMIN — MORPHINE SULFATE 2 MG: 2 INJECTION, SOLUTION INTRAMUSCULAR; INTRAVENOUS at 13:29

## 2024-04-01 RX ADMIN — SODIUM CHLORIDE, PRESERVATIVE FREE 10 ML: 5 INJECTION INTRAVENOUS at 08:10

## 2024-04-01 RX ADMIN — ROSUVASTATIN CALCIUM 10 MG: 10 TABLET, FILM COATED ORAL at 21:27

## 2024-04-01 RX ADMIN — MORPHINE SULFATE 2 MG: 2 INJECTION, SOLUTION INTRAMUSCULAR; INTRAVENOUS at 03:47

## 2024-04-01 RX ADMIN — MORPHINE SULFATE 2 MG: 2 INJECTION, SOLUTION INTRAMUSCULAR; INTRAVENOUS at 23:45

## 2024-04-01 RX ADMIN — ESMOLOL HYDROCHLORIDE 15 MG: 10 INJECTION, SOLUTION INTRAVENOUS at 11:46

## 2024-04-01 RX ADMIN — METOPROLOL SUCCINATE 50 MG: 50 TABLET, EXTENDED RELEASE ORAL at 09:38

## 2024-04-01 RX ADMIN — Medication 1000 UNITS: at 08:11

## 2024-04-01 RX ADMIN — SODIUM CHLORIDE: 9 INJECTION, SOLUTION INTRAVENOUS at 19:31

## 2024-04-01 RX ADMIN — LIDOCAINE HYDROCHLORIDE 40 MG: 20 INJECTION, SOLUTION INFILTRATION; PERINEURAL at 11:48

## 2024-04-01 RX ADMIN — GABAPENTIN 300 MG: 300 CAPSULE ORAL at 00:12

## 2024-04-01 RX ADMIN — POTASSIUM CHLORIDE 10 MEQ: 750 TABLET, EXTENDED RELEASE ORAL at 08:11

## 2024-04-01 RX ADMIN — CEFTRIAXONE SODIUM 2000 MG: 2 INJECTION, POWDER, FOR SOLUTION INTRAMUSCULAR; INTRAVENOUS at 21:27

## 2024-04-01 ASSESSMENT — PAIN DESCRIPTION - ORIENTATION
ORIENTATION: LEFT

## 2024-04-01 ASSESSMENT — PAIN DESCRIPTION - FREQUENCY: FREQUENCY: CONTINUOUS

## 2024-04-01 ASSESSMENT — PAIN DESCRIPTION - PAIN TYPE: TYPE: ACUTE PAIN

## 2024-04-01 ASSESSMENT — PAIN DESCRIPTION - DESCRIPTORS
DESCRIPTORS: ACHING;DISCOMFORT
DESCRIPTORS: ACHING;DISCOMFORT;SHARP
DESCRIPTORS: DISCOMFORT
DESCRIPTORS: ACHING;STABBING

## 2024-04-01 ASSESSMENT — PAIN SCALES - GENERAL
PAINLEVEL_OUTOF10: 8
PAINLEVEL_OUTOF10: 7
PAINLEVEL_OUTOF10: 7

## 2024-04-01 ASSESSMENT — PAIN DESCRIPTION - LOCATION
LOCATION: ABDOMEN;BACK;GROIN
LOCATION: LEG;GROIN
LOCATION: FLANK;LEG

## 2024-04-01 ASSESSMENT — PAIN DESCRIPTION - ONSET: ONSET: ON-GOING

## 2024-04-01 ASSESSMENT — PAIN - FUNCTIONAL ASSESSMENT
PAIN_FUNCTIONAL_ASSESSMENT: PREVENTS OR INTERFERES SOME ACTIVE ACTIVITIES AND ADLS
PAIN_FUNCTIONAL_ASSESSMENT: ACTIVITIES ARE NOT PREVENTED
PAIN_FUNCTIONAL_ASSESSMENT: 0-10

## 2024-04-01 NOTE — ED NOTES
ED to Inpatient Handoff Report    Notified floor rn Sophia that electronic handoff available and patient ready for transport to room 0431.    Safety Risks: None identified    Patient in Restraints: no    Constant Observer or Patient : no    Telemetry Monitoring Ordered: Yes          Order to transfer to unit without monitor: NA    Last MEWS: 2 Time completed: 2312    Deterioration Index: 31.06    Vitals:    03/31/24 1942 03/31/24 2138 03/31/24 2311 03/31/24 2312   BP: 123/84 131/65  131/61   Pulse: (!) 109 (!) 112  (!) 102   Resp: 16  16 16   Temp: 98.9 °F (37.2 °C)  98.9 °F (37.2 °C) 98.9 °F (37.2 °C)   TempSrc: Oral  Oral Oral   SpO2: 94% 90% 94% 94%   Weight: 95.3 kg (210 lb)      Height: 1.778 m (5' 10\")          Opportunity for questions and clarification was provided.

## 2024-04-01 NOTE — ANESTHESIA POSTPROCEDURE EVALUATION
Department of Anesthesiology  Postprocedure Note    Patient: Sonu Vasquez  MRN: [id number]  YOB: 1945  Date of evaluation: 4/1/2024    Procedure Summary       Date: 04/01/24 Room / Location: [address]    Anesthesia Start: 1137 Anesthesia Stop: 1211    Procedure: CYSTOSCOPY RETROGRADE PYELOGRAM LEFT STENT INSERTION (Left) Diagnosis:       Complicated UTI (urinary tract infection)      (Complicated UTI (urinary tract infection) [N39.0])    Surgeons: Jacob Alas MD Responsible Provider: Jose Carlos Rich DO    Anesthesia Type: MAC ASA Status: 4            Anesthesia Type: No value filed.    Gil Phase I: Gil Score: 9    Gil Phase II:      Anesthesia Post Evaluation    Patient location during evaluation: PACU  Patient participation: complete - patient participated  Level of consciousness: awake and alert  Pain score: 1  Airway patency: patent  Nausea & Vomiting: no nausea and no vomiting  Cardiovascular status: hemodynamically stable  Respiratory status: acceptable  Pain management: adequate        No notable events documented.

## 2024-04-01 NOTE — CONSULTS
cystoscopy (with further removal of calculi).     May 2022.  Admitted to [facility] for cardiac surgery.  Seen by Dr. Elder for bacteriuria with mixed GPO.  Treated with Daptomycin and Meropenem.  Repeat urine cultures were negative.  Underwent sternotomy, aortic valve repair, mitral valve replacement with annular debridement and 33 mm Medtronic Mosaic bioprosthesis.    December 2021.  Admitted to [facility] with an empyema.  He was treated with Ceftriaxone, Azithromycin and Doxycycline.  He underwent a thoracentesis.  Seen by ID.  He biotics were changed to Unasyn.  Pleural fluid grew pansensitive E. coli.  He underwent thoracotomy with lung decortication and was discharged on 3 weeks of antibiotic.  Seen in the office in follow-up.  Unasyn was switched over to Augmentin.    Past Surgical History:        Procedure Laterality Date    CARDIOVASCULAR STRESS TEST  10/19/2011  Adenosine nuclear stress test (4 minute walking protocol) showed a minimally reversible minor, nontransmural defect involving the apical anterolateral wall, more consistent with soft tissue attenuation and motion artifact    with the gated views showing no regional wall motion abnormality with normal left ventricular systolic function and a coputer-calculated EF of 74%.      COLONOSCOPY      CORONARY ARTERY BYPASS GRAFT N/A 05/25/2022    CABG CORONARY ARTERY BYPASS, JOEL, MITRAL VALVE REPLACEMENT, TRICUSPID VALVE REPAIR performed by Jerad Pennington MD [place] [facility] [place]    DENTAL SURGERY      DIAGNOSTIC CARDIAC CATH LAB PROCEDURE      Around 30 years ago to evaluate mitral regurgitation.    EYE SURGERY  10/2013    Lt cataract     EYE SURGERY  11/2013    Rt cataract    INGUINAL HERNIA REPAIR      S/P bilateral inguinal hernia repair.  S/P redo left inguinal surgery in 12/2006 and again in 3/2012.    LITHOTRIPSY  12/6/17 @ [facility]    NOSE SURGERY      PICC LINE INSERTION NURSE  01/06/2022         PROSTATE BIOPSY  07/2013    PROSTATE SURGERY  08/2013     sodium chloride, fentanNYL, HYDROmorphone, diphenhydrAMINE, prochlorperazine, ipratropium 0.5 mg-albuterol 2.5 mg, dextrose bolus **OR** dextrose bolus, glucagon (rDNA), dextrose, Glucose, sodium chloride flush, sodium chloride, sodium chloride flush, sodium chloride, acetaminophen **OR** acetaminophen, bisacodyl, prochlorperazine, albuterol, morphine **OR** morphine    Allergies:  Aleve [naproxen], Baclofen, and Paxil [paroxetine]    Social History:   Social History     Socioeconomic History    Marital status: Single     Spouse name: None    Number of children: None    Years of education: None    Highest education level: None   Tobacco Use    Smoking status: Former     Current packs/day: 0.00     Average packs/day: 2.0 packs/day for 25.0 years (50.0 ttl pk-yrs)     Types: Cigarettes     Start date: 1957     Quit date: 1982     Years since quittin.3    Smokeless tobacco: Never    Tobacco comments:     Smoked 2-3 ppd.      Vaping Use    Vaping Use: Never used   Substance and Sexual Activity    Alcohol use: Yes     Alcohol/week: 10.0 standard drinks of alcohol     Types: 10 Glasses of wine per week     Comment: 1 glass of wine with supper    Drug use: No   Social History Narrative    8 cups coffee daily     Social Determinants of Health     Food Insecurity: No Food Insecurity (3/31/2024)    Hunger Vital Sign     Worried About Running Out of Food in the Last Year: Never true     Ran Out of Food in the Last Year: Never true   Transportation Needs: No Transportation Needs (3/31/2024)    PRAPARE - Transportation     Lack of Transportation (Medical): No     Lack of Transportation (Non-Medical): No   Housing Stability: Low Risk  (3/31/2024)    Housing Stability Vital Sign     Unable to Pay for Housing in the Last Year: No     Number of Places Lived in the Last Year: 1     Unstable Housing in the Last Year: No      Pets: None  Travel: No  The patient lives with his girlfriend.  He retired from [employer]
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
## 2024-04-01 NOTE — CONSULTS
3/31/2024 10:52 PM  Service: Urology  Group: [facility] (Jayant/Evette/Desi)    Sonu Vasquez  [id number]     Chief Complaint: 1 cm left UPJ stone    History of Present Illness:  The patient is a 78 y.o. male patient who presents with the above  He is well know to our practice  He had stone in the past  He did have a CT done and was found to have the above  He has had a low grade fever  His VSS are a stable   He is voiding well The risk of the surgery was discussed at length.  The risk of the anesthesia and loss of airway.  Cardiovascular risks, myocardial infraction, cerebral vascular accident, pulmonary embolism, deep vein thrombosis.  Injury risk, bowel injury, bladder injury, ureteral injury, blood vessel injury, delayed presentation of the injury (ie scar tissue or stricture formation).  These can lead to bleeding, requiring transfusion.  He risk of infection with can lead to sepsis.We discussed placing to sleep  Looking in the bladder shooting x-rays if needed  Seeing the stone  Getting to the stone  Placement of laser on the stone  Sometimes we use a stone basket  90% of the time being able to get the stone managed  10% we see the stone but cannot get to the stone  We place a stent in those situations and represent for another procedure in 2-3 weeks  If we see infected urine, we place a stent an come back for a second procedure once treated  1/1000 the stone is so impacted we have to abort the surgery and place a PNT  Then additional procedures will need to be preformed  The risk of death.  The may be other issues that present that are not listed above which can occur.  The patient understood these risks, they understood the benefits, they understood the alternatives and fully consent to proceed.  The needs for a second procedure may also be required.    Past Medical History:   Diagnosis Date    Anxiety     With panic attacks.    Bladder cancer (HCC)     BPV (benign positional vertigo)     Bronchitis   bases.  Images:      Assessment: Sonu Vasquez 78 y.o. male     Bilat hydronephrosis  UR (1500 cc)  Bilat renal calcED s/p IPP   BPH s/p TURP in March  JAMIE  Leukocytosis   1 cm left ureteral stone  Left hydronephrosis     Plan:  All options were discussed  The patient family is present  Progress to the OR for Cysto, left stent insertion   The risks, benefits, and alternatives were discussed  NPO  DVT prophylaxis  Pre-op antibiotics  Will need a left ESWL in the future vs laser     Viktor A Jayant, DO   [facility]  Urology

## 2024-04-01 NOTE — ED NOTES
Name: Sonu Vasquez  : 1945  MRN: [id number]    Date: 3/31/2024    Benefits of immediately proceeding with Radiology exam outweigh the risks and therefore the following is being waived:      [] Pregnancy test    [] Protocol for Iodine allergy    [] MRI questionnaire    [x] BUN/Creatinine        DO Ana Maria Naidu Benjamin, DO  24

## 2024-04-01 NOTE — PROGRESS NOTES
Zofran discontinued for QTc greater than 500. Please use the compazine for nausea/vomiting.  Thank You, Jackie Tanner Pharm.D 4/1/2024 12:24 PM

## 2024-04-01 NOTE — ANESTHESIA PRE PROCEDURE
Department of Anesthesiology  Preprocedure Note       Name:  Sonu Vasquez   Age:  78 y.o.  :  1945                                          MRN:  [id number]         Date:  2024      Surgeon: Surgeon(s):  Jacob Alas MD    Procedure: Procedure(s):  CYSTOSCOPY RETROGRADE PYELOGRAM LEFT STENT INSERTION    Medications prior to admission:   Prior to Admission medications    Medication Sig Start Date End Date Taking? Authorizing Provider   metoprolol succinate (TOPROL XL) 50 MG extended release tablet Take 1 tablet by mouth daily At night   Yes Kenny Santoro MD   vitamin B-12 (CYANOCOBALAMIN) 1000 MCG tablet Take 1 tablet by mouth daily   Yes Kenny Santoro MD   metoprolol succinate (TOPROL XL) 50 MG extended release tablet Take 1 tablet by mouth daily  Patient taking differently: Take 2 tablets by mouth daily In morning 24   Adelaide Smith MD   gabapentin (NEURONTIN) 300 MG capsule Take 1 capsule by mouth in the morning and at bedtime for 360 days. 23  Gerald Hanley APRN - CNP   potassium chloride (MICRO-K) 10 MEQ extended release capsule Take 1 capsule by mouth daily 22   Kenny Santoro MD   rosuvastatin (CRESTOR) 10 MG tablet Take 1 tablet by mouth daily 7/3/23   Kenny Santoro MD   tadalafil (CIALIS) 5 MG tablet Take 1 tablet by mouth daily    Kenny Santoro MD   Azelastine HCl 137 MCG/SPRAY SOLN USE 1 SPRAY IN EACH NOSTRIL TWICE A DAY AS DIRECTED 22   Thomas Ragland DO   sodium chloride 5 % ophthalmic ointment Place 1 drop into both eyes at bedtime    Kenny Santoro MD   sodium chloride (RADHA 128) 2 % ophthalmic solution Place 1 drop into both eyes in the morning, at noon, and at bedtime    Kenny Santoro MD   folic acid (FOLVITE) 1 MG tablet Take 1 tablet by mouth daily 6/6/22 3/31/24  Thomas Farnsworth DO   ascorbic acid (VITAMIN C) 500 MG tablet Take 1 tablet by mouth 2 times daily  Patient taking

## 2024-04-01 NOTE — ED PROVIDER NOTES
[facility] EMERGENCY DEPARTMENT  EMERGENCY DEPARTMENT ENCOUNTER      Pt Name: Sonu Vasquez  MRN: [id number]  Birthdate 1945  Date of evaluation: 3/31/2024  Provider: Max Rodgers DO  PCP: Vamshi Gipson MD  Note Started: 8:56 PM EDT 3/31/24    CHIEF COMPLAINT       Chief Complaint   Patient presents with    Abdominal Pain     Left sided abd. Pain radiating into back. Said it feels like previous kidney stones.        HISTORY OF PRESENT ILLNESS: 1 or more Elements   History From: patient  Limitations to history : None    Sonu Vasquez is a 78 y.o. male who presents to the ED for evaluation of abdominal pain.  Patient remarks that since Friday he has been having pain in the left side of his flank wrapping around to his left abdomen.  Patient states the pain seems to be worsening in severity.  Patient apparently had subjective fevers earlier today.  Patient is slightly nauseous.  Patient states he does have some difficulty urinating but is still making urine denies any hematuria. Patient has mitigating or worsening factors.    Nursing Notes were all reviewed and agreed with or any disagreements were addressed in the HPI.    REVIEW OF SYSTEMS :    Positives and Pertinent negatives as per HPI.     SURGICAL HISTORY     Past Surgical History:   Procedure Laterality Date    CARDIOVASCULAR STRESS TEST  10/19/2011  Adenosine nuclear stress test (4 minute walking protocol) showed a minimally reversible minor, nontransmural defect involving the apical anterolateral wall, more consistent with soft tissue attenuation and motion artifact    with the gated views showing no regional wall motion abnormality with normal left ventricular systolic function and a coputer-calculated EF of 74%.      COLONOSCOPY      CORONARY ARTERY BYPASS GRAFT N/A 05/25/2022    CABG CORONARY ARTERY BYPASS, JOEL, MITRAL VALVE REPLACEMENT, TRICUSPID VALVE REPAIR performed by Jerad Pennington MD at [facility]    DENTAL

## 2024-04-01 NOTE — OP NOTE
Operative Note      Patient: Sonu Vasquez  YOB: 1945  MRN: [id number]    Date of Procedure: 4/1/2024    Pre-Op Diagnosis Codes:     * Complicated UTI (urinary tract infection) [N39.0], 10 mm left UPJ stone    Post-Op Diagnosis: Same       Procedure(s):  CYSTOSCOPY RETROGRADE PYELOGRAM LEFT STENT INSERTION    Surgeon(s):  Jacob Alas MD    Assistant:   * No surgical staff found *    Anesthesia: Monitor Anesthesia Care    Estimated Blood Loss (mL): Minimal    Complications: None    Specimens:   * No specimens in log *    Implants:  * No implants in log *      Drains: * No LDAs found *            INDICATION FOR PROCEDURE: Sonu Vasquez is a 78 y.o. who  was found to have a ureteral stone with hydronephrosis and signs of sepsis including tachycardia.  The patient is to undergo cystoscopy with stent insertion urgently.  He understands the risks, benefits, alternatives of the procedure as well as the fact that the stone will not be treated today, signed informed consent and agrees to proceed.    PROCEDURE:   The patient was brought into the operating room and placed under anesthesia in the dorsal lithotomy position. He was prepped and draped in a sterile fashion. A 21-[place] citizen cystoscope with a 30-degree lens was passed through the urethra and into the bladder.  The entire length of the urethra was examined and found to be without strictures or other abnormalities. The entire bladder mucosal surface was examined under 30-degree endoscopy and found to be without calculi, tumors, diverticula, or other abnormalities. The ureteral orifices were normal in size, number, and location.     A 5 [place] citizen open-ended catheter was passed into the left ureter.  A stone was clearly seen at the UPJ.  A wire was passed through the catheter and into the ureter.  This was passed beyond the stone and into the renal pelvis under fluoroscopic guidance.  An efflux of purulent urine was seen and the decision was made to

## 2024-04-01 NOTE — PROGRESS NOTES
4 Eyes Skin Assessment     NAME:  Sonu Vasquez  YOB: 1945  MEDICAL RECORD NUMBER:  [id number]    The patient is being assessed for  Admission    I agree that at least one RN has performed a thorough Head to Toe Skin Assessment on the patient. ALL assessment sites listed below have been assessed.      Areas assessed by both nurses:    Head, Face, Ears, Shoulders, Back, Chest, Arms, Elbows, Hands, Sacrum. Buttock, Coccyx, Ischium, Legs. Feet and Heels, Under Medical Devices , and Other          Does the Patient have a Wound? No noted wound(s)       Enzo Prevention initiated by RN: Yes  Wound Care Orders initiated by RN: No    Pressure Injury (Stage 3,4, Unstageable, DTI, NWPT, and Complex wounds) if present, place Wound referral order by RN under : No    New Ostomies, if present place, Ostomy referral order under : No     Nurse 1 eSignature: Electronically signed by Sophia Coles RN on 4/1/24 at 12:24 AM EDT    **SHARE this note so that the co-signing nurse can place an eSignature**    Nurse 2 eSignature: Electronically signed by Fara Mcdermott RN on 4/1/24 at 12:26 AM EDT

## 2024-04-02 LAB
ANION GAP SERPL CALCULATED.3IONS-SCNC: 6 MMOL/L (ref 7–16)
BASOPHILS # BLD: 0.03 K/UL (ref 0–0.2)
BASOPHILS NFR BLD: 0 % (ref 0–2)
BUN SERPL-MCNC: 45 MG/DL (ref 6–23)
CALCIUM SERPL-MCNC: 8.4 MG/DL (ref 8.6–10.2)
CHLORIDE SERPL-SCNC: 107 MMOL/L (ref 98–107)
CO2 SERPL-SCNC: 25 MMOL/L (ref 22–29)
CREAT SERPL-MCNC: 1.6 MG/DL (ref 0.7–1.2)
EOSINOPHIL # BLD: 0.48 K/UL (ref 0.05–0.5)
EOSINOPHILS RELATIVE PERCENT: 4 % (ref 0–6)
ERYTHROCYTE [DISTWIDTH] IN BLOOD BY AUTOMATED COUNT: 13.2 % (ref 11.5–15)
GFR SERPL CREATININE-BSD FRML MDRD: 42 ML/MIN/1.73M2
GLUCOSE SERPL-MCNC: 85 MG/DL (ref 74–99)
HCT VFR BLD AUTO: 38.6 % (ref 37–54)
HGB BLD-MCNC: 12.7 G/DL (ref 12.5–16.5)
IMM GRANULOCYTES # BLD AUTO: 0.09 K/UL (ref 0–0.58)
IMM GRANULOCYTES NFR BLD: 1 % (ref 0–5)
LYMPHOCYTES NFR BLD: 0.72 K/UL (ref 1.5–4)
LYMPHOCYTES RELATIVE PERCENT: 6 % (ref 20–42)
MCH RBC QN AUTO: 32.5 PG (ref 26–35)
MCHC RBC AUTO-ENTMCNC: 32.9 G/DL (ref 32–34.5)
MCV RBC AUTO: 98.7 FL (ref 80–99.9)
MONOCYTES NFR BLD: 1 K/UL (ref 0.1–0.95)
MONOCYTES NFR BLD: 8 % (ref 2–12)
NEUTROPHILS NFR BLD: 81 % (ref 43–80)
NEUTS SEG NFR BLD: 9.98 K/UL (ref 1.8–7.3)
PLATELET # BLD AUTO: 234 K/UL (ref 130–450)
PMV BLD AUTO: 9.9 FL (ref 7–12)
POTASSIUM SERPL-SCNC: 4.3 MMOL/L (ref 3.5–5)
RBC # BLD AUTO: 3.91 M/UL (ref 3.8–5.8)
SODIUM SERPL-SCNC: 138 MMOL/L (ref 132–146)
WBC OTHER # BLD: 12.3 K/UL (ref 4.5–11.5)

## 2024-04-02 PROCEDURE — 6360000002 HC RX W HCPCS: Performed by: SPECIALIST

## 2024-04-02 PROCEDURE — 6370000000 HC RX 637 (ALT 250 FOR IP): Performed by: FAMILY MEDICINE

## 2024-04-02 PROCEDURE — 6370000000 HC RX 637 (ALT 250 FOR IP)

## 2024-04-02 PROCEDURE — 6370000000 HC RX 637 (ALT 250 FOR IP): Performed by: UROLOGY

## 2024-04-02 PROCEDURE — 6360000002 HC RX W HCPCS

## 2024-04-02 PROCEDURE — 2060000000 HC ICU INTERMEDIATE R&B

## 2024-04-02 PROCEDURE — 2580000003 HC RX 258: Performed by: SPECIALIST

## 2024-04-02 PROCEDURE — 36415 COLL VENOUS BLD VENIPUNCTURE: CPT

## 2024-04-02 PROCEDURE — 80048 BASIC METABOLIC PNL TOTAL CA: CPT

## 2024-04-02 PROCEDURE — 85025 COMPLETE CBC W/AUTO DIFF WBC: CPT

## 2024-04-02 PROCEDURE — 2580000003 HC RX 258: Performed by: UROLOGY

## 2024-04-02 RX ORDER — SENNOSIDES A AND B 8.6 MG/1
1 TABLET, FILM COATED ORAL 2 TIMES DAILY
Status: DISCONTINUED | OUTPATIENT
Start: 2024-04-02 | End: 2024-04-03 | Stop reason: HOSPADM

## 2024-04-02 RX ORDER — CALCIUM CARBONATE 500 MG/1
500 TABLET, CHEWABLE ORAL 3 TIMES DAILY PRN
Status: DISCONTINUED | OUTPATIENT
Start: 2024-04-02 | End: 2024-04-03 | Stop reason: HOSPADM

## 2024-04-02 RX ORDER — KETOROLAC TROMETHAMINE 15 MG/ML
15 INJECTION, SOLUTION INTRAMUSCULAR; INTRAVENOUS ONCE
Status: COMPLETED | OUTPATIENT
Start: 2024-04-02 | End: 2024-04-02

## 2024-04-02 RX ADMIN — FLUTICASONE PROPIONATE 1 SPRAY: 50 SPRAY, METERED NASAL at 07:28

## 2024-04-02 RX ADMIN — SODIUM CHLORIDE: 9 INJECTION, SOLUTION INTRAVENOUS at 07:33

## 2024-04-02 RX ADMIN — METOPROLOL SUCCINATE 50 MG: 50 TABLET, EXTENDED RELEASE ORAL at 07:28

## 2024-04-02 RX ADMIN — CALCIUM CARBONATE 500 MG: 500 TABLET, CHEWABLE ORAL at 02:30

## 2024-04-02 RX ADMIN — POTASSIUM CHLORIDE 10 MEQ: 750 TABLET, EXTENDED RELEASE ORAL at 07:28

## 2024-04-02 RX ADMIN — GABAPENTIN 300 MG: 300 CAPSULE ORAL at 07:28

## 2024-04-02 RX ADMIN — SODIUM CHLORIDE: 9 INJECTION, SOLUTION INTRAVENOUS at 20:00

## 2024-04-02 RX ADMIN — KETOROLAC TROMETHAMINE 15 MG: 15 INJECTION, SOLUTION INTRAMUSCULAR; INTRAVENOUS at 02:48

## 2024-04-02 RX ADMIN — ROSUVASTATIN CALCIUM 10 MG: 10 TABLET, FILM COATED ORAL at 19:30

## 2024-04-02 RX ADMIN — SENNOSIDES 8.6 MG: 8.6 TABLET, COATED ORAL at 19:30

## 2024-04-02 RX ADMIN — SENNOSIDES 8.6 MG: 8.6 TABLET, COATED ORAL at 16:23

## 2024-04-02 RX ADMIN — AMPICILLIN SODIUM 2000 MG: 2 INJECTION, POWDER, FOR SOLUTION INTRAMUSCULAR; INTRAVENOUS at 23:05

## 2024-04-02 RX ADMIN — Medication 1000 UNITS: at 07:28

## 2024-04-02 RX ADMIN — CALCIUM CARBONATE 500 MG: 500 TABLET, CHEWABLE ORAL at 23:06

## 2024-04-02 RX ADMIN — GABAPENTIN 300 MG: 300 CAPSULE ORAL at 19:30

## 2024-04-02 RX ADMIN — AMPICILLIN SODIUM 2000 MG: 2 INJECTION, POWDER, FOR SOLUTION INTRAMUSCULAR; INTRAVENOUS at 16:21

## 2024-04-02 ASSESSMENT — PAIN - FUNCTIONAL ASSESSMENT: PAIN_FUNCTIONAL_ASSESSMENT: ACTIVITIES ARE NOT PREVENTED

## 2024-04-02 ASSESSMENT — PAIN SCALES - GENERAL
PAINLEVEL_OUTOF10: 0
PAINLEVEL_OUTOF10: 7
PAINLEVEL_OUTOF10: 3

## 2024-04-02 ASSESSMENT — PAIN DESCRIPTION - DESCRIPTORS: DESCRIPTORS: ACHING;DISCOMFORT

## 2024-04-02 ASSESSMENT — PAIN DESCRIPTION - LOCATION: LOCATION: FLANK

## 2024-04-02 ASSESSMENT — PAIN DESCRIPTION - ORIENTATION: ORIENTATION: LEFT

## 2024-04-02 NOTE — CARE COORDINATION
CASE MANAGEMENT....Patient had cysto with left stent placement yesterday with Dr YASMEEN Alas. Hernandez cath remains in place. Per urology NP notes, plan to remove hernandez in the am of discharge and check pvr. Anticipating tomorrow. ID on board for complicated uti 2nd to obstructive uropathy. Following cxs and currently on iv rocephin. Per our conversation today, Mr Vasquez is independent from home with his significant other Shireen Vogel. They live in a 1 floor plan home with small step to enter and he uses cane or walker when needed. He is interested in Hospice for information only. Provided a list of choices and referral called to \[facility] [place] [facility]\"". Will cont to follow along and assist with needs

## 2024-04-02 NOTE — PROGRESS NOTES
SPIRITUAL HEALTH SERVICES - [facility]  PROGRESS NOTE    Name: Sonu Vasquez                Mormon: Islam   Anointed (Last Rites): NA    Referral: Routine Visit    Assessment:  Upon entering the room  observes calm patient lying in bed.      Intervention:  Discussed illness and its implications with patient. Discussed patient's Zoroastrian. Provided active, empathetic listening and sustaining ministry of presence. Prayed for patient as requested.    Outcome:  Patient expressed gratitude for the visit.    Plan:  Chaplains will remain available to offer spiritual and emotional support as needed.      Electronically signed by Chaplain Jessica, on 4/2/2024 at 4:57 PM.  Spiritual Care Department  [facility]  [phone]

## 2024-04-02 NOTE — PROGRESS NOTES
4/2/2024 9:29 AM  Service: Urology  Group: [facility] (Jayant/Evette/Desi)    Sonu Vasquez  [id number]    Subjective:    Alert and conversing   He denies pain at this time  He has a hernandez catheter which is draining yellow urine with sediment  afebrile      Review of Systems  Constitutional: Fatigue   Respiratory: negative for cough and shortness of breath  Cardiovascular: negative for chest pain  Gastrointestinal: negative for nausea and vomiting  Dermatologic: negative   Hematologic/lymphatic: negative  Musculoskeletal:positive for muscle weakness, generalized   Neurological: negative for seizures and tremors  Endocrine: negative  Psychiatric: negative   : see above    Scheduled Meds:   cefTRIAXone (ROCEPHIN) IV  2,000 mg IntraVENous Q24H    sodium chloride flush  5-40 mL IntraVENous 2 times per day    fluticasone  1 spray Nasal Daily    gabapentin  300 mg Oral BID    metoprolol succinate  50 mg Oral Daily    potassium chloride  10 mEq Oral Daily    rosuvastatin  10 mg Oral Nightly    Vitamin D  1,000 Units Oral Daily       Objective:  Vitals:    04/02/24 0620   BP: 117/60   Pulse: 95   Resp: 18   Temp: 98.2 °F (36.8 °C)   SpO2: 92%         Allergies: Aleve [naproxen], Baclofen, and Paxil [paroxetine]    General Appearance: alert and oriented to person, place and time and in no acute distress. Appears fatigued   Skin: no rash or erythema  Head: normocephalic and atraumatic  Pulmonary/Chest: normal air movement, no respiratory distress   Abdomen: soft, non-tender, non-distended  Genitalia: hernandez catheter draining yellow urine with sediment   Extremities: no cyanosis, clubbing or edema  Labs:     Recent Labs     04/02/24  0600      K 4.3      CO2 25   BUN 45*   CREATININE 1.6*   GLUCOSE 85   CALCIUM 8.4*       Lab Results   Component Value Date/Time    HGB 12.7 04/02/2024 06:00 AM    HCT 38.6 04/02/2024 06:00 AM    HCT 29.0 05/26/2022 10:50 AM     ntains abnormal data Culture, Blood

## 2024-04-02 NOTE — DISCHARGE INSTRUCTIONS
You have a stent which will remain until next procedure  You may have back pain with urination  With increased activity you will have blood in urine  You may have frequency and urgency or also burning with urination you may drive and bathe tomorrow  You may do heavy lifting  You may go up and down stairs  Keep bowels soft with prune juice or  Colace(over the counter--once a day)  My office will call you to schedule a follow-up procedure  Call  for follow up if you do not hear from us  Med -dental bureau number for emergencies [phone]

## 2024-04-02 NOTE — PROGRESS NOTES
[facility]   Progress note      Subjective:    The patient is awake and alert.  Lying in bed. Appears comfortable. No complains. Has indwelling hernandez. He is bloated/distended an dhas not had bm in 2 days.  No acute events overnight.    Denies chest pain, angina, SOB     Objective:    /60   Pulse 95   Temp 98.2 °F (36.8 °C) (Oral)   Resp 18   Ht 1.778 m (5' 10\")   Wt 90.7 kg (199 lb 14.4 oz)   SpO2 92%   BMI 28.68 kg/m²     In: 500 [I.V.:500]  Out: 2525   In: 500   Out: 2525 [Urine:2525]    General appearance: NAD, conversant  Eyes: Sclerae anicteric, PERRLA  HEENT: AT/NC, MMM  Neck: FROM, supple, no thyromegaly  Lymph: No cervical / supraclavicular lymphadenopathy  Lungs: tachy, systolic murmur present  CV: RRR, no MRGs, no lower extremity edema  Abdomen: Soft, bloated, non-tender; no masses or HSM, +BS but hypoactive  Extremities: FROM without synovitis.  No clubbing or cyanosis of the hands.  Skin: no rash, induration, lesions, or ulcers  Psych: Calm and cooperative.  Normal judgement and insight.  Normal mood and affect.  Neuro: Alert and interactive, face symmetric, speech fluent.     Recent Labs     03/31/24 2015 04/01/24  0610 04/02/24  0600   WBC 18.7* 15.6* 12.3*   HGB 14.8 13.9 12.7   HCT 44.7 43.4 38.6    198 234       Recent Labs     03/31/24 2015 04/01/24  0610 04/02/24  0600    135 138   K 4.8 4.7 4.3    104 107   CO2 22 22 25   BUN 39* 38* 45*   CREATININE 1.7* 1.7* 1.6*   CALCIUM 9.0 8.2* 8.4*       Assessment:    Principal Problem:    Complicated UTI (urinary tract infection)  Active Problems:    JAMIE (acute kidney injury) (HCC)    Kidney stone    Hydronephrosis, left  Resolved Problems:    * No resolved hospital problems. *      Plan:    78-year-old male admitted to telemetry unit with     Complicated UTI  Monitor labs-WBC 18.7  Await cultures  Rocephin 1 g every 24 hours     JAMIE/hydronephrosis  Monitor labs-BUN/creatinine 39/1.7-baseline  19/0.9  IV hydration  Hold nephrotoxins  Consult urology-surgical procedure today  Pain control  Keep patient n.p.o.      4/2/2024  --pt is day 1 CYSTOSCOPY RETROGRADE PYELOGRAM LEFT STENT INSERTION with urology. Definitive stone management as outpatient  --blood cultures and urine cultures pos for eneterococcus faecalis, ID following-->pt on rocephin, await further cultures-->after rounds, went to ampicillin and rocpehin  --control pain  --afebrile last 24 hours  --vitals stable  --fluids for perfusion-->Dwaine is improving, cr 1.6, will continue to monitor  --diet is regular  --leukocytosis is appropriately down trending  --bowel regimen    Medication for other comorbidities continue as appropriate dose adjustment as necessary.     DVT prophylaxis  PT OT  Discharge planning        Code status: Full  Requires inpatient level of care      I have spent a total time of 25 minutes of this patient encounter reviewing chart, labs, radiological reports coordinating care with interdisciplinary teams, face to face encounter with patient, providing counseling/education to patient/family, and formulating plan.       Risa Tidwell,   9:38 AM  4/2/2024

## 2024-04-02 NOTE — PROGRESS NOTES
HOSPICE [facility]    Consult received. Chart reviewed. Visited patient at bedside. He is sitting up in chair. No family present. He is alert and oriented.     The hospice benefit and philosophy were explained including that hospice is end of life care in which, per Medicare, a patient has a terminal diagnosis that life expectancy would be 6 months or less. Explained that once in hospice care, all aggressive treatments would be stopped and allow nature to takes its course with focus on comfort care for the patient.  Explained hospice services at home, at [facility] with room/board private pay unless patient has Medicaid and the [facility] for short-term symptom management and respite.    August is thankful for information. It is more for his significant other whom lives with him. He would like a follow up tomorrow for more questions he may have. Thank you for the consult. HOTV will follow up in am.     Charge nurse updated.     Electronically signed by Nanda Quiñonez RN on 4/2/2024 at 5:40 PM  [phone]; [phone]

## 2024-04-02 NOTE — ACP (ADVANCE CARE PLANNING)
Advance Care Planning   Healthcare Decision Maker:    Primary Decision Maker: Anai Montero - Domestic Partner - [phone]    Secondary Decision Maker: CHARLES MAGALLON - Child - [phone]      Today we documented Decision Maker(s) consistent with ACP documents on file.

## 2024-04-02 NOTE — PROGRESS NOTES
[facility]  [facility]  Progress Note    SUBJECTIVE:  Chief Complaint   Patient presents with    Abdominal Pain     Left sided abd. Pain radiating into back. Said it feels like previous kidney stones.      Patient is tolerating medications. No reported adverse drug reactions.  No nausea, vomiting, diarrhea.  Says he is feeling much better  Son is on speaker phone  No fevers     Review of systems:  As stated above in the chief complaint, otherwise negative.    Medications:  Scheduled Meds:   ampicillin IV  2,000 mg IntraVENous 4 times per day    sodium chloride flush  5-40 mL IntraVENous 2 times per day    fluticasone  1 spray Nasal Daily    gabapentin  300 mg Oral BID    metoprolol succinate  50 mg Oral Daily    potassium chloride  10 mEq Oral Daily    rosuvastatin  10 mg Oral Nightly    Vitamin D  1,000 Units Oral Daily     Continuous Infusions:   sodium chloride      sodium chloride 75 mL/hr at 24 0733     PRN Meds:calcium carbonate, Glucose, sodium chloride flush, sodium chloride, acetaminophen **OR** acetaminophen, bisacodyl, prochlorperazine, albuterol, morphine **OR** morphine    OBJECTIVE:  /67   Pulse 91   Temp 97.5 °F (36.4 °C) (Oral)   Resp 18   Ht 1.778 m (5' 10\")   Wt 90.7 kg (199 lb 14.4 oz)   SpO2 95%   BMI 28.68 kg/m²   Temp  Av.1 °F (36.7 °C)  Min: 97.5 °F (36.4 °C)  Max: 98.5 °F (36.9 °C)  Constitutional: The patient is awake, alert, and oriented. Sitting up in bed, son on phone  Skin: Warm and dry. No rashes were noted.   HEENT: Round and reactive pupils.  Moist mucous membranes.  No ulcerations or thrush.  Neck: Supple to movements.   Chest: No use of accessory muscles to breathe. Symmetrical expansion.  No wheezing, crackles or rhonchi.  Cardiovascular: S1 and S2 are rhythmic and regular. No murmurs appreciated.   Abdomen: Positive bowel sounds to auscultation. Benign to palpation. No masses felt.   Extremities: No edema.  Lines:  Peripheral.  Davidson catheter with clear yellow urine     Laboratory and Tests:  Lab Results   Component Value Date    .0 (H) 04/01/2024    CRP 13.7 (H) 12/31/2021     Lab Results   Component Value Date    SEDRATE 35 (H) 04/01/2024    SEDRATE 68 (H) 12/30/2021       Radiology:  Reviewed     Microbiology:   Blood cultures 3/31: Enterococcus faecalis 1/4 bottles   Urine culture 3/31: >100K Enterococcus faecalis     ASSESSMENT:  Left ureteral calculus causing obstruction  Complicated UTI secondary to obstructive uropathy  Leukocytosis associated to the above, improving   Low-grade fever associated to the above, improving   Status post cystoscopy with left stenting 4/1/24  Enterococcus faeclis bacteremia associated to complicated UTI    PLAN:  Change Ceftriaxone to Ampicillin   Check final cultures & sensitivities  Monitor labs  Anticipate change to oral antibiotics upon discharge     Yeni Phillips APRN - CNP  1:15 PM  4/2/2024    Patient seen and examined. I had a face to face encounter with the patient. Agree with exam.  Assessment and plan as outlined above and directed by me. Addition and corrections were done as deemed appropriate. My exam and plan include: The patient is feeling better.  Tolerating antibiotics.  No fevers.  Surprisingly, urine cultures growing Enterococcus faecalis.  Ceftriaxone will will be changed over to Ampicillin.  If doing well by tomorrow we can be discharged on oral Amoxicillin.  Case discussed with Dr. Alas.    [place] MD  4/2/2024  1:40 PM

## 2024-04-02 NOTE — PROGRESS NOTES
Spoke to Elvia COLE regarding patients complaints of increased pain in the left flank area. New one time order received.

## 2024-04-03 VITALS
HEIGHT: 70 IN | OXYGEN SATURATION: 96 % | HEART RATE: 88 BPM | SYSTOLIC BLOOD PRESSURE: 150 MMHG | RESPIRATION RATE: 18 BRPM | WEIGHT: 200 LBS | BODY MASS INDEX: 28.63 KG/M2 | TEMPERATURE: 97.8 F | DIASTOLIC BLOOD PRESSURE: 65 MMHG

## 2024-04-03 LAB
ANION GAP SERPL CALCULATED.3IONS-SCNC: 8 MMOL/L (ref 7–16)
BASOPHILS # BLD: 0.03 K/UL (ref 0–0.2)
BASOPHILS NFR BLD: 0 % (ref 0–2)
BUN SERPL-MCNC: 36 MG/DL (ref 6–23)
CALCIUM SERPL-MCNC: 8.4 MG/DL (ref 8.6–10.2)
CHLORIDE SERPL-SCNC: 110 MMOL/L (ref 98–107)
CO2 SERPL-SCNC: 22 MMOL/L (ref 22–29)
CREAT SERPL-MCNC: 1.2 MG/DL (ref 0.7–1.2)
EOSINOPHIL # BLD: 0.72 K/UL (ref 0.05–0.5)
EOSINOPHILS RELATIVE PERCENT: 8 % (ref 0–6)
ERYTHROCYTE [DISTWIDTH] IN BLOOD BY AUTOMATED COUNT: 13 % (ref 11.5–15)
GFR SERPL CREATININE-BSD FRML MDRD: 61 ML/MIN/1.73M2
GLUCOSE SERPL-MCNC: 88 MG/DL (ref 74–99)
HCT VFR BLD AUTO: 39.5 % (ref 37–54)
HGB BLD-MCNC: 12.8 G/DL (ref 12.5–16.5)
IMM GRANULOCYTES # BLD AUTO: 0.06 K/UL (ref 0–0.58)
IMM GRANULOCYTES NFR BLD: 1 % (ref 0–5)
LYMPHOCYTES NFR BLD: 0.72 K/UL (ref 1.5–4)
LYMPHOCYTES RELATIVE PERCENT: 8 % (ref 20–42)
MCH RBC QN AUTO: 31.2 PG (ref 26–35)
MCHC RBC AUTO-ENTMCNC: 32.4 G/DL (ref 32–34.5)
MCV RBC AUTO: 96.3 FL (ref 80–99.9)
MICROORGANISM SPEC CULT: ABNORMAL
MICROORGANISM SPEC CULT: ABNORMAL
MONOCYTES NFR BLD: 0.67 K/UL (ref 0.1–0.95)
MONOCYTES NFR BLD: 7 % (ref 2–12)
NEUTROPHILS NFR BLD: 77 % (ref 43–80)
NEUTS SEG NFR BLD: 7.29 K/UL (ref 1.8–7.3)
PLATELET # BLD AUTO: 263 K/UL (ref 130–450)
PMV BLD AUTO: 9.8 FL (ref 7–12)
POTASSIUM SERPL-SCNC: 4.1 MMOL/L (ref 3.5–5)
RBC # BLD AUTO: 4.1 M/UL (ref 3.8–5.8)
SODIUM SERPL-SCNC: 140 MMOL/L (ref 132–146)
SPECIMEN DESCRIPTION: ABNORMAL
WBC OTHER # BLD: 9.5 K/UL (ref 4.5–11.5)

## 2024-04-03 PROCEDURE — 6360000002 HC RX W HCPCS: Performed by: SPECIALIST

## 2024-04-03 PROCEDURE — 6370000000 HC RX 637 (ALT 250 FOR IP): Performed by: FAMILY MEDICINE

## 2024-04-03 PROCEDURE — 85025 COMPLETE CBC W/AUTO DIFF WBC: CPT

## 2024-04-03 PROCEDURE — 2580000003 HC RX 258: Performed by: SPECIALIST

## 2024-04-03 PROCEDURE — 6370000000 HC RX 637 (ALT 250 FOR IP): Performed by: SPECIALIST

## 2024-04-03 PROCEDURE — 36415 COLL VENOUS BLD VENIPUNCTURE: CPT

## 2024-04-03 PROCEDURE — 6370000000 HC RX 637 (ALT 250 FOR IP): Performed by: UROLOGY

## 2024-04-03 PROCEDURE — 80048 BASIC METABOLIC PNL TOTAL CA: CPT

## 2024-04-03 PROCEDURE — 2580000003 HC RX 258: Performed by: UROLOGY

## 2024-04-03 RX ORDER — AMOXICILLIN 500 MG/1
1000 CAPSULE ORAL EVERY 8 HOURS SCHEDULED
Qty: 42 CAPSULE | Refills: 0 | Status: SHIPPED | OUTPATIENT
Start: 2024-04-03 | End: 2024-04-10

## 2024-04-03 RX ORDER — AMOXICILLIN 250 MG/1
1000 CAPSULE ORAL EVERY 8 HOURS SCHEDULED
Status: DISCONTINUED | OUTPATIENT
Start: 2024-04-03 | End: 2024-04-03 | Stop reason: HOSPADM

## 2024-04-03 RX ADMIN — Medication 1000 UNITS: at 09:26

## 2024-04-03 RX ADMIN — AMPICILLIN SODIUM 2000 MG: 2 INJECTION, POWDER, FOR SOLUTION INTRAMUSCULAR; INTRAVENOUS at 04:02

## 2024-04-03 RX ADMIN — SENNOSIDES 8.6 MG: 8.6 TABLET, COATED ORAL at 09:26

## 2024-04-03 RX ADMIN — FLUTICASONE PROPIONATE 1 SPRAY: 50 SPRAY, METERED NASAL at 09:27

## 2024-04-03 RX ADMIN — METOPROLOL SUCCINATE 50 MG: 50 TABLET, EXTENDED RELEASE ORAL at 09:26

## 2024-04-03 RX ADMIN — GABAPENTIN 300 MG: 300 CAPSULE ORAL at 09:26

## 2024-04-03 RX ADMIN — AMPICILLIN SODIUM 2000 MG: 2 INJECTION, POWDER, FOR SOLUTION INTRAMUSCULAR; INTRAVENOUS at 09:33

## 2024-04-03 RX ADMIN — SODIUM CHLORIDE, PRESERVATIVE FREE 10 ML: 5 INJECTION INTRAVENOUS at 09:27

## 2024-04-03 RX ADMIN — POTASSIUM CHLORIDE 10 MEQ: 750 TABLET, EXTENDED RELEASE ORAL at 09:26

## 2024-04-03 RX ADMIN — AMOXICILLIN 1000 MG: 250 CAPSULE ORAL at 13:32

## 2024-04-03 RX ADMIN — SODIUM CHLORIDE: 9 INJECTION, SOLUTION INTRAVENOUS at 10:13

## 2024-04-03 NOTE — DISCHARGE SUMMARY
[facility]   Discharge summary   Patient ID:  Sonu Vasquez  [id number]  78 y.o.  1945    Admit date: 3/31/2024    Discharge date and time: 4/3/2024    Admission Diagnoses:   Patient Active Problem List   Diagnosis    MR (mitral regurgitation)    Tricuspid valve regurgitation    Pulmonary HTN (HCC)    HTN (hypertension)    Multiple sclerosis (HCC)    Hiatal hernia    GERD (gastroesophageal reflux disease)    Benign prostatic hyperplasia    Asbestosis (HCC)    Erectile dysfunction    Washburn's esophagus determined by biopsy    Anxiety    History of PSVT (paroxysmal supraventricular tachycardia)    H/O endocarditis    Nonrheumatic aortic valve insufficiency    Nonrheumatic pulmonary valve insufficiency    JAMIE (acute kidney injury) (HCC)    Arrhythmia    CAD in native artery    Complicated UTI (urinary tract infection)    Kidney stone    Hydronephrosis, left       Discharge Diagnoses: ***    Consults: {consultation:85184}    Procedures: ***    Hospital Course: The patient is a 78 y.o. male of Vamshi Gipson MD with significant past medical history of *** who presents with ***    Recent Labs     04/01/24  0610 04/02/24  0600 04/03/24  0445   WBC 15.6* 12.3* 9.5   HGB 13.9 12.7 12.8   HCT 43.4 38.6 39.5    234 263       Recent Labs     04/01/24  0610 04/02/24  0600 04/03/24  0445    138 140   K 4.7 4.3 4.1    107 110*   CO2 22 25 22   BUN 38* 45* 36*   CREATININE 1.7* 1.6* 1.2   CALCIUM 8.2* 8.4* 8.4*       FLUORO FOR SURGICAL PROCEDURES    Result Date: 4/1/2024  EXAMINATION: LEFT RETROGRADE PYELOGRAM AND LEFT URETERAL STENT INSERTION WITH FLUOROSCOPY 4/1/2024 11:54 am TECHNIQUE: Fluoroscopy was provided by the radiology department for procedure. Radiologist was not present during examination. FLUOROSCOPY DOSE AND TYPE: Radiation Exposure Index: Kerma mGy, COMPARISON: CT abdomen and pelvis 03/31/2020 for HISTORY: ORDERING SYSTEM PROVIDED HISTORY: CYSTOSCOPY RETROGRADE PYELOGRAM

## 2024-04-03 NOTE — PROGRESS NOTES
[facility]  [facility]  Progress Note    SUBJECTIVE:  Chief Complaint   Patient presents with    Abdominal Pain     Left sided abd. Pain radiating into back. Said it feels like previous kidney stones.      No new complaints.  Davidson catheter was removed but he has not voided yet    Review of systems:  As stated above in the chief complaint, otherwise negative.    Medications:  Scheduled Meds:   ampicillin IV  2,000 mg IntraVENous 4 times per day    senna  1 tablet Oral BID    sodium chloride flush  5-40 mL IntraVENous 2 times per day    fluticasone  1 spray Nasal Daily    gabapentin  300 mg Oral BID    metoprolol succinate  50 mg Oral Daily    potassium chloride  10 mEq Oral Daily    rosuvastatin  10 mg Oral Nightly    Vitamin D  1,000 Units Oral Daily     Continuous Infusions:   sodium chloride      sodium chloride 75 mL/hr at 24 1013     PRN Meds:calcium carbonate, Glucose, sodium chloride flush, sodium chloride, acetaminophen **OR** acetaminophen, bisacodyl, prochlorperazine, albuterol, morphine **OR** morphine    OBJECTIVE:  BP (!) 172/77   Pulse (!) 106   Temp 97.7 °F (36.5 °C) (Temporal)   Resp 18   Ht 1.778 m (5' 10\")   Wt 90.7 kg (200 lb)   SpO2 94%   BMI 28.70 kg/m²   Temp  Av.9 °F (36.6 °C)  Min: 97.5 °F (36.4 °C)  Max: 98.3 °F (36.8 °C)  Constitutional: The patient is awake, alert, and oriented. Sitting up in bed.  Skin: Warm and dry. No rashes were noted.   HEENT: Round and reactive pupils.  Moist mucous membranes.  No ulcerations or thrush.  Neck: Supple to movements.   Chest: No respiratory distress.  No crackles.  Cardiovascular: Heart sounds rhythmic and regular.  Abdomen: Positive bowel sounds to auscultation. Benign to palpation. No masses felt.   Extremities: No edema.  Lines: Peripheral.    Laboratory and Tests:  Lab Results   Component Value Date    .0 (H) 2024    CRP 13.7 (H) 2021     Lab Results   Component Value Date     SEDRATE 35 (H) 04/01/2024    SEDRATE 68 (H) 12/30/2021       Radiology:  Reviewed     Microbiology:   Blood cultures 3/31: Enterococcus faecalis 1/4 bottles   Urine culture 3/31: >100K pansensitive Enterococcus faecalis     ASSESSMENT:  Left ureteral calculus causing obstruction  Complicated UTI secondary to obstructive uropathy  Leukocytosis associated to the above, improving   Low-grade fever associated to the above, improving   Status post cystoscopy with left stenting 4/1/2024  Enterococcus faeclis bacteremia associated to complicated UTI    PLAN:  Change IV Ampicillin to oral Amoxicillin x 7 days  The patient can be discharged from ID standpoint     [place] MD  11:56 AM  4/3/2024

## 2024-04-03 NOTE — PLAN OF CARE
Problem: Discharge Planning  Goal: Discharge to home or other facility with appropriate resources  4/3/2024 1238 by Nusrat Haywood RN  Outcome: Progressing     Problem: Pain  Goal: Verbalizes/displays adequate comfort level or baseline comfort level  4/3/2024 1238 by Nusrat Haywood RN  Outcome: Progressing     Problem: Skin/Tissue Integrity  Goal: Absence of new skin breakdown  Description: 1.  Monitor for areas of redness and/or skin breakdown  2.  Assess vascular access sites hourly  3.  Every 4-6 hours minimum:  Change oxygen saturation probe site  4.  Every 4-6 hours:  If on nasal continuous positive airway pressure, respiratory therapy assess nares and determine need for appliance change or resting period.  4/3/2024 1238 by Nusrat Haywood RN  Outcome: Progressing     Problem: Safety - Adult  Goal: Free from fall injury  4/3/2024 1238 by Nusrat Haywood RN  Outcome: Progressing     Problem: Chronic Conditions and Co-morbidities  Goal: Patient's chronic conditions and co-morbidity symptoms are monitored and maintained or improved  4/3/2024 1238 by Nusrat Haywood RN  Outcome: Progressing

## 2024-04-03 NOTE — PROGRESS NOTES
[facility]   Progress note      Subjective:    The patient is awake and alert.  Lying in bed. Appears comfortable. No complaints. Has indwelling hernandez. He did have a bm and feels better. Tolerating diet.  No acute events overnight.    Denies chest pain, angina, SOB     Objective:    BP (!) 145/69   Pulse 89   Temp 97.7 °F (36.5 °C) (Temporal)   Resp 18   Ht 1.778 m (5' 10\")   Wt 90.7 kg (200 lb)   SpO2 94%   BMI 28.70 kg/m²     In: 1500 [P.O.:600; I.V.:900]  Out: 4000   In: 1500   Out: 4000 [Urine:4000]    General appearance: NAD, conversant  Eyes: Sclerae anicteric, PERRLA  HEENT: AT/NC, MMM  Neck: FROM, supple, no thyromegaly  Lymph: No cervical / supraclavicular lymphadenopathy  Lungs: tachy, systolic murmur present  CV: RRR, no MRGs, no lower extremity edema  Abdomen: Soft, bloated, non-tender; no masses or HSM, +BS but hypoactive  Extremities: FROM without synovitis.  No clubbing or cyanosis of the hands.  Skin: no rash, induration, lesions, or ulcers  Psych: Calm and cooperative.  Normal judgement and insight.  Normal mood and affect.  Neuro: Alert and interactive, face symmetric, speech fluent.     Recent Labs     04/01/24  0610 04/02/24  0600 04/03/24  0445   WBC 15.6* 12.3* 9.5   HGB 13.9 12.7 12.8   HCT 43.4 38.6 39.5    234 263       Recent Labs     04/01/24  0610 04/02/24  0600 04/03/24  0445    138 140   K 4.7 4.3 4.1    107 110*   CO2 22 25 22   BUN 38* 45* 36*   CREATININE 1.7* 1.6* 1.2   CALCIUM 8.2* 8.4* 8.4*       Assessment:    Principal Problem:    Complicated UTI (urinary tract infection)  Active Problems:    JAMIE (acute kidney injury) (HCC)    Kidney stone    Hydronephrosis, left  Resolved Problems:    * No resolved hospital problems. *      Plan:    78-year-old male admitted to telemetry unit with     Complicated UTI  Monitor labs-WBC 18.7  Await cultures  Rocephin 1 g every 24 hours     JAMIE/hydronephrosis  Monitor labs-BUN/creatinine  39/1.7-baseline 19/0.9  IV hydration  Hold nephrotoxins  Consult urology-surgical procedure today  Pain control  Keep patient n.p.o.      4/2/2024  --pt is day 1 CYSTOSCOPY RETROGRADE PYELOGRAM LEFT STENT INSERTION with urology. Definitive stone management as outpatient  --blood cultures and urine cultures pos for eneterococcus faecalis, ID following-->pt on rocephin, await further cultures-->after rounds, went to ampicillin and rocpehin  --control pain  --afebrile last 24 hours  --vitals stable  --fluids for perfusion-->Dwaine is improving, cr 1.6, will continue to monitor  --diet is regular  --leukocytosis is appropriately down trending  --bowel regimen    4/3/2024  --pt is day 2 CYSTOSCOPY RETROGRADE PYELOGRAM LEFT STENT INSERTION with urology. Definitive stone management as outpatient  --blood cultures and urine cultures pos for eneterococcus faecalis, ID following-->pt on rocephin, await further cultures-->after rounds, went to ampicillin  --control pain  --afebrile last 24 hours  --vitals stable  --fluids for perfusion-->Dwaine is improving, cr 1.6, will continue to monitor  --diet is regular  --leukocytosis is appropriately down trending  --bowel regimen  --await further ID input, may be able to dc today    Medication for other comorbidities continue as appropriate dose adjustment as necessary.     DVT prophylaxis  PT OT  Discharge planning        Code status: Full  Requires inpatient level of care      I have spent a total time of 25 minutes of this patient encounter reviewing chart, labs, radiological reports coordinating care with interdisciplinary teams, face to face encounter with patient, providing counseling/education to patient/family, and formulating plan.       Risa Tidwell,   9:16 AM  4/3/2024

## 2024-04-03 NOTE — PROGRESS NOTES
4/3/2024 12:24 PM  Sonu Vasquez  [id number]    Subjective:      Awake and alert  Lying in bed  He states his Davidson was removed approximately 1 to 1-1/2 hours ago  He does have a mild feeling to void but is not ready to go yet  He states ID was just and told him that he had a blood infection for which he was placed on ampicillin    Review of Systems  Constitutional: No fever or chills   Respiratory: negative for cough and hemoptysis  Cardiovascular: negative for chest pain and dyspnea  Gastrointestinal: negative for abdominal pain, diarrhea, nausea and vomiting   : See above  Derm: negative for rash and skin lesion(s)  Neurological: negative for seizures and tremors  Musculoskeletal: Negative    Psychiatric: Negative   All other reviews are negative      Scheduled Meds:   amoxicillin  1,000 mg Oral 3 times per day    senna  1 tablet Oral BID    sodium chloride flush  5-40 mL IntraVENous 2 times per day    fluticasone  1 spray Nasal Daily    gabapentin  300 mg Oral BID    metoprolol succinate  50 mg Oral Daily    potassium chloride  10 mEq Oral Daily    rosuvastatin  10 mg Oral Nightly    Vitamin D  1,000 Units Oral Daily       Objective:  Vitals:    04/03/24 1103   BP: (!) 172/77   Pulse: (!) 106   Resp: 18   Temp: 97.7 °F (36.5 °C)   SpO2: 94%         Allergies: Aleve [naproxen], Baclofen, and Paxil [paroxetine]    General Appearance: alert and oriented to person, place and time and in no acute distress  Skin: no rash or erythema  Head: normocephalic and atraumatic  Pulmonary/Chest: normal air movement, no respiratory distress  Abdomen: soft, non-tender, non-distended  Genitourinary: No Davidson  Extremities: no cyanosis, clubbing or edema         Labs:     Recent Labs     04/03/24  0445      K 4.1   *   CO2 22   BUN 36*   CREATININE 1.2   GLUCOSE 88   CALCIUM 8.4*       Lab Results   Component Value Date/Time    HGB 12.8 04/03/2024 04:45 AM    HCT 39.5 04/03/2024 04:45 AM    HCT 29.0 05/26/2022

## 2024-04-03 NOTE — PROGRESS NOTES
HOSPICE [facility]    Visited patient at bedside. He wanted information only. HOTV will close. Call again if needed. Thank you for the consult.     Electronically signed by Nanad Quiñonez RN on 4/3/2024 at 10:57 AM  [phone]; [phone]

## 2024-04-03 NOTE — CARE COORDINATION
CASE MANAGEMENT....POD 2 cysto with left stent insertion. Anticipate discharge home today pending pcp input. Nursing will need to remove hernandez for voiding trials and pvr as per urology recommendations. ID following and plans patient will need po amoxicillin at discharge. Await input today. Iris from [facility] met with patient yesterday and information on services provided. Per conversation with Iris this am, patient is not appropriate for hospice at this time. No home going needs identified. Will follow and assist accordingly.

## 2024-04-04 LAB
ACB COMPLEX DNA BLD POS QL NAA+NON-PROBE: NOT DETECTED
B FRAGILIS DNA BLD POS QL NAA+NON-PROBE: NOT DETECTED
BIOFIRE TEST COMMENT: ABNORMAL
C ALBICANS DNA BLD POS QL NAA+NON-PROBE: NOT DETECTED
C AURIS DNA BLD POS QL NAA+NON-PROBE: NOT DETECTED
C GATTII+NEOFOR DNA BLD POS QL NAA+N-PRB: NOT DETECTED
C GLABRATA DNA BLD POS QL NAA+NON-PROBE: NOT DETECTED
C KRUSEI DNA BLD POS QL NAA+NON-PROBE: NOT DETECTED
C PARAP DNA BLD POS QL NAA+NON-PROBE: NOT DETECTED
C TROPICLS DNA BLD POS QL NAA+NON-PROBE: NOT DETECTED
E CLOAC COMP DNA BLD POS NAA+NON-PROBE: NOT DETECTED
E COLI DNA BLD POS QL NAA+NON-PROBE: NOT DETECTED
E FAECALIS DNA BLD POS QL NAA+NON-PROBE: DETECTED
E FAECIUM DNA BLD POS QL NAA+NON-PROBE: NOT DETECTED
ENTEROBACTERALES DNA BLD POS NAA+N-PRB: NOT DETECTED
GP B STREP DNA BLD POS QL NAA+NON-PROBE: NOT DETECTED
HAEM INFLU DNA BLD POS QL NAA+NON-PROBE: NOT DETECTED
K OXYTOCA DNA BLD POS QL NAA+NON-PROBE: NOT DETECTED
KLEBSIELLA SP DNA BLD POS QL NAA+NON-PRB: NOT DETECTED
KLEBSIELLA SP DNA BLD POS QL NAA+NON-PRB: NOT DETECTED
L MONOCYTOG DNA BLD POS QL NAA+NON-PROBE: NOT DETECTED
MICROORGANISM SPEC CULT: ABNORMAL
MICROORGANISM/AGENT SPEC: ABNORMAL
N MEN DNA BLD POS QL NAA+NON-PROBE: NOT DETECTED
P AERUGINOSA DNA BLD POS NAA+NON-PROBE: NOT DETECTED
PROTEUS SP DNA BLD POS QL NAA+NON-PROBE: NOT DETECTED
S AUREUS DNA BLD POS QL NAA+NON-PROBE: NOT DETECTED
S AUREUS+CONS DNA BLD POS NAA+NON-PROBE: NOT DETECTED
S EPIDERMIDIS DNA BLD POS QL NAA+NON-PRB: NOT DETECTED
S LUGDUNENSIS DNA BLD POS QL NAA+NON-PRB: NOT DETECTED
S MALTOPHILIA DNA BLD POS QL NAA+NON-PRB: NOT DETECTED
S MARCESCENS DNA BLD POS NAA+NON-PROBE: NOT DETECTED
S PNEUM DNA BLD POS QL NAA+NON-PROBE: NOT DETECTED
S PYO DNA BLD POS QL NAA+NON-PROBE: NOT DETECTED
SALMONELLA DNA BLD POS QL NAA+NON-PROBE: NOT DETECTED
SERVICE CMNT-IMP: ABNORMAL
SPECIMEN DESCRIPTION: ABNORMAL
STREPTOCOCCUS DNA BLD POS NAA+NON-PROBE: NOT DETECTED
VANA+VANB ISLT/SPM QL: NOT DETECTED

## 2024-04-04 NOTE — PROGRESS NOTES
CLINICAL PHARMACY NOTE: MEDS TO BEDS    Total # of Prescriptions Filled: 1   The following medications were delivered to the patient:  Amoxicillin 500 mg    Additional Documentation:

## 2024-04-05 LAB
MICROORGANISM SPEC CULT: ABNORMAL
MICROORGANISM SPEC CULT: ABNORMAL
SPECIMEN DESCRIPTION: ABNORMAL

## 2024-04-06 LAB
MICROORGANISM SPEC CULT: NORMAL
SERVICE CMNT-IMP: NORMAL
SPECIMEN DESCRIPTION: NORMAL

## 2024-04-11 NOTE — PROGRESS NOTES
Physician Progress Note      PATIENT:               NILO MAGALLON #:                  [id number]  :                       1945  ADMIT DATE:       3/31/2024 7:42 PM  DISCH DATE:        4/3/2024 5:48 PM  RESPONDING  PROVIDER #:        Risa Tidwell DO          QUERY TEXT:    Pt admitted with UTI. Pt noted to have on admission WBC 18.7, , CRP   149.0, JAMIE. If possible, please document in the progress notes and discharge   summary if you are evaluating and /or treating any of the following:    The medical record reflects the following:  Risk Factors: UTI  Clinical Indicators: On admission WBC 18.7, , .0, JAMIE  Treatment: abx, left ureteral stent insertion    Thank you,  Marline Pretty RN, CCDS  Clinical Documentation   [email or web address]  Options provided:  -- Sepsis, present on admission  -- UTI without Sepsis  -- Other - I will add my own diagnosis  -- Disagree - Not applicable / Not valid  -- Disagree - Clinically unable to determine / Unknown  -- Refer to Clinical Documentation Reviewer    PROVIDER RESPONSE TEXT:    This patient has sepsis which was present on admission.    Query created by: Marline Pretty on 2024 9:10 AM      Electronically signed by:  Risa Tidwell DO 2024 9:15 AM

## 2024-04-17 ENCOUNTER — TELEPHONE (OUTPATIENT)
Dept: CARDIOLOGY CLINIC | Age: 79
End: 2024-04-17

## 2024-04-17 NOTE — TELEPHONE ENCOUNTER
's office called and wants to make sure he's cleared for Kidney Stone removal surgery. Please advise.

## 2024-04-23 NOTE — PROGRESS NOTES
[facility]   PRE-ADMISSION TESTING GENERAL INSTRUCTIONS  PAT Phone Number: [phone]      GENERAL INSTRUCTIONS:    [x] Antibacterial Soap Shower Night before and/or AM of surgery.  [] CHG Wipes instruction sheet and wipes given.  [x] Do not wear makeup, lotions, powders, deodorant the morning of surgery.  [x] Nothing to eat or drink after midnight. This includes no gum, candy, mints or water.  [x] You may brush your teeth, gargle, but do not swallow water.   [x] No tobacco products, illegal drugs, or alcohol within 24 hours of your surgery.  [x] Jewelry or valuables should not be brought to the hospital. All body and/or tongue piercing's must be removed prior to arriving to hospital. No contact lens or hair pins.   [x] Arrange transportation with a responsible adult  to and from the hospital. Arrange for someone to be with you for the remainder of the day and for 24 hours after your procedure due to having had anesthesia.          -Who will be your  for transportation? daughter        -Who will be staying with you for 24 hrs after your procedure? daughter  [x] Bring insurance card and photo ID.  [x] Bring copy of living will or healthcare power of  papers to be placed in your electronic record.  [] Urine Pregnancy test will be preformed the day of surgery. A specimen sample may be brought from home.  [] Transfusion (Green) Bracelet: Please bring with you to hospital, day of surgery.     PARKING INSTRUCTIONS:     [x] ARRIVAL DATE & TIME: 4/25 @ 1015  [x] Times are subject to change. We will contact you the business day before surgery if that were to occur.  [x] Enter into the [facility] Entrance. Two people may accompany you. Masks are not required.  [x] Parking Lot \"I\" is where you will park. It is located on the corner of [address] and [facility] [address] [facility]. The entrance is on [address].   Only one vehicle - per patient, is permitted in parking lot.

## 2024-04-24 ENCOUNTER — PREP FOR PROCEDURE (OUTPATIENT)
Dept: UROLOGY | Age: 79
End: 2024-04-24

## 2024-04-24 RX ORDER — SODIUM CHLORIDE 0.9 % (FLUSH) 0.9 %
5-40 SYRINGE (ML) INJECTION EVERY 12 HOURS SCHEDULED
Status: CANCELLED | OUTPATIENT
Start: 2024-04-25

## 2024-04-24 RX ORDER — SODIUM CHLORIDE 9 MG/ML
INJECTION, SOLUTION INTRAVENOUS CONTINUOUS
Status: CANCELLED | OUTPATIENT
Start: 2024-04-25

## 2024-04-24 RX ORDER — SODIUM CHLORIDE 9 MG/ML
INJECTION, SOLUTION INTRAVENOUS PRN
Status: CANCELLED | OUTPATIENT
Start: 2024-04-24

## 2024-04-24 RX ORDER — SODIUM CHLORIDE 0.9 % (FLUSH) 0.9 %
5-40 SYRINGE (ML) INJECTION PRN
Status: CANCELLED | OUTPATIENT
Start: 2024-04-24

## 2024-04-25 ENCOUNTER — ANESTHESIA EVENT (OUTPATIENT)
Dept: OPERATING ROOM | Age: 79
End: 2024-04-25
Payer: MEDICARE

## 2024-04-25 ENCOUNTER — ANESTHESIA (OUTPATIENT)
Dept: OPERATING ROOM | Age: 79
End: 2024-04-25
Payer: MEDICARE

## 2024-04-25 ENCOUNTER — HOSPITAL ENCOUNTER (OUTPATIENT)
Age: 79
Setting detail: OUTPATIENT SURGERY
Discharge: HOME | End: 2024-04-25
Attending: UROLOGY | Admitting: UROLOGY
Payer: MEDICARE

## 2024-04-25 VITALS
OXYGEN SATURATION: 98 % | RESPIRATION RATE: 18 BRPM | WEIGHT: 190 LBS | TEMPERATURE: 97 F | HEIGHT: 70 IN | DIASTOLIC BLOOD PRESSURE: 65 MMHG | HEART RATE: 75 BPM | SYSTOLIC BLOOD PRESSURE: 155 MMHG | BODY MASS INDEX: 27.2 KG/M2

## 2024-04-25 DIAGNOSIS — Z01.812 PRE-OPERATIVE LABORATORY EXAMINATION: Primary | ICD-10-CM

## 2024-04-25 LAB
ANION GAP SERPL CALCULATED.3IONS-SCNC: 10 MMOL/L (ref 7–16)
BUN SERPL-MCNC: 20 MG/DL (ref 6–23)
CALCIUM SERPL-MCNC: 9.9 MG/DL (ref 8.6–10.2)
CHLORIDE SERPL-SCNC: 103 MMOL/L (ref 98–107)
CO2 SERPL-SCNC: 25 MMOL/L (ref 22–29)
CREAT SERPL-MCNC: 1 MG/DL (ref 0.7–1.2)
ERYTHROCYTE [DISTWIDTH] IN BLOOD BY AUTOMATED COUNT: 13 % (ref 11.5–15)
GFR SERPL CREATININE-BSD FRML MDRD: 73 ML/MIN/1.73M2
GLUCOSE SERPL-MCNC: 88 MG/DL (ref 74–99)
HCT VFR BLD AUTO: 46.3 % (ref 37–54)
HGB BLD-MCNC: 15 G/DL (ref 12.5–16.5)
MCH RBC QN AUTO: 31.4 PG (ref 26–35)
MCHC RBC AUTO-ENTMCNC: 32.4 G/DL (ref 32–34.5)
MCV RBC AUTO: 97.1 FL (ref 80–99.9)
PLATELET # BLD AUTO: 214 K/UL (ref 130–450)
PMV BLD AUTO: 10.6 FL (ref 7–12)
POTASSIUM SERPL-SCNC: 4.4 MMOL/L (ref 3.5–5)
RBC # BLD AUTO: 4.77 M/UL (ref 3.8–5.8)
SODIUM SERPL-SCNC: 138 MMOL/L (ref 132–146)
WBC OTHER # BLD: 7.3 K/UL (ref 4.5–11.5)

## 2024-04-25 PROCEDURE — 6360000002 HC RX W HCPCS

## 2024-04-25 PROCEDURE — 85027 COMPLETE CBC AUTOMATED: CPT

## 2024-04-25 PROCEDURE — 7100000011 HC PHASE II RECOVERY - ADDTL 15 MIN: Performed by: UROLOGY

## 2024-04-25 PROCEDURE — 2580000003 HC RX 258

## 2024-04-25 PROCEDURE — 80048 BASIC METABOLIC PNL TOTAL CA: CPT

## 2024-04-25 PROCEDURE — 7100000010 HC PHASE II RECOVERY - FIRST 15 MIN: Performed by: UROLOGY

## 2024-04-25 PROCEDURE — 3600000003 HC SURGERY LEVEL 3 BASE: Performed by: UROLOGY

## 2024-04-25 PROCEDURE — 2709999900 HC NON-CHARGEABLE SUPPLY: Performed by: UROLOGY

## 2024-04-25 PROCEDURE — 3600000013 HC SURGERY LEVEL 3 ADDTL 15MIN: Performed by: UROLOGY

## 2024-04-25 PROCEDURE — 3700000000 HC ANESTHESIA ATTENDED CARE: Performed by: UROLOGY

## 2024-04-25 PROCEDURE — 7100000000 HC PACU RECOVERY - FIRST 15 MIN: Performed by: UROLOGY

## 2024-04-25 PROCEDURE — 7100000001 HC PACU RECOVERY - ADDTL 15 MIN: Performed by: UROLOGY

## 2024-04-25 PROCEDURE — 6360000002 HC RX W HCPCS: Performed by: NURSE ANESTHETIST, CERTIFIED REGISTERED

## 2024-04-25 PROCEDURE — 3700000001 HC ADD 15 MINUTES (ANESTHESIA): Performed by: UROLOGY

## 2024-04-25 RX ORDER — PROPOFOL 10 MG/ML
INJECTION, EMULSION INTRAVENOUS PRN
Status: DISCONTINUED | OUTPATIENT
Start: 2024-04-25 | End: 2024-04-25 | Stop reason: SDUPTHER

## 2024-04-25 RX ORDER — OXYCODONE HYDROCHLORIDE AND ACETAMINOPHEN 5; 325 MG/1; MG/1
1 TABLET ORAL EVERY 4 HOURS PRN
Status: DISCONTINUED | OUTPATIENT
Start: 2024-04-25 | End: 2024-04-25 | Stop reason: HOSPADM

## 2024-04-25 RX ORDER — SODIUM CHLORIDE 9 MG/ML
INJECTION, SOLUTION INTRAVENOUS PRN
Status: DISCONTINUED | OUTPATIENT
Start: 2024-04-25 | End: 2024-04-25 | Stop reason: HOSPADM

## 2024-04-25 RX ORDER — LIDOCAINE HYDROCHLORIDE 20 MG/ML
INJECTION, SOLUTION INTRAVENOUS PRN
Status: DISCONTINUED | OUTPATIENT
Start: 2024-04-25 | End: 2024-04-25 | Stop reason: SDUPTHER

## 2024-04-25 RX ORDER — SODIUM CHLORIDE 0.9 % (FLUSH) 0.9 %
5-40 SYRINGE (ML) INJECTION PRN
Status: DISCONTINUED | OUTPATIENT
Start: 2024-04-25 | End: 2024-04-25 | Stop reason: HOSPADM

## 2024-04-25 RX ORDER — SODIUM CHLORIDE 9 MG/ML
INJECTION, SOLUTION INTRAVENOUS CONTINUOUS
Status: DISCONTINUED | OUTPATIENT
Start: 2024-04-25 | End: 2024-04-25 | Stop reason: HOSPADM

## 2024-04-25 RX ORDER — SODIUM CHLORIDE, SODIUM LACTATE, POTASSIUM CHLORIDE, CALCIUM CHLORIDE 600; 310; 30; 20 MG/100ML; MG/100ML; MG/100ML; MG/100ML
INJECTION, SOLUTION INTRAVENOUS CONTINUOUS
Status: DISCONTINUED | OUTPATIENT
Start: 2024-04-25 | End: 2024-04-25 | Stop reason: HOSPADM

## 2024-04-25 RX ORDER — FENTANYL CITRATE 50 UG/ML
INJECTION, SOLUTION INTRAMUSCULAR; INTRAVENOUS PRN
Status: DISCONTINUED | OUTPATIENT
Start: 2024-04-25 | End: 2024-04-25 | Stop reason: SDUPTHER

## 2024-04-25 RX ORDER — SODIUM CHLORIDE 0.9 % (FLUSH) 0.9 %
5-40 SYRINGE (ML) INJECTION EVERY 12 HOURS SCHEDULED
Status: DISCONTINUED | OUTPATIENT
Start: 2024-04-25 | End: 2024-04-25 | Stop reason: HOSPADM

## 2024-04-25 RX ADMIN — LIDOCAINE HYDROCHLORIDE 100 MG: 20 INJECTION, SOLUTION INTRAVENOUS at 11:10

## 2024-04-25 RX ADMIN — CEFAZOLIN 2000 MG: 2 INJECTION, POWDER, FOR SOLUTION INTRAMUSCULAR; INTRAVENOUS at 11:15

## 2024-04-25 RX ADMIN — SODIUM CHLORIDE: 9 INJECTION, SOLUTION INTRAVENOUS at 10:50

## 2024-04-25 RX ADMIN — PROPOFOL 100 MG: 10 INJECTION, EMULSION INTRAVENOUS at 11:10

## 2024-04-25 RX ADMIN — FENTANYL CITRATE 50 MCG: 50 INJECTION, SOLUTION INTRAMUSCULAR; INTRAVENOUS at 11:10

## 2024-04-25 ASSESSMENT — PAIN - FUNCTIONAL ASSESSMENT
PAIN_FUNCTIONAL_ASSESSMENT: NONE - DENIES PAIN
PAIN_FUNCTIONAL_ASSESSMENT: 0-10
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
## 2024-04-25 NOTE — DISCHARGE INSTRUCTIONS
Follow up in office in 1 week.  Get KUB xray prior to office appointment.  Fill antibiotic script at pharmacy.

## 2024-04-25 NOTE — ANESTHESIA POSTPROCEDURE EVALUATION
Department of Anesthesiology  Postprocedure Note    Patient: Sonu Vasquez  MRN: [id number]  YOB: 1945  Date of evaluation: 4/25/2024    Procedure Summary       Date: 04/25/24 Room / Location: [address]    Anesthesia Start: 1100 Anesthesia Stop: 1204    Procedure: LEFT EXTRACORPOREAL SHOCK WAVE LITHOTRIPSY (Left) Diagnosis:       Uric acid nephrolithiasis      (Uric acid nephrolithiasis [N20.0])    Surgeons: Osito Alas MD Responsible Provider: Jocelyn Ramos MD    Anesthesia Type: general ASA Status: 4            Anesthesia Type: No value filed.    Gil Phase I: Gil Score: 10    Gil Phase II: Gil Score: 10    Anesthesia Post Evaluation    Patient location during evaluation: PACU  Patient participation: complete - patient participated  Level of consciousness: awake  Pain score: 0  Airway patency: patent  Nausea & Vomiting: no nausea  Cardiovascular status: hemodynamically stable  Respiratory status: acceptable  Hydration status: stable    No notable events documented.

## 2024-04-25 NOTE — H&P
[facility]              [address] [place] [address]                           HISTORY & PHYSICAL      PATIENT NAME: NILO MAGALLON              : 1945  MED REC NO: [id number]                        ROOM:   ACCOUNT NO: [id number]                       ADMIT DATE:   PROVIDER: Osito Gonzalez MD      CHIEF COMPLAINT:  Left ureteral stone.    PRESENT ILLNESS:  This 78-year-old male who in 2024, underwent cystoscopy and placement of a left ureteral stent for an obstructing calculus.  The patient recovered from his sepsis.  He is being brought to the operating room at this time for shockwave lithotripsy of the obstructing calculus.    PAST MEDICAL HISTORY:  Reveals he has had TURs on 3 occasions for all benign disease.  He has been treated in the past for urinary calculi.    MEDICATIONS:  Currently consist of metoprolol; Neurontin; vitamin B, C, D; and Xanax.    ALLERGIES:  TO ALEVE, BACLOFEN, IBUPROFEN, PAXIL, AND ROSUVASTATIN.      OTHER SURGICAL PROCEDURES:  Include triple bypass, heart valve implant replacement, penile implant placed in .  He has had colonoscopy, eye surgery, and nose surgery.    FAMILY HISTORY:  Includes diabetes but no cancer history.    MEDICAL PROBLEMS:  Also include multiple sclerosis.    SOCIAL HISTORY:  He is .  He has not smoked cigarettes since .  He is retired from .    PHYSICAL EXAMINATION:  GENERAL:  The patient is alert, oriented, appears to be in stable health.  HEAD, EYES, EARS, NOSE, AND THROAT: Unremarkable.  HEART:  Regular rhythm.  ABDOMEN:  Soft.  LUNGS:  Clear.  EXTREMITIES:  No edema.    IMPRESSION:  Left ureteral calculus, status post stenting.  The patient is to have shockwave lithotripsy and possible stent removal.  He is to have a procedure tomorrow which is .          OSITO GONZALEZ MD      D:  2024 15:54:55     T:  2024 20:46:13     RRR/AQS  Job #:

## 2024-04-25 NOTE — BRIEF OP NOTE
Brief Postoperative Note      Patient: Sonu Vasquez  YOB: 1945  MRN: [id number]    Date of Procedure: 4/25/2024    Pre-Op Diagnosis Codes:     * Uric acid nephrolithiasis [N20.0]    Post-Op Diagnosis: Same       Procedure(s):  LEFT EXTRACORPOREAL SHOCK WAVE LITHOTRIPSY    Surgeon(s):  Osito Alas MD    Assistant:      Anesthesia: General    Estimated Blood Loss (mL): less than 50     Complications: None    Specimens:       Implants:        Drains:   [REMOVED] Urinary Catheter 04/01/24 2 Way (Removed)   Urine Color Yellow 04/03/24 1044   Urine Appearance Sediment 04/02/24 1935   Collection Container Standard 04/02/24 1935   Securement Method Leg strap 04/02/24 1935   Catheter Care  Perineal wipes 04/02/24 1935   Catheter Best Practices  Drainage tube clipped to bed 04/02/24 1935   Status Draining 04/02/24 1935   Output (mL) 1200 mL 04/03/24 1044       Findings:  Infection Present At Time Of Surgery (PATOS) (choose all levels that have infection present):  No infection present  Other Findings:     Electronically signed by Osito Alas MD on 4/25/2024 at 11:56 AM

## 2024-04-25 NOTE — OP NOTE
[facility]              [address] [place] [address]                            OPERATIVE REPORT      PATIENT NAME: NILO MAGALLON              : 1945  MED REC NO: [id number]                        ROOM: [facility]  ACCOUNT NO: [id number]                       ADMIT DATE: 2024  PROVIDER: Noe Gonzalez MD      DATE OF PROCEDURE:      SURGEON:  Noe Gonzalez MD    PREOPERATIVE DIAGNOSIS:  Left renal calculus.    POSTOPERATIVE DIAGNOSIS:  Left renal calculus.    OPERATION:  Left shockwave lithotripsy.    ESTIMATED BLOOD LOSS:  Less than 50.    ANESTHESIA:  General anesthesia.    DESCRIPTION OF PROCEDURE:  With the patient on the Lithotripter apparatus, under satisfactory general anesthesia, a 14-mm left renal stone was localized, was treated with a total of 3000 shocks.  There appeared to be good fragmentation, however, there was multiple fragments left in the renal pelvis.  It was decided that the stent should be left in place.  The procedure was then terminated, and the patient was sent to the recovery room in satisfactory condition.    The plan is for him to come to the office in a week after having a KUB and if the fragmentation appears to have been adequate, then the stent will be removed.          NOE GONZALEZ MD      D:  2024 12:04:06     T:  2024 12:24:20     MINGO/STEPHANIE  Job #:  [id number]     Doc#:  [id number]

## 2024-04-30 ENCOUNTER — HOSPITAL ENCOUNTER (OUTPATIENT)
Age: 79
Discharge: HOME | End: 2024-05-02
Payer: MEDICARE

## 2024-04-30 ENCOUNTER — HOSPITAL ENCOUNTER (OUTPATIENT)
Dept: GENERAL RADIOLOGY | Age: 79
Discharge: HOME | End: 2024-05-02
Payer: MEDICARE

## 2024-04-30 DIAGNOSIS — Z09 FOLLOW-UP EXAM: ICD-10-CM

## 2024-04-30 PROCEDURE — 74018 RADEX ABDOMEN 1 VIEW: CPT

## 2024-08-12 NOTE — PROGRESS NOTES
Patient denies any cardiac issues at this time. Patient was cardiac cleared for ESWL in April 2024.  Ok to proceed with 8/15 surgery without further workup.

## 2024-08-12 NOTE — PROGRESS NOTES
[facility] PRE-ADMISSION TESTING INSTRUCTIONS    The Preadmission Testing patient is instructed accordingly using the following criteria (check applicable):    ARRIVAL INSTRUCTIONS:  [x] Parking the day of Surgery is located in the Main Entrance lot.  Upon entering the door, make an immediate right to the surgery reception desk    [x] Bring photo ID and insurance card    [] Bring in a copy of Living will or Durable Power of  papers.    [x] Please be sure to arrange for a responsible adult to provide transportation to and from the hospital    [x] Please arrange for a responsible adult to be with you for the 24 hour period post procedure due to having anesthesia    [x] If you awake am of surgery not feeling well or have temperature >100 please call [phone]    GENERAL INSTRUCTIONS:    [x] No solid foods after midnight, may have up to 8oz of water until 4 hours prior to surgery. No gum, no candy, no mints. NPO time:       [x] You may brush your teeth, do not swallow any toothpaste    [x] Take medications as instructed     [x] Stop herbal supplements and vitamins 5 days prior to procedure    [x] Follow preop dosing of blood thinners per physician instructions    [] Take 1/2 dose of evening insulin, but no insulin after midnight    [] No oral diabetic medications after midnight    [] If diabetic and have low blood sugar or feel symptomatic, take 1-2oz apple juice only    [] Bring inhalers day of surgery    [] Bring urine specimen day of surgery    [x] Shower or bath with soap, lather and rinse well, AM of Surgery, no lotion, powders or creams to surgical site    [] Follow bowel prep as instructed per surgeon    [x] No tobacco products within 24 hours of surgery     [x] No alcohol or illegal drug use, marijuana included, within 24 hours of surgery.    [x] Jewelry, body piercing's, eyeglasses, contact lenses and dentures are not permitted into surgery (bring cases)      [x] Please do

## 2024-08-15 ENCOUNTER — ANESTHESIA EVENT (OUTPATIENT)
Dept: OPERATING ROOM | Age: 79
End: 2024-08-15
Payer: MEDICARE

## 2024-08-15 ENCOUNTER — ANESTHESIA (OUTPATIENT)
Dept: OPERATING ROOM | Age: 79
End: 2024-08-15
Payer: MEDICARE

## 2024-08-15 ENCOUNTER — HOSPITAL ENCOUNTER (OUTPATIENT)
Age: 79
Setting detail: OUTPATIENT SURGERY
Discharge: HOME | End: 2024-08-15
Attending: STUDENT IN AN ORGANIZED HEALTH CARE EDUCATION/TRAINING PROGRAM | Admitting: STUDENT IN AN ORGANIZED HEALTH CARE EDUCATION/TRAINING PROGRAM
Payer: MEDICARE

## 2024-08-15 VITALS
WEIGHT: 179 LBS | DIASTOLIC BLOOD PRESSURE: 76 MMHG | HEART RATE: 70 BPM | HEIGHT: 70 IN | RESPIRATION RATE: 16 BRPM | TEMPERATURE: 97.8 F | SYSTOLIC BLOOD PRESSURE: 142 MMHG | BODY MASS INDEX: 25.62 KG/M2 | OXYGEN SATURATION: 97 %

## 2024-08-15 DIAGNOSIS — H18.513 ENDOTHELIAL CORNEAL DYSTROPHY OF BOTH EYES: ICD-10-CM

## 2024-08-15 PROCEDURE — 6360000002 HC RX W HCPCS: Performed by: STUDENT IN AN ORGANIZED HEALTH CARE EDUCATION/TRAINING PROGRAM

## 2024-08-15 PROCEDURE — 3600000003 HC SURGERY LEVEL 3 BASE: Performed by: STUDENT IN AN ORGANIZED HEALTH CARE EDUCATION/TRAINING PROGRAM

## 2024-08-15 PROCEDURE — 2709999900 HC NON-CHARGEABLE SUPPLY: Performed by: STUDENT IN AN ORGANIZED HEALTH CARE EDUCATION/TRAINING PROGRAM

## 2024-08-15 PROCEDURE — 7100000010 HC PHASE II RECOVERY - FIRST 15 MIN: Performed by: STUDENT IN AN ORGANIZED HEALTH CARE EDUCATION/TRAINING PROGRAM

## 2024-08-15 PROCEDURE — 2500000003 HC RX 250 WO HCPCS: Performed by: STUDENT IN AN ORGANIZED HEALTH CARE EDUCATION/TRAINING PROGRAM

## 2024-08-15 PROCEDURE — 3700000000 HC ANESTHESIA ATTENDED CARE: Performed by: STUDENT IN AN ORGANIZED HEALTH CARE EDUCATION/TRAINING PROGRAM

## 2024-08-15 PROCEDURE — 6370000000 HC RX 637 (ALT 250 FOR IP)

## 2024-08-15 PROCEDURE — 3700000001 HC ADD 15 MINUTES (ANESTHESIA): Performed by: STUDENT IN AN ORGANIZED HEALTH CARE EDUCATION/TRAINING PROGRAM

## 2024-08-15 PROCEDURE — 6370000000 HC RX 637 (ALT 250 FOR IP): Performed by: STUDENT IN AN ORGANIZED HEALTH CARE EDUCATION/TRAINING PROGRAM

## 2024-08-15 PROCEDURE — V2785 CORNEAL TISSUE PROCESSING: HCPCS | Performed by: STUDENT IN AN ORGANIZED HEALTH CARE EDUCATION/TRAINING PROGRAM

## 2024-08-15 PROCEDURE — 88305 TISSUE EXAM BY PATHOLOGIST: CPT

## 2024-08-15 PROCEDURE — 3600000013 HC SURGERY LEVEL 3 ADDTL 15MIN: Performed by: STUDENT IN AN ORGANIZED HEALTH CARE EDUCATION/TRAINING PROGRAM

## 2024-08-15 PROCEDURE — 7100000011 HC PHASE II RECOVERY - ADDTL 15 MIN: Performed by: STUDENT IN AN ORGANIZED HEALTH CARE EDUCATION/TRAINING PROGRAM

## 2024-08-15 PROCEDURE — 2580000003 HC RX 258: Performed by: REGISTERED NURSE

## 2024-08-15 PROCEDURE — 6360000002 HC RX W HCPCS: Performed by: REGISTERED NURSE

## 2024-08-15 RX ORDER — CIPROFLOXACIN HYDROCHLORIDE 3.5 MG/ML
1 SOLUTION/ DROPS TOPICAL SEE ADMIN INSTRUCTIONS
Status: COMPLETED | OUTPATIENT
Start: 2024-08-15 | End: 2024-08-15

## 2024-08-15 RX ORDER — CIPROFLOXACIN HYDROCHLORIDE 3.5 MG/ML
SOLUTION/ DROPS TOPICAL PRN
Status: DISCONTINUED | OUTPATIENT
Start: 2024-08-15 | End: 2024-08-15 | Stop reason: HOSPADM

## 2024-08-15 RX ORDER — DEXAMETHASONE SODIUM PHOSPHATE 4 MG/ML
INJECTION, SOLUTION INTRA-ARTICULAR; INTRALESIONAL; INTRAMUSCULAR; INTRAVENOUS; SOFT TISSUE PRN
Status: DISCONTINUED | OUTPATIENT
Start: 2024-08-15 | End: 2024-08-15 | Stop reason: HOSPADM

## 2024-08-15 RX ORDER — SODIUM CHLORIDE, SODIUM LACTATE, POTASSIUM CHLORIDE, CALCIUM CHLORIDE 600; 310; 30; 20 MG/100ML; MG/100ML; MG/100ML; MG/100ML
INJECTION, SOLUTION INTRAVENOUS CONTINUOUS
Status: DISCONTINUED | OUTPATIENT
Start: 2024-08-15 | End: 2024-08-15 | Stop reason: HOSPADM

## 2024-08-15 RX ORDER — KETOROLAC TROMETHAMINE 5 MG/ML
1 SOLUTION OPHTHALMIC
Status: COMPLETED | OUTPATIENT
Start: 2024-08-15 | End: 2024-08-15

## 2024-08-15 RX ORDER — PREDNISOLONE ACETATE 10 MG/ML
SUSPENSION/ DROPS OPHTHALMIC PRN
Status: DISCONTINUED | OUTPATIENT
Start: 2024-08-15 | End: 2024-08-15 | Stop reason: HOSPADM

## 2024-08-15 RX ORDER — SODIUM CHLORIDE 9 MG/ML
INJECTION, SOLUTION INTRAVENOUS CONTINUOUS PRN
Status: DISCONTINUED | OUTPATIENT
Start: 2024-08-15 | End: 2024-08-15 | Stop reason: SDUPTHER

## 2024-08-15 RX ORDER — FENTANYL CITRATE 50 UG/ML
INJECTION, SOLUTION INTRAMUSCULAR; INTRAVENOUS PRN
Status: DISCONTINUED | OUTPATIENT
Start: 2024-08-15 | End: 2024-08-15 | Stop reason: SDUPTHER

## 2024-08-15 RX ORDER — KETOROLAC TROMETHAMINE 5 MG/ML
SOLUTION OPHTHALMIC
Status: COMPLETED
Start: 2024-08-15 | End: 2024-08-15

## 2024-08-15 RX ORDER — BRIMONIDINE TARTRATE 2 MG/ML
SOLUTION/ DROPS OPHTHALMIC PRN
Status: DISCONTINUED | OUTPATIENT
Start: 2024-08-15 | End: 2024-08-15 | Stop reason: HOSPADM

## 2024-08-15 RX ORDER — ONDANSETRON 2 MG/ML
4 INJECTION INTRAMUSCULAR; INTRAVENOUS EVERY 6 HOURS PRN
Status: DISCONTINUED | OUTPATIENT
Start: 2024-08-15 | End: 2024-08-15 | Stop reason: CLARIF

## 2024-08-15 RX ORDER — PREDNISOLONE ACETATE 10 MG/ML
1 SUSPENSION/ DROPS OPHTHALMIC EVERY 6 HOURS SCHEDULED
Status: DISCONTINUED | OUTPATIENT
Start: 2024-08-15 | End: 2024-08-15 | Stop reason: HOSPADM

## 2024-08-15 RX ORDER — CEFAZOLIN SODIUM 1 G/3ML
INJECTION, POWDER, FOR SOLUTION INTRAMUSCULAR; INTRAVENOUS PRN
Status: DISCONTINUED | OUTPATIENT
Start: 2024-08-15 | End: 2024-08-15 | Stop reason: HOSPADM

## 2024-08-15 RX ORDER — KETOROLAC TROMETHAMINE 30 MG/ML
INJECTION, SOLUTION INTRAMUSCULAR; INTRAVENOUS PRN
Status: DISCONTINUED | OUTPATIENT
Start: 2024-08-15 | End: 2024-08-15 | Stop reason: SDUPTHER

## 2024-08-15 RX ORDER — PROCHLORPERAZINE EDISYLATE 5 MG/ML
10 INJECTION INTRAMUSCULAR; INTRAVENOUS EVERY 6 HOURS PRN
Status: DISCONTINUED | OUTPATIENT
Start: 2024-08-15 | End: 2024-08-15 | Stop reason: HOSPADM

## 2024-08-15 RX ORDER — PILOCARPINE HYDROCHLORIDE 20 MG/ML
1 SOLUTION/ DROPS OPHTHALMIC SEE ADMIN INSTRUCTIONS
Status: COMPLETED | OUTPATIENT
Start: 2024-08-15 | End: 2024-08-15

## 2024-08-15 RX ORDER — SODIUM CHLORIDE, POTASSIUM CHLORIDE, CALCIUM CHLORIDE, MAGNESIUM CHLORIDE, SODIUM ACETATE, AND SODIUM CITRATE 6.4; .75; .48; .3; 3.9; 1.7 MG/ML; MG/ML; MG/ML; MG/ML; MG/ML; MG/ML
SOLUTION IRRIGATION PRN
Status: DISCONTINUED | OUTPATIENT
Start: 2024-08-15 | End: 2024-08-15 | Stop reason: HOSPADM

## 2024-08-15 RX ORDER — GLYCOPYRROLATE 0.2 MG/ML
INJECTION INTRAMUSCULAR; INTRAVENOUS PRN
Status: DISCONTINUED | OUTPATIENT
Start: 2024-08-15 | End: 2024-08-15 | Stop reason: SDUPTHER

## 2024-08-15 RX ORDER — PROCHLORPERAZINE MALEATE 10 MG
10 TABLET ORAL EVERY 8 HOURS PRN
Status: DISCONTINUED | OUTPATIENT
Start: 2024-08-15 | End: 2024-08-15 | Stop reason: HOSPADM

## 2024-08-15 RX ORDER — MIDAZOLAM HYDROCHLORIDE 1 MG/ML
INJECTION INTRAMUSCULAR; INTRAVENOUS PRN
Status: DISCONTINUED | OUTPATIENT
Start: 2024-08-15 | End: 2024-08-15 | Stop reason: SDUPTHER

## 2024-08-15 RX ORDER — ONDANSETRON 4 MG/1
4 TABLET, ORALLY DISINTEGRATING ORAL EVERY 8 HOURS PRN
Status: DISCONTINUED | OUTPATIENT
Start: 2024-08-15 | End: 2024-08-15 | Stop reason: CLARIF

## 2024-08-15 RX ORDER — TETRACAINE HYDROCHLORIDE 5 MG/ML
SOLUTION OPHTHALMIC PRN
Status: DISCONTINUED | OUTPATIENT
Start: 2024-08-15 | End: 2024-08-15 | Stop reason: HOSPADM

## 2024-08-15 RX ORDER — PROPOFOL 10 MG/ML
INJECTION, EMULSION INTRAVENOUS PRN
Status: DISCONTINUED | OUTPATIENT
Start: 2024-08-15 | End: 2024-08-15 | Stop reason: SDUPTHER

## 2024-08-15 RX ADMIN — KETOROLAC TROMETHAMINE 1 DROP: 5 SOLUTION OPHTHALMIC at 09:00

## 2024-08-15 RX ADMIN — FENTANYL CITRATE 25 MCG: 50 INJECTION, SOLUTION INTRAMUSCULAR; INTRAVENOUS at 11:13

## 2024-08-15 RX ADMIN — MIDAZOLAM 1 MG: 1 INJECTION INTRAMUSCULAR; INTRAVENOUS at 10:38

## 2024-08-15 RX ADMIN — FENTANYL CITRATE 25 MCG: 50 INJECTION, SOLUTION INTRAMUSCULAR; INTRAVENOUS at 10:38

## 2024-08-15 RX ADMIN — CIPROFLOXACIN HYDROCHLORIDE 1 DROP: 3 SOLUTION/ DROPS OPHTHALMIC at 09:20

## 2024-08-15 RX ADMIN — PILOCARPINE HYDROCHLORIDE 1 DROP: 20 SOLUTION/ DROPS OPHTHALMIC at 09:10

## 2024-08-15 RX ADMIN — PROPOFOL 30 MG: 10 INJECTION, EMULSION INTRAVENOUS at 10:41

## 2024-08-15 RX ADMIN — DEXAMETHASONE SODIUM PHOSPHATE 10 MG: 4 INJECTION, SOLUTION INTRAMUSCULAR; INTRAVENOUS at 11:48

## 2024-08-15 RX ADMIN — KETOROLAC TROMETHAMINE 1 DROP: 5 SOLUTION OPHTHALMIC at 09:20

## 2024-08-15 RX ADMIN — KETOROLAC TROMETHAMINE 1 DROP: 5 SOLUTION OPHTHALMIC at 08:48

## 2024-08-15 RX ADMIN — PROPOFOL 50 MG: 10 INJECTION, EMULSION INTRAVENOUS at 10:38

## 2024-08-15 RX ADMIN — Medication 1 DROP: at 08:48

## 2024-08-15 RX ADMIN — MIDAZOLAM 1 MG: 1 INJECTION INTRAMUSCULAR; INTRAVENOUS at 11:16

## 2024-08-15 RX ADMIN — CIPROFLOXACIN HYDROCHLORIDE 1 DROP: 3 SOLUTION/ DROPS OPHTHALMIC at 09:00

## 2024-08-15 RX ADMIN — SODIUM CHLORIDE: 9 INJECTION, SOLUTION INTRAVENOUS at 10:46

## 2024-08-15 RX ADMIN — PILOCARPINE HYDROCHLORIDE 1 DROP: 20 SOLUTION/ DROPS OPHTHALMIC at 09:20

## 2024-08-15 RX ADMIN — FENTANYL CITRATE 25 MCG: 50 INJECTION, SOLUTION INTRAMUSCULAR; INTRAVENOUS at 10:51

## 2024-08-15 RX ADMIN — PILOCARPINE HYDROCHLORIDE 1 DROP: 20 SOLUTION/ DROPS OPHTHALMIC at 08:48

## 2024-08-15 RX ADMIN — CIPROFLOXACIN HYDROCHLORIDE 1 DROP: 3 SOLUTION/ DROPS OPHTHALMIC at 08:48

## 2024-08-15 RX ADMIN — FENTANYL CITRATE 25 MCG: 50 INJECTION, SOLUTION INTRAMUSCULAR; INTRAVENOUS at 11:03

## 2024-08-15 RX ADMIN — PILOCARPINE HYDROCHLORIDE 1 DROP: 20 SOLUTION/ DROPS OPHTHALMIC at 09:00

## 2024-08-15 RX ADMIN — CIPROFLOXACIN HYDROCHLORIDE 1 DROP: 3 SOLUTION/ DROPS OPHTHALMIC at 09:10

## 2024-08-15 RX ADMIN — KETOROLAC TROMETHAMINE 1 DROP: 5 SOLUTION OPHTHALMIC at 09:10

## 2024-08-15 RX ADMIN — KETOROLAC TROMETHAMINE 30 MG: 30 INJECTION, SOLUTION INTRAMUSCULAR at 11:47

## 2024-08-15 RX ADMIN — GLYCOPYRROLATE 0.1 MG: 0.2 INJECTION, SOLUTION INTRAMUSCULAR; INTRAVENOUS at 10:35

## 2024-08-15 NOTE — PROGRESS NOTES
DISCHARGE INSTRUCTIONS GIVEN TO PT AT THIS TIME QUESTIONS ANSWERED PAMPHLETS GIVEN WAITNG FOR PT RIDE, PT ASSISTED WITH GETTING DRESSED PER RN PT TOLERATING LIQUIDS   1310 PT RIDE HERE AT THIS TIME

## 2024-08-15 NOTE — H&P
Short Form History and Physical    Sonu Vasquez     Chief Complaint: Patient complains of decrease vision in left eye.    History of Present Illness:  Best-corrected vision in eye is 20/100   W  Patient has had a complete ophthalmologic exam. It has been determined that the  primary cause of decreased vision is due to a fuchs corneal dystrophy and patient feels it is affecting activities of daily living.  The patient is having difficulty with:   driving, glare, reading, and depth perception   Patient understands Risk, benefits, and alternatives to surgery and wants cataract surgery.     Consent form has been reviewed and signed.        Other findings: .    Ocular Ex: Biometry measurements:   Cornea: Dense 3+ guttae, worse centrally with 1+ dfolds     Past Medical History:  Past Medical History:   Diagnosis Date    Anxiety     With panic attacks.    BPV (benign positional vertigo)     Bronchitis     CAD (coronary artery disease)     Cervical radiculopathy     Claustrophobia     Empyema (HCC)     R lung    Endocarditis 04/2005    Transesophageal echocardiogram showed prolapse of the posterior mitral leaflet but with no definite vegetation and with moderate mitral regurgitation.    Enlarged LA (left atrium) 03/24/2015    severely dilated    Erectile dysfunction     GERD (gastroesophageal reflux disease)     H/O paroxysmal supraventricular tachycardia     Hiatal hernia     History of coronary artery bypass graft     Hydronephrosis     Hypertension     Left-sided Bell's palsy     Lumbosacral radiculopathy     Lung nodule     Mitral valve prolapse     Mitral regurgitation.    Moderate tricuspid regurgitation 03/24/2015    Multiple sclerosis (HCC)     Mild drop foot.    Prostatitis     Renal calculi 11/2004 S/P lithotripsy.    1/2005 S/P cystoscopy (with further removal of calculi).       ROS:  Pertinent items are noted in HPI.    Medications:     lactated ringers IV soln            Allergies:   Aleve [naproxen],

## 2024-08-15 NOTE — ANESTHESIA POSTPROCEDURE EVALUATION
Department of Anesthesiology  Postprocedure Note    Patient: Sonu Vasquez  MRN: [id number]  YOB: 1945  Date of evaluation: 8/15/2024    Procedure Summary       Date: 08/15/24 Room / Location: [address]    Anesthesia Start: 1033 Anesthesia Stop: 1157    Procedure: LEFT EYE DESCEMET MEMBRANE ENDOTHELIAL KERATOPLASTY (Left: Eye) Diagnosis:       Endothelial corneal dystrophy of both eyes      (Endothelial corneal dystrophy of both eyes [H18.513])    Surgeons: Linette Nunez MD Responsible Provider: Monica Aldridge DO    Anesthesia Type: MAC ASA Status: 4            Anesthesia Type: No value filed.    Gil Phase I: Gil Score: 10    Gil Phase II: Gil Score: 10    Anesthesia Post Evaluation    Patient location during evaluation: PACU  Patient participation: complete - patient participated  Level of consciousness: awake and alert  Airway patency: patent  Nausea & Vomiting: no nausea and no vomiting  Cardiovascular status: hemodynamically stable  Respiratory status: acceptable  Hydration status: euvolemic  Pain management: adequate    No notable events documented.

## 2024-08-15 NOTE — ANESTHESIA PRE PROCEDURE
Current packs/day: 0.00     Average packs/day: 2.0 packs/day for 25.0 years (50.0 ttl pk-yrs)     Types: Cigarettes     Start date: 1957     Quit date: 1982     Years since quittin.7    Smokeless tobacco: Never   Substance Use Topics    Alcohol use: Yes     Comment: occas                                Counseling given: Not Answered      Vital Signs (Current):   Vitals:    24 1014 08/15/24 0904   Temp:  97 °F (36.1 °C)   TempSrc:  Temporal   Weight: 81.2 kg (179 lb)    Height: 1.778 m (5' 10\")                                               BP Readings from Last 3 Encounters:   24 (!) 155/65   24 (!) 150/65   24 118/82       NPO Status: Time of last liquid consumption:                         Time of last solid consumption:                         Date of last liquid consumption: 24                        Date of last solid food consumption: 24    BMI:   Wt Readings from Last 3 Encounters:   24 81.2 kg (179 lb)   24 86.2 kg (190 lb)   24 90.7 kg (200 lb)     Body mass index is 25.68 kg/m².    CBC:   Lab Results   Component Value Date/Time    WBC 7.3 2024 10:30 AM    RBC 4.77 2024 10:30 AM    HGB 15.0 2024 10:30 AM    HGB 9.9 2022 10:50 AM    HCT 46.3 2024 10:30 AM    HCT 29.0 2022 10:50 AM    MCV 97.1 2024 10:30 AM    RDW 13.0 2024 10:30 AM     2024 10:30 AM       CMP:   Lab Results   Component Value Date/Time     2024 10:30 AM    K 4.4 2024 10:30 AM    K 4.1 2022 12:53 AM     2024 10:30 AM    CO2 25 2024 10:30 AM    BUN 20 2024 10:30 AM    CREATININE 1.0 2024 10:30 AM    GFRAA >60 2022 05:44 AM    LABGLOM 73 2024 10:30 AM    GLUCOSE 88 2024 10:30 AM    CALCIUM 9.9 2024 10:30 AM    BILITOT 0.8 2024 08:15 PM    ALKPHOS 105 2024 08:15 PM    AST 9 2024 08:15 PM    ALT 10 2024 08:15 PM

## 2024-08-15 NOTE — OP NOTE
Operative Note      Patient: Sonu Vasquez  YOB: 1945  MRN: [id number]    Date of Procedure: 8/15/2024    Pre-Op Diagnosis Codes:      * Endothelial corneal dystrophy of both eyes [H18.513]    Post-Op Diagnosis: Same       Procedure(s):  LEFT EYE DESCEMET MEMBRANE ENDOTHELIAL KERATOPLASTY    Surgeon(s):  Linette Nunez MD    Assistant:   * No surgical staff found *    Anesthesia: Monitor Anesthesia Care    Estimated Blood Loss (mL): Minimal    Complications: None    Specimens:   ID Type Source Tests Collected by Time Destination   A : LEFT EYE DESCEMET MEMBRANE Tissue Tissue SURGICAL PATHOLOGY Linette Nunez MD 8/15/2024 1111        Implants:  Implant Name Type Inv. Item Serial No.  Lot No. LRB No. Used Action   [employer]  [employer]   [id number]   Left 1 Implanted         Drains: * No LDAs found *    Findings:  Infection Present At Time Of Surgery (PATOS) (choose all levels that have infection present):  No infection present  Other Findings:     Detailed Description of Procedure:     The patient was identified in the preoperative area where consent was obtained and the surgical eye indicated by the patient was appropriately marked.  Proper eyedrops were instilled per nursing orders.  The patient was taken to the operating room suite where leads and monitors were placed.  The patient confirmed the operative eye in the presence of the surgeon and circulator and the intraocular lens was confirmed.  A retrobulbar block was given using marcaine and lidocaine.  The eye was then cleaned and draped in the usual fasion for intraocular surgery. A lid speculum was placed.     Callipers were used to measure the corneal surface.  A sinsky was used to karen the center of the cornea.  Using weck-cels, a superficial keratectomy was performed.  An 8.0 mm corneal marking ring was used to indent and karen the corneal surface.  A 15 degree blade was used to make 3

## 2024-08-15 NOTE — DISCHARGE INSTRUCTIONS
1.  PLEASE READ AFTER CATARACT INSTRUCTIONS FROM DR. CHAVIRA.    2.  LEAVE THE EYE PATCH ON TILL TOMORROW .  DR. CHAVIRA WILL REMOVE THE PATCH IN THE MORNING.    3.  FOLLOW UP IN DR CHAVIRA OFFICE TOMORROW AT 8:45 AM.  SHE WILL GIVE YOU ALL YOUR EYE DROPS AND INSTRUCTIONS AT THAT TIME.     4. LAY FLAT FOR 45 MINUTES OUT OF EVERY HOUR.  THIS WILL HELP ENSURE GRAFT ADHERENCE. IT IS OK TO TAKE 10 MINUTE BREAKS EVERY HOUR TO EAT/STRETCH/USE RESTROOM.     5. Alternate Advil 600 mg and Tylenol 650 mg every 4 hours for pain.     6.  FOLLOW POST ANESTHESIA DISCHARGE INSTRUCTIONS PROVIDED.         Linette Chavira M.D.  Diplomate [employer] Board [employer]hthal[employer]    [address] [place] [address]  Phone: [phone]  Fax: [phone]  After Cataract Surgery Patient Directions       A recently operated eye must be protected from injury and infection since it is healing.                                           You must always scrub your hands well before applying any medications, treating or touching about the eye; this will lessen the chance of introducing infection.    Some REDNESS is expected, as you will note when the dressing is removed; this redness should gradually lessen.    Mild irritation is expected.  It may feel like an eyelash irritating the eye.  DEEP ACHING pain is NOT expected.  Please call me immediately if you have any problems at office or through medical dental [phone]    A mucus discharge will sometimes be found on the lashes or at the corners of the eye; this is normal in modest amounts. Use a clean tissue directly from the box (not a purse or pocket) or a clean washcloth to gently wipe around the eye to remove any mucus from the eye or lashes.     If this discharge should become excessive and yellow (pus-like), notify me immediately.    If any eye, eyelid or orbital swelling becomes prominent notify me.    Your vision may be blurred for a period of time postoperatively  bottle or tube.    Post-Operative Medications Sheet will be given to you on your first post op visit.    All Aspirin, Coumadin, Plavix and other nonsteroidal products such as Advil can be   resumed after surgery.     If you should need to reach me, try the office phone number first - [phone].  If you cannot reach me at the office call medical dental at [phone].    Thank you,    Linette Nunez M.D.

## 2024-08-22 LAB — SURGICAL PATHOLOGY REPORT: NORMAL

## 2024-09-17 ENCOUNTER — HOSPITAL ENCOUNTER (OUTPATIENT)
Age: 79
Discharge: HOME | End: 2024-09-19
Payer: MEDICARE

## 2024-09-17 ENCOUNTER — HOSPITAL ENCOUNTER (OUTPATIENT)
Dept: GENERAL RADIOLOGY | Age: 79
Discharge: HOME | End: 2024-09-19
Payer: MEDICARE

## 2024-09-17 DIAGNOSIS — N20.0 CALCULUS OF KIDNEY: ICD-10-CM

## 2024-09-17 DIAGNOSIS — R07.89 OTHER CHEST PAIN: ICD-10-CM

## 2024-09-17 PROCEDURE — 74018 RADEX ABDOMEN 1 VIEW: CPT

## 2024-09-17 PROCEDURE — 71111 X-RAY EXAM RIBS/CHEST4/> VWS: CPT

## 2024-09-18 RX ORDER — GABAPENTIN 300 MG/1
300 CAPSULE ORAL 2 TIMES DAILY
Qty: 60 CAPSULE | Refills: 5 | Status: SHIPPED | OUTPATIENT
Start: 2024-09-18 | End: 2025-03-17

## 2024-09-25 ENCOUNTER — OFFICE VISIT (OUTPATIENT)
Dept: ENT CLINIC | Age: 79
End: 2024-09-25
Payer: MEDICARE

## 2024-09-25 VITALS
WEIGHT: 177.1 LBS | OXYGEN SATURATION: 98 % | HEIGHT: 70 IN | BODY MASS INDEX: 25.35 KG/M2 | HEART RATE: 75 BPM | SYSTOLIC BLOOD PRESSURE: 124 MMHG | TEMPERATURE: 97.2 F | DIASTOLIC BLOOD PRESSURE: 65 MMHG

## 2024-09-25 DIAGNOSIS — H61.23 BILATERAL IMPACTED CERUMEN: Primary | ICD-10-CM

## 2024-09-25 PROCEDURE — 69210 REMOVE IMPACTED EAR WAX UNI: CPT

## 2024-09-25 PROCEDURE — 3074F SYST BP LT 130 MM HG: CPT

## 2024-09-25 PROCEDURE — G8419 CALC BMI OUT NRM PARAM NOF/U: HCPCS

## 2024-09-25 PROCEDURE — G8427 DOCREV CUR MEDS BY ELIG CLIN: HCPCS

## 2024-09-25 PROCEDURE — 1036F TOBACCO NON-USER: CPT

## 2024-09-25 PROCEDURE — 3078F DIAST BP <80 MM HG: CPT

## 2024-09-25 PROCEDURE — 1123F ACP DISCUSS/DSCN MKR DOCD: CPT

## 2024-09-25 PROCEDURE — 99213 OFFICE O/P EST LOW 20 MIN: CPT

## 2024-09-25 RX ORDER — TAMSULOSIN HYDROCHLORIDE 0.4 MG/1
CAPSULE ORAL
COMMUNITY
Start: 2024-09-24

## 2024-09-25 ASSESSMENT — ENCOUNTER SYMPTOMS
COUGH: 0
SORE THROAT: 0
SHORTNESS OF BREATH: 0
ALLERGIC/IMMUNOLOGIC NEGATIVE: 1
DIARRHEA: 0
VOMITING: 0
EYE PAIN: 0
BACK PAIN: 0
RHINORRHEA: 0
SINUS PRESSURE: 0
EYE DISCHARGE: 0

## 2024-10-09 ENCOUNTER — OFFICE VISIT (OUTPATIENT)
Dept: CARDIOLOGY CLINIC | Age: 79
End: 2024-10-09

## 2024-10-09 VITALS
RESPIRATION RATE: 18 BRPM | BODY MASS INDEX: 25.45 KG/M2 | HEART RATE: 89 BPM | OXYGEN SATURATION: 96 % | SYSTOLIC BLOOD PRESSURE: 114 MMHG | DIASTOLIC BLOOD PRESSURE: 64 MMHG | HEIGHT: 70 IN | WEIGHT: 177.8 LBS

## 2024-10-09 DIAGNOSIS — Z87.01 H/O: PNEUMONIA: ICD-10-CM

## 2024-10-09 DIAGNOSIS — F41.9 ANXIETY: ICD-10-CM

## 2024-10-09 DIAGNOSIS — Z87.09 H/O PLEURAL EFFUSION: ICD-10-CM

## 2024-10-09 DIAGNOSIS — Z90.79 H/O TRANSURETHRAL RESECTION OF PROSTATE: ICD-10-CM

## 2024-10-09 DIAGNOSIS — Z98.890 H/O TRANSURETHRAL RESECTION OF PROSTATE: ICD-10-CM

## 2024-10-09 DIAGNOSIS — I38 VHD (VALVULAR HEART DISEASE): ICD-10-CM

## 2024-10-09 DIAGNOSIS — F40.240 CLAUSTROPHOBIA: ICD-10-CM

## 2024-10-09 DIAGNOSIS — Z98.890 HISTORY OF LITHOTRIPSY: ICD-10-CM

## 2024-10-09 DIAGNOSIS — Z86.79 H/O ENDOCARDITIS: ICD-10-CM

## 2024-10-09 DIAGNOSIS — K22.70 BARRETT'S ESOPHAGUS WITHOUT DYSPLASIA: ICD-10-CM

## 2024-10-09 DIAGNOSIS — K44.9 HIATAL HERNIA WITH GASTROESOPHAGEAL REFLUX: ICD-10-CM

## 2024-10-09 DIAGNOSIS — Z87.440 HISTORY OF UTI: ICD-10-CM

## 2024-10-09 DIAGNOSIS — Z98.890 S/P TVR (TRICUSPID VALVE REPAIR): ICD-10-CM

## 2024-10-09 DIAGNOSIS — K43.2 INCISIONAL HERNIA, WITHOUT OBSTRUCTION OR GANGRENE: ICD-10-CM

## 2024-10-09 DIAGNOSIS — N52.9 ERECTILE DYSFUNCTION, UNSPECIFIED ERECTILE DYSFUNCTION TYPE: ICD-10-CM

## 2024-10-09 DIAGNOSIS — Z95.1 HX OF CABG: ICD-10-CM

## 2024-10-09 DIAGNOSIS — I10 PRIMARY HYPERTENSION: ICD-10-CM

## 2024-10-09 DIAGNOSIS — I25.10 CAD IN NATIVE ARTERY: Primary | ICD-10-CM

## 2024-10-09 DIAGNOSIS — Z87.19 H/O GASTRITIS: ICD-10-CM

## 2024-10-09 DIAGNOSIS — E11.9 DIET-CONTROLLED DIABETES MELLITUS (HCC): ICD-10-CM

## 2024-10-09 DIAGNOSIS — Z98.890: ICD-10-CM

## 2024-10-09 DIAGNOSIS — F41.0 PANIC ATTACKS: ICD-10-CM

## 2024-10-09 DIAGNOSIS — K21.9 HIATAL HERNIA WITH GASTROESOPHAGEAL REFLUX: ICD-10-CM

## 2024-10-09 DIAGNOSIS — Z98.890 S/P AORTIC VALVE REPAIR: ICD-10-CM

## 2024-10-09 DIAGNOSIS — Z87.442 HISTORY OF KIDNEY STONES: ICD-10-CM

## 2024-10-09 DIAGNOSIS — G35 MULTIPLE SCLEROSIS (HCC): ICD-10-CM

## 2024-10-09 DIAGNOSIS — J61 ASBESTOSIS (HCC): ICD-10-CM

## 2024-10-09 DIAGNOSIS — Z85.51 HISTORY OF BLADDER CANCER: ICD-10-CM

## 2024-10-09 DIAGNOSIS — Z98.890 S/P BALLOON DILATATION OF ESOPHAGEAL STRICTURE: ICD-10-CM

## 2024-10-09 DIAGNOSIS — Z95.2 S/P MVR (MITRAL VALVE REPLACEMENT): ICD-10-CM

## 2024-10-09 DIAGNOSIS — I47.10 SVT (SUPRAVENTRICULAR TACHYCARDIA) (HCC): ICD-10-CM

## 2024-10-09 NOTE — PROGRESS NOTES
daily     Social Determinants of Health     Food Insecurity: No Food Insecurity (3/31/2024)    Hunger Vital Sign     Worried About Running Out of Food in the Last Year: Never true     Ran Out of Food in the Last Year: Never true   Transportation Needs: No Transportation Needs (3/31/2024)    PRAPARE - Transportation     Lack of Transportation (Medical): No     Lack of Transportation (Non-Medical): No   Housing Stability: Low Risk  (3/31/2024)    Housing Stability Vital Sign     Unable to Pay for Housing in the Last Year: No     Number of Places Lived in the Last Year: 1     Unstable Housing in the Last Year: No       O:  COMPLETE PHYSICAL EXAM:      /64   Pulse 89   Resp 18   Ht 1.778 m (5' 10\")   Wt 80.6 kg (177 lb 12.8 oz)   SpO2 96%   BMI 25.51 kg/m²      General:                      Well-developed, well-nourished male in no distress.  Head & Neck:              Normocephalic and atraumatic head. No JVD. No carotid bruits. Carotid upstrokes normal bilaterally. No   thyromegaly. Sclerae not icteric. No xanthelasmas. Mucous membranes moist.  Chest:                         Symmetrical and nontender. No deformities.  Sternotomy scar well-healed:  There is a soft tissue bulge at the lower end   of the sternal wound/epigastric area consistent with chest wall/epigastric incisional/ventral hernia.    Lungs:                         Clear to auscultation bilaterally.  Heart:                          Normal S1 and S2. No S3 or S4. Grade 2/6 systolic murmur heard at the apex, axilla and left middle sternal   border.  Grade 2/6 systolic murmur heard at the right upper sternal border.    Abdomen:                   Soft, nontender without organomegaly or masses. No bruits. Normal bowel sounds.  Extremities:                 No edema. Pulses normal.  Skin:                            Normal turgor. No rashes or ulcers noted.  Neurologic:                  Oriented x 3. No motor or sensory deficits detected     REVIEW OF

## 2024-11-12 ENCOUNTER — OFFICE VISIT (OUTPATIENT)
Dept: NEUROLOGY | Age: 79
End: 2024-11-12

## 2024-11-12 VITALS
DIASTOLIC BLOOD PRESSURE: 66 MMHG | BODY MASS INDEX: 26.83 KG/M2 | TEMPERATURE: 98.4 F | HEART RATE: 97 BPM | WEIGHT: 187 LBS | SYSTOLIC BLOOD PRESSURE: 130 MMHG | OXYGEN SATURATION: 98 %

## 2024-11-12 DIAGNOSIS — G35 MULTIPLE SCLEROSIS (HCC): Primary | ICD-10-CM

## 2024-11-12 PROBLEM — N13.30 HYDRONEPHROSIS, LEFT: Status: RESOLVED | Noted: 2024-03-31 | Resolved: 2024-11-12

## 2024-11-12 PROBLEM — N39.0 COMPLICATED UTI (URINARY TRACT INFECTION): Status: RESOLVED | Noted: 2024-03-31 | Resolved: 2024-11-12

## 2024-11-12 PROBLEM — N17.9 AKI (ACUTE KIDNEY INJURY) (HCC): Status: RESOLVED | Noted: 2022-01-17 | Resolved: 2024-11-12

## 2024-11-12 NOTE — PROGRESS NOTES
[facility] NEUROLOGY   Gerald Hanley MSN, APRN-CNP, [facility]     [address] [place] [address]      [phone]                                      Office Follow Up:       Sonu Vasquez is a 79 y.o. ambidextrous male     We are following for longtanding MS    He presents alone and is a good historian    Previous medications: Tecfidera    He has had no new neurologic issues since his last visit.  PCP did increase his gabapentin due to an increase in dysesthesias in both feet.  He has noted some increased stress related to the death of his girlfriend and states that he is moving to [place] in two weeks to be closer to family.  He is blaming his worsening dysesthesias on the busyness of packing    No new weakness, falls, vision issues, bladder issues.  He lives independently    His cardiac issues are currently stable.  He is not currently taking any antiplatelet or anticoagulants.     No chest pain or palpitations  No SOB  No vertigo, lightheadedness or loss of consciousness  No falls, tripping or stumbling  No incontinence of bowels or bladder  No itching or bruising appreciated  No speech or swallowing problems    ROS otherwise negative      Current Outpatient Medications   Medication Sig Dispense Refill    tamsulosin (FLOMAX) 0.4 MG capsule       gabapentin (NEURONTIN) 300 MG capsule Take 1 capsule by mouth 2 times daily for 180 days. (Patient taking differently: Take 1 capsule by mouth 3 times daily. Patient states he is taking it 3 times daily.) 60 capsule 5    metoprolol succinate (TOPROL XL) 50 MG extended release tablet Take 1 tablet by mouth daily (Patient taking differently: Take 1 tablet by mouth in the morning and at bedtime) 90 tablet 3    rosuvastatin (CRESTOR) 10 MG tablet Take 1 tablet by mouth daily      tadalafil (CIALIS) 5 MG tablet Take 1 tablet by mouth daily      Azelastine HCl 137 MCG/SPRAY SOLN USE 1 SPRAY IN EACH NOSTRIL TWICE A DAY AS DIRECTED 90 mL 2    sodium chloride 5

## 2025-01-30 RX ORDER — METOPROLOL SUCCINATE 50 MG/1
50 TABLET, EXTENDED RELEASE ORAL 2 TIMES DAILY
Qty: 60 TABLET | Refills: 5 | Status: SHIPPED | OUTPATIENT
Start: 2025-01-30

## 2025-03-05 RX ORDER — GABAPENTIN 300 MG/1
300 CAPSULE ORAL 3 TIMES DAILY
Qty: 270 CAPSULE | Refills: 3 | Status: SHIPPED | OUTPATIENT
Start: 2025-03-05 | End: 2026-02-28

## 2025-06-10 RX ORDER — METOPROLOL SUCCINATE 50 MG/1
50 TABLET, EXTENDED RELEASE ORAL 2 TIMES DAILY
Qty: 60 TABLET | Refills: 6 | Status: SHIPPED | OUTPATIENT
Start: 2025-06-10

## (undated) DEVICE — SPONGE LAP W18XL18IN WHT COT 4 PLY FLD STRUNG RADPQ DISP ST

## (undated) DEVICE — COVER HNDL LT DISP

## (undated) DEVICE — YANKAUER,OPEN TIP,W/O VENT,STERILE: Brand: MEDLINE INDUSTRIES, INC.

## (undated) DEVICE — NEEDLE RETROBLB L1 1 2IN OD25GA SHRP

## (undated) DEVICE — GAUZE,SPONGE,4"X4",16PLY,XRAY,STRL,LF: Brand: MEDLINE

## (undated) DEVICE — CLIP INT SM TI EZ LD LIG SYS WECK HORZ

## (undated) DEVICE — CHANNEL DRAIN, 28FR, HUBLESS: Brand: JACKSON-PRATT

## (undated) DEVICE — DRESSING TRNSPAR W4XL55IN ACRYL SUP FLM W ADH WTRPRF OPSITE

## (undated) DEVICE — 4-PORT MANIFOLD: Brand: NEPTUNE 2

## (undated) DEVICE — FORCEPS MAX FINE CURVED

## (undated) DEVICE — DRAPE THER FLUID WARMING 66X44 IN FLAT SLUSH DBL DISC ORS

## (undated) DEVICE — SYRINGE MEDICAL 3ML CLEAR PLASTIC STANDARD NON CONTROL LUERLOCK TIP DISPOSABLE

## (undated) DEVICE — GLOVE ORANGE PI 7 1/2   MSG9075

## (undated) DEVICE — TOTAL TRAY, 16FR 10ML SIL FOLEY, URN: Brand: MEDLINE

## (undated) DEVICE — SET SURG BASIN OPEN HEART NO  1 REUSABLE

## (undated) DEVICE — TUBING PERF PMP L10FT OD0.25IN ID1/16IN MED CLASS VI PRECUT

## (undated) DEVICE — NEEDLE FLTR 18GA L1.5IN MEM THK5UM BLNT DISP

## (undated) DEVICE — CATHETER THOR 32FR L23IN PVC 6 EYELET STR ATRAUM

## (undated) DEVICE — PACK OPEN HRT DRP

## (undated) DEVICE — CATHETER F BLLN 5CC 14FR 2 W HYDRGEL COAT LESS TRAUM LUB

## (undated) DEVICE — NEEDLE HYPO 30GA L0.5IN BGE POLYPR HUB S STL REG BVL STR

## (undated) DEVICE — MICROSURGICAL INSTRUMENT ANTERIOR CHAMBER CANNULA 30GA: Brand: ALCON

## (undated) DEVICE — CATHETER IV 24GA L3/4IN PERIPH YEL S STL POLYUR PLAS HUB

## (undated) DEVICE — AORTIC PUNCHES ARE USED TO CREATE A UNIFORM OPENING IN BLOOD VESSELS DURING CARDIOVASCULAR SURGERY. THE VESSEL GRAFT IS INSERTED INTO THE CREATED OPENING AND SUTURED TO THE VESSEL WALL. AORTIC LANCETS ARE USED TO MAKE A SMALL UNIFORM CUT IN A BLOOD VESSEL TO FACILITATE INSERTION OF AN AORTIC PUNCH.  PUNCHES COME IN VARIOUS LENGTHS, DIAMETERS AND TIP CONFIGURATIONS.: Brand: CLEANCUT ROTATING AORTIC PUNCH

## (undated) DEVICE — LOOP VES W25MM THK1MM MAXI RED SIL FLD REPELLENT 100 PER

## (undated) DEVICE — TIP APPL GEL PLT ENDO 5MMX32CM

## (undated) DEVICE — CATHETER THOR 32FR L23IN PVC 5 EYELET STR ATRAUM

## (undated) DEVICE — Device

## (undated) DEVICE — CYSTO PACK: Brand: MEDLINE INDUSTRIES, INC.

## (undated) DEVICE — PAD,NON-ADHERENT,2X3,STERILE,LF,1/PK: Brand: MEDLINE

## (undated) DEVICE — ABSORBENT, WATERPROOF, BACTERIA PROOF FILM DRESSING: Brand: OPSITE POST OP 20X10CM CTN 20

## (undated) DEVICE — SOLUTION IV IRRIG POUR BRL 0.9% SODIUM CHL 2F7124

## (undated) DEVICE — CATHETER THORACENTESIS STR 28 FRX23 IN 6 EYELET TAPR TIP LF

## (undated) DEVICE — CONNECTOR PERF W64XH64XL64MM W  LUERLOCK

## (undated) DEVICE — INSTRUMENT GRADUATE REUSABLE

## (undated) DEVICE — STERILE POLYISOPRENE POWDER-FREE SURGICAL GLOVES: Brand: PROTEXIS

## (undated) DEVICE — TAPE ADH W3INXL10YD WHT COT WVN BK POWERFUL RUB BASE HIGHLY

## (undated) DEVICE — GOWN,SIRUS,FABRNF,L,20/CS: Brand: MEDLINE

## (undated) DEVICE — SHIELD EYE W3XL2.5IN UNIV CLR PLAS LTWT

## (undated) DEVICE — RETROGRADE CARDIOPLEGIA CATHETER: Brand: EDWARDS LIFESCIENCES RETROGRADE CARDIOPLEGIA CATHETER

## (undated) DEVICE — TAPE ADH W1INXL10YD WHT PAPR GENTLE BRTH FLX COMFORTABLE

## (undated) DEVICE — MAGNETIC INSTR DRAPE 20X16: Brand: MEDLINE INDUSTRIES, INC.

## (undated) DEVICE — DRAINBAG,ANTI-REFLUX TOWER,L/F,2000ML,LL: Brand: MEDLINE

## (undated) DEVICE — SUMP INTCARD SUCT AD 20FR PERICARD MAYO STYL FLX VERSATILE

## (undated) DEVICE — MICROSURGICAL INSTRUMENT HYDRODISSECTION CANNULA 27GA, 8MM BEND: Brand: ALCON

## (undated) DEVICE — GUIDEWIRE ENDOSCP L150CM DIA0.035IN TIP 3CM PTFE NIT

## (undated) DEVICE — BLADE ES L6IN ELASTOMERIC COAT EXT DURABLE BEND UPTO 90DEG

## (undated) DEVICE — MEDICINE CUP, GRADUATED, STER: Brand: MEDLINE

## (undated) DEVICE — GUIDE SURG SELECTION FOR GILLINOV COSGROVE LAA EXCLUSION SYS

## (undated) DEVICE — BLOOD TRANSFUSION FILTER: Brand: HAEMONETICS

## (undated) DEVICE — GLOVE SURG SZ 8 CRM LTX FREE POLYISOPRENE POLYMER BEAD ANTI

## (undated) DEVICE — SOLUTION IRRIGATION BAL SALT SOLUTION 500 ML STRL BSS

## (undated) DEVICE — SOLUTION IRRIG BSS ST 500ML

## (undated) DEVICE — SYRINGE, LUER LOCK, 10ML: Brand: MEDLINE

## (undated) DEVICE — PAD PREP L 2 PLY 70% ISO ALC NONWOVEN SFT ABSRB TOP ANTISEP

## (undated) DEVICE — INSUFFLATION TUBING SET WITH FILTER, FUNNEL CONNECTOR AND LUER LOCK: Brand: JOSNOE MEDICAL INC

## (undated) DEVICE — SYSTEM ENDOSCP VES HARV W/ TOOL CANN SEAL SHT PRT BLNT TIP

## (undated) DEVICE — STERILE LATEX POWDER FREE SURGICAL GLOVES WITH HYDROGEL COATING: Brand: PROTEXIS

## (undated) DEVICE — COVER,LIGHT HANDLE,FLX,2/PK: Brand: MEDLINE INDUSTRIES, INC.

## (undated) DEVICE — BAG DRNGE COMB PK

## (undated) DEVICE — OPTIFOAM GENTLE EX, SACRUM, 9X9: Brand: MEDLINE

## (undated) DEVICE — GLOVE ORANGE PI 7   MSG9070

## (undated) DEVICE — AGENT HEMSTAT W4XL8IN OXIDIZED REGENERATED CELOS ABSRB

## (undated) DEVICE — INSTRUMENT HORIZON HEMOCLIP APPLIER SET REUSABLE

## (undated) DEVICE — SYRINGE MED 30ML STD CLR PLAS LUERLOCK TIP N CTRL DISP

## (undated) DEVICE — KIT SURG W7XL11IN 2 PKT UNTREATED NA

## (undated) DEVICE — CHLORAPREP 26ML ORANGE

## (undated) DEVICE — BLOWER COR ART L16.5CM PLAS SHFT MAL W/ MIST IV SET AXIUS

## (undated) DEVICE — GARMENT,MEDLINE,DVT,INT,CALF,MED, GEN2: Brand: MEDLINE

## (undated) DEVICE — 1 ML TUBERCULIN SYRINGE,DETACHABLE NEEDLE: Brand: MONOJECT

## (undated) DEVICE — TUBING, SUCTION, 3/16" X 12', STRAIGHT: Brand: MEDLINE

## (undated) DEVICE — SOLUTION SCRB 4OZ 4% CHG H2O AIDED FOR PREOPERATIVE SKIN

## (undated) DEVICE — BASIC SINGLE BASIN 1-LF: Brand: MEDLINE INDUSTRIES, INC.

## (undated) DEVICE — PACK PROCEDURE SURG GEN CUST

## (undated) DEVICE — Device: Brand: RAP FEMORAL VENOUS CANNULA

## (undated) DEVICE — GLOVE SURG SZ 6 THK91MIL LTX FREE SYN POLYISOPRENE ANTI

## (undated) DEVICE — INTENDED FOR TISSUE SEPARATION, AND OTHER PROCEDURES THAT REQUIRE A SHARP SURGICAL BLADE TO PUNCTURE OR CUT.: Brand: BARD-PARKER ® STAINLESS STEEL BLADES

## (undated) DEVICE — LIQUID SOAP 4OZ

## (undated) DEVICE — GLASSES SAFETY PROTCT GRN

## (undated) DEVICE — PACK PROCEDURE SURG SURG CATARACT CUSTOM

## (undated) DEVICE — KIT INFUS PMP 270ML 4ML/HR 2ML/SITE SOAK CATH L5IN N NARC

## (undated) DEVICE — TUBING L10FT 3/8X3/32IN ST PRE CUT MED GRD

## (undated) DEVICE — MARKER,SKIN,WI/RULER AND LABELS: Brand: MEDLINE

## (undated) DEVICE — SCALPEL 15 DEG NO 715

## (undated) DEVICE — GLOVE SURG SZ 7.5 L11.73IN FNGR THK9.8MIL STRW LTX POLYMER

## (undated) DEVICE — LUER-LOK 360°: Brand: CONNECTA, LUER-LOK

## (undated) DEVICE — KIT PLT RATIO DISPNS KT 2IN CANN TIP SPRY TIP DISP MAGELLAN

## (undated) DEVICE — ALCOHOL RUBBING ISO 16OZ 70%

## (undated) DEVICE — CANNULA PERF 7FR L5.5IN AORT ROOT RADPQ STD TIP W/ VENT LN

## (undated) DEVICE — GOWN,SIRUS,FABRNF,XL,20/CS: Brand: MEDLINE

## (undated) DEVICE — SPONGE,DISSECTOR,K,XRAY,9/16"X1/4",STRL: Brand: MEDLINE

## (undated) DEVICE — BAG: SPONGE CT 10.25X32 2.0ML BLU 250/CS: Brand: MEDICAL ACTION INDUSTRIES

## (undated) DEVICE — TOWEL,OR,DSP,ST,WHITE,DLX,4/PK,20PK/CS: Brand: MEDLINE

## (undated) DEVICE — INSTRUMENT NEW THORACOTOMY TRAY  REUSABLE

## (undated) DEVICE — 25 MM 1.2 MICRON SYRINGE FILTER: Brand: SUPOR

## (undated) DEVICE — SOLUTION IV 1000ML 140MEQ/L SOD 5MEQ/L K 3MEQ/L MG 27MEQ/L

## (undated) DEVICE — GLOVE SURG SZ 65 THK91MIL LTX FREE SYN POLYISOPRENE

## (undated) DEVICE — SPEAR SURG TRIANG SHP HNDL PCH WECK-CEL

## (undated) DEVICE — ELECTRODE PT RET AD L9FT HI MOIST COND ADH HYDRGEL CORDED

## (undated) DEVICE — TUNNELER SHTH 11GAX8IN ON-Q

## (undated) DEVICE — COUNTER NDL 30 COUNT DBL MAG

## (undated) DEVICE — TOWEL,OR,DSP,ST,BLUE,STD,6/PK,12PK/CS: Brand: MEDLINE

## (undated) DEVICE — COVER MICROSCOPE KNOB SM

## (undated) DEVICE — ADHESIVE SKIN CLSR 0.7ML TOP DERMBND ADV

## (undated) DEVICE — SOLUTION IRRIGATION BAL SALT SOLUTION 15 ML STRL BSS

## (undated) DEVICE — DOUBLE BASIN SET: Brand: MEDLINE INDUSTRIES, INC.

## (undated) DEVICE — Z INACTIVE USE 2641837 CLIP LIG M BLU TI HRT SHP WIRE HORZ 600 PER BX

## (undated) DEVICE — TRAY MAJOR VASCULAR

## (undated) DEVICE — MARKER A/C LOCATOR GRAFT U SILICONE

## (undated) DEVICE — KIT DIL 8/12/16/20/24FR NDL 18GA GWIRE L180CM DIA0.035IN

## (undated) DEVICE — EZ GLIDE AORTIC CANNULA: Brand: EDWARDS LIFESCIENCES EZ GLIDE AORTIC CANNULA

## (undated) DEVICE — SOLUTION IV IRRIG WATER 1000ML POUR BRL 2F7114

## (undated) DEVICE — SPONGE,DRAIN,NONWVN,4"X4",6PLY,STRL,LF: Brand: MEDLINE

## (undated) DEVICE — SOLUTION IRRIG 1000ML STRL H2O USP PLAS POUR BTL

## (undated) DEVICE — WYE PIPE 1/2X1/2X3/8IN REDUC CONN

## (undated) DEVICE — ADHESIVE SKIN CLOSURE TOP 36 CC HI VISC DERMBND MINI

## (undated) DEVICE — SOLUTION IRRIG 3000ML 0.9% SOD CHL USP UROMATIC PLAS CONT

## (undated) DEVICE — CANNULA INJ L2.5IN BLNT TIP 3MM CLR BODY W/ 1 W VLV DLP

## (undated) DEVICE — CANNULA IRRIGATION DOME 45 DEG 27 GAX7/8 IN SMOOTH RYCROFT

## (undated) DEVICE — BATTERY PACK FOR VARISPEED: Brand: STRYKER VARISPEED

## (undated) DEVICE — PERFUSION PACK CUST OPN HRT

## (undated) DEVICE — SOLUTION IV 50ML 0.9% SOD CHL PLAS CONT USP VIAFLX

## (undated) DEVICE — BLOWER PWD 3GM FOR STERITALC TALCAIR NOVATECH

## (undated) DEVICE — CATHETER URET 5FR L70CM OPN END SGL LUMN INJ HUB FLEXIMA

## (undated) DEVICE — SOLUTION IRRIG 1000ML 0.9% SOD CHL USP POUR PLAS BTL

## (undated) DEVICE — COR-KNOT MINI® COMBO KITBASE PACKAGE TYPE - KITEACH STERILE PACKAGE KIT CONTAINS (2) SINGLE PATIENT USE COR-KNOT MINI® DEVICES AND (12) COR-KNOT® QUICK LOADS®.: Brand: COR-KNOT MINI®

## (undated) DEVICE — SET EXTN IV L30IN TBNG DIA0.1IN PRIMING 4ML MACBOR FEM ADPT

## (undated) DEVICE — GAUZE,SPONGE,4"X4",8PLY,STRL,LF,10/TRAY: Brand: MEDLINE

## (undated) DEVICE — DRAIN SURG SGL COLL PT TB FOR ATS BG OASIS

## (undated) DEVICE — SPECIMEN TRAP

## (undated) DEVICE — 3M™ STERI-DRAPE™ INCISE DRAPE 1050 (60CM X 45CM): Brand: STERI-DRAPE™

## (undated) DEVICE — PACK,UNIV, II AURORA: Brand: MEDLINE

## (undated) DEVICE — PACK PROC FLD MGMT SYS CENTURION CUST

## (undated) DEVICE — GLOVE SURG SZ 6 L11.2IN FNGR THK9.8MIL STRW LTX POLYMER

## (undated) DEVICE — 40436 HEAD REST OCULAR: Brand: 40436 HEAD REST OCULAR

## (undated) DEVICE — 6 FOOT DISPOSABLE EXTENSION CABLE WITH SAFE CONNECT / SCREW-DOWN